# Patient Record
Sex: FEMALE | Race: WHITE | HISPANIC OR LATINO | Employment: PART TIME | URBAN - METROPOLITAN AREA
[De-identification: names, ages, dates, MRNs, and addresses within clinical notes are randomized per-mention and may not be internally consistent; named-entity substitution may affect disease eponyms.]

---

## 2017-02-14 ENCOUNTER — ALLSCRIPTS OFFICE VISIT (OUTPATIENT)
Dept: OTHER | Facility: OTHER | Age: 37
End: 2017-02-14

## 2017-02-14 ENCOUNTER — TRANSCRIBE ORDERS (OUTPATIENT)
Dept: ADMINISTRATIVE | Facility: HOSPITAL | Age: 37
End: 2017-02-14

## 2017-02-14 DIAGNOSIS — E53.8 DEFICIENCY OF OTHER SPECIFIED B GROUP VITAMINS: ICD-10-CM

## 2017-02-14 DIAGNOSIS — G35 MULTIPLE SCLEROSIS (HCC): Primary | ICD-10-CM

## 2017-02-14 DIAGNOSIS — E55.9 VITAMIN D DEFICIENCY: ICD-10-CM

## 2017-02-14 DIAGNOSIS — G35 MULTIPLE SCLEROSIS (HCC): ICD-10-CM

## 2017-02-25 ENCOUNTER — APPOINTMENT (OUTPATIENT)
Dept: LAB | Facility: HOSPITAL | Age: 37
End: 2017-02-25
Payer: COMMERCIAL

## 2017-02-25 DIAGNOSIS — E55.9 VITAMIN D DEFICIENCY: ICD-10-CM

## 2017-02-25 DIAGNOSIS — Z79.899 OTHER LONG TERM (CURRENT) DRUG THERAPY: ICD-10-CM

## 2017-02-25 DIAGNOSIS — E53.8 DEFICIENCY OF OTHER SPECIFIED B GROUP VITAMINS: ICD-10-CM

## 2017-02-25 DIAGNOSIS — G35 MULTIPLE SCLEROSIS (HCC): ICD-10-CM

## 2017-02-25 LAB
25(OH)D3 SERPL-MCNC: 30.9 NG/ML (ref 30–100)
BASOPHILS # BLD AUTO: 0.01 THOUSANDS/ΜL (ref 0–0.1)
BASOPHILS NFR BLD AUTO: 0 % (ref 0–1)
BUN SERPL-MCNC: 11 MG/DL (ref 5–25)
CREAT SERPL-MCNC: 0.58 MG/DL (ref 0.6–1.3)
EOSINOPHIL # BLD AUTO: 0.09 THOUSAND/ΜL (ref 0–0.61)
EOSINOPHIL NFR BLD AUTO: 2 % (ref 0–6)
ERYTHROCYTE [DISTWIDTH] IN BLOOD BY AUTOMATED COUNT: 13.5 % (ref 11.6–15.1)
GFR SERPL CREATININE-BSD FRML MDRD: >60 ML/MIN/1.73SQ M
HCT VFR BLD AUTO: 37.7 % (ref 34.8–46.1)
HGB BLD-MCNC: 12.5 G/DL (ref 11.5–15.4)
LYMPHOCYTES # BLD AUTO: 1.22 THOUSANDS/ΜL (ref 0.6–4.47)
LYMPHOCYTES NFR BLD AUTO: 25 % (ref 14–44)
MCH RBC QN AUTO: 32.1 PG (ref 26.8–34.3)
MCHC RBC AUTO-ENTMCNC: 33.2 G/DL (ref 31.4–37.4)
MCV RBC AUTO: 97 FL (ref 82–98)
MONOCYTES # BLD AUTO: 0.37 THOUSAND/ΜL (ref 0.17–1.22)
MONOCYTES NFR BLD AUTO: 8 % (ref 4–12)
NEUTROPHILS # BLD AUTO: 3.14 THOUSANDS/ΜL (ref 1.85–7.62)
NEUTS SEG NFR BLD AUTO: 65 % (ref 43–75)
NRBC BLD AUTO-RTO: 0 /100 WBCS
PLATELET # BLD AUTO: 242 THOUSANDS/UL (ref 149–390)
PMV BLD AUTO: 10.5 FL (ref 8.9–12.7)
RBC # BLD AUTO: 3.89 MILLION/UL (ref 3.81–5.12)
VIT B12 SERPL-MCNC: 381 PG/ML (ref 100–900)
WBC # BLD AUTO: 4.83 THOUSAND/UL (ref 4.31–10.16)

## 2017-02-25 PROCEDURE — 82607 VITAMIN B-12: CPT

## 2017-02-25 PROCEDURE — 83918 ORGANIC ACIDS TOTAL QUANT: CPT

## 2017-02-25 PROCEDURE — 82565 ASSAY OF CREATININE: CPT

## 2017-02-25 PROCEDURE — 36415 COLL VENOUS BLD VENIPUNCTURE: CPT

## 2017-02-25 PROCEDURE — 85025 COMPLETE CBC W/AUTO DIFF WBC: CPT

## 2017-02-25 PROCEDURE — 82306 VITAMIN D 25 HYDROXY: CPT

## 2017-02-25 PROCEDURE — 84520 ASSAY OF UREA NITROGEN: CPT

## 2017-02-27 ENCOUNTER — GENERIC CONVERSION - ENCOUNTER (OUTPATIENT)
Dept: OTHER | Facility: OTHER | Age: 37
End: 2017-02-27

## 2017-03-01 LAB — METHYLMALONATE SERPL-SCNC: 141 NMOL/L (ref 0–378)

## 2017-03-04 ENCOUNTER — HOSPITAL ENCOUNTER (OUTPATIENT)
Dept: MRI IMAGING | Facility: HOSPITAL | Age: 37
Discharge: HOME | End: 2017-03-04
Payer: COMMERCIAL

## 2017-03-04 DIAGNOSIS — G35 MULTIPLE SCLEROSIS (HCC): ICD-10-CM

## 2017-03-04 PROCEDURE — A9585 GADOBUTROL INJECTION: HCPCS | Performed by: PHYSICIAN ASSISTANT

## 2017-03-04 PROCEDURE — 72157 MRI CHEST SPINE W/O & W/DYE: CPT

## 2017-03-04 RX ADMIN — GADOBUTROL 10 ML: 604.72 INJECTION INTRAVENOUS at 11:40

## 2017-03-18 ENCOUNTER — HOSPITAL ENCOUNTER (OUTPATIENT)
Dept: MRI IMAGING | Facility: HOSPITAL | Age: 37
Discharge: HOME | End: 2017-03-18
Payer: COMMERCIAL

## 2017-03-18 DIAGNOSIS — G35 MULTIPLE SCLEROSIS (HCC): ICD-10-CM

## 2017-03-18 PROCEDURE — A9585 GADOBUTROL INJECTION: HCPCS | Performed by: PHYSICIAN ASSISTANT

## 2017-03-18 PROCEDURE — 70553 MRI BRAIN STEM W/O & W/DYE: CPT

## 2017-03-18 PROCEDURE — 72156 MRI NECK SPINE W/O & W/DYE: CPT

## 2017-03-18 RX ADMIN — GADOBUTROL 10 ML: 604.72 INJECTION INTRAVENOUS at 11:32

## 2017-03-21 ENCOUNTER — HOSPITAL ENCOUNTER (OUTPATIENT)
Dept: MRI IMAGING | Facility: HOSPITAL | Age: 37
Discharge: HOME | End: 2017-03-21
Payer: COMMERCIAL

## 2017-03-21 DIAGNOSIS — G35 MULTIPLE SCLEROSIS (HCC): ICD-10-CM

## 2017-04-22 ENCOUNTER — TRANSCRIBE ORDERS (OUTPATIENT)
Dept: ADMINISTRATIVE | Facility: HOSPITAL | Age: 37
End: 2017-04-22

## 2017-04-22 ENCOUNTER — LAB (OUTPATIENT)
Dept: LAB | Facility: HOSPITAL | Age: 37
End: 2017-04-22
Attending: PSYCHIATRY & NEUROLOGY
Payer: COMMERCIAL

## 2017-04-22 DIAGNOSIS — G35 MS (MULTIPLE SCLEROSIS) (HCC): ICD-10-CM

## 2017-04-22 DIAGNOSIS — G35 MS (MULTIPLE SCLEROSIS) (HCC): Primary | ICD-10-CM

## 2017-04-22 LAB
ALBUMIN SERPL BCP-MCNC: 3.6 G/DL (ref 3.5–5)
ALP SERPL-CCNC: 71 U/L (ref 46–116)
ALT SERPL W P-5'-P-CCNC: 15 U/L (ref 12–78)
AST SERPL W P-5'-P-CCNC: 14 U/L (ref 5–45)
BASOPHILS # BLD AUTO: 0.01 THOUSANDS/ΜL (ref 0–0.1)
BASOPHILS NFR BLD AUTO: 0 % (ref 0–1)
BILIRUB DIRECT SERPL-MCNC: 0.07 MG/DL (ref 0–0.2)
BILIRUB SERPL-MCNC: 0.2 MG/DL (ref 0.2–1)
EOSINOPHIL # BLD AUTO: 0.06 THOUSAND/ΜL (ref 0–0.61)
EOSINOPHIL NFR BLD AUTO: 1 % (ref 0–6)
ERYTHROCYTE [DISTWIDTH] IN BLOOD BY AUTOMATED COUNT: 13.8 % (ref 11.6–15.1)
HCT VFR BLD AUTO: 37.1 % (ref 34.8–46.1)
HGB BLD-MCNC: 12.4 G/DL (ref 11.5–15.4)
LYMPHOCYTES # BLD AUTO: 1.28 THOUSANDS/ΜL (ref 0.6–4.47)
LYMPHOCYTES NFR BLD AUTO: 18 % (ref 14–44)
MCH RBC QN AUTO: 32.2 PG (ref 26.8–34.3)
MCHC RBC AUTO-ENTMCNC: 33.4 G/DL (ref 31.4–37.4)
MCV RBC AUTO: 96 FL (ref 82–98)
MONOCYTES # BLD AUTO: 0.32 THOUSAND/ΜL (ref 0.17–1.22)
MONOCYTES NFR BLD AUTO: 5 % (ref 4–12)
NEUTROPHILS # BLD AUTO: 5.5 THOUSANDS/ΜL (ref 1.85–7.62)
NEUTS SEG NFR BLD AUTO: 76 % (ref 43–75)
NRBC BLD AUTO-RTO: 0 /100 WBCS
PLATELET # BLD AUTO: 265 THOUSANDS/UL (ref 149–390)
PMV BLD AUTO: 10.3 FL (ref 8.9–12.7)
PROT SERPL-MCNC: 7 G/DL (ref 6.4–8.2)
RBC # BLD AUTO: 3.85 MILLION/UL (ref 3.81–5.12)
WBC # BLD AUTO: 7.17 THOUSAND/UL (ref 4.31–10.16)

## 2017-04-22 PROCEDURE — 36415 COLL VENOUS BLD VENIPUNCTURE: CPT

## 2017-04-22 PROCEDURE — 80076 HEPATIC FUNCTION PANEL: CPT

## 2017-04-22 PROCEDURE — 85025 COMPLETE CBC W/AUTO DIFF WBC: CPT

## 2017-05-17 ENCOUNTER — HOSPITAL ENCOUNTER (EMERGENCY)
Facility: HOSPITAL | Age: 37
Discharge: HOME | End: 2017-05-17
Admitting: EMERGENCY MEDICINE
Payer: COMMERCIAL

## 2017-05-17 VITALS
SYSTOLIC BLOOD PRESSURE: 137 MMHG | HEART RATE: 89 BPM | TEMPERATURE: 98.6 F | OXYGEN SATURATION: 98 % | DIASTOLIC BLOOD PRESSURE: 76 MMHG | RESPIRATION RATE: 16 BRPM

## 2017-05-17 DIAGNOSIS — N89.8 VAGINAL DISCHARGE: Primary | ICD-10-CM

## 2017-05-17 DIAGNOSIS — M79.642 BILATERAL HAND PAIN: ICD-10-CM

## 2017-05-17 DIAGNOSIS — M79.641 BILATERAL HAND PAIN: ICD-10-CM

## 2017-05-17 LAB
BACTERIA UR QL AUTO: ABNORMAL /HPF
BILIRUB UR QL STRIP: NEGATIVE
CLARITY UR: CLEAR
COLOR UR: YELLOW
COLOR, POC: YELLOW
GLUCOSE UR STRIP-MCNC: NEGATIVE MG/DL
HCG UR QL: NEGATIVE
HGB UR QL STRIP.AUTO: ABNORMAL
KETONES UR STRIP-MCNC: NEGATIVE MG/DL
LEUKOCYTE ESTERASE UR QL STRIP: NEGATIVE
NITRITE UR QL STRIP: NEGATIVE
NON-SQ EPI CELLS URNS QL MICRO: ABNORMAL /HPF
PH UR STRIP.AUTO: 6 [PH] (ref 4.5–8)
PROT UR STRIP-MCNC: NEGATIVE MG/DL
RBC #/AREA URNS AUTO: ABNORMAL /HPF
SP GR UR STRIP.AUTO: 1.02 (ref 1–1.03)
UROBILINOGEN UR QL STRIP.AUTO: 1 E.U./DL
WBC #/AREA URNS AUTO: ABNORMAL /HPF

## 2017-05-17 PROCEDURE — 81002 URINALYSIS NONAUTO W/O SCOPE: CPT | Performed by: PHYSICIAN ASSISTANT

## 2017-05-17 PROCEDURE — 87591 N.GONORRHOEAE DNA AMP PROB: CPT | Performed by: PHYSICIAN ASSISTANT

## 2017-05-17 PROCEDURE — 96372 THER/PROPH/DIAG INJ SC/IM: CPT

## 2017-05-17 PROCEDURE — 87491 CHLMYD TRACH DNA AMP PROBE: CPT | Performed by: PHYSICIAN ASSISTANT

## 2017-05-17 PROCEDURE — 81001 URINALYSIS AUTO W/SCOPE: CPT

## 2017-05-17 PROCEDURE — 81025 URINE PREGNANCY TEST: CPT | Performed by: PHYSICIAN ASSISTANT

## 2017-05-17 PROCEDURE — 99283 EMERGENCY DEPT VISIT LOW MDM: CPT

## 2017-05-17 RX ORDER — AZITHROMYCIN 250 MG/1
1000 TABLET, FILM COATED ORAL ONCE
Status: COMPLETED | OUTPATIENT
Start: 2017-05-17 | End: 2017-05-17

## 2017-05-17 RX ADMIN — AZITHROMYCIN MONOHYDRATE 1000 MG: 250 TABLET ORAL at 16:57

## 2017-05-17 RX ADMIN — WATER 250 MG: 1 INJECTION INTRAMUSCULAR; INTRAVENOUS; SUBCUTANEOUS at 16:58

## 2017-05-18 LAB
CHLAMYDIA DNA CVX QL NAA+PROBE: ABNORMAL
N GONORRHOEA DNA GENITAL QL NAA+PROBE: ABNORMAL

## 2017-05-31 ENCOUNTER — ALLSCRIPTS OFFICE VISIT (OUTPATIENT)
Dept: OTHER | Facility: OTHER | Age: 37
End: 2017-05-31

## 2017-05-31 DIAGNOSIS — M54.50 LOW BACK PAIN: ICD-10-CM

## 2017-05-31 DIAGNOSIS — R91.1 SOLITARY PULMONARY NODULE: ICD-10-CM

## 2017-06-10 ENCOUNTER — APPOINTMENT (OUTPATIENT)
Dept: LAB | Facility: HOSPITAL | Age: 37
End: 2017-06-10
Attending: PSYCHIATRY & NEUROLOGY
Payer: COMMERCIAL

## 2017-06-10 ENCOUNTER — TRANSCRIBE ORDERS (OUTPATIENT)
Dept: ADMINISTRATIVE | Facility: HOSPITAL | Age: 37
End: 2017-06-10

## 2017-06-10 DIAGNOSIS — G35 MS (MULTIPLE SCLEROSIS) (HCC): Primary | ICD-10-CM

## 2017-06-10 DIAGNOSIS — G35 MS (MULTIPLE SCLEROSIS) (HCC): ICD-10-CM

## 2017-06-10 LAB
ALBUMIN SERPL BCP-MCNC: 3.6 G/DL (ref 3.5–5)
ALP SERPL-CCNC: 62 U/L (ref 46–116)
ALT SERPL W P-5'-P-CCNC: 18 U/L (ref 12–78)
AST SERPL W P-5'-P-CCNC: 11 U/L (ref 5–45)
BASOPHILS # BLD AUTO: 0.01 THOUSANDS/ΜL (ref 0–0.1)
BASOPHILS NFR BLD AUTO: 0 % (ref 0–1)
BILIRUB DIRECT SERPL-MCNC: 0.06 MG/DL (ref 0–0.2)
BILIRUB SERPL-MCNC: 0.23 MG/DL (ref 0.2–1)
EOSINOPHIL # BLD AUTO: 0.07 THOUSAND/ΜL (ref 0–0.61)
EOSINOPHIL NFR BLD AUTO: 1 % (ref 0–6)
ERYTHROCYTE [DISTWIDTH] IN BLOOD BY AUTOMATED COUNT: 14.5 % (ref 11.6–15.1)
HCT VFR BLD AUTO: 37 % (ref 34.8–46.1)
HGB BLD-MCNC: 12 G/DL (ref 11.5–15.4)
LYMPHOCYTES # BLD AUTO: 1.3 THOUSANDS/ΜL (ref 0.6–4.47)
LYMPHOCYTES NFR BLD AUTO: 21 % (ref 14–44)
MCH RBC QN AUTO: 31.7 PG (ref 26.8–34.3)
MCHC RBC AUTO-ENTMCNC: 32.4 G/DL (ref 31.4–37.4)
MCV RBC AUTO: 98 FL (ref 82–98)
MONOCYTES # BLD AUTO: 0.4 THOUSAND/ΜL (ref 0.17–1.22)
MONOCYTES NFR BLD AUTO: 7 % (ref 4–12)
NEUTROPHILS # BLD AUTO: 4.39 THOUSANDS/ΜL (ref 1.85–7.62)
NEUTS SEG NFR BLD AUTO: 71 % (ref 43–75)
PLATELET # BLD AUTO: 255 THOUSANDS/UL (ref 149–390)
PMV BLD AUTO: 10.3 FL (ref 8.9–12.7)
PROT SERPL-MCNC: 6.9 G/DL (ref 6.4–8.2)
RBC # BLD AUTO: 3.78 MILLION/UL (ref 3.81–5.12)
WBC # BLD AUTO: 6.17 THOUSAND/UL (ref 4.31–10.16)

## 2017-06-10 PROCEDURE — 80076 HEPATIC FUNCTION PANEL: CPT

## 2017-06-10 PROCEDURE — 36415 COLL VENOUS BLD VENIPUNCTURE: CPT

## 2017-06-10 PROCEDURE — 85025 COMPLETE CBC W/AUTO DIFF WBC: CPT

## 2017-06-12 ENCOUNTER — GENERIC CONVERSION - ENCOUNTER (OUTPATIENT)
Dept: OTHER | Facility: OTHER | Age: 37
End: 2017-06-12

## 2017-06-12 ENCOUNTER — APPOINTMENT (OUTPATIENT)
Dept: PHYSICAL THERAPY | Facility: CLINIC | Age: 37
End: 2017-06-12
Payer: COMMERCIAL

## 2017-06-12 DIAGNOSIS — M54.50 LOW BACK PAIN: ICD-10-CM

## 2017-06-12 PROCEDURE — 97161 PT EVAL LOW COMPLEX 20 MIN: CPT

## 2017-06-15 ENCOUNTER — ALLSCRIPTS OFFICE VISIT (OUTPATIENT)
Dept: OTHER | Facility: OTHER | Age: 37
End: 2017-06-15

## 2017-06-21 ENCOUNTER — LAB CONVERSION - ENCOUNTER (OUTPATIENT)
Dept: OTHER | Facility: OTHER | Age: 37
End: 2017-06-21

## 2017-06-21 LAB
INDEX VALUE (HISTORICAL): 0.14
JCV ANTIBODY (HISTORICAL): NEGATIVE

## 2017-08-12 ENCOUNTER — APPOINTMENT (OUTPATIENT)
Dept: LAB | Facility: HOSPITAL | Age: 37
End: 2017-08-12
Attending: PSYCHIATRY & NEUROLOGY
Payer: COMMERCIAL

## 2017-08-12 ENCOUNTER — TRANSCRIBE ORDERS (OUTPATIENT)
Dept: ADMINISTRATIVE | Facility: HOSPITAL | Age: 37
End: 2017-08-12

## 2017-08-12 DIAGNOSIS — G35 MS (MULTIPLE SCLEROSIS) (HCC): Primary | ICD-10-CM

## 2017-08-12 DIAGNOSIS — G35 MS (MULTIPLE SCLEROSIS) (HCC): ICD-10-CM

## 2017-08-12 LAB
ALBUMIN SERPL BCP-MCNC: 3.6 G/DL (ref 3.5–5)
ALP SERPL-CCNC: 58 U/L (ref 46–116)
ALT SERPL W P-5'-P-CCNC: 22 U/L (ref 12–78)
AST SERPL W P-5'-P-CCNC: 15 U/L (ref 5–45)
BASOPHILS # BLD AUTO: 0.01 THOUSANDS/ΜL (ref 0–0.1)
BASOPHILS NFR BLD AUTO: 0 % (ref 0–1)
BILIRUB DIRECT SERPL-MCNC: 0.05 MG/DL (ref 0–0.2)
BILIRUB SERPL-MCNC: 0.2 MG/DL (ref 0.2–1)
EOSINOPHIL # BLD AUTO: 0.07 THOUSAND/ΜL (ref 0–0.61)
EOSINOPHIL NFR BLD AUTO: 2 % (ref 0–6)
ERYTHROCYTE [DISTWIDTH] IN BLOOD BY AUTOMATED COUNT: 13.8 % (ref 11.6–15.1)
HCT VFR BLD AUTO: 37.9 % (ref 34.8–46.1)
HGB BLD-MCNC: 12.4 G/DL (ref 11.5–15.4)
LYMPHOCYTES # BLD AUTO: 1.15 THOUSANDS/ΜL (ref 0.6–4.47)
LYMPHOCYTES NFR BLD AUTO: 24 % (ref 14–44)
MCH RBC QN AUTO: 31.7 PG (ref 26.8–34.3)
MCHC RBC AUTO-ENTMCNC: 32.7 G/DL (ref 31.4–37.4)
MCV RBC AUTO: 97 FL (ref 82–98)
MONOCYTES # BLD AUTO: 0.4 THOUSAND/ΜL (ref 0.17–1.22)
MONOCYTES NFR BLD AUTO: 8 % (ref 4–12)
NEUTROPHILS # BLD AUTO: 3.12 THOUSANDS/ΜL (ref 1.85–7.62)
NEUTS SEG NFR BLD AUTO: 66 % (ref 43–75)
PLATELET # BLD AUTO: 239 THOUSANDS/UL (ref 149–390)
PMV BLD AUTO: 10.4 FL (ref 8.9–12.7)
PROT SERPL-MCNC: 7 G/DL (ref 6.4–8.2)
RBC # BLD AUTO: 3.91 MILLION/UL (ref 3.81–5.12)
WBC # BLD AUTO: 4.75 THOUSAND/UL (ref 4.31–10.16)

## 2017-08-12 PROCEDURE — 85025 COMPLETE CBC W/AUTO DIFF WBC: CPT

## 2017-08-12 PROCEDURE — 36415 COLL VENOUS BLD VENIPUNCTURE: CPT

## 2017-08-12 PROCEDURE — 80076 HEPATIC FUNCTION PANEL: CPT

## 2017-09-26 ENCOUNTER — HOSPITAL ENCOUNTER (EMERGENCY)
Facility: HOSPITAL | Age: 37
Discharge: HOME | End: 2017-09-26
Attending: EMERGENCY MEDICINE | Admitting: EMERGENCY MEDICINE
Payer: COMMERCIAL

## 2017-09-26 ENCOUNTER — APPOINTMENT (EMERGENCY)
Dept: ULTRASOUND IMAGING | Facility: HOSPITAL | Age: 37
End: 2017-09-26
Payer: COMMERCIAL

## 2017-09-26 VITALS
HEART RATE: 72 BPM | DIASTOLIC BLOOD PRESSURE: 72 MMHG | TEMPERATURE: 98.1 F | RESPIRATION RATE: 16 BRPM | BODY MASS INDEX: 34.28 KG/M2 | SYSTOLIC BLOOD PRESSURE: 150 MMHG | OXYGEN SATURATION: 98 % | WEIGHT: 206 LBS

## 2017-09-26 DIAGNOSIS — R10.9 ACUTE ABDOMINAL PAIN: Primary | ICD-10-CM

## 2017-09-26 DIAGNOSIS — N94.6 MENSES PAINFUL: ICD-10-CM

## 2017-09-26 LAB
BACTERIA UR QL AUTO: ABNORMAL /HPF
BILIRUB UR QL STRIP: NEGATIVE
CLARITY UR: CLEAR
COLOR UR: YELLOW
EXT PREG TEST URINE: NEGATIVE
GLUCOSE UR STRIP-MCNC: NEGATIVE MG/DL
HGB UR QL STRIP.AUTO: ABNORMAL
KETONES UR STRIP-MCNC: NEGATIVE MG/DL
LEUKOCYTE ESTERASE UR QL STRIP: NEGATIVE
NITRITE UR QL STRIP: NEGATIVE
NON-SQ EPI CELLS URNS QL MICRO: ABNORMAL /HPF
PH UR STRIP.AUTO: 7 [PH] (ref 4.5–8)
PROT UR STRIP-MCNC: ABNORMAL MG/DL
RBC #/AREA URNS AUTO: ABNORMAL /HPF
SP GR UR STRIP.AUTO: 1.02 (ref 1–1.03)
UROBILINOGEN UR QL STRIP.AUTO: 1 E.U./DL
WBC #/AREA URNS AUTO: ABNORMAL /HPF

## 2017-09-26 PROCEDURE — 99284 EMERGENCY DEPT VISIT MOD MDM: CPT

## 2017-09-26 PROCEDURE — 76830 TRANSVAGINAL US NON-OB: CPT

## 2017-09-26 PROCEDURE — 81001 URINALYSIS AUTO W/SCOPE: CPT

## 2017-09-26 PROCEDURE — 81002 URINALYSIS NONAUTO W/O SCOPE: CPT | Performed by: EMERGENCY MEDICINE

## 2017-09-26 PROCEDURE — 76856 US EXAM PELVIC COMPLETE: CPT

## 2017-09-26 PROCEDURE — 96372 THER/PROPH/DIAG INJ SC/IM: CPT

## 2017-09-26 PROCEDURE — 81025 URINE PREGNANCY TEST: CPT | Performed by: EMERGENCY MEDICINE

## 2017-09-26 RX ORDER — NAPROXEN 250 MG/1
250 TABLET ORAL
Qty: 21 TABLET | Refills: 0 | Status: SHIPPED | OUTPATIENT
Start: 2017-09-26 | End: 2018-02-02

## 2017-09-26 RX ORDER — KETOROLAC TROMETHAMINE 30 MG/ML
15 INJECTION, SOLUTION INTRAMUSCULAR; INTRAVENOUS ONCE
Status: COMPLETED | OUTPATIENT
Start: 2017-09-26 | End: 2017-09-26

## 2017-09-26 RX ORDER — KETOROLAC TROMETHAMINE 30 MG/ML
15 INJECTION, SOLUTION INTRAMUSCULAR; INTRAVENOUS ONCE
Status: DISCONTINUED | OUTPATIENT
Start: 2017-09-26 | End: 2017-09-26

## 2017-09-26 RX ADMIN — KETOROLAC TROMETHAMINE 15 MG: 30 INJECTION, SOLUTION INTRAMUSCULAR at 17:12

## 2017-09-26 NOTE — DISCHARGE INSTRUCTIONS
Abdominal Pain   WHAT YOU NEED TO KNOW:   Abdominal pain can be dull, achy, or sharp  You may have pain in one area of your abdomen, or in your entire abdomen  Your pain may be caused by a condition such as constipation, food sensitivity or poisoning, infection, or a blockage  Abdominal pain can also be from a hernia, appendicitis, or an ulcer  Liver, gallbladder, or kidney conditions can also cause abdominal pain  The cause of your abdominal pain may be unknown  DISCHARGE INSTRUCTIONS:   Return to the emergency department if:   · You have new chest pain or shortness of breath  · You have pulsing pain in your upper abdomen or lower back that suddenly becomes constant  · Your pain is in the right lower abdominal area and worsens with movement  · You have a fever over 100 4°F (38°C) or shaking chills  · You are vomiting and cannot keep food or liquids down  · Your pain does not improve or gets worse over the next 8 to 12 hours  · You see blood in your vomit or bowel movements, or they look black and tarry  · Your skin or the whites of your eyes turn yellow  · You are a woman and have a large amount of vaginal bleeding that is not your monthly period  Contact your healthcare provider if:   · You have pain in your lower back  · You are a man and have pain in your testicles  · You have pain when you urinate  · You have questions or concerns about your condition or care  Follow up with your healthcare provider within 24 hours or as directed:  Write down your questions so you remember to ask them during your visits  Medicines:   · Medicines  may be given to calm your stomach and prevent vomiting or to decrease pain  Ask how to take pain medicine safely  · Take your medicine as directed  Contact your healthcare provider if you think your medicine is not helping or if you have side effects  Tell him of her if you are allergic to any medicine   Keep a list of the medicines, vitamins, and herbs you take  Include the amounts, and when and why you take them  Bring the list or the pill bottles to follow-up visits  Carry your medicine list with you in case of an emergency  © 2017 [address] [employer] [employer] Information is for End User's use only and may not be sold, redistributed or otherwise used for commercial purposes  All illustrations and images included in CareNotes® are the copyrighted property of A D A M , Inc  or [address]  The above information is an  only  It is not intended as medical advice for individual conditions or treatments  Talk to your doctor, nurse or pharmacist before following any medical regimen to see if it is safe and effective for you  Dysmenorrhea   WHAT YOU NEED TO KNOW:   Dysmenorrhea is painful menstrual cramps at or around the time of your monthly period  DISCHARGE INSTRUCTIONS:   Medicines: You may need any of the following:  · NSAIDs  help decrease swelling, pain, and fever  This medicine is available with or without a doctor's order  NSAIDs can cause stomach bleeding or kidney problems in certain people  If you take blood thinner medicine, always ask your healthcare provider if NSAIDs are safe for you  Always read the medicine label and follow directions  · Birth control medicine  may help decrease your pain  This medicine may be birth control pills or an IUD that does not contain copper  · Take your medicine as directed  Contact your healthcare provider if you think your medicine is not helping or if you have side effects  Tell him if you are allergic to any medicine  Keep a list of the medicines, vitamins, and herbs you take  Include the amounts, and when and why you take them  Bring the list or the pill bottles to follow-up visits  Carry your medicine list with you in case of an emergency  Eat low-fat foods: Increase the amount of vegetables and raw seeds you eat   Ask your healthcare provider if you should take vitamin B or magnesium supplements  These will help decrease your pain  Do not eat dairy products or eggs  Apply heat:  Apply heat on your lower abdomen for 20 to 30 minutes every 2 hours for as many days as directed  Heat helps decrease pain and muscle spasms  Manage your stress:  Stress can make your symptoms worse  Try relaxation exercises, such as deep breathing  Exercise regularly:  Ask your healthcare provider about the best exercise plan for you  Exercise can help decrease pain  Keep a record of your pain:  Write down when your pain and periods start and stop  Bring the record with you to your follow-up visits  Do not smoke:  Avoid others who smoke  If you smoke, it is never too late to quit  Smoking can increase your risk for dysmenorrhea  Ask your healthcare provider for information if you need help quitting  Follow up with your healthcare provider or OB-GYN as directed:  Write down your questions so you remember to ask them during your visits  Contact your healthcare provider or OB-GYN if:   · You have anxiety or feel depressed  · Your periods are early, late, or more painful than usual     · You have questions or concerns about your condition or care  Return to the emergency department if:   · You have severe pain  · You have heavy vaginal bleeding and you feel faint  · You have sudden chest pain and trouble breathing  © 2017 [address] [employer]  Information is for End User's use only and may not be sold, redistributed or otherwise used for commercial purposes  All illustrations and images included in CareNotes® are the copyrighted property of [employer]  or Jethro Hamilton  The above information is an  only  It is not intended as medical advice for individual conditions or treatments  Talk to your doctor, nurse or pharmacist before following any medical regimen to see if it is safe and effective for you

## 2017-09-26 NOTE — ED NOTES
Patient transported to [address], [address] [place] [address]  09/26/17 1244

## 2017-09-26 NOTE — ED PROVIDER NOTES
History  Chief Complaint   Patient presents with    Abdominal Pain     Pt reports abdominal pain, started, "Right before I got my period on Friday, but it went away " Pt reports pain returned today  Pt reports hx of right ovarian cyst, pt having b/l lower abdominal pain, that radiates down right leg only  61-year-old male presents for evaluation of sharp crampy lower abdominal pain which started Friday before she got her period  She states the pain was moderate intensity and resolved  She states that she has been having vaginal bleeding which is typical of her normal menses for the past 4 days  She states the pain came back today was rated as moderate intensity, constant, nonradiating, without modifying factors  She is concerned because she does have a known right ovarian cyst   Patient has vaginal discharge, urinary complaints, back/flank pain, fevers, chills, chest pain, shortness of breath, anorexia  Prior to Admission Medications   Prescriptions Last Dose Informant Patient Reported? Taking? Dimethyl Fumarate (TECFIDERA) 240 MG CPDR   Yes No   Sig: Take 240 mg by mouth 2 (two) times a day  aspirin 81 MG tablet   Yes No   Sig: Take 81 mg by mouth daily  Facility-Administered Medications: None       Past Medical History:   Diagnosis Date    MS (multiple sclerosis)     Ovarian cyst        Past Surgical History:   Procedure Laterality Date    KNEE ARTHROSCOPY         History reviewed  No pertinent family history  I have reviewed and agree with the history as documented  Social History   Substance Use Topics    Smoking status: Current Every Day Smoker    Smokeless tobacco: Never Used    Alcohol use Yes      Comment: occasionally        Review of Systems   Constitutional: Negative for activity change, appetite change, fatigue and fever  HENT: Negative for congestion, dental problem, ear pain, rhinorrhea and sore throat  Eyes: Negative for pain and redness     Respiratory: Negative for chest tightness, shortness of breath and wheezing  Cardiovascular: Negative for chest pain and palpitations  Gastrointestinal: Positive for abdominal pain  Negative for blood in stool, constipation, diarrhea, nausea and vomiting  Endocrine: Negative for cold intolerance and heat intolerance  Genitourinary: Positive for vaginal bleeding  Negative for difficulty urinating, dysuria, enuresis, flank pain, frequency, hematuria, vaginal discharge and vaginal pain  Musculoskeletal: Negative for arthralgias and myalgias  Skin: Negative for color change, pallor and rash  Neurological: Negative for weakness and numbness  Hematological: Does not bruise/bleed easily  Psychiatric/Behavioral: Negative for agitation, hallucinations and suicidal ideas  Physical Exam  ED Triage Vitals   Temperature Pulse Respirations Blood Pressure SpO2   09/26/17 1519 09/26/17 1520 09/26/17 1519 09/26/17 1520 09/26/17 1519   98 1 °F (36 7 °C) 72 16 150/72 98 %      Temp Source Heart Rate Source Patient Position - Orthostatic VS BP Location FiO2 (%)   09/26/17 1519 09/26/17 1519 09/26/17 1519 09/26/17 1519 --   Temporal Monitor Sitting Right arm       Pain Score       09/26/17 1519       9           Physical Exam   Constitutional: She is oriented to person, place, and time  She appears well-developed and well-nourished  HENT:   Mouth/Throat: No oropharyngeal exudate  TMs normal bilaterally no pharyngeal erythema no rhinorrhea nontender palpation of sinuses, normal looking turbinates   Eyes: Conjunctivae and EOM are normal    Neck: Normal range of motion  Neck supple  No meningeal signs   Cardiovascular: Normal rate, regular rhythm, normal heart sounds and intact distal pulses  Pulmonary/Chest: Effort normal and breath sounds normal  No respiratory distress  She has no wheezes  She has no rales  She exhibits no tenderness  Abdominal: Soft   Bowel sounds are normal  She exhibits no distension and no mass  There is no tenderness  No hernia  No cvat   Musculoskeletal: Normal range of motion  She exhibits no edema  Lymphadenopathy:     She has no cervical adenopathy  Neurological: She is alert and oriented to person, place, and time  No cranial nerve deficit  Skin: No rash noted  No erythema  No edema   Psychiatric: She has a normal mood and affect  Her behavior is normal    Nursing note and vitals reviewed  ED Medications  Medications   ketorolac (TORADOL) 30 mg/mL injection 15 mg (15 mg Intramuscular Given 9/26/17 1930)       Diagnostic Studies  Labs Reviewed   URINE MICROSCOPIC - Abnormal        Result Value Ref Range Status    RBC, UA 4-10 (*) None Seen, 0-5 /hpf Final    WBC, UA None Seen  None Seen, 0-5, 5-55, 5-65 /hpf Final    Epithelial Cells Occasional  None Seen, Occasional /hpf Final    Bacteria, UA Occasional  None Seen, Occasional /hpf Final   POCT URINALYSIS DIPSTICK - Abnormal    ED URINE MACROSCOPIC - Abnormal     Protein, UA Trace (*) Negative mg/dl Final    Blood, UA Moderate (*) Negative Final    Color, UA Yellow   Final    Clarity, UA Clear   Final    pH, UA 7 0  4 5 - 8 0 Final    Leukocytes, UA Negative  Negative Final    Nitrite, UA Negative  Negative Final    Glucose, UA Negative  Negative mg/dl Final    Ketones, UA Negative  Negative mg/dl Final    Urobilinogen, UA 1 0  0 2, 1 0 E U /dl E U /dl Final    Bilirubin, UA Negative  Negative Final    Specific Gravity, UA 1 025  1 003 - 1 030 Final    Narrative:     CLINITEK RESULT   POCT PREGNANCY, URINE - Normal    EXT PREG TEST UR (Ref: Negative) negative   Final       US pelvis complete w transvaginal   ED Interpretation   1   Normal ovaries without evidence for torsion  2   Probable peritoneal inclusion cyst in the pouch of Pérez,   unchanged from priors   No imaging follow-up needed  Final Result       1  Normal ovaries without evidence for torsion        2   Probable peritoneal inclusion cyst in the pouch of Amando David, unchanged from priors  No imaging follow-up needed  Workstation performed: [id number]             Procedures  Procedures      Phone Contacts  ED Phone Contact    ED Course  ED Course as of Sep 26 1715   Tue Sep 26, 2017   1711 Negative ultrasound  Patient's abdominal pain during menses likely secondary to dysmenorrhea  Will discharged home with NSAIDs, PCP follow-up  MDM  Number of Diagnoses or Management Options  Acute abdominal pain:   Menses painful:   Diagnosis management comments: Abdominal pain in menses with acute worsening on R with known cyst-will do urine dip, urpeg, u/s to r/o torsion, nsaids      CritCare Time    Disposition  Final diagnoses:   Acute abdominal pain   Menses painful     ED Disposition     ED Disposition Condition Comment    Discharge  [facility] discharge to home/self care  Condition at discharge: Good        Follow-up Information     Follow up With Specialties Details Why Apple Bhardwaj MD Family Medicine Schedule an appointment as soon as possible for a visit in 2 days  [address] [place]  [facility]          Patient's Medications   Discharge Prescriptions    NAPROXEN (NAPROSYN) 250 MG TABLET    Take 1 tablet by mouth 3 (three) times a day with meals for 7 days       Start Date: 9/26/2017 End Date: 10/3/2017       Order Dose: 250 mg       Quantity: 21 tablet    Refills: 0     No discharge procedures on file      ED Provider  Electronically Signed by       Marisol Chang MD  09/26/17 5923

## 2017-09-27 ENCOUNTER — GENERIC CONVERSION - ENCOUNTER (OUTPATIENT)
Dept: OTHER | Facility: OTHER | Age: 37
End: 2017-09-27

## 2017-11-09 ENCOUNTER — HOSPITAL ENCOUNTER (EMERGENCY)
Facility: HOSPITAL | Age: 37
Discharge: HOME | End: 2017-11-09
Attending: EMERGENCY MEDICINE | Admitting: EMERGENCY MEDICINE
Payer: COMMERCIAL

## 2017-11-09 VITALS
TEMPERATURE: 97.8 F | RESPIRATION RATE: 18 BRPM | HEART RATE: 77 BPM | DIASTOLIC BLOOD PRESSURE: 61 MMHG | BODY MASS INDEX: 34.45 KG/M2 | OXYGEN SATURATION: 100 % | SYSTOLIC BLOOD PRESSURE: 131 MMHG | WEIGHT: 207 LBS

## 2017-11-09 DIAGNOSIS — R10.2 PELVIC PAIN: Primary | ICD-10-CM

## 2017-11-09 LAB
BACTERIA UR QL AUTO: ABNORMAL /HPF
BILIRUB UR QL STRIP: NEGATIVE
CLARITY UR: CLEAR
COLOR UR: YELLOW
COLOR, POC: YELLOW
EXT PREG TEST URINE: NEGATIVE
GLUCOSE UR STRIP-MCNC: NEGATIVE MG/DL
HGB UR QL STRIP.AUTO: ABNORMAL
KETONES UR STRIP-MCNC: NEGATIVE MG/DL
LEUKOCYTE ESTERASE UR QL STRIP: ABNORMAL
NITRITE UR QL STRIP: NEGATIVE
NON-SQ EPI CELLS URNS QL MICRO: ABNORMAL /HPF
PH UR STRIP.AUTO: 6 [PH] (ref 4.5–8)
PROT UR STRIP-MCNC: NEGATIVE MG/DL
RBC #/AREA URNS AUTO: ABNORMAL /HPF
SP GR UR STRIP.AUTO: 1.02 (ref 1–1.03)
UROBILINOGEN UR QL STRIP.AUTO: 0.2 E.U./DL
WBC #/AREA URNS AUTO: ABNORMAL /HPF

## 2017-11-09 PROCEDURE — 81001 URINALYSIS AUTO W/SCOPE: CPT

## 2017-11-09 PROCEDURE — 81025 URINE PREGNANCY TEST: CPT | Performed by: EMERGENCY MEDICINE

## 2017-11-09 PROCEDURE — 87491 CHLMYD TRACH DNA AMP PROBE: CPT | Performed by: EMERGENCY MEDICINE

## 2017-11-09 PROCEDURE — 99283 EMERGENCY DEPT VISIT LOW MDM: CPT

## 2017-11-09 PROCEDURE — 87591 N.GONORRHOEAE DNA AMP PROB: CPT | Performed by: EMERGENCY MEDICINE

## 2017-11-09 PROCEDURE — 81002 URINALYSIS NONAUTO W/O SCOPE: CPT | Performed by: EMERGENCY MEDICINE

## 2017-11-09 RX ORDER — NAPROXEN 500 MG/1
500 TABLET ORAL 2 TIMES DAILY WITH MEALS
Qty: 20 TABLET | Refills: 0 | Status: SHIPPED | OUTPATIENT
Start: 2017-11-09 | End: 2018-02-02

## 2017-11-09 RX ORDER — TRAMADOL HYDROCHLORIDE 50 MG/1
50 TABLET ORAL EVERY 6 HOURS PRN
Qty: 20 TABLET | Refills: 0 | Status: SHIPPED | OUTPATIENT
Start: 2017-11-09 | End: 2017-11-19

## 2017-11-09 NOTE — DISCHARGE INSTRUCTIONS
Pelvic Pain   WHAT YOU NEED TO KNOW:   Pelvic pain may be caused by a number of conditions, such as irritable bowel syndrome, appendicitis, or constipation  A urinary tract infection, prostate inflammation, menstrual cramps, or kidney stones can also cause pelvic pain  You may have pain on one or both sides of your pelvis  Pelvic pain can develop if you have trauma to your pelvis or if you sit or stand for a long time  DISCHARGE INSTRUCTIONS:   Medicines: You may need any of the following:  · NSAIDs , such as ibuprofen, help decrease swelling, pain, and fever  This medicine is available with or without a doctor's order  NSAIDs can cause stomach bleeding or kidney problems in certain people  If you take blood thinner medicine, always ask your healthcare provider if NSAIDs are safe for you  Always read the medicine label and follow directions  · Prescription pain medicine  may be given  Ask how to take this medicine safely  · Birth control medicines  may help decrease pain in women  · Take your medicine as directed  Contact your healthcare provider if you think your medicine is not helping or if you have side effects  Tell him or her if you are allergic to any medicine  Keep a list of the medicines, vitamins, and herbs you take  Include the amounts, and when and why you take them  Bring the list or the pill bottles to follow-up visits  Carry your medicine list with you in case of an emergency  Call 911 for any of the following:   · You have severe chest pain and sudden trouble breathing  Contact your healthcare provider if:   · You have a fever  · You have nausea, vomiting, or diarrhea  · Your pain does not go away, or it gets worse, even after treatment  · You have numbness in your legs or toes  · You have heavy or unusual vaginal bleeding  · You have questions or concerns about your condition or care  Manage your pelvic pain:   · Keep a pain record    Write down when your pain happens and how severe it is  Include any other symptoms you have with your pain  A record will help you keep track of pain cycles  Bring the record with you to your follow-up visits  It may also help your healthcare provider find out what is causing your pain  · Learn ways to relax  Deep breathing, meditation, and relaxation techniques can help decrease your pain  When you are tense, your pain may increase  · Treat or prevent constipation  Drink liquids as directed  You may need to drink more liquid than usual  Ask your healthcare provider how much liquid to drink each day  Eat high-fiber foods  High-fiber foods include fruit, vegetables, whole-grain breads and cereals, and beans  You may need to change the foods you eat if you have irritable bowel syndrome  Follow up with your healthcare provider as directed: You may need physical therapy  You may need to see an orthopedic specialist  Write down your questions so you remember to ask them during your visits  © 2017 [address] [employer]  Information is for End User's use only and may not be sold, redistributed or otherwise used for commercial purposes  All illustrations and images included in CareNotes® are the copyrighted property of A D A M , Inc  or Jethro Hamilton  The above information is an  only  It is not intended as medical advice for individual conditions or treatments  Talk to your doctor, nurse or pharmacist before following any medical regimen to see if it is safe and effective for you

## 2017-11-09 NOTE — ED PROVIDER NOTES
History  Chief Complaint   Patient presents with    Pelvic Pain     Reports to have pelvic pain that occurs monthly the week prior to menstual period  Also reports yellow, bloody vaginal discharge  C/o pelvic pain every month about a week before her period  Ob-gyn told her that she might  Have endometriosis  She has an appt  With ob-gyn next week and had an ultrasound last week  +mild vaginal discharge  Pt  Is sexually active , doesn't always use condoms  No fevers, no n/v    Pt  Is using naprosyn for pain but it isn't helping  Prior to Admission Medications   Prescriptions Last Dose Informant Patient Reported? Taking? Dimethyl Fumarate (TECFIDERA) 240 MG CPDR   Yes No   Sig: Take 240 mg by mouth 2 (two) times a day  aspirin 81 MG tablet   Yes No   Sig: Take 81 mg by mouth daily  naproxen (NAPROSYN) 250 mg tablet   No No   Sig: Take 1 tablet by mouth 3 (three) times a day with meals for 7 days      Facility-Administered Medications: None       Past Medical History:   Diagnosis Date    MS [facility]multiple sclerosis) ([facility] [address] )     Ovarian cyst        Past Surgical History:   Procedure Laterality Date    KNEE ARTHROSCOPY         History reviewed  No pertinent family history  I have reviewed and agree with the history as documented  Social History   Substance Use Topics    Smoking status: Current Every Day Smoker    Smokeless tobacco: Never Used    Alcohol use Yes      Comment: occasionally        Review of Systems   Constitutional: Negative for appetite change, fatigue and fever  HENT: Negative for rhinorrhea and sore throat  Respiratory: Negative for cough, shortness of breath and wheezing  Cardiovascular: Negative for chest pain and leg swelling  Gastrointestinal: Negative for abdominal pain, diarrhea and vomiting  Genitourinary: Positive for pelvic pain and vaginal discharge  Negative for dysuria, flank pain and vaginal bleeding     Musculoskeletal: Negative for back pain and neck pain  Skin: Negative for rash  Neurological: Negative for syncope and headaches  Psychiatric/Behavioral:        Mood normal       Physical Exam  ED Triage Vitals [11/09/17 1211]   Temperature Pulse Respirations Blood Pressure SpO2   97 8 °F (36 6 °C) 77 18 131/61 100 %      Temp Source Heart Rate Source Patient Position - Orthostatic VS BP Location FiO2 (%)   Temporal -- -- -- --      Pain Score       7           Orthostatic Vital Signs  Vitals:    11/09/17 1211   BP: 131/61   Pulse: 77       Physical Exam   Constitutional: She is oriented to person, place, and time  She appears well-developed and well-nourished  HENT:   Head: Normocephalic and atraumatic  Neck: Normal range of motion  Neck supple  Cardiovascular: Normal rate and regular rhythm  Pulmonary/Chest: Effort normal and breath sounds normal    Abdominal: Soft  There is no tenderness  Genitourinary:   Genitourinary Comments: Cervix is not reddened, mild discharge present, no uterine /ovarian tenderness, cultures neg  No CMT   Musculoskeletal: Normal range of motion  Neurological: She is alert and oriented to person, place, and time  Skin: Skin is warm and dry  Nursing note and vitals reviewed  ED Medications  Medications - No data to display    Diagnostic Studies  Results Reviewed     Procedure Component Value Units Date/Time    Chlamydia/GC amplified DNA by PCR [[id number]] Collected:  11/09/17 1334    Lab Status:   In process Specimen:  Genital from Vaginal Updated:  11/09/17 1337    Urine Microscopic [[id number]]  (Abnormal) Collected:  11/09/17 1339    Lab Status:  Final result Specimen:  Urine from Urine, Clean Catch Updated:  11/09/17 1255     RBC, UA None Seen /hpf      WBC, UA 1-2 (A) /hpf      Epithelial Cells Occasional /hpf      Bacteria, UA Occasional /hpf     POCT urinalysis dipstick [[id number]]  (Abnormal) Resulted:  11/09/17 1231    Lab Status:  Final result Updated:  11/09/17 1231     Color, UA yellow POCT pregnancy, urine [[id number]]  (Normal) Resulted:  11/09/17 1231    Lab Status:  Final result Updated:  11/09/17 1231     EXT PREG TEST UR (Ref: Negative) negative    ED Urine Macroscopic [[id number]]  (Abnormal) Collected:  11/09/17 1339    Lab Status:  Final result Specimen:  Urine Updated:  11/09/17 1224     Color, UA Yellow     Clarity, UA Clear     pH, UA 6 0     Leukocytes, UA Trace (A)     Nitrite, UA Negative     Protein, UA Negative mg/dl      Glucose, UA Negative mg/dl      Ketones, UA Negative mg/dl      Urobilinogen, UA 0 2 E U /dl      Bilirubin, UA Negative     Blood, UA Moderate (A)     Specific [place], UA 1 025    Narrative:       CLINITEK RESULT                 No orders to display              Procedures  Procedures       Phone Contacts  ED Phone Contact    ED Course  ED Course                                MDM  Number of Diagnoses or Management Options  Pelvic pain:      Amount and/or Complexity of Data Reviewed  Clinical lab tests: ordered and reviewed    Risk of Complications, Morbidity, and/or Mortality  Presenting problems: moderate      CritCare Time    Disposition  Final diagnoses:   Pelvic pain     Time reflects when diagnosis was documented in both MDM as applicable and the Disposition within this note     Time User Action Codes Description Comment    11/9/2017  1:28 PM Laith Bowling Add [R10 2] Pelvic pain       ED Disposition     ED Disposition Condition Comment    Discharge  Reynold Hernandez discharge to home/self care      Condition at discharge: Stable        Follow-up Information     Follow up With Specialties Details Why Abner Cruz MD [facility]   [address] [address]  [address] [place] [address]   or any ob-gyn dr Xenia Orellana  [phone]        Discharge Medication List as of 11/9/2017  1:29 PM      START taking these medications    Details   traMADol Maykel Ospina) 50 mg tablet Take 1 tablet by mouth every 6 (six) hours as needed for moderate pain for up to 10 days, Starting Thu 11/9/2017, Until Sun 11/19/2017, Print         CONTINUE these medications which have CHANGED    Details   naproxen (NAPROSYN) 500 mg tablet Take 1 tablet by mouth 2 (two) times a day with meals, Starting Thu 11/9/2017, Print         CONTINUE these medications which have NOT CHANGED    Details   aspirin 81 MG tablet Take 81 mg by mouth daily  , Until Discontinued, Historical Med      Dimethyl Fumarate (TECFIDERA) 240 MG CPDR Take 240 mg by mouth 2 (two) times a day , Until Discontinued, Historical Med           No discharge procedures on file      ED Provider  Electronically Signed by           Ivonne Nunez MD  11/09/17 1893

## 2017-11-10 LAB
CHLAMYDIA DNA CVX QL NAA+PROBE: NORMAL
N GONORRHOEA DNA GENITAL QL NAA+PROBE: NORMAL

## 2017-11-27 ENCOUNTER — ALLSCRIPTS OFFICE VISIT (OUTPATIENT)
Dept: OTHER | Facility: OTHER | Age: 37
End: 2017-11-27

## 2017-11-29 NOTE — PROGRESS NOTES
Assessment    1  Multiple sclerosis (340) (G35)  2  Fatigue (780 79) (R53 83)  3  Bilateral hand numbness (782 0) (R20 0)    Discussion/Summary  Discussion Summary:   Pt here for neuro follow upjust found out she is now about 5 weeks pregnantwas having abdominal pain and had endo scan with some bleeding post procedure with spottingthinks she was pregnant at time of scanis now following with new ob in [place] but may be going back to ob in [facility] now off tecfidera, and gabapentinstopped meds about 3 weeks ago at the 2 week kayla during time frame of gyn eval for abdominal paineven went to ER due to the intial painhas 3 children at homeis getting her ultrasound on [place] told there is a risk for miscarriage due to procedure 1/3 riskto call if any clinical changesaware to remain off tecfiderawill have better timing of pregnancy after the ultrasounddue date was done [facility]  Counseling Documentation With Imm: The patient was counseled regarding diagnostic results,-- instructions for management,-- risk factor reductions,-- prognosis,-- patient and family education,-- risks and benefits of treatment options  Goals and Barriers: The patient has the current Goals: Call for any new sxs  Patient's Capacity to Self-Care: Patient is able to Self-Care  Patient Education: Educational resources provided:   Medication SE Review and Pt Understands Tx: Possible side effects of new medications were reviewed with the patient/guardian today  Patient Guardian understands agrees: The treatment plan was reviewed with the patient/guardian  The patient/guardian understands and agrees with the treatment plan      Chief Complaint  Chief Complaint Free Text Note Form: Patient is here for follow up MS  History of Present Illness  HPI: Jeremy Paniagua returns today for follow-up of MS   To review, she is a 41 yo female with PMH of migraines, depression and anxiety  Pt notes sxs began in January 2015, she had a work injury to the left shoulder  She noted in March 2015, she had left t numbness down her torso, down into her feet and legs  She also experienced urinary leakage without Valsalva  Pt then experienced right eye pain and fogginess of vision  She was seen by Dr Shreyas Oviedo and had an OCT done  It revealed normal findings on the right but on the left a sector defect  MRI of the brain from May 13, 2015 revealed a few scattered nonspecific supratentorial WM lesions  No acute infarction, or hemorrhage or enh  MRI of the lumbar spine without contrast revealed small L5/S1 disc herniation, MRI of the T-spine revealed evidence of solitary 8mm lesion to the left of midline at T8/9 in the dorsal Cord  No pathological enh  MRI of the C-spine also done on June 13, 2015 revealed a solitary 5 mm focus of active demyelination in the right lateral cord at C2  Pt was given 3 days of IV steroids and tolerated ok  Pt with significant GI upset with the steroids and also some mild change in mood with increased anxiety  She was NMO negative and Lyme negative  She did not have an LP  MRA of the head was done due to a history of a sister with cerebral aneurysm  This was normal  She was placed on Copaxone  Back in Oct 2015 she was off her Copaxone for about 3 months due to insurance issues  She had increased symptoms during that time with banding and bladder issues, and she did receive steroids with minimal relief  Updated MRI Thoracic Jan 2016, No abnormal enhancement  Persistent small left posterior T8-9 spinal cord lesion suspicious for MS  No pathologic enhancement  Findings are stable  MRI Lumbar Jan 2016 No abnormal enhancement  Persistent tiny thin broad left central disc protrusion L5-S1 with left S1 nerve root contiguity, but without nerve root displacement, compression or edema, Persistent mild degenerative facet hypertrophy L4-5  She went back on her Copaxone TIW   In May 2016, patient stopped Copxone due to increasing site reactions, and she initiated Tecfidera on 5/6/16  She had some GI issues at first, but then tolerated fairly well last seen in June 2017  She is now off the tecfidera due to unexpected pregnancy  pt just found out she is now about 5 weeks pregnant  pt was having abdominal pain and had endo scan with some bleeding post procedure with spotting  pt thinks she was pregnant at time of scan  pt is now following with new ob in [place] but may be going back to ob in [facility]  pt is now off tecfidera, and gabapentin  she stopped meds about 3 weeks ago at the 2 week kayla during time frame of gyn eval for abdominal pain  pt even went to ER due to the intial pain  pt has 3 children at home  pt is getting her ultrasound on dec 8th  pt was told there is a risk for miscarriage due to procedure 1/3 risk  no new MS sxs  pt aware to remain off tecfidera  pt will have better timing of pregnancy after the ultrasound  per pt doing a pregnancy phoebe, her expected due date is July 25th  extended appt to rev pregnancy and MS  no new sxs  no falls or trips  no change in bowel or bladder  no recent infections  pt notes occ int tingling in her hands  She had updated imaging in May 2016, stable c-spine and stable brain MRIs  pt notes some increased fatigue with her first trimester but overall doing ok  re rev last updated MRI brain, c-spine and t-spine in March 2017 and all stable  No new or enhancing lesions in any area  She continues with bladder urgency and has also had some bowel urgency  Denies vision changes  No falls  No trouble with speech or swallowing  Review of Systems  ros rev with pt at appt Neurological ROS:  Constitutional: fatigue  HEENT:  no sinus problems, not feeling congested, no blurred vision, no dryness of the eyes, no eye pain, no hearing loss, no tinnitus, no mouth sores, no sore throat, no hoarseness, no dysphagia, no masses, no bleeding    Cardiovascular:  no chest pain or pressure, no palpitations present, the heart rate was not rapid or irregular, no swelling in the arms or legs, no poor circulation  Respiratory:  no unusual or persistant cough, no shortness of breath with or without exertion  Genitourinary: feelings of urinary urgency  Musculoskeletal: head/neck/back pain  Integumentary  no masses, no rash, no skin lesions, no livedo reticularis  Psychiatric: anxiety,-- depression-- and-- mood swings  Endocrine  no unusual weight loss or gain, no excessive urination, no excessive thirst, no hair loss or gain, no hot or cold intolerance, no menstrual period change or irregularity, no loss of sexual ability or drive, no erection difficulty, no nipple discharge  Hematologic/Lymphatic: a tendency for easy bruising  Neurological General: headache,-- increased sleepiness-- and-- waking up at night  Neurological Mental Status:  no confusion, no mood swings, no alteration or loss of consciousness, no difficulty expressing/understanding speech, no memory problems  Neurological Cranial Nerves:  no blurry or double vision, no loss of vision, no face drooping, no facial numbness or weakness, no taste or smell loss/changes, no hearing loss or ringing, no vertigo or dizziness, no dysphagia, no slurred speech  Neurological Motor findings include: twitching  Neurological Coordination:  no unsteadiness, no vertigo or dizziness, no clumsiness, no problems reaching for objects  Neurological Sensory: numbness-- and-- tingling  Neurological Gait:  no difficulty walking, not falling to one side, no sensation of being pushed, has not had falls  Active Problems  1  Abnormal MRI of head (793 0) (R93 0)  2  Anxiety (300 00) (F41 9)  3  Bilateral hand numbness (782 0) (R20 0)  4  Blurred vision, right eye (368 8) (H53 8)  5  Cervical cancer screening (V76 2) (Z12 4)  6  Chronic bilateral low back pain without sciatica (724 2,338 29) (M54 5,G89 29)  7   Contraception (V25 9) (Z30 9)  8  Eczema (692 9) (L30 9)  9  Fatigue (780 79) (R53 83)  10  Generalized abdominal pain (789 07) (R10 84)  11  Long term use of drug (V58 69) (Z79 899)  12  Lumbar disc disease (722 93) (M51 9)  13  Lung nodule seen on imaging study (793 11) (R91 1)  14  Multiple sclerosis (340) (G35)  15  Numbness in left leg (782 0) (R20 0)  16  Obesity (278 00) (E66 9)  17  Vitamin B12 deficiency (266 2) (E53 8)  18  Vitamin D deficiency (268 9) (E55 9)    Past Medical History    1  History of Acute sinusitis (461 9) (J01 90)  2  Acute upper respiratory infection (465 9) (J06 9)  3  History of Herniated lumbar intervertebral disc (722 10) (M51 26)  4  History of Herpes zoster (053 9) (B02 9)  5  History of acute pharyngitis (V12 69) (Z87 09)  6  History of urinary tract infection (V13 02) (Z87 440)  7  History of Major depressive disorder, recurrent (296 30) (F33 9)  8  History of Obesity complicating pregnancy, childbirth, or puerperium, antepartum (649 13,278 00) (O99 210,E66 9)  9  History of Panic Disorder Without Agoraphobia (300 01)  10  History of Tobacco use disorder complicating pregnancy, childbirth, or the puerperium,  antepartum condition or complication (366 34) (O20 524)  Active Problems And Past Medical History Reviewed: The active problems and past medical history were reviewed and updated today  Surgical History  1  History of Knee Arthroscopy (Therapeutic)  Surgical History Reviewed: The surgical history was reviewed and updated today  Family History  Mother   1  Family history of Hypertension  Father   2  Family history of macular degeneration (V19 19) (Z83 518)  3  Family history of Hypertension  Family History   4  Family history of glaucoma (V19 11) (Z83 511)  5  Family history of macular degeneration (V19 19) (Z83 518)  6  Family history of Hypertension (V17 49)  Family History Reviewed: The family history was reviewed and updated today         Social History     · Alcohol Use (History)   · Current Every Day Smoker (305 1)   · Denied: History of Drug use   · Educational Level - Has High School Diploma   · Marital History - Single   · Occupation:   · Social alcohol use (Z78 9)  Social History Reviewed: The social history was reviewed and updated today  Current Meds  1  Amantadine HCl - 100 MG Oral Capsule; TAKE 1 CAPSULE TWICE DAILY; Therapy: 25Obg3466 to (Evaluate:13Oct2017)  Requested for: 83DMS3224; Last Rx:15Jun2017 Ordered  2  Fish Oil 1000 MG Oral Capsule; Therapy: (Recorded:15Jun2017) to Recorded  3  Gabapentin 300 MG Oral Capsule; 1 BID; Therapy: 74CRE5092 to (Evaluate:25Jan2016)  Requested for: 08LUA2832; Last Rx:27Oct2015; Status: ACTIVE - Renewal Denied Ordered  4  Gabapentin 300 MG Oral Capsule; take 2 caps AM, 1 cap noon, and 1 cap PM; Therapy: 18CQU6478 to (Evaluate:53Fts1677)  Requested for: 93ZMP7359; Last Rx:15Jun2017 Ordered  5  Naproxen 500 MG Oral Tablet; TAKE 1 TABLET EVERY 12 HOURS WITH FOOD AS NEEDED; Therapy: 02HZS3561 to (Evaluate:53Zck7452)  Requested for: 63UFE9646; Last Rx:55Ebh5655 Ordered  6  Tecfidera 240 MG Oral Capsule Delayed Release; Take 1 capsule twice daily  Requested for: 24UUO6856; Last Rx:03Nov2017 Ordered  7  Vitamin B-12 1000 MCG Oral Tablet; Therapy: (Recorded:31Mar2016) to Recorded  8  Vitamin D3 5000 UNIT Oral Capsule; Therapy: (Recorded:27Oct2016) to Recorded  Medication List Reviewed: The medication list was reviewed and updated today  Allergies    1  No Known Drug Allergies    Vitals  Signs   Recorded: 27Nov2017 11:00AM   Heart Rate: 72  Systolic: 820  Diastolic: 60  Height: 5 ft 5 in  Weight: 206 lb 4 oz  BMI Calculated: 34 32  BSA Calculated: 2    Physical Exam   Constitutional  General appearance: No acute distress, well appearing and well nourished  Constitutional  General Appearance: Appears appropriate for age, healthy, well developed, appropriately groomed and appropriately dressed -- distal pulses intact madhu    Eyes Ophthalmoscopic examination: Vision is grossly normal  Gross visual field testing by confrontation shows no abnormalities  EOMI in both eyes  Conjunctivae clear  Eyelids normal palpebral fissures equal  Orbits exhibit normal position  No discharge from the eyes  PERRL  Cardiovascular  Peripheral vascular exam: Normal pulses throughout, no tenderness, erythema or swelling  Musculoskeletal  Gait and station: Abnormal  -- wide based, mild antalgic  Muscle strength: Normal strength throughout  Muscle tone: No atrophy, abnormal movements, flaccidity, cogwheeling or spasticity  Involuntary movements: None observed  Neurologic  Mental Status: Mood is normal  Affect is normal  Memory is intact  (Concentration) No apparent agnosia present  Thought process appears clear and appropriate  Motor System: No pronator drift  Upper Extremities:Normal to inspection  No tenderness over the upper extremities bilaterally  No instability bilaterally  Strength: Motor strength is 5/5 bilaterally  Normal muscle tone bilaterally  Muscle bulk: Muscle bulk is normal bilaterally  Full ROM bilaterally  Lower Extremities: Normal to inspection and palpation  No tenderness of the lower extrimities bilaterally  Exhibits no instability bilaterally  Strength: Motor strength is 5/5 bilaterally  Normal muscle tone bilaterally  Muscle Bulk: Muscle bulk is normal bilaterally  Full ROM bilaterally  Reflexes: DTR's are normal and symmetric bilaterally  Babinski's reflex is negative bilaterally  No pathologic ankle clonus   Coordination: Cerebellum function intact  No involuntary movement or psychomotor activity  Sensation: Abnormal  -- decreased vibration left LE  Cortical function: Normal    Judgment and insight: Normal    Cranial Nerve Exam  II: Normal with no deficit  -- disc flat  III,IV, VI:Normal with no deficit   V: Tamiko with no deficitl  VII: Normal with no deficit  VIII: Normal with no deficit     IV: Normal with no deficit  X: Normal with no deficit  XI: Normal with no deficit  XII: Normal with no deficit  Constitutional  General appearance: No acute distress, well appearing and well nourished  Eyes  Conjunctiva and lids: No erythema, swelling or discharge  Recent and remote memory: Intact  Mood and affect: Normal        Results/Data  Diagnostic Studies Reviewed: I personally reviewed the films/images/results in the office today  My interpretation follows  Diagnostic Review see hpi        Future Appointments    Date/Time Provider Specialty Site   01/25/2018 11:00 AM Avis Starr [facility] Neurology [facility] [address]       Signatures   Electronically signed by : ANDREW Ramirez ; Nov 28 2017  6:24AM EST                       (Author)    Electronically signed by : ANDREW Ramirez ; Nov 28 2017  8:36AM EST                       (Author)

## 2017-12-13 ENCOUNTER — GENERIC CONVERSION - ENCOUNTER (OUTPATIENT)
Dept: OTHER | Facility: OTHER | Age: 37
End: 2017-12-13

## 2017-12-13 ENCOUNTER — HOSPITAL ENCOUNTER (EMERGENCY)
Facility: HOSPITAL | Age: 37
Discharge: HOME | End: 2017-12-13
Attending: EMERGENCY MEDICINE | Admitting: EMERGENCY MEDICINE
Payer: COMMERCIAL

## 2017-12-13 VITALS
OXYGEN SATURATION: 99 % | TEMPERATURE: 97.5 F | HEART RATE: 75 BPM | RESPIRATION RATE: 18 BRPM | DIASTOLIC BLOOD PRESSURE: 57 MMHG | BODY MASS INDEX: 34.45 KG/M2 | WEIGHT: 207 LBS | SYSTOLIC BLOOD PRESSURE: 120 MMHG

## 2017-12-13 DIAGNOSIS — R10.9 ABDOMINAL PAIN DURING PREGNANCY: Primary | ICD-10-CM

## 2017-12-13 DIAGNOSIS — O26.899 ABDOMINAL PAIN DURING PREGNANCY: Primary | ICD-10-CM

## 2017-12-13 LAB
ABO GROUP BLD: NORMAL
ALBUMIN SERPL BCP-MCNC: 3.4 G/DL (ref 3.5–5)
ALP SERPL-CCNC: 59 U/L (ref 46–116)
ALT SERPL W P-5'-P-CCNC: 16 U/L (ref 12–78)
ANION GAP SERPL CALCULATED.3IONS-SCNC: 7 MMOL/L (ref 4–13)
AST SERPL W P-5'-P-CCNC: 14 U/L (ref 5–45)
B-HCG SERPL-ACNC: ABNORMAL MIU/ML
BACTERIA UR QL AUTO: NORMAL /HPF
BASOPHILS # BLD AUTO: 0.01 THOUSANDS/ΜL (ref 0–0.1)
BASOPHILS NFR BLD AUTO: 0 % (ref 0–1)
BILIRUB SERPL-MCNC: 0.17 MG/DL (ref 0.2–1)
BILIRUB UR QL STRIP: NEGATIVE
BLD GP AB SCN SERPL QL: NEGATIVE
BUN SERPL-MCNC: 8 MG/DL (ref 5–25)
CALCIUM SERPL-MCNC: 9.5 MG/DL (ref 8.3–10.1)
CHLAMYDIA DNA CVX QL NAA+PROBE: NORMAL
CHLORIDE SERPL-SCNC: 103 MMOL/L (ref 100–108)
CLARITY UR: CLEAR
CO2 SERPL-SCNC: 27 MMOL/L (ref 21–32)
COLOR UR: YELLOW
CREAT SERPL-MCNC: 0.43 MG/DL (ref 0.6–1.3)
EOSINOPHIL # BLD AUTO: 0.13 THOUSAND/ΜL (ref 0–0.61)
EOSINOPHIL NFR BLD AUTO: 2 % (ref 0–6)
ERYTHROCYTE [DISTWIDTH] IN BLOOD BY AUTOMATED COUNT: 13.7 % (ref 11.6–15.1)
GFR SERPL CREATININE-BSD FRML MDRD: 131 ML/MIN/1.73SQ M
GLUCOSE SERPL-MCNC: 84 MG/DL (ref 65–140)
GLUCOSE UR STRIP-MCNC: NEGATIVE MG/DL
HCT VFR BLD AUTO: 32.3 % (ref 34.8–46.1)
HGB BLD-MCNC: 11.1 G/DL (ref 11.5–15.4)
HGB UR QL STRIP.AUTO: ABNORMAL
HYALINE CASTS #/AREA URNS LPF: NORMAL /LPF
KETONES UR STRIP-MCNC: NEGATIVE MG/DL
LEUKOCYTE ESTERASE UR QL STRIP: NEGATIVE
LYMPHOCYTES # BLD AUTO: 1.73 THOUSANDS/ΜL (ref 0.6–4.47)
LYMPHOCYTES NFR BLD AUTO: 23 % (ref 14–44)
MCH RBC QN AUTO: 32.3 PG (ref 26.8–34.3)
MCHC RBC AUTO-ENTMCNC: 34.4 G/DL (ref 31.4–37.4)
MCV RBC AUTO: 94 FL (ref 82–98)
MONOCYTES # BLD AUTO: 0.54 THOUSAND/ΜL (ref 0.17–1.22)
MONOCYTES NFR BLD AUTO: 7 % (ref 4–12)
N GONORRHOEA DNA GENITAL QL NAA+PROBE: NORMAL
NEUTROPHILS # BLD AUTO: 5.03 THOUSANDS/ΜL (ref 1.85–7.62)
NEUTS SEG NFR BLD AUTO: 68 % (ref 43–75)
NITRITE UR QL STRIP: NEGATIVE
NON-SQ EPI CELLS URNS QL MICRO: NORMAL /HPF
NRBC BLD AUTO-RTO: 0 /100 WBCS
PH UR STRIP.AUTO: 7 [PH] (ref 4.5–8)
PLATELET # BLD AUTO: 272 THOUSANDS/UL (ref 149–390)
PMV BLD AUTO: 9.4 FL (ref 8.9–12.7)
POTASSIUM SERPL-SCNC: 3.9 MMOL/L (ref 3.5–5.3)
PROT SERPL-MCNC: 7 G/DL (ref 6.4–8.2)
PROT UR STRIP-MCNC: NEGATIVE MG/DL
RBC # BLD AUTO: 3.44 MILLION/UL (ref 3.81–5.12)
RBC #/AREA URNS AUTO: NORMAL /HPF
RH BLD: POSITIVE
SODIUM SERPL-SCNC: 137 MMOL/L (ref 136–145)
SP GR UR STRIP.AUTO: 1.01 (ref 1–1.03)
SPECIMEN EXPIRATION DATE: NORMAL
UROBILINOGEN UR QL STRIP.AUTO: 0.2 E.U./DL
WBC # BLD AUTO: 7.45 THOUSAND/UL (ref 4.31–10.16)
WBC #/AREA URNS AUTO: NORMAL /HPF

## 2017-12-13 PROCEDURE — 86901 BLOOD TYPING SEROLOGIC RH(D): CPT | Performed by: EMERGENCY MEDICINE

## 2017-12-13 PROCEDURE — 87491 CHLMYD TRACH DNA AMP PROBE: CPT | Performed by: EMERGENCY MEDICINE

## 2017-12-13 PROCEDURE — 87591 N.GONORRHOEAE DNA AMP PROB: CPT | Performed by: EMERGENCY MEDICINE

## 2017-12-13 PROCEDURE — 99284 EMERGENCY DEPT VISIT MOD MDM: CPT

## 2017-12-13 PROCEDURE — 85025 COMPLETE CBC W/AUTO DIFF WBC: CPT | Performed by: EMERGENCY MEDICINE

## 2017-12-13 PROCEDURE — 84702 CHORIONIC GONADOTROPIN TEST: CPT | Performed by: EMERGENCY MEDICINE

## 2017-12-13 PROCEDURE — 86850 RBC ANTIBODY SCREEN: CPT | Performed by: EMERGENCY MEDICINE

## 2017-12-13 PROCEDURE — 96374 THER/PROPH/DIAG INJ IV PUSH: CPT

## 2017-12-13 PROCEDURE — 36415 COLL VENOUS BLD VENIPUNCTURE: CPT | Performed by: EMERGENCY MEDICINE

## 2017-12-13 PROCEDURE — 81002 URINALYSIS NONAUTO W/O SCOPE: CPT | Performed by: EMERGENCY MEDICINE

## 2017-12-13 PROCEDURE — 80053 COMPREHEN METABOLIC PANEL: CPT | Performed by: EMERGENCY MEDICINE

## 2017-12-13 PROCEDURE — 81001 URINALYSIS AUTO W/SCOPE: CPT

## 2017-12-13 PROCEDURE — 86900 BLOOD TYPING SEROLOGIC ABO: CPT | Performed by: EMERGENCY MEDICINE

## 2017-12-13 PROCEDURE — 96372 THER/PROPH/DIAG INJ SC/IM: CPT

## 2017-12-13 RX ORDER — ONDANSETRON 4 MG/1
4 TABLET, FILM COATED ORAL EVERY 6 HOURS
Qty: 12 TABLET | Refills: 0 | Status: SHIPPED | OUTPATIENT
Start: 2017-12-13 | End: 2018-02-02

## 2017-12-13 RX ORDER — ACETAMINOPHEN 325 MG/1
650 TABLET ORAL ONCE
Status: COMPLETED | OUTPATIENT
Start: 2017-12-13 | End: 2017-12-13

## 2017-12-13 RX ORDER — ONDANSETRON 2 MG/ML
4 INJECTION INTRAMUSCULAR; INTRAVENOUS ONCE
Status: COMPLETED | OUTPATIENT
Start: 2017-12-13 | End: 2017-12-13

## 2017-12-13 RX ORDER — AZITHROMYCIN 250 MG/1
1000 TABLET, FILM COATED ORAL ONCE
Status: COMPLETED | OUTPATIENT
Start: 2017-12-13 | End: 2017-12-13

## 2017-12-13 RX ADMIN — AZITHROMYCIN 1000 MG: 250 TABLET, FILM COATED ORAL at 15:40

## 2017-12-13 RX ADMIN — LIDOCAINE HYDROCHLORIDE 250 MG: 10 INJECTION, SOLUTION EPIDURAL; INFILTRATION; INTRACAUDAL; PERINEURAL at 15:47

## 2017-12-13 RX ADMIN — ONDANSETRON 4 MG: 2 INJECTION INTRAMUSCULAR; INTRAVENOUS at 13:44

## 2017-12-13 RX ADMIN — ACETAMINOPHEN 650 MG: 325 TABLET, FILM COATED ORAL at 13:43

## 2017-12-13 NOTE — ED PROVIDER NOTES
History  Chief Complaint   Patient presents with    Abdominal Pain Pregnant     Pt is 8 weeks preg and is having increased lower abdominal pain and cramping  Pt states she has b eenhaving brown discharge that has progressed to pink  called obmaikeln was told to come to Ed for evaluation      40  8 week pregnant female comes to the ED for evaluation of pelvic pain with 1 episode pink discharge from the vagina  Patient states that she noticed when she wiped that she had pink discharge from vaginal  Patient denies fever, hematochezia, and dysuria  Patient states her pelvic pain is sharp, intermittent, and made worse with movement  Patient has taken tylenol with minimal relief  Patient states that she also has yellow mucoid discharge intermittently  Patient does have hx of gonorrhea and still is sexually active with the man who transmitted it to her  MDM: I assess for ectopic pregnancy by performing bedside ultrasound  I will assess for UTI by ordering urinalysis  I will also get a cbc and cmp to assess for leukocytosis and transamnitis  I will treat symptomatically with tylenol and zofran  Prior to Admission Medications   Prescriptions Last Dose Informant Patient Reported? Taking? Dimethyl Fumarate (TECFIDERA) 240 MG CPDR   Yes No   Sig: Take 240 mg by mouth 2 (two) times a day  aspirin 81 MG tablet   Yes No   Sig: Take 81 mg by mouth daily  naproxen (NAPROSYN) 250 mg tablet   No No   Sig: Take 1 tablet by mouth 3 (three) times a day with meals for 7 days   naproxen (NAPROSYN) 500 mg tablet   No No   Sig: Take 1 tablet by mouth 2 (two) times a day with meals      Facility-Administered Medications: None       Past Medical History:   Diagnosis Date    MS (multiple sclerosis) ([facility] [address] )     Ovarian cyst        Past Surgical History:   Procedure Laterality Date    KNEE ARTHROSCOPY         History reviewed  No pertinent family history  I have reviewed and agree with the history as documented      Social History   Substance Use Topics    Smoking status: Current Every Day Smoker    Smokeless tobacco: Never Used    Alcohol use Yes      Comment: occasionally        Review of Systems   Constitutional: Negative for appetite change, chills, diaphoresis, fatigue and fever  HENT: Negative for congestion, ear discharge, ear pain, hearing loss, postnasal drip, rhinorrhea, sneezing and sore throat  Eyes: Negative for pain, discharge and redness  Respiratory: Negative for choking, chest tightness, shortness of breath, wheezing and stridor  Cardiovascular: Negative for chest pain and palpitations  Gastrointestinal: Positive for abdominal pain and nausea  Negative for abdominal distention, blood in stool, constipation, diarrhea and vomiting  Genitourinary: Negative for decreased urine volume, difficulty urinating, dysuria, flank pain, frequency and hematuria  Musculoskeletal: Negative for arthralgias, gait problem, joint swelling and neck pain  Skin: Negative for color change, pallor and rash  Allergic/Immunologic: Negative for environmental allergies, food allergies and immunocompromised state  Neurological: Negative for dizziness, seizures, weakness, light-headedness, numbness and headaches  Hematological: Negative for adenopathy  Does not bruise/bleed easily  Psychiatric/Behavioral: Negative for agitation and behavioral problems  Physical Exam  ED Triage Vitals [12/13/17 1140]   Temperature Pulse Respirations Blood Pressure SpO2   97 5 °F (36 4 °C) 77 18 129/60 100 %      Temp Source Heart Rate Source Patient Position - Orthostatic VS BP Location FiO2 (%)   Tympanic Monitor Lying Left arm --      Pain Score       6           Orthostatic Vital Signs  Vitals:    12/13/17 1140 12/13/17 1503   BP: 129/60 120/57   Pulse: 77 75   Patient Position - Orthostatic VS: Lying Lying       Physical Exam   Constitutional: She is oriented to person, place, and time   She appears well-developed and well-nourished  HENT:   Head: Normocephalic and atraumatic  Nose: Nose normal    Mouth/Throat: Oropharynx is clear and moist    Eyes: Conjunctivae and EOM are normal  Pupils are equal, round, and reactive to light  Neck: Normal range of motion  Neck supple  Cardiovascular: Normal rate, regular rhythm and normal heart sounds  Exam reveals no gallop and no friction rub  No murmur heard  Pulmonary/Chest: Effort normal and breath sounds normal    Abdominal: Soft  Bowel sounds are normal  She exhibits no distension  There is tenderness  There is no rebound and no guarding  Genitourinary: Vaginal discharge found  Musculoskeletal: Normal range of motion  Neurological: She is alert and oriented to person, place, and time  She has normal reflexes  Skin: Skin is warm and dry  No erythema  No pallor  Psychiatric: She has a normal mood and affect  Her behavior is normal    Nursing note and vitals reviewed        ED Medications  Medications   acetaminophen (TYLENOL) tablet 650 mg (650 mg Oral Given 12/13/17 1343)   ondansetron (ZOFRAN) injection 4 mg (4 mg Intravenous Given 12/13/17 1344)   cefTRIAXone (ROCEPHIN) 250 mg in lidocaine (PF) (XYLOCAINE-MPF) 1 % IM only syringe (250 mg Intramuscular Given 12/13/17 1547)   azithromycin (ZITHROMAX) tablet 1,000 mg (1,000 mg Oral Given 12/13/17 1540)       Diagnostic Studies  Results Reviewed     Procedure Component Value Units Date/Time    hCG, quantitative [[id number]]  (Abnormal) Collected:  12/13/17 1341    Lab Status:  Final result Specimen:  Blood from Arm, Left Updated:  12/13/17 1446     HCG, Quant 86,449 (H) mIU/mL     Narrative:          Expected Ranges:     Approximate               Approximate HCG  Gestation age          Concentration ( mIU/mL)  _____________          ______________________   Suhail Sers                      HCG values  0 2-1                       5-50  1-2                           2-3                         100-5000  3-4 500-39446  4-5                         1000-36367  5-6                         [id number]  6-8                         48225-844375  8-12                        43948-567636    Comprehensive metabolic panel [[id number]]  (Abnormal) Collected:  12/13/17 1341    Lab Status:  Final result Specimen:  Blood from Arm, Left Updated:  12/13/17 1446     Sodium 137 mmol/L      Potassium 3 9 mmol/L      Chloride 103 mmol/L      CO2 27 mmol/L      Anion Gap 7 mmol/L      BUN 8 mg/dL      Creatinine 0 43 (L) mg/dL      Glucose 84 mg/dL      Calcium 9 5 mg/dL      AST 14 U/L      ALT 16 U/L      Alkaline Phosphatase 59 U/L      Total Protein 7 0 g/dL      Albumin 3 4 (L) g/dL      Total Bilirubin 0 17 (L) mg/dL      eGFR 131 ml/min/1 73sq m     Narrative:         National Kidney Disease Education Program recommendations are as follows:  GFR calculation is accurate only with a steady state creatinine  Chronic Kidney disease less than 60 ml/min/1 73 sq  meters  Kidney failure less than 15 ml/min/1 73 sq  meters  [facility] amplified DNA by PCR [[id number]] Collected:  12/13/17 1343    Lab Status:   In process Specimen:  Genital from Cervix Updated:  12/13/17 1425    Urine Microscopic [[id number]]  (Normal) Collected:  12/13/17 1403    Lab Status:  Final result Specimen:  Urine from Urine, Clean Catch Updated:  12/13/17 1419     RBC, UA None Seen /hpf      WBC, UA None Seen /hpf      Epithelial Cells None Seen /hpf      Bacteria, UA None Seen /hpf      Hyaline Casts, UA None Seen /lpf     POCT urinalysis dipstick [[id number]]  (Normal) Resulted:  12/13/17 1404    Lab Status:  Final result Specimen:  Urine Updated:  12/13/17 1404     Color, UA --    ED Urine Macroscopic [[id number]]  (Abnormal) Collected:  12/13/17 1403    Lab Status:  Final result Specimen:  Urine Updated:  12/13/17 1403     Color, UA Yellow     Clarity, UA Clear     pH, UA 7 0     Leukocytes, UA Negative     Nitrite, UA Negative     Protein, UA Negative mg/dl Glucose, UA Negative mg/dl      Ketones, UA Negative mg/dl      Urobilinogen, UA 0 2 E U /dl      Bilirubin, UA Negative     Blood, UA Trace (A)     Specific [place], UA 1 015    Narrative:       CLINITEK RESULT    CBC and differential [[id number]]  (Abnormal) Collected:  12/13/17 1341    Lab Status:  Final result Specimen:  Blood from Arm, Left Updated:  12/13/17 1357     WBC 7 45 Thousand/uL      RBC 3 44 (L) Million/uL      Hemoglobin 11 1 (L) g/dL      Hematocrit 32 3 (L) %      MCV 94 fL      MCH 32 3 pg      MCHC 34 4 g/dL      RDW 13 7 %      MPV 9 4 fL      Platelets 963 Thousands/uL      nRBC 0 /100 WBCs      Neutrophils Relative 68 %      Lymphocytes Relative 23 %      Monocytes Relative 7 %      Eosinophils Relative 2 %      Basophils Relative 0 %      Neutrophils Absolute 5 03 Thousands/µL      Lymphocytes Absolute 1 73 Thousands/µL      Monocytes Absolute 0 54 Thousand/µL      Eosinophils Absolute 0 13 Thousand/µL      Basophils Absolute 0 01 Thousands/µL                  No orders to display         Procedures  Procedures      Phone Consults  ED Phone Contact    ED Course  ED Course as of Dec 13 2224   Wed Dec 13, 2017   1358 Bedside transvaginal ultrasound shows IUP with fetal heart rate at 175                                [facility]  CritCare Time    Disposition  Final diagnoses:   Abdominal pain during pregnancy     Time reflects when diagnosis was documented in both MDM as applicable and the Disposition within this note     Time User Action Codes Description Comment    12/13/2017  3:50 PM Megan Saez Add [O26 899,  R10 9] Abdominal pain during pregnancy       ED Disposition     ED Disposition Condition Comment    Discharge  Cleophus Sukhdev discharge to home/self care      Condition at discharge: Stable        Follow-up Information     Follow up With Specialties Details Why [address], [place] Family Medicine In 3 days  [address]  [address] [place] [address] U  49  [phone] Discharge Medication List as of 12/13/2017  3:51 PM      CONTINUE these medications which have NOT CHANGED    Details   aspirin 81 MG tablet Take 81 mg by mouth daily  , Until Discontinued, Historical Med      Dimethyl Fumarate (TECFIDERA) 240 MG CPDR Take 240 mg by mouth 2 (two) times a day , Until Discontinued, Historical Med      naproxen (NAPROSYN) 500 mg tablet Take 1 tablet by mouth 2 (two) times a day with meals, Starting Thu 11/9/2017, Print           No discharge procedures on file  ED Provider  Attending physically available and evaluated Ángel Urena I managed the patient along with the ED Attending      Electronically Signed by         Alicia Muhammad MD  Resident  12/13/17 0961

## 2017-12-13 NOTE — ED ATTENDING ATTESTATION
Marjorie Kayser, MD, saw and evaluated the patient  I have discussed the patient with the resident/non-physician practitioner and agree with the resident's/non-physician practitioner's findings, Plan of Care, and MDM as documented in the resident's/non-physician practitioner's note, except where noted  All available labs and Radiology studies were reviewed  At this point I agree with the current assessment done in the Emergency Department  I have conducted an independent evaluation of this patient a history and physical is as follows:      Critical Care Time  CritCare Time    39 yo female , currently 8 weeks pregnant c/o pelvic pain with pink discharge  Pt with hx of std and having intermittent yellow discharge  Pain is intermittent  No urinary complaints  pmh ms, no meds currently  No fever, no n/v/d  Vss, afebrile, lungs cta, rrr, abdomen soft tender pelvis, vaginal exam with white discharge  Urine, gc and chlamydia  U/s at bedside, labs, type and screen

## 2017-12-13 NOTE — DISCHARGE INSTRUCTIONS
Follow up with primary care doctor in 3-5 days  If your symptoms continue, or if there are ANY concerns, return to the ED immediately  Abdominal Pain, Ambulatory Care   GENERAL INFORMATION:   Abdominal pain  can be dull, achy, or sharp  You may have pain in one area of your abdomen, or in your entire abdomen  Your pain may be caused by constipation, food sensitivity or poisoning, infection, or a blockage  Abdominal pain can also be caused by a hernia, appendicitis, or an ulcer  The cause of your abdominal pain may be unknown  Seek immediate care for the following symptoms:   · New chest pain or shortness of breath    · Pulsing pain in your upper abdomen or lower back that suddenly becomes constant    · Pain in the right lower abdominal area that worsens with movement    · Fever over 100 4°F (38°C) or shaking chills    · Vomiting and you cannot keep food or fluids down    · Pain does not improve or gets worse over the next 8 to 12 hours    · Blood in your vomit or bowel movements, or they look black and tarry    · Skin or the whites of your eyes turn yellow    · Large amount of vaginal bleeding that is not your monthly period  Treatment for abdominal pain  may include medicine to calm your stomach, prevent vomiting, or decrease pain  Follow up with your healthcare provider as directed:  Write down your questions so you remember to ask them during your visits  CARE AGREEMENT:   You have the right to help plan your care  Learn about your health condition and how it may be treated  Discuss treatment options with your caregivers to decide what care you want to receive  You always have the right to refuse treatment  The above information is an  only  It is not intended as medical advice for individual conditions or treatments  Talk to your doctor, nurse or pharmacist before following any medical regimen to see if it is safe and effective for you    © 2014 [address] is for End User's use only and may not be sold, redistributed or otherwise used for commercial purposes  All illustrations and images included in CareNotes® are the copyrighted property of A D A M [employer] Inc  or [employer]

## 2018-01-03 ENCOUNTER — ALLSCRIPTS OFFICE VISIT (OUTPATIENT)
Dept: OTHER | Facility: OTHER | Age: 38
End: 2018-01-03

## 2018-01-03 DIAGNOSIS — F41.9 ANXIETY DISORDER: ICD-10-CM

## 2018-01-03 DIAGNOSIS — Z34.81 ENCOUNTER FOR SUPERVISION OF OTHER NORMAL PREGNANCY, FIRST TRIMESTER: ICD-10-CM

## 2018-01-05 LAB
HPV 18 (HISTORICAL): NOT DETECTED
HPV HIGH RISK 16/18 (HISTORICAL): NOT DETECTED
HPV16 (HISTORICAL): NOT DETECTED
PAP (HISTORICAL): NORMAL

## 2018-01-06 LAB — CULT RSLT GENITAL (HISTORICAL): ABNORMAL

## 2018-01-09 NOTE — RESULT NOTES
Verified Results  (1) CBC/PLT/DIFF [id number] 09:37AM Kassandra Lui Order Number: [id number]     Test Name Result Flag Reference   WBC COUNT 4 83 Thousand/uL  4 31-10 16   RBC COUNT 3 89 Million/uL  3 81-5 12   HEMOGLOBIN 12 5 g/dL  11 5-15 4   HEMATOCRIT 37 7 %  34 8-46  1   MCV 97 fL  82-98   MCH 32 1 pg  26 8-34 3   MCHC 33 2 g/dL  31 4-37 4   RDW 13 5 %  11 6-15 1   MPV 10 5 fL  8 9-12 7   PLATELET COUNT 263 Thousands/uL  149-390   nRBC AUTOMATED 0 /100 WBCs     NEUTROPHILS RELATIVE PERCENT 65 %  43-75   LYMPHOCYTES RELATIVE PERCENT 25 %  14-44   MONOCYTES RELATIVE PERCENT 8 %  4-12   EOSINOPHILS RELATIVE PERCENT 2 %  0-6   BASOPHILS RELATIVE PERCENT 0 %  0-1   NEUTROPHILS ABSOLUTE COUNT 3 14 Thousands/? ??L  1 85-7 62   LYMPHOCYTES ABSOLUTE COUNT 1 22 Thousands/? ??L  0 60-4 47   MONOCYTES ABSOLUTE COUNT 0 37 Thousand/? ??L  0 17-1 22   EOSINOPHILS ABSOLUTE COUNT 0 09 Thousand/? ??L  0 00-0 61   BASOPHILS ABSOLUTE COUNT 0 01 Thousands/? ??L  0 00-0 10

## 2018-01-12 VITALS
RESPIRATION RATE: 18 BRPM | OXYGEN SATURATION: 100 % | TEMPERATURE: 96.6 F | HEART RATE: 88 BPM | BODY MASS INDEX: 34.66 KG/M2 | SYSTOLIC BLOOD PRESSURE: 124 MMHG | WEIGHT: 208 LBS | DIASTOLIC BLOOD PRESSURE: 78 MMHG | HEIGHT: 65 IN

## 2018-01-13 VITALS
DIASTOLIC BLOOD PRESSURE: 60 MMHG | HEART RATE: 72 BPM | WEIGHT: 206.25 LBS | BODY MASS INDEX: 34.36 KG/M2 | SYSTOLIC BLOOD PRESSURE: 110 MMHG | HEIGHT: 65 IN

## 2018-01-13 VITALS
RESPIRATION RATE: 18 BRPM | DIASTOLIC BLOOD PRESSURE: 58 MMHG | BODY MASS INDEX: 33.66 KG/M2 | WEIGHT: 202 LBS | HEIGHT: 65 IN | HEART RATE: 77 BPM | SYSTOLIC BLOOD PRESSURE: 100 MMHG | OXYGEN SATURATION: 98 %

## 2018-01-13 NOTE — RESULT NOTES
Verified Results  (1) CBC/PLT/DIFF 08Oct2016 08:19AM [facility] Order Number: [id number]     Test Name Result Flag Reference   WBC COUNT 4 31 Thousand/uL  4 31-10 16   RBC COUNT 3 93 Million/uL  3 81-5 12   HEMOGLOBIN 12 4 g/dL  11 5-15 4   HEMATOCRIT 37 6 %  34 8-46  1   MCV 96 fL  82-98   MCH 31 6 pg  26 8-34 3   MCHC 33 0 g/dL  31 4-37 4   RDW 13 4 %  11 6-15 1   MPV 10 1 fL  8 9-12 7   PLATELET COUNT 695 Thousands/uL  149-390   nRBC AUTOMATED 0 /100 WBCs     NEUTROPHILS RELATIVE PERCENT 65 %  43-75   LYMPHOCYTES RELATIVE PERCENT 25 %  14-44   MONOCYTES RELATIVE PERCENT 7 %  4-12   EOSINOPHILS RELATIVE PERCENT 3 %  0-6   BASOPHILS RELATIVE PERCENT 0 %  0-1   NEUTROPHILS ABSOLUTE COUNT 2 76 Thousands/?L  1 85-7 62   LYMPHOCYTES ABSOLUTE COUNT 1 09 Thousands/?L  0 60-4 47   MONOCYTES ABSOLUTE COUNT 0 32 Thousand/?L  0 17-1 22   EOSINOPHILS ABSOLUTE COUNT 0 13 Thousand/?L  0 00-0 61   BASOPHILS ABSOLUTE COUNT 0 01 Thousands/?L  0 00-0 10   - Patient Instructions: This bloodwork is non-fasting  Please drink two glasses of water morning of bloodwork  - Patient Instructions: This bloodwork is non-fasting  Please drink two glasses of water morning of bloodwork  (1) TSH 08Oct2016 08:19[facility] Order Number: [id number]     Test Name Result Flag Reference   TSH 0 856 uIU/mL  0 358-3 740   - Patient Instructions: This bloodwork is non-fasting  Please drink two glasses of water morning of bloodwork  - Patient Instructions: This bloodwork is non-fasting  Please drink two glasses of water morning of bloodwork  Patients undergoing fluorescein dye angiography may retain small amounts of fluorescein in the body for 48-72 hours post procedure  Samples containing fluorescein can produce falsely depressed TSH values  If the patient had this procedure,a specimen should be resubmitted post fluorescein clearance            The recommended reference ranges for TSH during pregnancy are as follows:  First trimester 0 1 to 2 5 uIU/mL  Second trimester  0 2 to 3 0 uIU/mL  Third trimester 0 3 to 3 0 uIU/m

## 2018-01-13 NOTE — RESULT NOTES
Verified Results  (1) VITAMIN D 25-HYDROXY [id number] 08:19AM Andree Castillo    Order Number: [id number]     Test Name Result Flag Reference   VIT D 25-HYDROX 31 9 ng/mL  30 0-100 0   This assay is a certified procedure of the CDC Vitamin D Standardization Certification Program (VDSCP)     Deficiency <20ng/ml   Insufficiency 20-30ng/ml   Sufficient  ng/ml     *Patients undergoing fluorescein dye angiography may retain small amounts of fluorescein in the body for 48-72 hours post procedure  Samples containing fluorescein can produce falsely elevated Vitamin D values  If the patient had this procedure, a specimen should be resubmitted post fluorescein clearance

## 2018-01-13 NOTE — MISCELLANEOUS
Message   Recorded as Task   Date: 09/20/2016 11:22 AM, Created By: Bakari Obregon   Task Name: Medical Complaint Callback   Assigned To: Brittanie Kern   Regarding Patient: Louise Worley, Status: In Progress   Comment:    Trixie Mora - 20 Sep 2016 11:22 AM     TASK CREATED  pt called the office c/o bilateral hand numbness, pain in her feet, and lower back pain  pt states she is taking gabapentin 300mg 1 po bid  pt states she does feel her legs are weaker x 2 weeks and states she has the intermitent feeling of vibrations down her legs  pt denies any bowel or bladder changes  pt does report chills, fever, and sore throat  I did advise pt she needs to be seen by her PCP to r/o infection as this can cause psuedoflare of sx  pt states her PCP retired and she moved from [facility] to [place]  I advised pt she needs to establish care with a PCP and in the meantime can be seen by urgent care  I advised pt if she has underlying infection we cannot treat with steroids so that needs to be addressed first  pt aware and aggreable to see urgent care  any other reccomendations? Nuria George - 20 Sep 2016 2:10 PM     TASK REASSIGNED: Previously Assigned To Nuria George Francine - 20 Sep 2016 3:10 PM     TASK EDITED                 noted, pt as advised, needs to be seen by medical to r/o concurrent illness with fever and chills first  pt to let us know outcome  Brittanie Kern - 20 Sep 2016 3:13 PM     TASK IN PROGRESS   Brittanie Kern - 26 Sep 2016 11:14 AM     TASK REPLIED TO: Previously Assigned To Neurology Clinical,Team        can you call pt and see how she is doing  thanks   [facility] - 26 Sep 2016 12:48 PM     TASK EDITED                 [facility] for pt to return call     Trixie Mora - 27 Sep 2016 12:06 PM     TASK EDITED                 called and lmom for pt to call the office back   [facility] - 27 Sep 2016 12:53 PM     TASK EDITED                 Pt states that she is calling and establishing care with a new PCP in 2 weeks  Pt states that she is feeling better, no fever or chills  She thinks it was a virus going around, because nw her son is sick  Brittanie Kern - 27 Sep 2016 3:33 PM     TASK EDITED                 noted, thanks for update   pt to call with any other issues        Signatures   Electronically signed by : Hong Guzmán; Sep 27 2016  3:34PM EST                       (Author)

## 2018-01-14 VITALS
RESPIRATION RATE: 18 BRPM | WEIGHT: 209 LBS | HEIGHT: 65 IN | HEART RATE: 80 BPM | SYSTOLIC BLOOD PRESSURE: 128 MMHG | DIASTOLIC BLOOD PRESSURE: 70 MMHG | BODY MASS INDEX: 34.82 KG/M2

## 2018-01-15 NOTE — RESULT NOTES
Message   Pls let patient know vit B12 is 381, lower end of normal   Would suggest OTC B12 supplement     Verified Results  (1) [facility]UN [id number] 09:37AM [facility]: [id number]     Test Name Result Flag Reference   BLOOD UREA NITROGEN 11 mg/dL  5-25     (1) CREATININE [id number] 09:37AM Tomeka Cocker Order Number: [id number]     Test Name Result Flag Reference   CREATININE 0 58 mg/dL L 0 60-1 30   Standardized to IDMS reference method   eGFR Non-African American      >60 0 ml/min/1 73sq [facility] Disease Education Program recommendations are as follows:  GFR calculation is accurate only with a steady state creatinine  Chronic Kidney disease less than 60 ml/min/1 73 sq  meters  Kidney failure less than 15 ml/min/1 73 sq  meters  (1) VITAMIN D 25-HYDROXY [id number] 09:37AM Tomeka Cocker Order Number: [id number]     Test Name Result Flag Reference   VIT D 25-HYDROX 30 9 ng/mL  30 0-100 0   This assay is a certified procedure of the CDC Vitamin D Standardization Certification Program (VDSCP)     Deficiency <20ng/ml   Insufficiency 20-30ng/ml   Sufficient  ng/ml     *Patients undergoing fluorescein dye angiography may retain small amounts of fluorescein in the body for 48-72 hours post procedure  Samples containing fluorescein can produce falsely elevated Vitamin D values  If the patient had this procedure, a specimen should be resubmitted post fluorescein clearance       (1) VITAMIN B12 [id number] 09:37AM Alexsandra [facility] Order Number: [id number]     Test Name Result Flag Reference   VITAMIN B12 381 pg/mL  100-900

## 2018-01-16 NOTE — RESULT NOTES
Verified Results  * MRI CERVICAL [address]ene [email or web address] [id number] 01:21PM Nikki Rutherford Order Number: [id number]     Test Name Result Flag Reference   MRI CERVICAL SPINE [address] (Report)     MRI CERVICAL SPINE WITH AND WITHOUT CONTRAST     INDICATION:        COMPARISON: None  TECHNIQUE: Sagittal T1, sagittal T2, sagittal inversion recovery, axial 2D merge and axial T2  Sagittal T1 and axial T1 postcontrast       mL of Gadavist was administered without immediate consequence  IMAGE QUALITY: Diagnostic  FINDINGS:     ALIGNMENT: Normal alignment of the cervical spine  No compression fracture  No subluxation  No scoliosis  MARROW SIGNAL: Normal marrow signal is identified within the visualized bony structures  No discrete marrow lesion  CERVICAL AND VISUALIZED UPPER THORACIC CORD: Prior described focus of cord abnormality on the right at C2 is barely visible on today's study no longer demonstrating postcontrast enhancement  PREVERTEBRAL AND PARASPINAL SOFT TISSUES: Prevertebral and paraspinal soft tissues are unremarkable  VISUALIZED POSTERIOR FOSSA: The visualized posterior fossa demonstrates no abnormal signal      CERVICAL DISC SPACES:        C2-C3: Normal      C3-C4: Normal      C4-C5: Normal      C5-C6: Tiny left paramedian protrusion type disc herniation  No central or foraminal narrowing  C6-C7: Annular bulging and tiny right paramedian protrusion type disc herniation  No central or foraminal narrowing  C7-T1: Normal      UPPER THORACIC DISC SPACES: Normal      POSTCONTRAST IMAGING: Normal        IMPRESSION:     Minimal cord abnormality eccentric to the right at the C2 level barely visible and has decreased in size from the prior MRI no longer demonstrating postcontrast enhancement  Minimal degenerative changes as described         Workstation performed: [id number]     Signed by:   Gil Gonzalez DO   5/9/16     * MRI BRAIN MS WO AND W CONTRAST [id number] 01:14PM Lary Mobley Order Number: [id number]     Test Name Result Flag Reference   MRI BRAIN MS WO AND W CONTRAST (Report)     MRI BRAIN WITH AND WITHOUT CONTRAST     INDICATION: Multiple sclerosis  Checking for new lesions  COMPARISON: Prior MRI May 11, 2015     TECHNIQUE: Axial T2, axial and sagittal FLAIR, axial T1  Axial and sagittal [id number]  Axial postcontrast bravo  9 mL of Gadavist was injected intravenously without immediate consequence  IMAGE QUALITY: Diagnostic  FINDINGS:     BRAIN PARENCHYMA: There are a few white matter foci identified which are new since the prior study located in the left splenium the corpus callosum, right temporal periventricular white matter, and right frontoparietal deep white matter none of which    demonstrate postcontrast enhancement  The progression in location of these lesions are compatible with demyelinating disease i e  multiple sclerosis  There is no discrete mass, mass effect or midline shift  Brainstem and cerebellum demonstrate normal signal  There is no intracranial hemorrhage  There is no evidence of acute infarction  Postcontrast imaging of the brain demonstrates no abnormal enhancement  VENTRICLES: Normal      SELLA AND PITUITARY GLAND: Normal      ORBITS: Normal      PARANASAL SINUSES: Normal      VASCULATURE: Evaluation of the major intracranial vasculature demonstrates appropriate flow voids  CALVARIUM AND SKULL BASE: Normal      EXTRACRANIAL SOFT TISSUES: Normal        IMPRESSION:     There are new white matter foci identified located left splenium the corpus callosum, right frontoparietal deep white matter, and right temporal periventricular white matter compatible with history of multiple sclerosis  There are no abnormal enhancing    lesions identified         Workstation performed: [id number]     Signed by:   Bonny Bone DO   5/9/16       Discussion/Summary   new lesions noted on [facility] MRI, No enh note, Just started Tecfidera, will recheck mri james in few months on new med

## 2018-01-16 NOTE — RESULT NOTES
Verified Results  (1) CBC/PLT/DIFF [id number] 08:23AM [employer]     Test Name Result Flag Reference   WBC COUNT 5 85 Thousand/uL  4 31-10 16   RBC COUNT 3 62 Million/uL L 3 81-5 12   HEMOGLOBIN 11 9 g/dL  11 5-15 4   HEMATOCRIT 35 0 %  34 8-46  1   MCV 97 fL  82-98   MCH 32 9 pg  26 8-34 3   MCHC 34 0 g/dL  31 4-37 4   RDW 13 8 %  11 6-15 1   MPV 10 3 fL  8 9-12 7   PLATELET COUNT 762 Thousands/uL  149-390   nRBC AUTOMATED 0 /100 WBCs     NEUTROPHILS RELATIVE PERCENT 71 %  43-75   LYMPHOCYTES RELATIVE PERCENT 22 %  14-44   MONOCYTES RELATIVE PERCENT 6 %  4-12   EOSINOPHILS RELATIVE PERCENT 1 %  0-6   BASOPHILS RELATIVE PERCENT 0 %  0-1   NEUTROPHILS ABSOLUTE COUNT 4 19 Thousands/?L  1 85-7 62   LYMPHOCYTES ABSOLUTE COUNT 1 26 Thousands/?L  0 60-4 47   MONOCYTES ABSOLUTE COUNT 0 34 Thousand/?L  0 17-1 22   EOSINOPHILS ABSOLUTE COUNT 0 05 Thousand/?L  0 00-0 61   BASOPHILS ABSOLUTE COUNT 0 01 Thousands/?L  0 00-0 10     (1) HEPATIC FUNCTION PANEL [id number] 08:23AM [employer]     Test Name Result Flag Reference   ALBUMIN 3 4 g/dL L 3 5-5 0   ALK PHOSPHATAS 64 U/L     ALT (SGPT) 16 U/L  12-78   AST(SGOT) 16 U/L  5-45   BILI, DIRECT 0 10 mg/dL  0 00-0 20   BILI, TOTAL 0 32 mg/dL  0 20-1 00   TOTAL PROTEIN 6 6 g/dL  6 4-8 2 21.9

## 2018-01-17 ENCOUNTER — GENERIC CONVERSION - ENCOUNTER (OUTPATIENT)
Dept: PERINATAL CARE | Facility: MEDICAL CENTER | Age: 38
End: 2018-01-17

## 2018-01-17 NOTE — MISCELLANEOUS
To Whom It May Concern:    Giovanna Kirk missed work on August 19, 2016 due to swollen feet  Electronically signed by:Octavio CAMILO    Aug 23 2016  1:18PM EST

## 2018-01-18 NOTE — RESULT NOTES
Message   please obtain final assay result for jcv ab   present value indeterminate     Verified Results  STRATIFY JCV(TM) AB (WITH INDEX) W/RFL INHIBITION [id number] 09:55AM Miguel Odor   REPORT COMMENT:  FASTING:NO     Test Name Result Flag Reference   INDEX VALUE 0 21 H    JCV ANTIBODY INDETERMINATE A    Index interpretive criteria:       <0 20 negative       0 20-0 40 indeterminate       >0 40 positive     INTERPRETATION       Negative: Antibodies to JCV not detected  Indeterminate: Low level reactivity detected, see                      Inhibition Assay result to follow                      for the final antibody result  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

## 2018-01-19 ENCOUNTER — GENERIC CONVERSION - ENCOUNTER (OUTPATIENT)
Dept: OTHER | Facility: OTHER | Age: 38
End: 2018-01-19

## 2018-01-19 ENCOUNTER — APPOINTMENT (OUTPATIENT)
Dept: PERINATAL CARE | Facility: CLINIC | Age: 38
End: 2018-01-19
Payer: COMMERCIAL

## 2018-01-19 ENCOUNTER — ALLSCRIPTS OFFICE VISIT (OUTPATIENT)
Dept: PERINATAL CARE | Facility: CLINIC | Age: 38
End: 2018-01-19
Payer: COMMERCIAL

## 2018-01-19 PROCEDURE — 99203 OFFICE O/P NEW LOW 30 MIN: CPT | Performed by: GENETIC COUNSELOR, MS

## 2018-01-19 PROCEDURE — 76801 OB US < 14 WKS SINGLE FETUS: CPT | Performed by: OBSTETRICS & GYNECOLOGY

## 2018-01-19 PROCEDURE — 76813 OB US NUCHAL MEAS 1 GEST: CPT | Performed by: OBSTETRICS & GYNECOLOGY

## 2018-01-22 VITALS
DIASTOLIC BLOOD PRESSURE: 58 MMHG | HEIGHT: 65 IN | HEART RATE: 77 BPM | SYSTOLIC BLOOD PRESSURE: 113 MMHG | BODY MASS INDEX: 34.49 KG/M2 | WEIGHT: 207.03 LBS

## 2018-01-23 VITALS
BODY MASS INDEX: 34.32 KG/M2 | DIASTOLIC BLOOD PRESSURE: 62 MMHG | WEIGHT: 206 LBS | SYSTOLIC BLOOD PRESSURE: 128 MMHG | HEIGHT: 65 IN

## 2018-01-23 NOTE — PROGRESS NOTES
To Whom it May Concern,    The above named patient is under our care for pregnancy  She has and expected due date of July 25, 2018  Any questions or concerns please contact Arnav Casarez at [phone]      Sincerely,           MICHAEL Jauregui      Electronically signed by:Cat RODRIGUEZ  Simón  3 2018  4:39PM EST Author         Electronically signed by:Cat RODRIGUEZ   Simón  3 2018  4:41PM EST Acknowledgement       Electronically signed by:Cat RODRIGUEZ  Simón  3 2018  4:42PM EST Author

## 2018-01-23 NOTE — PROGRESS NOTES
Chief Complaint  Patient seen for genetic counseling to discuss maternal age related risk for aneuploidy  At the time of our genetic counseling session Patricia Ayala was approximately 13 weeks 2 days pregnant with her 4th pregnancy  Her prior pregnancy history is significant for 3 full-term vaginal deliveries of healthy children now ages 13, 6 and 4  Her younger two children were with her current partner  At the age of 41 there is an estimated 1/122 risk for a fetal chromosome abnormality at term  Approximately half of these abnormalities are due to Down syndrome with the remainder due to other chromosome abnormalities  I reviewed with the patient the options regarding prenatal diagnosis for chromosome abnormalities including CVS and amniocentesis  Chorionic villus sampling(CVS) is generally performed between 10 and 12 weeks of pregnancy and amniocentesis is generally performed at 16 weeks or later  Recent literature supports that CVS and amniocentesis both have an associated procedure related risk of miscarriage of less than 1/300  Chromosome results from CVS or amniocentesis are greater than 99 9% accurate  In addition microarray testing can detect submicroscopic deletions and duplications of genetic material throughout th genome  Occasionally a repeat procedure may be necessary due to cell culture failure  Approximately 1% of the time, a mosaic chromosome result from CVS will necessitate a followup amniocentesis  Measurement of AFP from amniotic fluid is able to detect approximately 95% of open neural tube defects  Maternal serum AFP is recommended for patients who elect CVS     We also reviewed the availability and limitations of sequential screening, NIPS, and level II ultrasound evaluation   Sequential screening consisting of first trimester measurement of nuchal translucency combined with first trimester biochemical analysis as well as second trimester biochemical analysis is able to detect approximately 90% of pregnancies in which the fetus has Down syndrome, 90% of pregnancies in which the fetus has trisomy 25 and 80% of pregnancies which appears has an open neural tube defect  NIPS involves assessment of fragments of fetal DNA in maternal blood  This test has varying detection rates depending on the laboratory used though the quoted detection rate for Down syndrome is generally greater than 99% and detection rate for trisomy 18 is 98% or better  NIPS has a low false positive rate however consideration of prenatal diagnostic testing is always recommended following a positive NIPS  MSAFP screening is recommended for patients electing NIPS  Level II ultrasound evaluation is able to detect many major physical birth defects and variations in fetal development that may be associated with chromosome abnormalities  Level II ultrasound evaluation is not able to detect all birth defects or health problems  After discussing the available prenatal diagnostic and screening procedures and elected to decline prenatal diagnostic testing at this time  She did opt to move forward with NIPS  Her insurance requires prior authorization  Our office will initiate the process and contact the patient once authorization has been obtained  In the meantime she was provided with a TRF for [employer]  In addition she has nuchal translucency ultrasound scheduled follow genetic counseling session and is planning on pursuing MSAFP screening and level 2 ultrasound evaluation at the appropriate times  During our counseling session history is were taken on the patient's family and the family of her partner, Keith Das, both of which were negative for any prior known cases of intellectual disability, birth defects or single gene disorders  Shanell Nesotr does report that her 14-year-old son with a previous partner has a diagnosis of ADHD  We reviewed the multifactorial inheritance of this condition increased risk to her pregnancy   Currently there are no genetic test clinically available to detect susceptibility to ADHD  Her pediatrician is already aware of this history  The patient reports being of [place] and [place] descent at her partner is of  descent  She denies either of them having any known Gnosticism ancestry  Carrier screening for CF, SMA, Fragile X and hemoglobinopathies was discussed  Jeremy Coates is planning on further considering her genetic carrier screening options  Hemoglobinopathy screening is recommended if not previously performed  Lastly, we discussed the fact that it is important to keep in mind that everyone in the general population regardless of age, family history, or medical background has approximately a 3% risk of having a child with some type of her defect, genetic disease or intellectual disability  Currently there are no tests available to rule out all birth defects or health problems  Active Problems    1  Abnormal MRI of head (793 0) (R93 0)   2  Anxiety (300 00) (F41 9)   3  Bilateral hand numbness (782 0) (R20 0)   4  Blurred vision, right eye (368 8) (H53 8)   5  Cervical cancer screening (V76 2) (Z12 4)   6  Chronic bilateral low back pain without sciatica (724 2,338 29) (M54 5,G89 29)   7  Contraception (V25 9) (Z30 9)   8  Eczema (692 9) (L30 9)   9  Encounter for supervision of other normal pregnancy in first trimester (V22 1) (Z34 81)   10  Fatigue (780 79) (R53 83)   11  Generalized abdominal pain (789 07) (R10 84)   12  Long term use of drug (V58 69) (Z79 899)   13  Lumbar disc disease (722 93) (M51 9)   14  Lung nodule seen on imaging study (793 11) (R91 1)   15  Multiple sclerosis (340) (G35)   16  Numbness in left leg (782 0) (R20 0)   17  Obesity (278 00) (E66 9)   18  Vitamin B12 deficiency (266 2) (E53 8)   19  Vitamin D deficiency (268 9) (E55 9)    Past Medical History    1  History of Acute sinusitis (461 9) (J01 90)   2  Acute upper respiratory infection (465 9) (J06 9)   3   History of Herniated lumbar intervertebral disc (722 10) (M51 26)   4  History of Herpes zoster (053 9) (B02 9)   5  History of acute pharyngitis (V12 69) (Z87 09)   6  History of urinary tract infection (V13 02) (Z87 440)   7  History of Major depressive disorder, recurrent (296 30) (F33 9)   8  History of Obesity complicating pregnancy, childbirth, or puerperium, antepartum   (649 13,278 00) (O99 210,E66 9)   9  History of Panic Disorder Without Agoraphobia (300 01)   10  History of Tobacco use disorder complicating pregnancy, childbirth, or the puerperium,    antepartum condition or complication (155 01) (V89 852)    Surgical History    1  History of Knee Arthroscopy (Therapeutic)    Family History    1  Family history of Hypertension    2  Family history of macular degeneration (V19 19) (Z83 518)   3  Family history of Hypertension    4  Family history of glaucoma (V19 11) (Z83 511)   5  Family history of macular degeneration (V19 19) (Z83 518)   6  Family history of Hypertension (V17 49)    Social History    · Alcohol Use (History)   · Current Every Day Smoker (305 1)   · Denied: History of Drug use   · Educational Level - Has High School Diploma   · Marital History - Single   · Occupation:   · Social alcohol use (Z78 9)    Current Meds   1  PNV Prenatal Plus Multivitamin 27-1 MG Oral Tablet; Therapy: 29HGU4868 to Recorded    Allergies    1  No Known Drug Allergies    2  No Known Environmental Allergies   3  No Known Food Allergies    Vitals  Signs   Recorded: 71ZNP3707 12:53PM   Heart Rate: 77  Systolic: 569, LUE, Sitting  Diastolic: 58, LUE, Sitting  BP Cuff Size: Large  Height: 5 ft 5 in  Weight: 207 lb 0 4 oz  BMI Calculated: 34 45  BSA Calculated: 2 01  Pain Scale: 0    Future Appointments    Date/Time Provider Specialty Site   02/02/2018 03:15 PM ANDREW Manzano   Obstetrics/Gynecology [facility]   01/25/2018 11:00 AM Jus Eid [address] Neurology [facility] 0464 2018 02:30 PM  [place], [address]   Electronically signed by : Marizol Berry, ; 2018  1:57PM EST                       (Author)

## 2018-01-23 NOTE — PROGRESS NOTES
2018         RE: Damien Turner                                To: [facility]   MR#: [id number]                                    [address]   : MAY [birth year] Cady Mack [address] [facility]   ENC: [id number]                             Fax: [phone]   (Exam #: [id number])      The LMP of this 40year old,  G4, P3-0-0-3 patient was OCT 25 2017, giving   her an ANGEL of 2018 and a current gestational age of 17 weeks 2 days   by dates  A sonographic examination was performed on 2018 using   real time equipment  The ultrasound examination was performed using   abdominal technique  The patient has a BMI of 34 4  Her blood pressure   today was 113/58  Earliest US on record:   1/3/18  11w0d  ANGEL 18 Multiple longitudinal   and transverse sections revealed a steinberg intrauterine pregnancy with   the fetus in transverse presentation  The placenta is anterior in   implantation, grade 0 in appearance  Cardiac motion was observed at 155 bpm       INDICATIONS      first trimester genetic screening   advanced maternal age   maternal multiple sclerosis   obesity      Exam Types      Level I      RESULTS      Fetus # 1 of 1   Transverse presentation   Fetal growth appeared normal      MEASUREMENTS (* Included In Average GA)      CRL              7 8 cm        13 weeks 4 days*   Nuchal Trans    2 01 mm      THE AVERAGE GESTATIONAL AGE is 13 weeks 4 days +/- 7 days  ANATOMY COMMENTS      Anatomic detail is limited at this gestational age  The fetal cranium   appeared normal in shape and the nuchal translucency was normal in size   (2 0mm)  The nasal bone was not assessed due to fetal position  The   intracranial anatomy was unremarkable  Evaluation of the spine revealed   no obvious evidence for a neural tube defect    Anatomy of the fetal   thorax, normal cardiac axis and first trimester three vessel views were   not assessed due to unfavorable fetal position  The cardiac rhythm was   regular and documented with M-mode  Within the abdomen, the stomach as   seen  The bladder was not visualized and the abdominal wall appeared   intact  A three vessel cord appears to be present  Active movement of the   fetal body & extremities was seen  There is no suspicion of a   subchorionic bleed  The placental cord insertion appeared normal    There   is no suspicion of a uterine myoma  Free fluid is not seen in the   posterior cul-de-sac  ADNEXA      The left ovary was not visualized  The right ovary was not visualized  AMNIOTIC FLUID         Largest Vertical Pocket = 3 7 cm   Amniotic Fluid: Normal      IMPRESSION      [place] IUP   13 weeks and 4 days by this ultrasound  (ANGEL=JUL 23 2018)   Transverse presentation   Fetal growth appeared normal   Regular fetal heart rate of 155 bpm   Anterior placenta      CONSULT COMMENT        Thank you very much for requesting a consultation on this very nice   patient for the indication of advanced maternal age with history of   multiple sclerosis  This is the patient's 4th pregnancy  She has a   history of 3 full-term vaginal deliveries  Her last pregnancy was   complicated by bleeding throughout much of her pregnancy however she   delivered at term  She has a significant history of back pain and   depression not currently taking medications  She has a surgical history   of a cyst removal from her right knee  She was diagnosed with multiple   sclerosis over the past couple of years previously on multiple   medications, most recently on Tecfidera and gabapentin  She stopped these   medications in conjunction with her neurologist upon learning of the   pregnancy  She currently smokes a half pack of cigarettes a day  She   denies alcohol or drug use  She takes prenatal vitamins and has no known   drug allergies  Her family medical history is otherwise unremarkable    A   review of systems is otherwise negative  On exam, the patient appears   well, in no acute distress, and her abdomen is nontender  The patient had formal genetic counseling and at the conclusion of that   counseling, she elected noninvasive prenatal testing utilizing the [facility]   Advanced cell-free fetal DNA test through [employer]  The patient   was given a requisition to have this blood work drawn through [facility] and the results should be available in approximately 7-10 days  We discussed multiple sclerosis during pregnancy  We discussed that the   majority of patients demonstrate improvement in disease activity as a   result of pregnancy and most patients can come off of medications  The   medication that she was previously taking is generally felt to be safe to   utilized during pregnancy if she does need this medication  In general,   steroids can be used to treat flares during pregnancy  We discussed that   there is often times at increased risk for a disease flare after pregnancy   and I recommend she continue to maintain close contact with her   neurologist to discuss utilizing medication immediately after birth  The   patient is unsure if she wants to breast feed however she is leaning   against it given her history  We discussed the increased risk of adverse pregnancy outcomes in women who   smoke tobacco, including but not limited to risk of growth restriction,   abruption, and  delivery  We discussed smoking cessation and   trying to cut down as much as possible if she is unable to quit  If she   continues to have trouble quitting smoking, recommend referral to a   smoking cessation program   Recommend a third trimester growth ultrasound   to screen for fetal growth restriction given her tobacco use  We discussed follow-up in detail and I recommend an anatomy ultrasound be   scheduled for 20 weeks gestation        Thank you very much for allowing us to participate in the care of this   very nice patient  Should you have any questions, please do not hesitate   to contact our office  Please note, in addition to the time spent discussing the results of the   ultrasound, I spent approximately 15 minutes of face-to-face time with the   patient, greater than 50% of which was spent in counseling and the   coordination of care for this patient  Portions of the record may have been created with voice recognition   software  Occasional wrong word or "sound a like" substitutions may have   occurred due to the inherent limitations of voice recognition software  Read the chart carefully and recognize, using context, where substitutions   have occurred  GRACIELA Mcginnis M D     Electronically signed 01/19/18 15:31

## 2018-01-23 NOTE — MISCELLANEOUS
Attached is your new obstetrical paperwork package for your upcoming appointment  Please fill out all highlighted sections of Parts 2, 3 and 4 and complete  the questionnaire and consent forms in their entirety  Please contact your insurance company to find out which lab you must use for bloodwork  Also, please bring all completed paperwork with you to your first appointment  Prior to your first  appointment, please fax or email a copy of your current insurance card (both sides) in order for our office to pre-certify your insurance  Our fax number is [phone], steven Medina or email to  [email or web address]  Arrive at least 20 minutes prior to your appointment time

## 2018-01-25 ENCOUNTER — TELEPHONE (OUTPATIENT)
Dept: PERINATAL CARE | Facility: CLINIC | Age: 38
End: 2018-01-25

## 2018-01-30 LAB
ABO GROUP BLD: NORMAL
APPEARANCE UR: CLEAR
BASOPHILS # BLD AUTO: 23 CELLS/UL (ref 0–200)
BASOPHILS NFR BLD AUTO: 0.3 %
BILIRUB UR QL STRIP: NEGATIVE
BLD GP AB SCN SERPL QL: NORMAL
COLOR UR: YELLOW
EOSINOPHIL # BLD AUTO: 113 CELLS/UL (ref 15–500)
EOSINOPHIL NFR BLD AUTO: 1.5 %
ERYTHROCYTE [DISTWIDTH] IN BLOOD BY AUTOMATED COUNT: 13.1 % (ref 11–15)
GLUCOSE 1H P 50 G GLC PO SERPL-MCNC: 88 MG/DL
GLUCOSE UR QL STRIP: NEGATIVE
HBV SURFACE AG SERPL QL IA: NORMAL
HCT VFR BLD AUTO: 32.5 % (ref 35–45)
HCV AB S/CO SERPL IA: 0.01
HCV AB SERPL QL IA: NORMAL
HGB BLD-MCNC: 10.9 G/DL (ref 11.7–15.5)
HGB UR QL STRIP: NEGATIVE
HIV 1+2 AB+HIV1 P24 AG SERPL QL IA: NORMAL
KETONES UR QL STRIP: NEGATIVE
LEUKOCYTE ESTERASE UR QL STRIP: NEGATIVE
LYMPHOCYTES # BLD AUTO: 1440 CELLS/UL (ref 850–3900)
LYMPHOCYTES NFR BLD AUTO: 19.2 %
MCH RBC QN AUTO: 32.3 PG (ref 27–33)
MCHC RBC AUTO-ENTMCNC: 33.5 G/DL (ref 32–36)
MCV RBC AUTO: 96.4 FL (ref 80–100)
MONOCYTES # BLD AUTO: 435 CELLS/UL (ref 200–950)
MONOCYTES NFR BLD AUTO: 5.8 %
NEUTROPHILS # BLD AUTO: 5490 CELLS/UL (ref 1500–7800)
NEUTROPHILS NFR BLD AUTO: 73.2 %
NITRITE UR QL STRIP: NEGATIVE
PH UR STRIP: 7.5 [PH] (ref 5–8)
PLATELET # BLD AUTO: 256 THOUSAND/UL (ref 140–400)
PMV BLD REES-ECKER: 10.1 FL (ref 7.5–12.5)
PROT UR QL STRIP: NEGATIVE
RBC # BLD AUTO: 3.37 MILLION/UL (ref 3.8–5.1)
RH BLD: NORMAL
RPR SER QL: NORMAL
RUBV IGG SERPL IA-ACNC: 5.81 INDEX
SP GR UR STRIP: 1.02 (ref 1–1.03)
T4 FREE SERPL-MCNC: 1 NG/DL (ref 0.8–1.8)
TSH SERPL-ACNC: 0.66 MIU/L
WBC # BLD AUTO: 7.5 THOUSAND/UL (ref 3.8–10.8)

## 2018-01-31 LAB
# FETUSES US: 1
CFDNA.FET/TOTAL PLAS.CFDNA: NORMAL
FET CHR 13 TS PLAS.CFDNA QL: NEGATIVE
FET CHR 18 TS PLAS.CFDNA QL: NEGATIVE
FET CHR 21 TS PLAS.CFDNA QL: NEGATIVE
FET CHR X + Y ANEUP PLAS.CFDNA QL: NORMAL
FET CHROM X + Y ANEUP CFDNA IMP: NORMAL
FET Y CHROM PLAS.CFDNA QL: NOT DETECTED
FET Y CHROM PLAS.CFDNA: NORMAL
GA (DAYS): 3 D
GA (WEEKS): 14 WK
MICRODELETION SYND BLD/T FISH: NORMAL
REF LAB TEST METHOD: NORMAL
SERVICE CMNT-IMP: NORMAL
SERVICE CMNT-IMP: NORMAL
SL AMB ABNORMAL MSS?: NORMAL
SL AMB ABNORMAL US?: NORMAL
SL AMB ADVANCED MATERNAL AGE?: YES
SL AMB MICRODELETION INTERP: NORMAL
SL AMB MICRODELETION: NORMAL
SL AMB PERSONAL/FAM HISTORY?: NORMAL
SL AMB SPECIFICATIONS: NORMAL

## 2018-02-01 ENCOUNTER — OB ABSTRACT (OUTPATIENT)
Dept: OBGYN CLINIC | Facility: CLINIC | Age: 38
End: 2018-02-01

## 2018-02-01 NOTE — PROGRESS NOTES
I have reviewed the results which are normal   Please call patient and notify her of normal results  Thank you

## 2018-02-02 ENCOUNTER — ROUTINE PRENATAL (OUTPATIENT)
Dept: OBGYN CLINIC | Facility: CLINIC | Age: 38
End: 2018-02-02

## 2018-02-02 VITALS — SYSTOLIC BLOOD PRESSURE: 110 MMHG | WEIGHT: 209 LBS | BODY MASS INDEX: 34.78 KG/M2 | DIASTOLIC BLOOD PRESSURE: 62 MMHG

## 2018-02-02 DIAGNOSIS — Z3A.16 16 WEEKS GESTATION OF PREGNANCY: Primary | ICD-10-CM

## 2018-02-02 PROCEDURE — PNV: Performed by: OBSTETRICS & GYNECOLOGY

## 2018-02-02 RX ORDER — VITAMIN A ACETATE, .BETA.-CAROTENE, ASCORBIC ACID, CHOLECALCIFEROL, .ALPHA.-TOCOPHEROL ACETATE, DL-, THIAMINE MONONITRATE, RIBOFLAVIN, NIACINAMIDE, PYRIDOXINE HYDROCHLORIDE, FOLIC ACID, CYANOCOBALAMIN, CALCIUM CARBONATE, FERROUS FUMARATE, ZINC OXIDE, AND CUPRIC OXIDE 2000; 2000; 120; 400; 22; 1.84; 3; 20; 10; 1; 12; 200; 27; 25; 2 [IU]/1; [IU]/1; MG/1; [IU]/1; MG/1; MG/1; MG/1; MG/1; MG/1; MG/1; UG/1; MG/1; MG/1; MG/1; MG/1
TABLET ORAL
COMMUNITY
Start: 2018-01-29 | End: 2018-10-02

## 2018-02-02 NOTE — PROGRESS NOTES
Visit:  Tolerating PNV  - slightly more energy - has f/u with [facility] - would like to have toxoplasmosis blood work - genetic screening pending -

## 2018-02-12 ENCOUNTER — TELEPHONE (OUTPATIENT)
Dept: PERINATAL CARE | Facility: CLINIC | Age: 38
End: 2018-02-12

## 2018-02-22 ENCOUNTER — OFFICE VISIT (OUTPATIENT)
Dept: NEUROLOGY | Facility: CLINIC | Age: 38
End: 2018-02-22
Payer: COMMERCIAL

## 2018-02-22 VITALS
WEIGHT: 208 LBS | RESPIRATION RATE: 18 BRPM | DIASTOLIC BLOOD PRESSURE: 67 MMHG | SYSTOLIC BLOOD PRESSURE: 133 MMHG | HEART RATE: 98 BPM | BODY MASS INDEX: 34.61 KG/M2

## 2018-02-22 DIAGNOSIS — G35 MULTIPLE SCLEROSIS (HCC): Primary | ICD-10-CM

## 2018-02-22 DIAGNOSIS — Z34.90 PREGNANCY, UNSPECIFIED GESTATIONAL AGE: ICD-10-CM

## 2018-02-22 PROCEDURE — 99214 OFFICE O/P EST MOD 30 MIN: CPT | Performed by: PHYSICIAN ASSISTANT

## 2018-02-22 NOTE — PROGRESS NOTES
Patient ID: Luca Kumar is a 40 y o  female  Assessment/Plan:    Multiple sclerosis ([address] )  Currently off Tecfidera due to pregnancy  She is doing well, no new symptoms to report  She will remain off IMD therapy for the duration of her pregnancy and resume after delivery/after she is finished breastfeeding  She is not sure if she is going to breastfeed yet  Her exam is stable today  Will see her back again in June before she is due in July  Pregnancy  Currently 18 weeks, following with OB       Diagnoses and all orders for this visit:    Multiple sclerosis ([address] )    Pregnancy, unspecified gestational age           Subjective:    HPI    Shruthi Bain returns today for follow-up of MS  To review, she is a 41 yo female with PMH of migraines, depression and anxiety  Pt notes sxs began in January 2015, she had a work injury to the left shoulder  She noted in March 2015, she had left sided numbness down her torso, down into her feet and legs  She also experienced urinary leakage without Valsalva  Pt then experienced right eye pain and fogginess of vision  She was seen by Dr Eyad Samuel and had an OCT done  It revealed normal findings on the right but on the left a sector defect  MRI of the brain from May 13, 2015 revealed a few scattered nonspecific supratentorial WM lesions  No acute infarction, or hemorrhage or enh  MRI of the lumbar spine without contrast revealed small L5/S1 disc herniation, MRI of the T-spine revealed evidence of solitary 8mm lesion to the left of midline at T8/9 in the dorsal Cord  No pathological enh  MRI of the C-spine also done on June 13, 2015 revealed a solitary 5 mm focus of active demyelination in the right lateral cord at C2  Pt was given 3 days of IV steroids and tolerated ok  Pt with significant GI upset with the steroids and also some mild change in mood with increased anxiety  She was NMO negative and Lyme negative  She did not have an LP   MRA of the head was done due to a history of a sister with cerebral aneurysm  This was normal  She was placed on Copaxone  Back in Oct 2015 she was off her Copaxone for about 3 months due to insurance issues  She went back on her Copaxone TIW  In May 2016, patient stopped Copxone due to increasing site reactions, and she initiated Tecfidera on 5/6/16  She had some GI issues at first, but then tolerated fairly well  She had updated MRI brain, c-spine and t-spine in March 2017 and all stable  No new or enhancing lesions in any area  Patient called our office 11/20/17 to report she just found out she was unexpectedly pregnant  Her Tecfidera was stopped immediately  She also stopped gabapentin  She was seen by GYN immediately  Today, patient reports she is doing well  She is currently 18 weeks pregnant and found out she is having a girl  She denies any new neurologic symptoms  She has some ongoing long-standing numbness in the hands and pain in the hands when she is mopping  She had a normal EMG done last year  Denies changes in bowel or bladder, speech or swallowing  No vision changes, vertigo  No new weakness, trips or falls  The following portions of the patient's history were reviewed and updated as appropriate: current medications, past family history, past medical history, past social history, past surgical history and problem list          Objective:    Blood pressure 133/67, pulse 98, resp  rate 18, weight 94 3 kg (208 lb), last menstrual period 10/18/2017    Physical Exam   Constitutional: She appears well-developed and well-nourished  HENT:   Head: Normocephalic and atraumatic  Eyes: EOM are normal  Pupils are equal, round, and reactive to light  Cardiovascular: Intact distal pulses  Neurological: She has normal strength and normal reflexes  Coordination normal    Skin: Skin is warm and dry  Psychiatric: She has a normal mood and affect   Her speech is normal        Neurological Exam    Mental Status  The patient is alert and oriented to person, place, time, and situation  Her recent and remote memory are normal  Her speech is normal  Her language is fluent with no aphasia  She has normal attention span and concentration  She has a normal fund of knowledge  Cranial Nerves    CN II: The patient's visual acuity and visual fields are normal   CN III, IV, VI: The patient's pupils are equally round and reactive to light and ocular movements are normal   CN V: The patient has normal facial sensation  CN VII:  The patient has symmetric facial movement  CN VIII:  The patient's hearing is normal   CN IX, X: The patient has symmetric palate movement and normal gag reflex  CN XI: The patient's shoulder shrug strength is normal   CN XII: The patient's tongue is midline without atrophy or fasciculations  Motor  The patient has normal muscle bulk throughout  Her overall muscle tone is normal throughout  Her strength is 5/5 throughout all four extremities  Sensory    Decreased vibratory sensation in lower extremities bilaterally       Reflexes  Deep tendon reflexes are 2+ and symmetric in all four extremities with downgoing toes bilaterally  Gait and Coordination   She has a wide stance  She has normal coordination bilaterally  ROS:    Review of Systems   Constitutional: Positive for fatigue  Negative for appetite change and fever  HENT: Negative  Negative for hearing loss, tinnitus, trouble swallowing and voice change  Eyes: Negative  Negative for photophobia and pain  Respiratory: Negative  Negative for shortness of breath  Cardiovascular: Negative  Negative for palpitations  Gastrointestinal: Negative  Negative for nausea and vomiting  Endocrine: Negative  Negative for cold intolerance and heat intolerance  Genitourinary: Positive for frequency and urgency  Negative for dysuria  Loss of bladder control   Musculoskeletal: Positive for back pain  Negative for myalgias and neck pain  Skin: Negative  Negative for rash  Neurological: Positive for numbness and headaches  Negative for dizziness, tremors, seizures, syncope, facial asymmetry, speech difficulty, weakness and light-headedness  Clumsiness  Tingling     Hematological: Bruises/bleeds easily  Psychiatric/Behavioral: Positive for sleep disturbance  Negative for confusion and hallucinations  Increased sleepiness   Waking at night  Balance problems         Total time spent today 25 minutes   Greater than 50% of total time was spent with the patient and / or family counseling and / or coordination of care

## 2018-02-22 NOTE — ASSESSMENT & PLAN NOTE
Currently off Tecfidera due to pregnancy  She is doing well, no new symptoms to report  She will remain off IMD therapy for the duration of her pregnancy and resume after delivery/after she is finished breastfeeding  She is not sure if she is going to breastfeed yet  Her exam is stable today  Will see her back again in June before she is due in July

## 2018-02-22 NOTE — PATIENT INSTRUCTIONS
Your exam is stable today    Continue to follow with OB for prenatal care  We will see you back in June before you deliver  Call for any new or worsening symptoms

## 2018-03-05 ENCOUNTER — ROUTINE PRENATAL (OUTPATIENT)
Dept: OBGYN CLINIC | Facility: CLINIC | Age: 38
End: 2018-03-05

## 2018-03-05 VITALS
HEIGHT: 65 IN | WEIGHT: 207.2 LBS | DIASTOLIC BLOOD PRESSURE: 74 MMHG | SYSTOLIC BLOOD PRESSURE: 124 MMHG | BODY MASS INDEX: 34.52 KG/M2

## 2018-03-05 DIAGNOSIS — Z3A.19 19 WEEKS GESTATION OF PREGNANCY: ICD-10-CM

## 2018-03-05 DIAGNOSIS — O46.90 VAGINAL BLEEDING IN PREGNANCY: Primary | ICD-10-CM

## 2018-03-05 PROCEDURE — PNV: Performed by: PHYSICIAN ASSISTANT

## 2018-03-05 NOTE — PROGRESS NOTES
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; No recent IC - last episode likely when she became became pregnant  Did have a week long of spotting in the first trimester - everything checked out ok at that time per pt; Did spot throughout last pregnancy for unknown reason; No vulvo-vaginal itch/burn/abn d/c or odor; Does report a thick yellowish discharge in moderate amount throughout the whole pregnancy -was treated for a yeast infection at beginning of pregnancy; Denies any urinary sx -burning/pain/frequency/hematuria; Pt last ultrasound done at [facility] for sequential screen - all WNL per patient; occasional n/v/HA;some swelling of fingers - can't wear rings no lof/dv; (+) smoking - 1/2 pack/day  PNVs + DHA - tolerating daily  No FM yet  Level 2 ultrasound scheduled for Thursday    Abdomen soft without any significant pain illicited on exam with deep palpation   On speculum exam - no active bleeding from cervical os or any blood noted in vaginal canal; small amount of thick, homogenous white discharge noted; Genital culture collected as well - will treat based on results    Note for work given - pt to not lift more than 15 lbs in her pregnancy; Pt to rest, hydrate and watch symptoms at this time;  Has level 2 ultrasound scheduled for Thursday; Pt to report any worsening symptoms - increase in bleeding/spotting or abdominal/pelvic pain  RTO in 4 weeks for routine ob check or sooner if needed

## 2018-03-08 ENCOUNTER — ROUTINE PRENATAL (OUTPATIENT)
Dept: PERINATAL CARE | Facility: CLINIC | Age: 38
End: 2018-03-08
Payer: COMMERCIAL

## 2018-03-08 VITALS
HEIGHT: 65 IN | WEIGHT: 210.6 LBS | DIASTOLIC BLOOD PRESSURE: 61 MMHG | HEART RATE: 90 BPM | BODY MASS INDEX: 35.09 KG/M2 | SYSTOLIC BLOOD PRESSURE: 120 MMHG

## 2018-03-08 DIAGNOSIS — O44.02 PLACENTA PREVIA ANTEPARTUM IN SECOND TRIMESTER: ICD-10-CM

## 2018-03-08 DIAGNOSIS — O99.212 OBESITY AFFECTING PREGNANCY IN SECOND TRIMESTER: ICD-10-CM

## 2018-03-08 DIAGNOSIS — Z36.3 ENCOUNTER FOR ANTENATAL SCREENING FOR MALFORMATIONS: Primary | ICD-10-CM

## 2018-03-08 DIAGNOSIS — Z3A.20 20 WEEKS GESTATION OF PREGNANCY: ICD-10-CM

## 2018-03-08 DIAGNOSIS — O09.522 ELDERLY MULTIGRAVIDA IN SECOND TRIMESTER: ICD-10-CM

## 2018-03-08 DIAGNOSIS — Z36.86 ENCOUNTER FOR ANTENATAL SCREENING FOR CERVICAL LENGTH: ICD-10-CM

## 2018-03-08 LAB — SL AMB GENITAL CULTURE: ABNORMAL

## 2018-03-08 PROCEDURE — 99212 OFFICE O/P EST SF 10 MIN: CPT | Performed by: OBSTETRICS & GYNECOLOGY

## 2018-03-08 PROCEDURE — 76811 OB US DETAILED SNGL FETUS: CPT | Performed by: OBSTETRICS & GYNECOLOGY

## 2018-03-08 PROCEDURE — 76817 TRANSVAGINAL US OBSTETRIC: CPT | Performed by: OBSTETRICS & GYNECOLOGY

## 2018-03-08 NOTE — PROGRESS NOTES
[address]: Ms Cece Adhikari was seen today at 20w1d for anatomic survey and cervical length screening ultrasound  See ultrasound report under "OB Procedures" tab  Please don't hesitate to contact our office with any concerns or questions    Taylor Lozano MD

## 2018-03-08 NOTE — PATIENT INSTRUCTIONS
Please notify your doctor if you have any bleeding complications this pregnancy  Would like to see her back in 4 weeks for follow-up of missed anatomy  We would also like to see back in 8 weeks for follow-up of placental location as well as fetal growth assessment

## 2018-03-09 ENCOUNTER — DOCUMENTATION (OUTPATIENT)
Dept: NEUROLOGY | Facility: CLINIC | Age: 38
End: 2018-03-09

## 2018-03-09 ENCOUNTER — TELEPHONE (OUTPATIENT)
Dept: NEUROLOGY | Facility: CLINIC | Age: 38
End: 2018-03-09

## 2018-03-09 ENCOUNTER — TELEPHONE (OUTPATIENT)
Dept: OBGYN CLINIC | Facility: CLINIC | Age: 38
End: 2018-03-09

## 2018-03-09 NOTE — PROGRESS NOTES
Signed, faxed to [phone] and Faxed back to central scanning to be put in patients chart on 3/9/2018  I called patient and she want them mailed to her and I am going to mail them today 3/9/2018

## 2018-03-09 NOTE — TELEPHONE ENCOUNTER
Pt's FLMA forms were filled out and emailed to you, please have Cely Chavez sign, and then contact pt to see what she would like done with these forms  They were also faxed to central faxing to sort into chart  Thank you

## 2018-03-20 ENCOUNTER — TELEPHONE (OUTPATIENT)
Dept: PERINATAL CARE | Facility: CLINIC | Age: 38
End: 2018-03-20

## 2018-04-05 ENCOUNTER — ULTRASOUND (OUTPATIENT)
Dept: PERINATAL CARE | Facility: CLINIC | Age: 38
End: 2018-04-05
Payer: COMMERCIAL

## 2018-04-05 ENCOUNTER — ROUTINE PRENATAL (OUTPATIENT)
Dept: OBGYN CLINIC | Facility: CLINIC | Age: 38
End: 2018-04-05

## 2018-04-05 VITALS
SYSTOLIC BLOOD PRESSURE: 133 MMHG | BODY MASS INDEX: 35.32 KG/M2 | DIASTOLIC BLOOD PRESSURE: 79 MMHG | WEIGHT: 212 LBS | HEIGHT: 65 IN | HEART RATE: 102 BPM

## 2018-04-05 VITALS — BODY MASS INDEX: 35.66 KG/M2 | SYSTOLIC BLOOD PRESSURE: 102 MMHG | DIASTOLIC BLOOD PRESSURE: 62 MMHG | WEIGHT: 213 LBS

## 2018-04-05 DIAGNOSIS — Z36.89 ENCOUNTER FOR ULTRASOUND TO CHECK FETAL GROWTH: ICD-10-CM

## 2018-04-05 DIAGNOSIS — Z3A.24 24 WEEKS GESTATION OF PREGNANCY: Primary | ICD-10-CM

## 2018-04-05 DIAGNOSIS — IMO0002 EVALUATE ANATOMY NOT SEEN ON PRIOR SONOGRAM: ICD-10-CM

## 2018-04-05 DIAGNOSIS — O99.212 OBESITY AFFECTING PREGNANCY IN SECOND TRIMESTER: ICD-10-CM

## 2018-04-05 DIAGNOSIS — O44.02 PLACENTA PREVIA ANTEPARTUM IN SECOND TRIMESTER: Primary | ICD-10-CM

## 2018-04-05 PROCEDURE — 76817 TRANSVAGINAL US OBSTETRIC: CPT | Performed by: OBSTETRICS & GYNECOLOGY

## 2018-04-05 PROCEDURE — 76816 OB US FOLLOW-UP PER FETUS: CPT | Performed by: OBSTETRICS & GYNECOLOGY

## 2018-04-05 PROCEDURE — 99212 OFFICE O/P EST SF 10 MIN: CPT | Performed by: OBSTETRICS & GYNECOLOGY

## 2018-04-05 PROCEDURE — PNV: Performed by: OBSTETRICS & GYNECOLOGY

## 2018-04-05 NOTE — PATIENT INSTRUCTIONS
Thank you for choosing [facility] for your  care today  If you have any questions about your ultrasound or care, please do not hesitate to contact us or your primary obstetrician  Please return in 4-6 weeks for your next ultrasound to check on the fetal growth  Your placenta previa has resolved  Please discuss with your doctors the discharge that you are having  Please continue to try to cut down on tobacco use as this will help you and baby  and Please try to keep your weight gain to 11-20 pounds this pregnancy; this is best accomplished by regular aerobic exercise and healthy eating

## 2018-04-05 NOTE — PROGRESS NOTES
[address]: Ms Matt Luke was seen today at 24w1d for growth and followup of missed anatomy and of placenta previa  Previa has resolved  See ultrasound report under "OB Procedures" tab  Please don't hesitate to contact our office with any concerns or questions    Leonard So MD

## 2018-04-05 NOTE — PROGRESS NOTES
Visit:  Good FM - previa resolved on [facility] scan today - tolerating PNV - has f/u US scheduled

## 2018-04-09 ENCOUNTER — TELEPHONE (OUTPATIENT)
Dept: OBGYN CLINIC | Facility: CLINIC | Age: 38
End: 2018-04-09

## 2018-04-09 ENCOUNTER — HOSPITAL ENCOUNTER (OUTPATIENT)
Facility: HOSPITAL | Age: 38
Discharge: HOME | End: 2018-04-09
Attending: OBSTETRICS & GYNECOLOGY | Admitting: OBSTETRICS & GYNECOLOGY
Payer: COMMERCIAL

## 2018-04-09 VITALS
RESPIRATION RATE: 16 BRPM | OXYGEN SATURATION: 100 % | TEMPERATURE: 98.1 F | DIASTOLIC BLOOD PRESSURE: 82 MMHG | SYSTOLIC BLOOD PRESSURE: 116 MMHG | HEART RATE: 102 BPM

## 2018-04-09 PROCEDURE — 87591 N.GONORRHOEAE DNA AMP PROB: CPT | Performed by: OBSTETRICS & GYNECOLOGY

## 2018-04-09 PROCEDURE — 87491 CHLMYD TRACH DNA AMP PROBE: CPT | Performed by: OBSTETRICS & GYNECOLOGY

## 2018-04-09 PROCEDURE — 99202 OFFICE O/P NEW SF 15 MIN: CPT

## 2018-04-09 NOTE — PROGRESS NOTES
Dr Terry Lino in to give d/c instructions  Patient sent home with labor precautions  All questions answered

## 2018-04-10 ENCOUNTER — HOSPITAL ENCOUNTER (EMERGENCY)
Facility: HOSPITAL | Age: 38
Discharge: HOME | End: 2018-04-10
Attending: EMERGENCY MEDICINE | Admitting: EMERGENCY MEDICINE
Payer: COMMERCIAL

## 2018-04-10 VITALS
DIASTOLIC BLOOD PRESSURE: 74 MMHG | SYSTOLIC BLOOD PRESSURE: 155 MMHG | WEIGHT: 211 LBS | TEMPERATURE: 97.9 F | RESPIRATION RATE: 18 BRPM | HEART RATE: 105 BPM | BODY MASS INDEX: 35.33 KG/M2 | OXYGEN SATURATION: 99 %

## 2018-04-10 DIAGNOSIS — N94.89 LABIAL PAIN: ICD-10-CM

## 2018-04-10 DIAGNOSIS — Z3A.24 24 WEEKS GESTATION OF PREGNANCY: ICD-10-CM

## 2018-04-10 DIAGNOSIS — N89.8 VAGINAL DISCHARGE: Primary | ICD-10-CM

## 2018-04-10 DIAGNOSIS — N94.89 LABIAL SWELLING: ICD-10-CM

## 2018-04-10 DIAGNOSIS — Z20.2 POSSIBLE EXPOSURE TO STD: ICD-10-CM

## 2018-04-10 PROCEDURE — 99283 EMERGENCY DEPT VISIT LOW MDM: CPT

## 2018-04-10 PROCEDURE — 96372 THER/PROPH/DIAG INJ SC/IM: CPT

## 2018-04-10 RX ORDER — AZITHROMYCIN 250 MG/1
1000 TABLET, FILM COATED ORAL ONCE
Status: COMPLETED | OUTPATIENT
Start: 2018-04-10 | End: 2018-04-10

## 2018-04-10 RX ADMIN — LIDOCAINE HYDROCHLORIDE: 20 JELLY TOPICAL at 04:36

## 2018-04-10 RX ADMIN — AZITHROMYCIN 1000 MG: 250 TABLET, FILM COATED ORAL at 04:47

## 2018-04-10 RX ADMIN — LIDOCAINE HYDROCHLORIDE 250 MG: 10 INJECTION, SOLUTION EPIDURAL; INFILTRATION; INTRACAUDAL; PERINEURAL at 04:48

## 2018-04-10 NOTE — DISCHARGE INSTRUCTIONS
You have been treated for a possible STD  You received Azithromycin and Rocephin IM  You have a STD culture pending - you will be notified by your OB with results  You are NOT to use vagisil cream or ointment  Use cool compresses  You are to use the lidocaine jelly as prescribed      Gonorrhea   WHAT YOU NEED TO KNOW:   Gonorrhea, or gonococcal urethritis, is a sexually transmitted infection (STI) caused by bacteria  It is spread by unprotected oral, vaginal, or anal sex  Gonorrhea causes inflammation of the urethra  The urethra is the tube where urine passes from the bladder to the outside of the body  Anyone with multiple sexual partners is at higher risk for gonorrhea  DISCHARGE INSTRUCTIONS:   Return to the emergency department if:   · You have chest pain or trouble breathing  · You have pain and swelling in your scrotum  · You have pain in your abdomen or joints  Contact your healthcare provider if:   · You have a fever  · You have chills, a cough, or feel weak and achy  · You have questions or concerns about your condition or care  Medicines:   · Antibiotics  help treat the infection caused by bacteria  Both you and your sexual partner have to be treated to prevent gonorrhea from spreading  · Take your medicine as directed  Contact your healthcare provider if you think your medicine is not helping or if you have side effects  Tell him of her if you are allergic to any medicine  Keep a list of the medicines, vitamins, and herbs you take  Include the amounts, and when and why you take them  Bring the list or the pill bottles to follow-up visits  Carry your medicine list with you in case of an emergency  Prevent the spread of gonorrhea:   · Use a condom  during oral, vaginal, and anal sex  Ask for more information about the correct way to use condoms  · Do not have sex with someone who has gonorrhea  This includes oral, vaginal, and anal sex      · Do not have sex while you or your partner are being treated for gonorrhea  Ask when it is safe to have sex  · Tell your healthcare provider if you are pregnant  Gonorrhea can be passed to an infant during birth  Follow up with your healthcare provider as directed:  Write down your questions so you remember to ask them during your visits  © 2017 [address]  Information is for End User's use only and may not be sold, redistributed or otherwise used for commercial purposes  All illustrations and images included in CareNotes® are the copyrighted property of A D A M , Inc  or [employer]eth[employer]  The above information is an  only  It is not intended as medical advice for individual conditions or treatments  Talk to your doctor, nurse or pharmacist before following any medical regimen to see if it is safe and effective for you  Chlamydia   WHAT YOU NEED TO KNOW:   Chlamydia is a sexually transmitted infection (STI)  It is caused by a bacteria most often spread through vaginal, oral, or anal sex  You have an increased risk of chlamydia if you have another STI, such as gonorrhea  Your risk is also higher if you have more than 1 sex partner  DISCHARGE INSTRUCTIONS:   Return to the emergency department if:   · You have a fever  · You have nausea or you cannot stop vomiting  · You have severe abdominal pain  Contact your healthcare provider if:   · Your signs or symptoms last longer than 1 week or get worse during treatment  · Your signs or symptoms return after treatment  · You have pain during sex  · You have questions or concerns about your condition or care  Medicines:   · Antibiotics  kill the bacteria that causes chlamydia  Take them as directed  · Take your medicine as directed  Contact your healthcare provider if you think your medicine is not helping or if you have side effects  Tell him or her if you are allergic to any medicine   Keep a list of the medicines, vitamins, and herbs you take  Include the amounts, and when and why you take them  Bring the list or the pill bottles to follow-up visits  Carry your medicine list with you in case of an emergency  Follow up with your healthcare provider as directed: You may need to return regularly for tests  Write down your questions so you remember to ask them during your visits  Prevent the spread of chlamydia:   · Wash your hands often  Use soap and water  Wash your hands after you use the bathroom  This helps prevent the infection from spreading to other parts of your body, such as your eyes  · Use a latex condom during sex to prevent chlamydia and other STIs  Use a new condom each time you have sex  · Talk to your sex partners  Tell anyone you have had sex with in the last 3 months that you have chlamydia  They may also be infected and need treatment  Ask your sex partners to get tested before you have sex  · Do not have sex until you and your partner have taken all of your antibiotics  Ask your healthcare provider when it is safe to have sex  · Get regular screenings for STIs  Ask your healthcare provider how often to get tested for STIs  He may tell you to get tested after you have sex with a new partner  Manage your symptoms:   · Keep your genital area clean and dry  Take showers instead of baths, and use unscented soap  · Do not douche unless your healthcare provider says it is okay  Do not use feminine hygiene sprays or powders  Tell your healthcare provider if you are pregnant:  You can spread chlamydia to your baby while you are pregnant  Your baby could get an eye infection or pneumonia  Chlamydia may also cause your baby to be born too early  Early treatment may prevent your baby from getting chlamydia  © 2017 [address] [employer]  Information is for End User's use only and may not be sold, redistributed or otherwise used for commercial purposes   All illustrations and images included in [facility] are the copyrighted property of [employer] [facility] [employer]  or [address]  The above information is an  only  It is not intended as medical advice for individual conditions or treatments  Talk to your doctor, nurse or pharmacist before following any medical regimen to see if it is safe and effective for you

## 2018-04-11 ENCOUNTER — OFFICE VISIT (OUTPATIENT)
Dept: OBGYN CLINIC | Facility: CLINIC | Age: 38
End: 2018-04-11
Payer: COMMERCIAL

## 2018-04-11 VITALS
DIASTOLIC BLOOD PRESSURE: 80 MMHG | SYSTOLIC BLOOD PRESSURE: 126 MMHG | BODY MASS INDEX: 34.66 KG/M2 | HEIGHT: 65 IN | WEIGHT: 208 LBS

## 2018-04-11 DIAGNOSIS — N76.0 VULVOVAGINITIS: Primary | ICD-10-CM

## 2018-04-11 LAB
CHLAMYDIA DNA CVX QL NAA+PROBE: NORMAL
N GONORRHOEA DNA GENITAL QL NAA+PROBE: NORMAL

## 2018-04-11 PROCEDURE — 99214 OFFICE O/P EST MOD 30 MIN: CPT | Performed by: PHYSICIAN ASSISTANT

## 2018-04-11 NOTE — PROGRESS NOTES
Assessment/Plan   Diagnoses and all orders for this visit:    Vulvovaginitis  -     GP Culture, Genital    Discussion  Genital culture collected; At this time since still with vulvo-vaginal itching and recent antibiotics - advise OTC Monistat 7  Encourage probiotic tablet like acidophilus BID with sx and then qd for maintenance; can also increase yogurt in diet; Apply OTC hydrocortisone BID x 7-10 days for vulvar itching  RTO for routine ob check or sooner if needed    Subjective     HPI   Hung Arguelles is a 40 y o  female who presents for possible vaginal infection at 25w0d  Patient recently got back together with "baby daddy" and had unprotected IC last Thursday  Patient found out he had 2 other partners this past month and had unprotected IC  Sunday patient started with vaginal pain, swelling, itching and pelvic pain/cramping  Pt seen at L&D 4/9 at which time they monitored baby and sent home stating no UTI, yeast, bacteria, or trich  Cultures for C/G done and results still pending  Then, symptoms persisted/worsened so went to ER yesterday - given Azithromycin and rocephin prophylactically - cultures still pending; At this time, pelvic pain/cramping has subsided; Still with vulvo-vaginal itching - no discharge or unusual odor  Denies any rashes, lesions, or ulcerations - denies any past history of genital herpes  Patient had treated prior yeast infection with Monistat 7 with positive results  Still smoking about 7 cigs/day  Denies fever/chills Experiencing mild headaches and nausea; Denies v/HA/cramping/vb/lof/edema/dv  PNVs + DHA - tolerating daily  Good FM   No urinary sx - burning/pain/frequency/hematuria    Review of Systems   Constitutional: Negative for fatigue and unexpected weight change  Eyes: Negative for photophobia and visual disturbance  Respiratory: Negative for shortness of breath  Cardiovascular: Negative for chest pain and leg swelling     Gastrointestinal: Negative for abdominal distention, abdominal pain, constipation, diarrhea, nausea and vomiting  Genitourinary: Positive for vaginal pain  Negative for difficulty urinating, dysuria, flank pain, frequency, genital sores, hematuria, pelvic pain, urgency, vaginal bleeding and vaginal discharge  Neurological: Negative for headaches  Psychiatric/Behavioral: Negative for dysphoric mood  The patient is not nervous/anxious          The following portions of the patient's history were reviewed and updated as appropriate: allergies, current medications, past family history, past medical history, past social history, past surgical history and problem list          Past Medical History:   Diagnosis Date    Abnormal Pap smear of cervix     Depression     Last assessed: 8/1/12    History of chlamydia infection     History of gonorrhea     MS ([address] sclerosis) [address] [address]     Ovarian cyst     Varicella     HX DISEASE       Past Surgical History:   Procedure Laterality Date    KNEE ARTHROSCOPY         Family History   Problem Relation Age of Onset    Hypertension Mother     Hypertension Father     Macular degeneration Father     Glaucoma Family     Macular degeneration Family     Hypertension Family     Anuerysm Sister     Migraines Sister     No Known Problems Daughter     Asthma Son     ADD / ADHD Son     Allergies Son        Social History     Social History    Marital status: Single     Spouse name: N/A    Number of children: N/A    Years of education: 12     Occupational History    [employer]      Social History Main Topics    Smoking status: Current Every Day Smoker     Packs/day: 1 00     Types: Cigarettes    Smokeless tobacco: Never Used    Alcohol use No      Comment: Per [employer]: Alcohol use/occasional/social    Drug use: No    Sexual activity: Not on file     Other Topics Concern    Not on file     Social History Narrative    No narrative on file         Current Outpatient Prescriptions:    lidocaine (XYLOCAINE) 2 % topical gel, Apply 1 application topically as needed for mild pain or moderate pain, Disp: 60 mL, Rfl: 0    Prenatal Vit-Fe Fumarate-FA (PNV PRENATAL PLUS MULTIVITAMIN) 27-1 MG TABS, , Disp: , Rfl:     No Known Allergies    Objective   Vitals:    04/11/18 0800   BP: 126/80   BP Location: Left arm   Cuff Size: Standard   Weight: 94 3 kg (208 lb)   Height: 5' 4 75" (1 645 m)     Physical Exam   Constitutional: She appears well-developed and well-nourished  No distress  Abdominal: Soft  She exhibits no distension and no mass  There is no tenderness  25 week gravid uterus measuring 25 cm fundal height   Genitourinary: Vagina normal and uterus normal  Pelvic exam was performed with patient supine  There is no rash, tenderness or lesion on the right labia  There is no rash, tenderness or lesion on the left labia  Uterus is not deviated, not enlarged, not fixed and not tender  Cervix exhibits no motion tenderness, no discharge and no friability  Right adnexum displays no mass, no tenderness and no fullness  Left adnexum displays no mass, no tenderness and no fullness  No erythema, tenderness or bleeding in the vagina  No foreign body in the vagina  No vaginal discharge found  Genitourinary Comments: Mild erythema of vulva without any lesions, ulcerations or rashes   Lymphadenopathy:        Right: No inguinal adenopathy present  Left: No inguinal adenopathy present  Neurological: She is alert  Skin: Skin is warm  She is not diaphoretic  Psychiatric: She has a normal mood and affect  Her behavior is normal    Vitals reviewed  There are no Patient Instructions on file for this visit

## 2018-04-14 LAB — SL AMB GENITAL CULTURE: NORMAL

## 2018-04-18 ENCOUNTER — TELEPHONE (OUTPATIENT)
Dept: OBGYN CLINIC | Facility: CLINIC | Age: 38
End: 2018-04-18

## 2018-04-18 NOTE — TELEPHONE ENCOUNTER
Pt aware reviewed with dr rizzo, normal pregnancy restrictions and can return to work  Pt asked to have return date on 5/14 due to upcoming ultrasound on 5/10 and doesn't have time to take from work for the day off

## 2018-05-04 ENCOUNTER — ROUTINE PRENATAL (OUTPATIENT)
Dept: OBGYN CLINIC | Facility: CLINIC | Age: 38
End: 2018-05-04

## 2018-05-04 VITALS — SYSTOLIC BLOOD PRESSURE: 118 MMHG | DIASTOLIC BLOOD PRESSURE: 60 MMHG | WEIGHT: 217 LBS | BODY MASS INDEX: 36.39 KG/M2

## 2018-05-04 DIAGNOSIS — Z3A.28 28 WEEKS GESTATION OF PREGNANCY: Primary | ICD-10-CM

## 2018-05-04 DIAGNOSIS — Z72.0 TOBACCO ABUSE: ICD-10-CM

## 2018-05-04 PROCEDURE — PNV: Performed by: OBSTETRICS & GYNECOLOGY

## 2018-05-04 RX ORDER — NICOTINE 21 MG/24HR
1 PATCH, TRANSDERMAL 24 HOURS TRANSDERMAL EVERY 24 HOURS
Qty: 28 PATCH | Refills: 1 | Status: SHIPPED | OUTPATIENT
Start: 2018-05-04 | End: 2018-05-30

## 2018-05-04 NOTE — PROGRESS NOTES
Patient reports good fm,some n/v, headache, cramping  No bleeding, loss of fluid, edema, dom violence     marlin pnv some smoking about 7/day fully counseled on quitting desires patch will send to pharmacy  Advised must not smoke on patch even if removed for at least 12 hours  Given slip glucola cbc return in 2 weeks or sooner as needed, decline blood std testing

## 2018-05-10 ENCOUNTER — ULTRASOUND (OUTPATIENT)
Dept: PERINATAL CARE | Facility: CLINIC | Age: 38
End: 2018-05-10
Payer: COMMERCIAL

## 2018-05-10 VITALS
HEART RATE: 90 BPM | BODY MASS INDEX: 35.96 KG/M2 | HEIGHT: 65 IN | SYSTOLIC BLOOD PRESSURE: 109 MMHG | DIASTOLIC BLOOD PRESSURE: 71 MMHG | WEIGHT: 215.8 LBS

## 2018-05-10 DIAGNOSIS — O99.350 MULTIPLE SCLEROSIS AFFECTING PREGNANCY, ANTEPARTUM (HCC): Primary | ICD-10-CM

## 2018-05-10 DIAGNOSIS — Z3A.29 29 WEEKS GESTATION OF PREGNANCY: ICD-10-CM

## 2018-05-10 DIAGNOSIS — O99.210 OBESITY AFFECTING PREGNANCY, ANTEPARTUM: ICD-10-CM

## 2018-05-10 DIAGNOSIS — O09.523 ELDERLY MULTIGRAVIDA IN THIRD TRIMESTER: ICD-10-CM

## 2018-05-10 DIAGNOSIS — G35 MULTIPLE SCLEROSIS AFFECTING PREGNANCY, ANTEPARTUM (HCC): Primary | ICD-10-CM

## 2018-05-10 PROBLEM — R91.1 LUNG NODULE SEEN ON IMAGING STUDY: Status: ACTIVE | Noted: 2017-05-31

## 2018-05-10 PROCEDURE — 76816 OB US FOLLOW-UP PER FETUS: CPT | Performed by: OBSTETRICS & GYNECOLOGY

## 2018-05-18 ENCOUNTER — ROUTINE PRENATAL (OUTPATIENT)
Dept: OBGYN CLINIC | Facility: CLINIC | Age: 38
End: 2018-05-18

## 2018-05-18 VITALS — BODY MASS INDEX: 36.28 KG/M2 | WEIGHT: 218 LBS | DIASTOLIC BLOOD PRESSURE: 68 MMHG | SYSTOLIC BLOOD PRESSURE: 110 MMHG

## 2018-05-18 DIAGNOSIS — Z34.83 PRENATAL CARE, SUBSEQUENT PREGNANCY, THIRD TRIMESTER: Primary | ICD-10-CM

## 2018-05-18 PROCEDURE — PNV: Performed by: PHYSICIAN ASSISTANT

## 2018-05-18 NOTE — PROGRESS NOTES
Visit: Good FM, vomiting only with brushing teeth, occ headaches improved with tylenol, Occ cramping, comes and goes, nothing regular, mild  Smoking 7 cig/day, planning on starting patch this weekend, aware not to smoke while has patch on  No FM, VB, LOF, edema, domestic violence  Tolerating PNV  Reviewed Tdap, patient reports 28 week booklet and 30 week packet already given last week  Continue routine prenatal care  Return to office in 2 weeks for ob check

## 2018-05-19 ENCOUNTER — APPOINTMENT (OUTPATIENT)
Dept: LAB | Facility: HOSPITAL | Age: 38
End: 2018-05-19
Attending: OBSTETRICS & GYNECOLOGY
Payer: COMMERCIAL

## 2018-05-19 DIAGNOSIS — Z3A.28 28 WEEKS GESTATION OF PREGNANCY: ICD-10-CM

## 2018-05-19 LAB
BASOPHILS # BLD AUTO: 0.01 THOUSANDS/ΜL (ref 0–0.1)
BASOPHILS NFR BLD AUTO: 0 % (ref 0–1)
EOSINOPHIL # BLD AUTO: 0.2 THOUSAND/ΜL (ref 0–0.61)
EOSINOPHIL NFR BLD AUTO: 3 % (ref 0–6)
ERYTHROCYTE [DISTWIDTH] IN BLOOD BY AUTOMATED COUNT: 14.7 % (ref 11.6–15.1)
GLUCOSE 1H P 50 G GLC PO SERPL-MCNC: 112 MG/DL
HCT VFR BLD AUTO: 29.1 % (ref 34.8–46.1)
HGB BLD-MCNC: 9.7 G/DL (ref 11.5–15.4)
LYMPHOCYTES # BLD AUTO: 1.15 THOUSANDS/ΜL (ref 0.6–4.47)
LYMPHOCYTES NFR BLD AUTO: 15 % (ref 14–44)
MCH RBC QN AUTO: 33.3 PG (ref 26.8–34.3)
MCHC RBC AUTO-ENTMCNC: 33.3 G/DL (ref 31.4–37.4)
MCV RBC AUTO: 100 FL (ref 82–98)
MONOCYTES # BLD AUTO: 0.38 THOUSAND/ΜL (ref 0.17–1.22)
MONOCYTES NFR BLD AUTO: 5 % (ref 4–12)
NEUTROPHILS # BLD AUTO: 5.79 THOUSANDS/ΜL (ref 1.85–7.62)
NEUTS SEG NFR BLD AUTO: 77 % (ref 43–75)
NRBC BLD AUTO-RTO: 0 /100 WBCS
PLATELET # BLD AUTO: 222 THOUSANDS/UL (ref 149–390)
PMV BLD AUTO: 9.7 FL (ref 8.9–12.7)
RBC # BLD AUTO: 2.91 MILLION/UL (ref 3.81–5.12)
WBC # BLD AUTO: 7.53 THOUSAND/UL (ref 4.31–10.16)

## 2018-05-19 PROCEDURE — 36415 COLL VENOUS BLD VENIPUNCTURE: CPT

## 2018-05-19 PROCEDURE — 82950 GLUCOSE TEST: CPT

## 2018-05-19 PROCEDURE — 85025 COMPLETE CBC W/AUTO DIFF WBC: CPT

## 2018-05-30 ENCOUNTER — ROUTINE PRENATAL (OUTPATIENT)
Dept: OBGYN CLINIC | Facility: CLINIC | Age: 38
End: 2018-05-30
Payer: COMMERCIAL

## 2018-05-30 ENCOUNTER — HOSPITAL ENCOUNTER (EMERGENCY)
Facility: HOSPITAL | Age: 38
Discharge: HOME | End: 2018-05-30
Attending: EMERGENCY MEDICINE | Admitting: EMERGENCY MEDICINE
Payer: COMMERCIAL

## 2018-05-30 ENCOUNTER — APPOINTMENT (EMERGENCY)
Dept: ULTRASOUND IMAGING | Facility: HOSPITAL | Age: 38
End: 2018-05-30
Payer: COMMERCIAL

## 2018-05-30 VITALS
OXYGEN SATURATION: 98 % | TEMPERATURE: 97.8 F | DIASTOLIC BLOOD PRESSURE: 64 MMHG | RESPIRATION RATE: 20 BRPM | HEART RATE: 94 BPM | BODY MASS INDEX: 36.05 KG/M2 | WEIGHT: 214.95 LBS | SYSTOLIC BLOOD PRESSURE: 131 MMHG

## 2018-05-30 VITALS
HEART RATE: 70 BPM | DIASTOLIC BLOOD PRESSURE: 68 MMHG | SYSTOLIC BLOOD PRESSURE: 110 MMHG | HEIGHT: 65 IN | WEIGHT: 215 LBS | BODY MASS INDEX: 35.82 KG/M2

## 2018-05-30 DIAGNOSIS — O23.43 UTI (URINARY TRACT INFECTION) DURING PREGNANCY, THIRD TRIMESTER: ICD-10-CM

## 2018-05-30 DIAGNOSIS — R10.9 RIGHT FLANK PAIN: Primary | ICD-10-CM

## 2018-05-30 DIAGNOSIS — Z34.83 PRENATAL CARE, SUBSEQUENT PREGNANCY, THIRD TRIMESTER: ICD-10-CM

## 2018-05-30 DIAGNOSIS — O23.43 UTI (URINARY TRACT INFECTION) DURING PREGNANCY, THIRD TRIMESTER: Primary | ICD-10-CM

## 2018-05-30 LAB
ANION GAP SERPL CALCULATED.3IONS-SCNC: 10 MMOL/L (ref 4–13)
BACTERIA UR QL AUTO: ABNORMAL /HPF
BACTERIA UR QL AUTO: ABNORMAL /HPF
BASOPHILS # BLD AUTO: 0.01 THOUSANDS/ΜL (ref 0–0.1)
BASOPHILS NFR BLD AUTO: 0 % (ref 0–1)
BILIRUB UR QL STRIP: NEGATIVE
BILIRUB UR QL STRIP: NEGATIVE
BUN SERPL-MCNC: 7 MG/DL (ref 5–25)
CALCIUM SERPL-MCNC: 8.8 MG/DL (ref 8.3–10.1)
CHLORIDE SERPL-SCNC: 103 MMOL/L (ref 100–108)
CLARITY UR: CLEAR
CLARITY UR: CLEAR
CO2 SERPL-SCNC: 24 MMOL/L (ref 21–32)
COLOR UR: YELLOW
COLOR UR: YELLOW
CREAT SERPL-MCNC: 0.54 MG/DL (ref 0.6–1.3)
EOSINOPHIL # BLD AUTO: 0.09 THOUSAND/ΜL (ref 0–0.61)
EOSINOPHIL NFR BLD AUTO: 1 % (ref 0–6)
ERYTHROCYTE [DISTWIDTH] IN BLOOD BY AUTOMATED COUNT: 14.7 % (ref 11.6–15.1)
GFR SERPL CREATININE-BSD FRML MDRD: 121 ML/MIN/1.73SQ M
GLUCOSE SERPL-MCNC: 93 MG/DL (ref 65–140)
GLUCOSE UR STRIP-MCNC: NEGATIVE MG/DL
GLUCOSE UR STRIP-MCNC: NEGATIVE MG/DL
HCT VFR BLD AUTO: 28 % (ref 34.8–46.1)
HGB BLD-MCNC: 9.4 G/DL (ref 11.5–15.4)
HGB UR QL STRIP.AUTO: ABNORMAL
HGB UR QL STRIP.AUTO: NEGATIVE
KETONES UR STRIP-MCNC: NEGATIVE MG/DL
KETONES UR STRIP-MCNC: NEGATIVE MG/DL
LEUKOCYTE ESTERASE UR QL STRIP: ABNORMAL
LEUKOCYTE ESTERASE UR QL STRIP: NEGATIVE
LYMPHOCYTES # BLD AUTO: 1.52 THOUSANDS/ΜL (ref 0.6–4.47)
LYMPHOCYTES NFR BLD AUTO: 21 % (ref 14–44)
MCH RBC QN AUTO: 33.3 PG (ref 26.8–34.3)
MCHC RBC AUTO-ENTMCNC: 33.6 G/DL (ref 31.4–37.4)
MCV RBC AUTO: 99 FL (ref 82–98)
MONOCYTES # BLD AUTO: 0.47 THOUSAND/ΜL (ref 0.17–1.22)
MONOCYTES NFR BLD AUTO: 7 % (ref 4–12)
NEUTROPHILS # BLD AUTO: 5.18 THOUSANDS/ΜL (ref 1.85–7.62)
NEUTS SEG NFR BLD AUTO: 71 % (ref 43–75)
NITRITE UR QL STRIP: NEGATIVE
NITRITE UR QL STRIP: NEGATIVE
NON-SQ EPI CELLS URNS QL MICRO: ABNORMAL /HPF
NON-SQ EPI CELLS URNS QL MICRO: ABNORMAL /HPF
NRBC BLD AUTO-RTO: 0 /100 WBCS
PH UR STRIP.AUTO: 7 [PH] (ref 4.5–8)
PH UR STRIP.AUTO: 8.5 [PH] (ref 4.5–8)
PLATELET # BLD AUTO: 208 THOUSANDS/UL (ref 149–390)
PMV BLD AUTO: 9.6 FL (ref 8.9–12.7)
POTASSIUM SERPL-SCNC: 3.6 MMOL/L (ref 3.5–5.3)
PROT UR STRIP-MCNC: ABNORMAL MG/DL
PROT UR STRIP-MCNC: NEGATIVE MG/DL
RBC # BLD AUTO: 2.82 MILLION/UL (ref 3.81–5.12)
RBC #/AREA URNS AUTO: ABNORMAL /HPF
RBC #/AREA URNS AUTO: ABNORMAL /HPF
SL AMB  POCT GLUCOSE, UA: NEGATIVE
SL AMB LEUKOCYTE ESTERASE,UA: ABNORMAL
SL AMB POCT BLOOD,UA: ABNORMAL
SL AMB POCT KETONES,UA: NEGATIVE
SL AMB POCT NITRITE,UA: NEGATIVE
SL AMB POCT PH,UA: 7.5
SL AMB POCT SPECIFIC GRAVITY,UA: 1.01
SL AMB POCT URINE PROTEIN: NEGATIVE
SODIUM SERPL-SCNC: 137 MMOL/L (ref 136–145)
SP GR UR STRIP.AUTO: 1.01 (ref 1–1.03)
SP GR UR STRIP.AUTO: 1.01 (ref 1–1.03)
UROBILINOGEN UR QL STRIP.AUTO: 0.2 E.U./DL
UROBILINOGEN UR QL STRIP.AUTO: 0.2 E.U./DL
WBC # BLD AUTO: 7.27 THOUSAND/UL (ref 4.31–10.16)
WBC #/AREA URNS AUTO: ABNORMAL /HPF
WBC #/AREA URNS AUTO: ABNORMAL /HPF

## 2018-05-30 PROCEDURE — 81001 URINALYSIS AUTO W/SCOPE: CPT

## 2018-05-30 PROCEDURE — 87086 URINE CULTURE/COLONY COUNT: CPT | Performed by: EMERGENCY MEDICINE

## 2018-05-30 PROCEDURE — 81002 URINALYSIS NONAUTO W/O SCOPE: CPT | Performed by: NURSE PRACTITIONER

## 2018-05-30 PROCEDURE — 81001 URINALYSIS AUTO W/SCOPE: CPT | Performed by: EMERGENCY MEDICINE

## 2018-05-30 PROCEDURE — 76770 US EXAM ABDO BACK WALL COMP: CPT

## 2018-05-30 PROCEDURE — PNV: Performed by: NURSE PRACTITIONER

## 2018-05-30 PROCEDURE — 99284 EMERGENCY DEPT VISIT MOD MDM: CPT

## 2018-05-30 PROCEDURE — 80048 BASIC METABOLIC PNL TOTAL CA: CPT | Performed by: EMERGENCY MEDICINE

## 2018-05-30 PROCEDURE — 36415 COLL VENOUS BLD VENIPUNCTURE: CPT | Performed by: EMERGENCY MEDICINE

## 2018-05-30 PROCEDURE — 85025 COMPLETE CBC W/AUTO DIFF WBC: CPT | Performed by: EMERGENCY MEDICINE

## 2018-05-30 PROCEDURE — 81002 URINALYSIS NONAUTO W/O SCOPE: CPT | Performed by: EMERGENCY MEDICINE

## 2018-05-30 RX ORDER — ACETAMINOPHEN 325 MG/1
650 TABLET ORAL ONCE
Status: COMPLETED | OUTPATIENT
Start: 2018-05-30 | End: 2018-05-30

## 2018-05-30 RX ORDER — NITROFURANTOIN 25; 75 MG/1; MG/1
100 CAPSULE ORAL 2 TIMES DAILY
Qty: 14 CAPSULE | Refills: 0 | Status: SHIPPED | OUTPATIENT
Start: 2018-05-30 | End: 2018-06-06

## 2018-05-30 RX ADMIN — ACETAMINOPHEN 650 MG: 325 TABLET ORAL at 20:08

## 2018-05-30 NOTE — PROGRESS NOTES
OFFICE VISIT: Pt presents today with complaints of back pain, abdominal cramping and pelvic pain which began last night  Pt states she took extra strength Tylenol and had no relief  Also started with Diarrhea this am  No nausea or vomiting  Staying well hydrated  Denies any fever, chills, malaise  Denies any contractions, just lower abdominal cramping  Denies any LOF, VB, or any abnormal vaginal discharge, itching, or odor  Pt denies dysuria, states + frequency, urgency, hesitancy  Urine dip in office + leuks + blood  Will treat and send out urine for culture  Reviewed with patient will treat for UTI  Advised to call back may need to be evaluated inpatient if pain persists, unable to keep down food or fluids, or any signs of fever or chills  NST in office reassuring FHR reactive with baseline 125bpm + accelerations, - decelerations, - contractions noted on the monitor  Pt states + FM, + HA which she has had on occasion throughout pregnancy  Denies any edema, domestic violence  Still smoking  Tolerating PNV, food and fluids at this time  RTO for routine ob check

## 2018-05-30 NOTE — ED PROVIDER NOTES
History  Chief Complaint   Patient presents with    Flank Pain     right flank pain that started yesterday  pt was seen by ob/gyn was given macrobid for uti  pain is worse  was told to come in  pt is 32weeks pregnant  denies discharge      42 yo female at 27 weeks gestation, c/o right flank pain, right low back, onset yesterday, and for which she was seen by OB today, started on macrobid for presumed UTI by urine dip  She was advised to return to the ED for increasing discomfort   History provided by:  Patient  Flank Pain   Pain location:  R flank  Pain quality: aching    Pain radiates to:  Groin  Pain severity:  Moderate  Onset quality:  Gradual  Duration:  1 day  Timing:  Constant  Progression:  Waxing and waning  Chronicity:  New  Context: not previous surgeries    Associated symptoms: nausea and vomiting (once today)    Associated symptoms: no chest pain, no chills, no cough, no diarrhea, no dysuria, no fever, no hematuria, no shortness of breath and no sore throat    Risk factors: pregnancy        Prior to Admission Medications   Prescriptions Last Dose Informant Patient Reported? Taking?    Prenatal Vit-Fe Fumarate-FA (PNV PRENATAL PLUS MULTIVITAMIN) 27-1 MG TABS  Self Yes No   lidocaine (XYLOCAINE) 2 % topical gel  Self No No   Sig: Apply 1 application topically as needed for mild pain or moderate pain   nitrofurantoin (MACROBID) 100 mg capsule   No No   Sig: Take 1 capsule (100 mg total) by mouth 2 (two) times a day for 7 days      Facility-Administered Medications: None       Past Medical History:   Diagnosis Date    Abnormal Pap smear of cervix     Depression     Last assessed: 8/1/12    History of chlamydia infection     History of gonorrhea     MS (multiple sclerosis) ([facility] [address] )     Ovarian cyst     Varicella     HX DISEASE       Past Surgical History:   Procedure Laterality Date    KNEE ARTHROSCOPY         Family History   Problem Relation Age of Onset    Hypertension Mother    [facility] Hypertension Father     Macular degeneration Father     Glaucoma Family     Macular degeneration Family     Hypertension Family     Anuerysm Sister     Migraines Sister     No Known Problems Daughter     Asthma Son     ADD / ADHD Son     Allergies Son      I have reviewed and agree with the history as documented  Social History   Substance Use Topics    Smoking status: Current Every Day Smoker     Packs/day: 1 00     Types: Cigarettes    Smokeless tobacco: Never Used    Alcohol use No      Comment: Per [employer]: Alcohol use/occasional/social        Review of Systems   Constitutional: Negative for appetite change, chills and fever  HENT: Negative for sore throat  Respiratory: Negative for cough, shortness of breath and wheezing  Cardiovascular: Negative for chest pain and palpitations  Gastrointestinal: Positive for nausea and vomiting (once today)  Negative for abdominal pain and diarrhea  Genitourinary: Positive for flank pain  Negative for dysuria and hematuria  Musculoskeletal: Negative for neck pain  Skin: Negative for rash  Neurological: Negative for dizziness, weakness and headaches  Psychiatric/Behavioral: Negative for suicidal ideas  All other systems reviewed and are negative  Physical Exam  Physical Exam   Constitutional: She is oriented to person, place, and time  She appears well-developed and well-nourished  No distress  HENT:   Head: Normocephalic and atraumatic  Right Ear: External ear normal    Left Ear: External ear normal    Nose: Nose normal    Eyes: Conjunctivae and EOM are normal  Pupils are equal, round, and reactive to light  Neck: Normal range of motion  Neck supple  Cardiovascular: Normal rate and regular rhythm  Pulmonary/Chest: Effort normal    Abdominal: Soft  She exhibits distension (gravid to xiphoid)  Musculoskeletal: Normal range of motion          Lumbar back: She exhibits tenderness (mild localizing right low back, below true CVA area)  Neurological: She is alert and oriented to person, place, and time  She has normal strength  Gait normal    Skin: Skin is warm and dry  No rash noted  She is not diaphoretic  Psychiatric: She has a normal mood and affect  Her behavior is normal  Judgment and thought content normal    Nursing note and vitals reviewed  Vital Signs  ED Triage Vitals [05/30/18 1739]   Temperature Pulse Respirations Blood Pressure SpO2   97 8 °F (36 6 °C) 91 20 135/68 98 %      Temp Source Heart Rate Source Patient Position - Orthostatic VS BP Location FiO2 (%)   Oral Monitor Sitting Right arm --      Pain Score       9           Vitals:    05/30/18 1739 05/30/18 1934   BP: 135/68 131/64   Pulse: 91 94   Patient Position - Orthostatic VS: Sitting        Visual Acuity      ED Medications  Medications   acetaminophen (TYLENOL) tablet 650 mg (not administered)       Diagnostic Studies  Results Reviewed     Procedure Component Value Units Date/Time    UA w Reflex to Microscopic [[id number]]     Lab Status:  No result Specimen:  Urine     Urine Microscopic [[id number]]  (Abnormal) Collected:  05/30/18 1854    Lab Status:  Final result Specimen:  Urine from Urine, Clean Catch Updated:  05/30/18 1931     RBC, UA None Seen /hpf      WBC, UA 4-10 (A) /hpf      Epithelial Cells Occasional /hpf      Bacteria, UA Occasional /hpf     Basic metabolic panel [[id number]]  (Abnormal) Collected:  05/30/18 1847    Lab Status:  Final result Specimen:  Blood from Arm, Right Updated:  05/30/18 1906     Sodium 137 mmol/L      Potassium 3 6 mmol/L      Chloride 103 mmol/L      CO2 24 mmol/L      Anion Gap 10 mmol/L      BUN 7 mg/dL      Creatinine 0 54 (L) mg/dL      Glucose 93 mg/dL      Calcium 8 8 mg/dL      eGFR 121 ml/min/1 73sq m     Narrative:         National Kidney Disease Education Program recommendations are as follows:  GFR calculation is accurate only with a steady state creatinine  Chronic Kidney disease less than 60 ml/min/1 73 sq  meters  Kidney failure less than 15 ml/min/1 73 sq  meters  CBC and differential [[id number]]  (Abnormal) Collected:  05/30/18 1847    Lab Status:  Final result Specimen:  Blood from Arm, Right Updated:  05/30/18 1858     WBC 7 27 Thousand/uL      RBC 2 82 (L) Million/uL      Hemoglobin 9 4 (L) g/dL      Hematocrit 28 0 (L) %      MCV 99 (H) fL      MCH 33 3 pg      MCHC 33 6 g/dL      RDW 14 7 %      MPV 9 6 fL      Platelets 626 Thousands/uL      nRBC 0 /100 WBCs      Neutrophils Relative 71 %      Lymphocytes Relative 21 %      Monocytes Relative 7 %      Eosinophils Relative 1 %      Basophils Relative 0 %      Neutrophils Absolute 5 18 Thousands/µL      Lymphocytes Absolute 1 52 Thousands/µL      Monocytes Absolute 0 47 Thousand/µL      Eosinophils Absolute 0 09 Thousand/µL      Basophils Absolute 0 01 Thousands/µL     POCT urinalysis dipstick [[id number]]  (Abnormal) Resulted:  05/30/18 1852    Lab Status:  Final result Specimen:  Urine Updated:  05/30/18 1852    ED Urine Macroscopic [[id number]]  (Abnormal) Collected:  05/30/18 1854    Lab Status:  Final result Specimen:  Urine Updated:  05/30/18 1851     Color, UA Yellow     Clarity, UA Clear     pH, UA 8 5 (H)     Leukocytes, UA Trace (A)     Nitrite, UA Negative     Protein, UA Trace (A) mg/dl      Glucose, UA Negative mg/dl      Ketones, UA Negative mg/dl      Urobilinogen, UA 0 2 E U /dl      Bilirubin, UA Negative     Blood, UA Negative     Specific Gravity, UA 1 015    Narrative:       CLINITEK RESULT                 US kidney and bladder   Final Result by Fred Corrigan MD (05/30 1926)         1  No hydronephrosis  2   Nondistended bladder  Workstation performed: [id number]                    Procedures  Procedures       Phone Contacts  ED Phone Contact    ED Course  ED Course as of May 30 2001   Wed May 30, 2018   1957 D/W Dr Rock Harry who agrees she can be discharged home, and to make sure a culture gets done  MDM  CritCare Time    Disposition  Final diagnoses:   Right flank pain   UTI (urinary tract infection) during pregnancy, third trimester     Time reflects when diagnosis was documented in both MDM as applicable and the Disposition within this note     Time User Action Codes Description Comment    5/30/2018  7:59 PM Max Fanshawe [R10 9] Right flank pain     5/30/2018  7:59 PM Hortencia Logan [O23 43] UTI (urinary tract infection) during pregnancy, third trimester       ED Disposition     ED Disposition Condition Comment    Discharge  Ashkan James discharge to home/self care  Condition at discharge: Good        Follow-up Information     Follow up With Specialties Details Why Cristina Bland MD Obstetrics and Gynecology, Obstetrics, Gynecology Go to For followup [address] [place] [address]  [phone]            Patient's Medications   Discharge Prescriptions    No medications on file     No discharge procedures on file      ED Provider  Electronically Signed by           Mike Perdomo MD  05/30/18 2001

## 2018-05-31 ENCOUNTER — ROUTINE PRENATAL (OUTPATIENT)
Dept: OBGYN CLINIC | Facility: CLINIC | Age: 38
End: 2018-05-31

## 2018-05-31 VITALS
HEIGHT: 65 IN | BODY MASS INDEX: 35.99 KG/M2 | SYSTOLIC BLOOD PRESSURE: 118 MMHG | DIASTOLIC BLOOD PRESSURE: 68 MMHG | WEIGHT: 216 LBS

## 2018-05-31 DIAGNOSIS — Z34.83 PRENATAL CARE, SUBSEQUENT PREGNANCY, THIRD TRIMESTER: Primary | ICD-10-CM

## 2018-05-31 PROCEDURE — PNV: Performed by: NURSE PRACTITIONER

## 2018-05-31 NOTE — PROGRESS NOTES
OFFICE VISIT: Pt states she presented to the hospital last evening because her back pain got worse  They did an ultrasound of her bladder, kidneys and ureters and saw no signs of kidney stones and stated did not look like a kidney infection at this time  Pt is continuing to take antibiotic I prescribed yesterday for UTI  States back pain has resolved but continues to have pelvic pain which she is describing now as more pressure than pain  Reviewed pregnancy support belt to help with pelvic pressure  Denies any fever/chills  Denies any N/V, HA, Cramping, VB, LOF, Edema, Domestic Violence  + FM, still smoking declines stopping at this time  Tolerating PNV  RTO 2 weeks for GBS or sooner as needed

## 2018-05-31 NOTE — DISCHARGE INSTRUCTIONS
Dr Dayami Wilson recommended plenty of fluids, rest, tylenol for pain, and close follow up with the office  Return if you are experiencing severe pain, vomiting, fever, bleeding or other new concerns

## 2018-06-01 LAB
BACTERIA UR CULT: NO GROWTH
BACTERIA UR CULT: NORMAL

## 2018-06-14 ENCOUNTER — ROUTINE PRENATAL (OUTPATIENT)
Dept: OBGYN CLINIC | Facility: CLINIC | Age: 38
End: 2018-06-14

## 2018-06-14 VITALS — DIASTOLIC BLOOD PRESSURE: 82 MMHG | WEIGHT: 219 LBS | BODY MASS INDEX: 36.73 KG/M2 | SYSTOLIC BLOOD PRESSURE: 130 MMHG

## 2018-06-14 DIAGNOSIS — Z34.83 PRENATAL CARE, SUBSEQUENT PREGNANCY, THIRD TRIMESTER: Primary | ICD-10-CM

## 2018-06-14 LAB — EXTERNAL GROUP B STREP ANTIGEN: POSITIVE

## 2018-06-14 PROCEDURE — PNV: Performed by: NURSE PRACTITIONER

## 2018-06-14 NOTE — PROGRESS NOTES
OFFICE VISIT: Occasional headache due to caffeine withdraw, goes away once she has her morning coffee  Mild lower leg edema, resolves with rest  Denies any N/V,  Cramping, VB, LOF, Domestic Violence, Smoking  + FM  Tolerating PNV  Has follow up growth scan at [facility] in 2 weeks  GBS done today  RTO 2 weeks for labor talk

## 2018-06-20 ENCOUNTER — TELEPHONE (OUTPATIENT)
Dept: OBGYN CLINIC | Facility: CLINIC | Age: 38
End: 2018-06-20

## 2018-06-20 ENCOUNTER — ROUTINE PRENATAL (OUTPATIENT)
Dept: OBGYN CLINIC | Facility: CLINIC | Age: 38
End: 2018-06-20

## 2018-06-20 VITALS — SYSTOLIC BLOOD PRESSURE: 120 MMHG | WEIGHT: 218 LBS | BODY MASS INDEX: 36.56 KG/M2 | DIASTOLIC BLOOD PRESSURE: 64 MMHG

## 2018-06-20 DIAGNOSIS — Z34.83 PRENATAL CARE, SUBSEQUENT PREGNANCY, THIRD TRIMESTER: Primary | ICD-10-CM

## 2018-06-20 LAB — GP B STREP GENITAL QL CULT: POSITIVE

## 2018-06-20 PROCEDURE — PNV: Performed by: NURSE PRACTITIONER

## 2018-06-20 RX ORDER — FERROUS SULFATE 325(65) MG
325 TABLET ORAL
COMMUNITY
End: 2018-09-27 | Stop reason: ALTCHOICE

## 2018-06-20 NOTE — PROGRESS NOTES
Problem visit: Pt presents today with complaints of vaginal spotting  States this morning after using restroom she noted spotting twice on toilet tissue  Has resolved since  Denies any pelvic or abdominal pain  Denies any bladder problems  Occasional constipation  Unsure if has hemorrhoids  States hx of resolved placenta previa  Denies any recent intercourse or vaginal exams  Speculum exam revealed no bleeding in vaginal vault  Pt has scheduled [facility] visit tomorrow, pt to keep appointment  Occasional nausea/vomiting  Denies any HA, Cramping, VB, LOF, Edema, Domestic Violence, Smoking  + FM  Tolerating PNV  Precautions given to call office with  RTO for regular ob check or sooner as needed

## 2018-06-21 ENCOUNTER — ULTRASOUND (OUTPATIENT)
Dept: PERINATAL CARE | Facility: CLINIC | Age: 38
End: 2018-06-21
Payer: COMMERCIAL

## 2018-06-21 VITALS
WEIGHT: 216.4 LBS | HEIGHT: 65 IN | SYSTOLIC BLOOD PRESSURE: 121 MMHG | HEART RATE: 103 BPM | DIASTOLIC BLOOD PRESSURE: 76 MMHG | BODY MASS INDEX: 36.06 KG/M2

## 2018-06-21 DIAGNOSIS — G35 MULTIPLE SCLEROSIS AFFECTING PREGNANCY, ANTEPARTUM (HCC): Primary | ICD-10-CM

## 2018-06-21 DIAGNOSIS — Z3A.35 35 WEEKS GESTATION OF PREGNANCY: ICD-10-CM

## 2018-06-21 DIAGNOSIS — O99.350 MULTIPLE SCLEROSIS AFFECTING PREGNANCY, ANTEPARTUM (HCC): Primary | ICD-10-CM

## 2018-06-21 DIAGNOSIS — O09.523 ELDERLY MULTIGRAVIDA IN THIRD TRIMESTER: ICD-10-CM

## 2018-06-21 PROCEDURE — 76816 OB US FOLLOW-UP PER FETUS: CPT | Performed by: OBSTETRICS & GYNECOLOGY

## 2018-06-21 PROCEDURE — 99212 OFFICE O/P EST SF 10 MIN: CPT | Performed by: OBSTETRICS & GYNECOLOGY

## 2018-06-21 NOTE — PATIENT INSTRUCTIONS

## 2018-06-27 ENCOUNTER — OFFICE VISIT (OUTPATIENT)
Dept: NEUROLOGY | Facility: CLINIC | Age: 38
End: 2018-06-27
Payer: COMMERCIAL

## 2018-06-27 VITALS
WEIGHT: 217.4 LBS | BODY MASS INDEX: 37.11 KG/M2 | HEIGHT: 64 IN | SYSTOLIC BLOOD PRESSURE: 102 MMHG | DIASTOLIC BLOOD PRESSURE: 58 MMHG | HEART RATE: 90 BPM

## 2018-06-27 DIAGNOSIS — G35 MULTIPLE SCLEROSIS (HCC): Primary | ICD-10-CM

## 2018-06-27 PROCEDURE — 99214 OFFICE O/P EST MOD 30 MIN: CPT | Performed by: PSYCHIATRY & NEUROLOGY

## 2018-06-27 NOTE — ASSESSMENT & PLAN NOTE
Pt here for neuro follow up  Pt last seen in feb  Pt is 8 months pregnant  Pt is due at end of July, this her 4th pregnancy  Pt had bout of vertigo while in [employer] about 2 weeks ago  No other associated sxs ie diff with speech or swallwong, no facial paresthesias, no diplopia  sxs sound peripheral in etiology  If any other assoicated neuro sxs, pt to go to er  Pt remains off imd therapy due to pregnancy  Pt is planning on breastfeeding  We will make an appt one month after delivery to discuss options  Pt ideally will go back on tecfidera once she is not breastfeeding  Pt to call for any new sxs  Pt may be going for the pertussis vaccination per gyn  Not sure if this is a live virus vaccination, if so, not generally advised with pts with ms

## 2018-06-27 NOTE — PROGRESS NOTES
Patient ID: Sharyn Henao is a 41 y o  female  Assessment/Plan:    Multiple sclerosis (HCC)  Pt here for neuro follow up  Pt last seen in feb  Pt is 8 months pregnant  Pt is due at end of July, this her 4th pregnancy  Pt had bout of vertigo while in [place] about 2 weeks ago  No other associated sxs ie diff with speech or swallwong, no facial paresthesias, no diplopia  sxs sound peripheral in etiology  If any other assoicated neuro sxs, pt to go to er  Pt remains off imd therapy due to pregnancy  Pt is planning on breastfeeding  We will make an appt one month after delivery to discuss options  Pt ideally will go back on tecfidera once she is not breastfeeding  Pt to call for any new sxs  Pt may be going for the pertussis vaccination per gyn  Not sure if this is a live virus vaccination, if so, not generally advised with pts with ms       Diagnoses and all orders for this visit:    Multiple sclerosis ([facility] [address] )           Subjective:    HPI  Demetrius Backers returns today for follow-up of MS  Pt is a  39 yo female with PMH of migraines, depression and anxiety  Pt last seen by diana in feb 2018 and per diana notes "Pt notes sxs began in January 2015, she had a work injury to the left shoulder  She noted in March 2015, she had left sided numbness down her torso, down into her feet and legs  She also experienced urinary leakage without Valsalva  Pt then experienced right eye pain and fogginess of vision  She was seen by Dr Sukhjinder Crabtree and had an OCT done  It revealed normal findings on the right but on the left a sector defect  MRI of the brain from May 13, 2015 revealed a few scattered nonspecific supratentorial WM lesions  No acute infarction, or hemorrhage or enh  MRI of the lumbar spine without contrast revealed small L5/S1 disc herniation, MRI of the T-spine revealed evidence of solitary 8mm lesion to the left of midline at T8/9 in the dorsal Cord  No pathological enh   MRI of the C-spine also done on June 13, 2015 revealed a solitary 5 mm focus of active demyelination in the right lateral cord at C2  Pt was given 3 days of IV steroids and tolerated ok  Pt with significant GI upset with the steroids and also some mild change in mood with increased anxiety  She was NMO negative and Lyme negative  She did not have an LP "     Pt was started on Copaxone as her initial imd med in 2015  Pt had some brief interval of being off med due to insurance  However due to site rxns pt stopped med and started tecfidera as oral med din may 2016  Pt was subsequently told to stop her tecifidera due to unplanned pregnancy in nov 2017  Pt has been off med since realization of her pregnancy  Pt following closely with gyn as well  Pt is 8 months pregnant currently here with her other 2 children  Family is very excited about newest addition  Pt is due at end of July, this her 4th pregnancy  Pt had bout of vertigo while in [facility] about 2 weeks ago  No other associated sxs ie diff with speech or swallwong, no facial paresthesias, no diplopia  Rev sxs in detail with pt and sxs sound peripheral in etiology  If any other assoicated neuro sxs, pt to go to er  Pt remains off imd therapy due to pregnancy  Overall she is feeling well, no other new sxs  No falls or trips  No changes in bowel or bladder  No recent infections  No ha   Pt is planning on breastfeeding  We will make an appt one month after delivery to discuss options  Pt ideally will go back on tecfidera once she is not breastfeeding any more  Pt may be going for the pertussis vaccination per gyn  Not sure if this is a live virus vaccination, if so, not generally advised with pts with ms  Pt will follow up with her gyn tomorrow to get more details  Pt remains off her gabapentin  No new paresthesias  No diplopia  No change in speech or swallowing  Total time spent today 25 minutes   Greater than 50% of total time was spent with the patient and / or family counseling and / or coordination of care                       The following portions of the patient's history were reviewed and updated as appropriate: allergies, current medications, past family history, past medical history, past social history, past surgical history and problem list          Objective:    Blood pressure 102/58, pulse 90, height 5' 4" (1 626 m), weight 98 6 kg (217 lb 6 4 oz), last menstrual period 10/18/2017, unknown if currently breastfeeding  Physical Exam   Constitutional: She appears well-developed and well-nourished  Eyes: EOM are normal  Pupils are equal, round, and reactive to light  Cardiovascular: Intact distal pulses  Neurological: She has normal strength and normal reflexes  Psychiatric: Her speech is normal        Neurological Exam    Mental Status  The patient is alert and oriented to person, place, time, and situation  Her recent and remote memory are normal  Her speech is normal  Her language is fluent with no aphasia  She has normal attention span and concentration  She follows multi-step commands  She has a normal fund of knowledge  Cranial Nerves    CN II: The patient's visual acuity and visual fields are normal   CN III, IV, VI: The patient's pupils are equally round and reactive to light and ocular movements are normal   CN V: The patient has normal facial sensation  CN VII:  The patient has symmetric facial movement  CN VIII:  The patient's hearing is normal   CN IX, X: The patient has symmetric palate movement and normal gag reflex  CN XI: The patient's shoulder shrug strength is normal   CN XII: The patient's tongue is midline without atrophy or fasciculations  Motor  The patient has normal muscle bulk throughout  Her overall muscle tone is normal throughout  Her strength is 5/5 throughout all four extremities  Sensory  The patient's sensation is normal in all four extremities      Reflexes  Deep tendon reflexes are 2+ and symmetric in all four extremities with downgoing toes bilaterally  Gait and Coordination   She has a wide stance  She has abnormal tandem gait  She has normal right finger to nose and normal left finger to nose coordination  Neg babinski         ROS:    Review of Systems   Constitutional: Negative  Negative for appetite change and fever  HENT: Negative  Negative for hearing loss, tinnitus, trouble swallowing and voice change  Eyes: Negative  Negative for photophobia and pain  Respiratory: Negative  Negative for shortness of breath  Cardiovascular: Negative  Negative for palpitations  Gastrointestinal: Negative  Negative for nausea and vomiting  Endocrine: Negative  Negative for cold intolerance and heat intolerance  Genitourinary: Positive for frequency and urgency  Negative for dysuria  Musculoskeletal: Negative  Negative for myalgias and neck pain  Skin: Negative  Negative for rash  Neurological: Positive for dizziness, numbness and headaches  Negative for tremors, seizures, syncope, facial asymmetry, speech difficulty, weakness and light-headedness  Cramping   Hematological: Bruises/bleeds easily  Psychiatric/Behavioral: Positive for sleep disturbance  Negative for confusion and hallucinations

## 2018-06-27 NOTE — PATIENT INSTRUCTIONS
Multiple sclerosis ([facility] [address] )  Pt here for neuro follow up  Pt last seen in feb  Pt is 8 months pregnant  Pt is due at end of July, this her 4th pregnancy  Pt had bout of vertigo while in [place] about 2 weeks ago  No other associated sxs ie diff with speech or swallwong, no facial paresthesias, no diplopia  sxs sound peripheral in etiology  If any other assoicated neuro sxs, pt to go to er  Pt remains off imd therapy due to pregnancy  Pt is planning on breastfeeding  We will make an appt one month after delivery to discuss options  Pt ideally will go back on tecfidera once she is not breastfeeding  Pt to call for any new sxs  Pt may be going for the pertussis vaccination per gyn  Not sure if this is a live virus vaccination, if so, not generally advised with pts with ms

## 2018-06-28 ENCOUNTER — ROUTINE PRENATAL (OUTPATIENT)
Dept: OBGYN CLINIC | Facility: CLINIC | Age: 38
End: 2018-06-28

## 2018-06-28 VITALS
DIASTOLIC BLOOD PRESSURE: 64 MMHG | SYSTOLIC BLOOD PRESSURE: 118 MMHG | HEIGHT: 65 IN | WEIGHT: 219 LBS | BODY MASS INDEX: 36.49 KG/M2

## 2018-06-28 DIAGNOSIS — Z34.83 PRENATAL CARE, SUBSEQUENT PREGNANCY, THIRD TRIMESTER: Primary | ICD-10-CM

## 2018-06-28 PROCEDURE — PNV: Performed by: NURSE PRACTITIONER

## 2018-06-28 NOTE — PROGRESS NOTES
OFFICE VISIT: Denies any N/V, HA, Cramping, VB, LOF, Edema, Domestic Violence, Smoking  + FM  Tolerating PNV  Labor talk done  Wants epidural, plans to breastfeed  Has car seat  Ws induced with other pregnancies, labors ranged from 12-32 hours  Plans on getting TDap vaccine  GBS positive  RTO 1 week or sooner as needed

## 2018-07-05 ENCOUNTER — ROUTINE PRENATAL (OUTPATIENT)
Dept: OBGYN CLINIC | Facility: CLINIC | Age: 38
End: 2018-07-05

## 2018-07-05 VITALS
HEIGHT: 65 IN | BODY MASS INDEX: 36.65 KG/M2 | DIASTOLIC BLOOD PRESSURE: 68 MMHG | SYSTOLIC BLOOD PRESSURE: 114 MMHG | WEIGHT: 220 LBS

## 2018-07-05 DIAGNOSIS — Z34.83 PRENATAL CARE, SUBSEQUENT PREGNANCY, THIRD TRIMESTER: Primary | ICD-10-CM

## 2018-07-05 PROCEDURE — PNV: Performed by: NURSE PRACTITIONER

## 2018-07-05 NOTE — PROGRESS NOTES
OFFICE VISIT: Denies any N/V, HA, Cramping, VB, LOF, Edema, Domestic Violence, Smoking  + FM  Tolerating PNV  Would like to look into induction of labor after 39 weeks will discuss at next appointment  RTO 1 week or sooner as needed

## 2018-07-17 ENCOUNTER — ROUTINE PRENATAL (OUTPATIENT)
Dept: OBGYN CLINIC | Facility: CLINIC | Age: 38
End: 2018-07-17

## 2018-07-17 VITALS
SYSTOLIC BLOOD PRESSURE: 118 MMHG | DIASTOLIC BLOOD PRESSURE: 78 MMHG | WEIGHT: 224 LBS | HEIGHT: 65 IN | BODY MASS INDEX: 37.32 KG/M2

## 2018-07-17 DIAGNOSIS — O09.523 ELDERLY MULTIGRAVIDA IN THIRD TRIMESTER: Primary | ICD-10-CM

## 2018-07-17 PROCEDURE — PNV: Performed by: OBSTETRICS & GYNECOLOGY

## 2018-07-17 NOTE — PROGRESS NOTES
Patient reports good fm, no n/v, headache, cramping,, dom violence    marlin pnv desire iol, some edema and smoking, will evaluate for iol

## 2018-07-18 ENCOUNTER — HOSPITAL ENCOUNTER (INPATIENT)
Facility: HOSPITAL | Age: 38
LOS: 2 days | Discharge: HOME | End: 2018-07-20
Attending: OBSTETRICS & GYNECOLOGY | Admitting: OBSTETRICS & GYNECOLOGY
Payer: COMMERCIAL

## 2018-07-18 ENCOUNTER — ANESTHESIA (INPATIENT)
Dept: LABOR AND DELIVERY | Facility: HOSPITAL | Age: 38
End: 2018-07-18
Payer: COMMERCIAL

## 2018-07-18 ENCOUNTER — HOSPITAL ENCOUNTER (OUTPATIENT)
Dept: LABOR AND DELIVERY | Facility: HOSPITAL | Age: 38
Discharge: HOME | End: 2018-07-18
Payer: COMMERCIAL

## 2018-07-18 ENCOUNTER — ANESTHESIA EVENT (INPATIENT)
Dept: LABOR AND DELIVERY | Facility: HOSPITAL | Age: 38
End: 2018-07-18
Payer: COMMERCIAL

## 2018-07-18 DIAGNOSIS — Z3A.39 39 WEEKS GESTATION OF PREGNANCY: Primary | ICD-10-CM

## 2018-07-18 LAB
ABO GROUP BLD: NORMAL
BASE EXCESS BLDCOV CALC-SCNC: -0.3 MMOL/L (ref 1–9)
BASOPHILS # BLD AUTO: 0.02 THOUSANDS/ΜL (ref 0–0.1)
BASOPHILS NFR BLD AUTO: 0 % (ref 0–1)
BLD GP AB SCN SERPL QL: NEGATIVE
EOSINOPHIL # BLD AUTO: 0.09 THOUSAND/ΜL (ref 0–0.61)
EOSINOPHIL NFR BLD AUTO: 1 % (ref 0–6)
ERYTHROCYTE [DISTWIDTH] IN BLOOD BY AUTOMATED COUNT: 14.4 % (ref 11.6–15.1)
HCO3 BLDCOV-SCNC: 24.8 MMOL/L (ref 12.2–28.6)
HCT VFR BLD AUTO: 30.8 % (ref 34.8–46.1)
HGB BLD-MCNC: 10.2 G/DL (ref 11.5–15.4)
IMM GRANULOCYTES # BLD AUTO: 0.04 THOUSAND/UL (ref 0–0.2)
IMM GRANULOCYTES NFR BLD AUTO: 1 % (ref 0–2)
LYMPHOCYTES # BLD AUTO: 1.24 THOUSANDS/ΜL (ref 0.6–4.47)
LYMPHOCYTES NFR BLD AUTO: 18 % (ref 14–44)
MCH RBC QN AUTO: 34 PG (ref 26.8–34.3)
MCHC RBC AUTO-ENTMCNC: 33.1 G/DL (ref 31.4–37.4)
MCV RBC AUTO: 103 FL (ref 82–98)
MONOCYTES # BLD AUTO: 0.43 THOUSAND/ΜL (ref 0.17–1.22)
MONOCYTES NFR BLD AUTO: 6 % (ref 4–12)
NEUTROPHILS # BLD AUTO: 5.03 THOUSANDS/ΜL (ref 1.85–7.62)
NEUTS SEG NFR BLD AUTO: 74 % (ref 43–75)
NRBC BLD AUTO-RTO: 0 /100 WBCS
OXYHGB MFR BLDCOV: 76.1 %
PCO2 BLDCOV: 42.4 MM HG (ref 27–43)
PH BLDCOV: 7.38 [PH] (ref 7.19–7.49)
PLATELET # BLD AUTO: 211 THOUSANDS/UL (ref 149–390)
PMV BLD AUTO: 9.5 FL (ref 8.9–12.7)
PO2 BLDCOV: 30.7 MM HG (ref 15–45)
RBC # BLD AUTO: 3 MILLION/UL (ref 3.81–5.12)
RH BLD: POSITIVE
SAO2 % BLDCOV: 16.6 ML/DL
SPECIMEN EXPIRATION DATE: NORMAL
WBC # BLD AUTO: 6.85 THOUSAND/UL (ref 4.31–10.16)

## 2018-07-18 PROCEDURE — 82805 BLOOD GASES W/O2 SATURATION: CPT | Performed by: OBSTETRICS & GYNECOLOGY

## 2018-07-18 PROCEDURE — 10H07YZ INSERTION OF OTHER DEVICE INTO PRODUCTS OF CONCEPTION, VIA NATURAL OR ARTIFICIAL OPENING: ICD-10-PCS | Performed by: OBSTETRICS & GYNECOLOGY

## 2018-07-18 PROCEDURE — 85025 COMPLETE CBC W/AUTO DIFF WBC: CPT | Performed by: OBSTETRICS & GYNECOLOGY

## 2018-07-18 PROCEDURE — 86901 BLOOD TYPING SEROLOGIC RH(D): CPT | Performed by: OBSTETRICS & GYNECOLOGY

## 2018-07-18 PROCEDURE — 86900 BLOOD TYPING SEROLOGIC ABO: CPT | Performed by: OBSTETRICS & GYNECOLOGY

## 2018-07-18 PROCEDURE — 59400 OBSTETRICAL CARE: CPT | Performed by: OBSTETRICS & GYNECOLOGY

## 2018-07-18 PROCEDURE — 3E033VJ INTRODUCTION OF OTHER HORMONE INTO PERIPHERAL VEIN, PERCUTANEOUS APPROACH: ICD-10-PCS | Performed by: OBSTETRICS & GYNECOLOGY

## 2018-07-18 PROCEDURE — 86592 SYPHILIS TEST NON-TREP QUAL: CPT | Performed by: OBSTETRICS & GYNECOLOGY

## 2018-07-18 PROCEDURE — 10907ZC DRAINAGE OF AMNIOTIC FLUID, THERAPEUTIC FROM PRODUCTS OF CONCEPTION, VIA NATURAL OR ARTIFICIAL OPENING: ICD-10-PCS | Performed by: OBSTETRICS & GYNECOLOGY

## 2018-07-18 PROCEDURE — 86850 RBC ANTIBODY SCREEN: CPT | Performed by: OBSTETRICS & GYNECOLOGY

## 2018-07-18 PROCEDURE — 4A1HXCZ MONITORING OF PRODUCTS OF CONCEPTION, CARDIAC RATE, EXTERNAL APPROACH: ICD-10-PCS | Performed by: OBSTETRICS & GYNECOLOGY

## 2018-07-18 RX ORDER — OXYCODONE HYDROCHLORIDE AND ACETAMINOPHEN 5; 325 MG/1; MG/1
2 TABLET ORAL EVERY 4 HOURS PRN
Status: DISCONTINUED | OUTPATIENT
Start: 2018-07-18 | End: 2018-07-20 | Stop reason: HOSPADM

## 2018-07-18 RX ORDER — ROPIVACAINE HYDROCHLORIDE 2 MG/ML
INJECTION, SOLUTION EPIDURAL; INFILTRATION; PERINEURAL AS NEEDED
Status: DISCONTINUED | OUTPATIENT
Start: 2018-07-18 | End: 2018-07-18 | Stop reason: SURG

## 2018-07-18 RX ORDER — MAGNESIUM HYDROXIDE/ALUMINUM HYDROXICE/SIMETHICONE 120; 1200; 1200 MG/30ML; MG/30ML; MG/30ML
15 SUSPENSION ORAL EVERY 6 HOURS PRN
Status: DISCONTINUED | OUTPATIENT
Start: 2018-07-18 | End: 2018-07-20 | Stop reason: HOSPADM

## 2018-07-18 RX ORDER — SODIUM CHLORIDE, SODIUM LACTATE, POTASSIUM CHLORIDE, CALCIUM CHLORIDE 600; 310; 30; 20 MG/100ML; MG/100ML; MG/100ML; MG/100ML
125 INJECTION, SOLUTION INTRAVENOUS CONTINUOUS
Status: DISCONTINUED | OUTPATIENT
Start: 2018-07-18 | End: 2018-07-20 | Stop reason: HOSPADM

## 2018-07-18 RX ORDER — IBUPROFEN 600 MG/1
600 TABLET ORAL EVERY 6 HOURS PRN
Status: DISCONTINUED | OUTPATIENT
Start: 2018-07-18 | End: 2018-07-20 | Stop reason: HOSPADM

## 2018-07-18 RX ORDER — OXYCODONE HYDROCHLORIDE AND ACETAMINOPHEN 5; 325 MG/1; MG/1
1 TABLET ORAL EVERY 4 HOURS PRN
Status: DISCONTINUED | OUTPATIENT
Start: 2018-07-18 | End: 2018-07-20 | Stop reason: HOSPADM

## 2018-07-18 RX ORDER — DIAPER,BRIEF,INFANT-TODD,DISP
1 EACH MISCELLANEOUS AS NEEDED
Status: DISCONTINUED | OUTPATIENT
Start: 2018-07-18 | End: 2018-07-20 | Stop reason: HOSPADM

## 2018-07-18 RX ORDER — OXYTOCIN/RINGER'S LACTATE 30/500 ML
1-30 PLASTIC BAG, INJECTION (ML) INTRAVENOUS
Status: DISCONTINUED | OUTPATIENT
Start: 2018-07-18 | End: 2018-07-18

## 2018-07-18 RX ORDER — ACETAMINOPHEN 325 MG/1
650 TABLET ORAL EVERY 6 HOURS PRN
Status: DISCONTINUED | OUTPATIENT
Start: 2018-07-18 | End: 2018-07-20 | Stop reason: HOSPADM

## 2018-07-18 RX ORDER — ONDANSETRON 2 MG/ML
4 INJECTION INTRAMUSCULAR; INTRAVENOUS EVERY 8 HOURS PRN
Status: DISCONTINUED | OUTPATIENT
Start: 2018-07-18 | End: 2018-07-20 | Stop reason: HOSPADM

## 2018-07-18 RX ORDER — DIPHENHYDRAMINE HCL 25 MG
25 TABLET ORAL EVERY 6 HOURS PRN
Status: DISCONTINUED | OUTPATIENT
Start: 2018-07-18 | End: 2018-07-20 | Stop reason: HOSPADM

## 2018-07-18 RX ADMIN — IBUPROFEN 600 MG: 600 TABLET ORAL at 23:25

## 2018-07-18 RX ADMIN — HYDROCORTISONE 1 APPLICATION: 1 CREAM TOPICAL at 23:26

## 2018-07-18 RX ADMIN — WITCH HAZEL 1 PAD: 500 SOLUTION RECTAL; TOPICAL at 23:26

## 2018-07-18 RX ADMIN — SODIUM CHLORIDE 2.5 MILLION UNITS: 9 INJECTION, SOLUTION INTRAVENOUS at 20:55

## 2018-07-18 RX ADMIN — SODIUM CHLORIDE 2.5 MILLION UNITS: 9 INJECTION, SOLUTION INTRAVENOUS at 17:02

## 2018-07-18 RX ADMIN — SODIUM CHLORIDE 2.5 MILLION UNITS: 9 INJECTION, SOLUTION INTRAVENOUS at 12:44

## 2018-07-18 RX ADMIN — SODIUM CHLORIDE, SODIUM LACTATE, POTASSIUM CHLORIDE, AND CALCIUM CHLORIDE 125 ML/HR: .6; .31; .03; .02 INJECTION, SOLUTION INTRAVENOUS at 17:06

## 2018-07-18 RX ADMIN — ROPIVACAINE HYDROCHLORIDE: 2 INJECTION, SOLUTION EPIDURAL; INFILTRATION at 12:15

## 2018-07-18 RX ADMIN — SODIUM CHLORIDE 5 MILLION UNITS: 0.9 INJECTION, SOLUTION INTRAVENOUS at 08:49

## 2018-07-18 RX ADMIN — BENZOCAINE AND LEVOMENTHOL: 200; 5 SPRAY TOPICAL at 23:26

## 2018-07-18 RX ADMIN — ROPIVACAINE HYDROCHLORIDE 4 ML: 2 INJECTION, SOLUTION EPIDURAL; INFILTRATION at 12:04

## 2018-07-18 RX ADMIN — SODIUM CHLORIDE, SODIUM LACTATE, POTASSIUM CHLORIDE, AND CALCIUM CHLORIDE 125 ML/HR: .6; .31; .03; .02 INJECTION, SOLUTION INTRAVENOUS at 11:48

## 2018-07-18 RX ADMIN — SODIUM CHLORIDE, SODIUM LACTATE, POTASSIUM CHLORIDE, AND CALCIUM CHLORIDE 125 ML/HR: .6; .31; .03; .02 INJECTION, SOLUTION INTRAVENOUS at 08:48

## 2018-07-18 RX ADMIN — Medication 2 MILLI-UNITS/MIN: at 08:49

## 2018-07-18 RX ADMIN — ROPIVACAINE HYDROCHLORIDE: 2 INJECTION, SOLUTION EPIDURAL; INFILTRATION at 21:32

## 2018-07-18 NOTE — OB LABOR/OXYTOCIN SAFETY PROGRESS
Oxytocin Safety Progress Check Note - Arnaldo Reach 41 y o  female MRN: [id number]    Unit/Bed#: -01 Encounter: [id number]    Obstetric History       T3      L3     SAB0   TAB0   Ectopic0   Multiple0   Live Births3      Gestational Age: 39w0d  Dose (shirin-units/min) Oxytocin: 8 shirin-units/min  Contraction Frequency (minutes): 3-6  Contraction Quality: Mild  Tachysystole: No   Dilation: 2        Effacement (%): 70  Station: -2  Baseline Rate: 140 bpm  Fetal Heart Rate: 135 BPM  FHR Category: Category I          Notes/comments:         Patient feeling contractions, generally tolerating well, may consider epidural before arom in a couple of hours    First pcn [phone]    Yolanda Huang MD 2018 10:38 AM

## 2018-07-18 NOTE — H&P
H&P Exam - Obstetrics   Pineda Santizo 41 y o  female MRN: [id number]  Unit/Bed#: -01 Encounter: [id number]    Assessment/Plan     History of Present Illness   Chief Complaint: elective iol, maternal discomfort    HPI:  Pineda Santizo is a 41 y o   female with an ANGEL of 2018, by Last Menstrual Period at 39w0d weeks gestation who is being admitted for induction of labor  Her current obstetrical history is significant for advanced maternal age, maternal MS  Contractions: None  Leakage of fluid: None  Bleeding: None  Fetal movement: present  Pregnancy complications: tobacco use and ama, maternal ms  Review of Systems    Historical Information   OB History    Para Term  AB Living   4 3 3     3   SAB TAB Ectopic Multiple Live Births           3      # Outcome Date GA Lbr [place]/2nd Weight Sex Delivery Anes PTL Lv   4 Current            3 Term 10/02/13 40w0d  3175 g (7 lb) F Vag-Spont EPI  JACQUELINE   2 Term 05/10/08 40w0d  3657 g (8 lb 1 oz) M Vag-Spont EPI  JACQUELINE   1 Term 02 39w0d  2778 g (6 lb 2 oz) M Vag-Spont EPI  JACQUELINE        Baby complications/comments:   Past Medical History:   Diagnosis Date    Abnormal Pap smear of cervix     Depression     Last assessed: 12    History of chlamydia infection     History of gonorrhea     [facility]     Ovarian cyst     Varicella     HX DISEASE     Past Surgical History:   Procedure Laterality Date    KNEE ARTHROSCOPY       Social History   History   Alcohol Use No     Comment: Per [employer]: Alcohol use/occasional/social     History   Drug Use No     History   Smoking Status    Current Every Day Smoker    Packs/day: 1 00    Types: Cigarettes   Smokeless Tobacco    Never Used       Meds/Allergies   PTA meds:   Prior to Admission Medications   Prescriptions Last Dose Informant Patient Reported? Taking?    Lactobacillus (ACIDOPHILUS PROBIOTIC PO) 2018 at 2100 Self Yes Yes   Sig: Take by mouth   Prenatal Vit-Fe Fumarate-FA (PNV PRENATAL PLUS MULTIVITAMIN) 27-1 MG TABS 7/17/2018 at 2100 Self Yes Yes   ferrous sulfate 325 (65 Fe) mg tablet 7/17/2018 at 2100 Self Yes Yes   Sig: Take 325 mg by mouth daily with breakfast   lidocaine (XYLOCAINE) 2 % topical gel Unknown at Unknown time Self No No   Sig: Apply 1 application topically as needed for mild pain or moderate pain      Facility-Administered Medications: None     No Known Allergies    Objective   Vitals: Blood pressure 122/67, pulse 96, temperature 98 2 °F (36 8 °C), temperature source Oral, resp  rate 16, height 5' 5" (1 651 m), weight 99 8 kg (220 lb), last menstrual period 10/18/2017, currently breastfeeding  Body mass index is 36 61 kg/m²      Invasive Devices     Peripheral Intravenous Line            Peripheral IV 07/18/18 Left Arm less than 1 day                Physical Exam   Lungs:  CTA B  Heart:  RRR S1-S2  Abdomen:  Soft gravid nontender positive bowel sounds no organomegaly  EFW:  7 5  Extremities no edema or calf pain  Reflexes trace  Fetal heart tones 130s reactive and reassuring  [place] rare  Cervix 1-2/60/ -3 soft posterior      Prenatal Labs:   Blood Type:   Lab Results   Component Value Date/Time    ABO Grouping A 01/27/2018 08:48 AM    ABO Grouping A 12/13/2017 01:41 PM     , D (Rh type):   Lab Results   Component Value Date/Time    Rh Type RH(D) POSITIVE 01/27/2018 08:48 AM     , Antibody Screen:   Lab Results   Component Value Date/Time    Antibody Screen Negative 12/13/2017 01:41 PM    , HCT/HGB:   Lab Results   Component Value Date/Time    Hematocrit 28 0 (L) 05/30/2018 06:47 PM    Hematocrit 35 2 05/03/2016 01:47 PM    Hemoglobin 9 4 (L) 05/30/2018 06:47 PM    Hemoglobin 11 6 (L) 05/03/2016 01:47 PM      , Platelets:   Lab Results   Component Value Date/Time    Platelets 399 01/18/6751 06:47 PM    Platelets 153 65/08/2353 01:47 PM      , 1 hour Glucola:   Lab Results   Component Value Date/Time    Glucose 112 05/19/2018 10:03 AM   , Rubella: Immune  Lab Results   Component Value Date/Time    RPR NON-REACTIVE 2018 08:48 AM   HIV: neg, HBSAG: non reactive, HepC: neg  Lab Results   Component Value Date/Time    External Strep Group B Ag Positive (A) 2018              Assessment:  27-year-old  4 para 3 here for induction of labor at 39 weeks for maternal discomfort    Plan:  Pitocin, penicillin G, expected management

## 2018-07-18 NOTE — OB LABOR/OXYTOCIN SAFETY PROGRESS
Oxytocin Safety Progress Check Note - Jaclyn Hyde 41 y o  female MRN: [id number]    Unit/Bed#: -01 Encounter: [id number]    Obstetric History       T3      L3     SAB0   TAB0   Ectopic0   Multiple0   Live Births3      Gestational Age: 39w0d  Dose (shirin-units/min) Oxytocin: 16 shirin-units/min  Contraction Frequency (minutes): 7-9  Contraction Quality: Mild  Tachysystole: No   Dilation: 3        Effacement (%): 70  Station: -2  Baseline Rate: 135 bpm  Fetal Heart Rate: 138 BPM  FHR Category: Category I          Notes/comments:   Patient is comfortable on epidural  Cervical exam is 370/-2  AROM @ 1350    D/w Dr Laz Hardy MD 2018 1:38 PM

## 2018-07-18 NOTE — ANESTHESIA PREPROCEDURE EVALUATION
Review of Systems/Medical History  Patient summary reviewed  Chart reviewed  No history of anesthetic complications     Cardiovascular  Exercise tolerance (METS): >4,     Pulmonary  Smoker cigarette smoker  , Tobacco cessation counseling given ,        GI/Hepatic            Endo/Other     GYN  Currently pregnant ,          Hematology   Musculoskeletal       Neurology    Neuromuscular disease , multiple sclerosis,    Psychology   Depression , being treated for depression,              Physical Exam    Airway    Mallampati score: II  TM Distance: >3 FB  Neck ROM: full     Dental   No notable dental hx     Cardiovascular      Pulmonary      Other Findings        Anesthesia Plan  ASA Score- 2     Anesthesia Type- epidural with ASA Monitors  Additional Monitors:   Airway Plan:         Plan Factors-    Induction-     Postoperative Plan-     Informed Consent- Anesthetic plan and risks discussed with patient

## 2018-07-18 NOTE — OB LABOR/OXYTOCIN SAFETY PROGRESS
Oxytocin Safety Progress Check Note - Sheila Rueda 41 y o  female MRN: [id number]    Unit/Bed#: -01 Encounter: [id number]    Obstetric History       T3      L3     SAB0   TAB0   Ectopic0   Multiple0   Live Births3      Gestational Age: 39w0d  Dose (shirin-units/min) Oxytocin: 10 shirin-units/min (per Dr Gaylene Litten)  Contraction Frequency (minutes): 2-4  Contraction Quality: Mild  Tachysystole: No   Dilation: 4        Effacement (%): 70  Station: -1  Baseline Rate: 135 bpm  Fetal Heart Rate: 160 BPM  FHR Category: Category I          Notes/comments:      IUPC placed, pit was at 28, pattern not appropriate but not tachysystole, will half pitocin and evaluate mvu         Mike Fleming MD 2018 5:33 PM

## 2018-07-18 NOTE — OB LABOR/OXYTOCIN SAFETY PROGRESS
Oxytocin Safety Progress Check Note - Vernon Duncan 41 y o  female MRN: [id number]    Unit/Bed#: -01 Encounter: [id number]    Obstetric History       T3      L3     SAB0   TAB0   Ectopic0   Multiple0   Live Births3      Gestational Age: 39w0d  Dose (shirin-units/min) Oxytocin: 22 shirin-units/min  Contraction Frequency (minutes): 2-6  Contraction Quality: Mild  Tachysystole: No   Dilation: 4        Effacement (%): 80  Station: -1  Baseline Rate: 140 bpm  Fetal Heart Rate: 135 BPM  FHR Category: Category I          Notes/comments:      Patient comfortable, expectant management         Saqib Faith MD 2018 3:20 PM

## 2018-07-18 NOTE — ANESTHESIA PROCEDURE NOTES
Epidural Block    Patient location during procedure: OB  Start time: 7/18/2018 11:52 AM  Reason for block: at surgeon's request and primary anesthetic  Staffing  Anesthesiologist: Peng Lovett  Performed: anesthesiologist   Preanesthetic Checklist  Completed: patient identified, site marked, surgical consent, pre-op evaluation, timeout performed, IV checked, risks and benefits discussed and monitors and equipment checked  Epidural  Patient position: sitting  Prep: Betadine and site prepped and draped  Patient monitoring: frequent blood pressure checks, continuous pulse ox and heart rate  Approach: midline  Location: lumbar (1-5)  Injection technique: LASHAY saline  Needle  Needle type: Tuohy   Needle gauge: 18 G  Catheter type: side hole  Catheter size: 20 G  Catheter at skin depth: 11 cm  Test dose: negative and lidocaine 1 5% with epinephrine 1-to-200,000  Assessment  Sensory level: [id number] aspiration for CSF, negative aspiration for heme and no paresthesia on injection  patient tolerated the procedure well with no immediate complications

## 2018-07-18 NOTE — OB LABOR/OXYTOCIN SAFETY PROGRESS
Oxytocin Safety Progress Check Note - Sheila Rueda 41 y o  female MRN: [id number]    Unit/Bed#: -01 Encounter: [id number]    Obstetric History       T3      L3     SAB0   TAB0   Ectopic0   Multiple0   Live Births3      Gestational Age: 39w0d  Dose (shirin-units/min) Oxytocin: 16 shirin-units/min  Contraction Frequency (minutes): 2-5  Contraction Quality: Mild  Tachysystole: No   Dilation: 6        Effacement (%): 70  Station: -1  Baseline Rate: 140 bpm  Fetal Heart Rate: 144 BPM  FHR Category: Category I          Notes/comments:      Patient comfortable, MVU about 200, expectant management         Mike Fleming MD 2018 7:36 PM

## 2018-07-18 NOTE — PLAN OF CARE
ANTEPARTUM     Maintain pregnancy as long as maternal and/or fetal condition is stable Progressing        BIRTH - VAGINAL/ SECTION     Fetal and maternal status remain reassuring during the birth process [de-identified]     Emotionally satisfying birthing experience for mother/fetus [address]ide Discharge to home or other facility with appropriate resources Progressing        INFECTION - ADULT     Absence or prevention of progression during hospitalization Progressing     Absence of fever/infection during neutropenic period Progressing        Knowledge Deficit     Patient/family/caregiver demonstrates understanding of disease process, treatment plan, medications, and discharge instructions Progressing     Verbalizes understanding of labor plan Progressing        Labor & Delivery     Manages discomfort Progressing     Patient vital signs are stable Progressing        PAIN - ADULT     Verbalizes/displays adequate comfort level or baseline comfort level Progressing        POSTPARTUM     Experiences normal postpartum course Progressing     Appropriate maternal -  bonding Progressing     Establishment of infant feeding pattern Progressing     Incision(s), wounds(s) or drain site(s) healing without S/S of infection Progressing        SAFETY ADULT     Patient will remain free of falls Progressing     Maintain or return to baseline ADL function Progressing     Maintain or return mobility status to optimal level Progressing

## 2018-07-19 LAB — RPR SER QL: NORMAL

## 2018-07-19 PROCEDURE — 99024 POSTOP FOLLOW-UP VISIT: CPT | Performed by: OBSTETRICS & GYNECOLOGY

## 2018-07-19 RX ADMIN — IBUPROFEN 600 MG: 600 TABLET ORAL at 07:46

## 2018-07-19 NOTE — ANESTHESIA POSTPROCEDURE EVALUATION
Post-Op Assessment Note      CV Status:  Stable    Mental Status:  Alert and awake    Hydration Status:  Euvolemic    PONV Controlled:  Controlled    Airway Patency:  Patent    Post Op Vitals Reviewed: Yes          Staff: Anesthesiologist     Post-op block assessment: no complications and catheter intact        /60 (07/18/18 2345)    Temp      Pulse 86 (07/18/18 2345)   Resp      SpO2

## 2018-07-19 NOTE — PLAN OF CARE
ANTEPARTUM     Maintain pregnancy as long as maternal and/or fetal condition is stable Completed        BIRTH - VAGINAL/ SECTION     Fetal and maternal status remain reassuring during the birth process Completed     Emotionally satisfying birthing experience for mother/fetus Completed        Labor & Delivery     Manages discomfort Completed     Patient vital signs are stable Completed          DISCHARGE PLANNING     Discharge to home or other facility with appropriate resources Progressing        INFECTION - ADULT     Absence or prevention of progression during hospitalization Progressing     Absence of fever/infection during neutropenic period Progressing        Knowledge Deficit     Patient/family/caregiver demonstrates understanding of disease process, treatment plan, medications, and discharge instructions Progressing     Verbalizes understanding of labor plan Progressing        PAIN - ADULT     Verbalizes/displays adequate comfort level or baseline comfort level Progressing        POSTPARTUM     Experiences normal postpartum course Progressing     Appropriate maternal -  bonding Progressing     Establishment of infant feeding pattern Progressing     Incision(s), wounds(s) or drain site(s) healing without S/S of infection Progressing        SAFETY ADULT     Patient will remain free of falls Progressing     Maintain or return to baseline ADL function Progressing     Maintain or return mobility status to optimal level Progressing

## 2018-07-19 NOTE — OB LABOR/OXYTOCIN SAFETY PROGRESS
Oxytocin Safety Progress Check Note - Jose Ceja 41 y o  female MRN: [id number]    Unit/Bed#: -01 Encounter: [id number]    Obstetric History       T3      L3     SAB0   TAB0   Ectopic0   Multiple0   Live Births3      Gestational Age: 39w0d  Dose (shirin-units/min) Oxytocin: 22 shirin-units/min  Contraction Frequency (minutes): 2-5  Contraction Quality: Mild  Tachysystole: No   Dilation: 9        Effacement (%): 100  Station: 1  Baseline Rate: 145 bpm  Fetal Heart Rate: 140 BPM  FHR Category: Category I          Notes/comments:   Patient comfortable, was feeling ctx earlier, expectant management            Hakan Green MD 2018 9:44 PM

## 2018-07-19 NOTE — DISCHARGE INSTRUCTIONS
Vaginal Delivery   WHAT YOU SHOULD KNOW:   A vaginal delivery is the birth of your baby through your vagina (birth canal)  AFTER YOU LEAVE:   Medicines:  · NSAIDs  help decrease swelling and pain or fever  This medicine is available with or without a doctor's order  NSAIDs can cause stomach bleeding or kidney problems in certain people  If you take blood thinner medicine, always ask your healthcare provider if NSAIDs are safe for you  Always read the medicine label and follow directions  · Take your medicine as directed  Call your healthcare provider if you think your medicine is not helping or if you have side effects  Tell him if you are allergic to any medicine  Keep a list of the medicines, vitamins, and herbs you take  Include the amounts, and when and why you take them  Bring the list or the pill bottles to follow-up visits  Carry your medicine list with you in case of an emergency  Follow up with your primary healthcare provider:  Most women need to return 6 weeks after a vaginal delivery  Ask about how to care for your wounds or stitches  Write down your questions so you remember to ask them during your visits  Activity:  Rest as much as possible  Try to keep all activities short  You may be able to do some exercise soon after you have your baby  Talk with your primary healthcare provider before you start exercising  If you work outside the home, ask when you can return to your job  Kegel exercises:  Kegel exercises may help your vaginal and rectal muscles heal faster  You can do Kegel exercises by tightening and relaxing the muscles around your vagina  Kegel exercises help make the muscles stronger  Breast care:  When your milk comes in, your breasts may feel full and hard  Ask how to care for your breasts, even if you are not breastfeeding  Constipation:  Do not try to push the bowel movement out if it is too hard   High-fiber foods, extra liquids, and regular exercise can help you prevent constipation  Examples of high-fiber foods are fruit and bran  Prune juice and water are good liquids to drink  Regular exercise helps your digestive system work  You may also be told to take over-the-counter fiber and stool softener medicines  Take these items as directed  Hemorrhoids:  Pregnancy can cause severe hemorrhoids  You may have rectal pain because of the hemorrhoids  Ask how to prevent or treat hemorrhoids  Perineum care: Your perineum is the area between your vagina and anus  Keep the area clean and dry to help it heal and to prevent infection  Wash the area gently with soap and water when you bathe or shower  Rinse your perineum with warm water when you use the toilet  Your primary healthcare provider may suggest you use a warm sitz bath to help decrease pain  A sitz bath is a bathtub or basin filled to hip level  Stay in the sitz bath for 20 to 30 minutes, or as directed  Vaginal discharge: You will have vaginal discharge, called lochia, after your delivery  The lochia is bright red the first day or two after the birth  By the fourth day, the amount decreases, and it turns red-brown  Use a sanitary pad rather than a tampon to prevent a vaginal infection  It is normal to have lochia up to 8 weeks after your baby is born  Monthly periods: Your period may start again within 7 to 12 weeks after your baby is born  If you are breastfeeding, it may take longer for your period to start again  You can still get pregnant again even though you do not have your monthly period  Talk with your primary healthcare provider about a birth control method that will be good for you if you do not want to get pregnant  Mood changes: Many new mothers have some kind of mood changes after delivery  Some of these changes occur because of lack of sleep, hormone changes, and caring for a new baby  Some mood changes can be more serious, such as postpartum depression   Talk with your primary healthcare provider if you feel unable to care for yourself or your baby  Sexual activity:  You may need to avoid sex for 6 to 7 weeks after you have your baby  You may notice you have a decreased desire for sex, or sex may be painful  You may need to use a vaginal lubricant (gel) to help make sex more comfortable  Contact your primary healthcare provider if:   · You have heavy vaginal bleeding that fills 1 or more sanitary pads in 1 hour  · You have a fever  · Your pain does not go away, or gets worse  · The skin between your vagina and rectum is swollen, warm, or red  · You have swollen, hard, or painful breasts  · You feel very sad or depressed  · You feel more tired than usual      · You have questions or concerns about your condition or care  Seek care immediately or call 911 if:   · You have pus or yellow drainage coming from your vagina or wound  · You are urinating very little, or not at all  · Your arm or leg feels warm, tender, and painful  It may look swollen and red  · You feel lightheaded, have sudden and worsening chest pain, or trouble breathing  You may have more pain when you take deep breaths or cough, or you may cough up blood  © 2014 [address] is for End User's use only and may not be sold, redistributed or otherwise used for commercial purposes  All illustrations and images included in CareNotes® are the copyrighted property of [employer]  or [employer]  The above information is an  only  It is not intended as medical advice for individual conditions or treatments  Talk to your doctor, nurse or pharmacist before following any medical regimen to see if it is safe and effective for you

## 2018-07-19 NOTE — PROGRESS NOTES
Progress Note - OB/GYN   Jose Ceja 41 y o  female MRN: [id number]  Unit/Bed#: CW3 335-01 Encounter: [id number]    Assessment:  Post partum Day #1 s/p , stable, baby in room    Plan:  1) MS, not requiring medication in postpartum period    2) Continue routine post partum care   Encourage ambulation   Encourage breastfeeding   Anticipate discharge tomorrow     Subjective/Objective   Chief Complaint:     Post delivery  Patient is doing well  Lochia WNL  Pain well controlled  Subjective:     Pain: yes, cramping, improved with meds  Tolerating PO: yes  Voiding: yes  Flatus: yes  BM: no  Ambulating: yes  Breastfeeding:  yes  Chest pain: no  Shortness of breath: no  Leg pain: no  Lochia: minimal    Objective:     Vitals: /73 (BP Location: Left arm)   Pulse 74   Temp 98 5 °F (36 9 °C) (Oral)   Resp 18   Ht 5' 5" (1 651 m)   Wt 99 8 kg (220 lb)   LMP 10/18/2017 (Exact Date)   SpO2 99%   Breastfeeding? Yes   BMI 36 61 kg/m²       Intake/Output Summary (Last 24 hours) at 18 0622  Last data filed at 18 0100   Gross per 24 hour   Intake              500 ml   Output             1800 ml   Net            -1300 ml       Lab Results   Component Value Date    WBC 6 85 2018    HGB 10 2 (L) 2018    HCT 30 8 (L) 2018     (H) 2018     2018       Physical Exam:     Gen: AAOx3, NAD  CV: RRR  Lungs: CTA b/l  Abd: Soft, non-tender, non-distended, no rebound or guarding  Uterine fundus firm and non-tender, 1 cm below the umbilicus     Ext: Non tender    Michael Thao MD  2018  6:22 AM

## 2018-07-19 NOTE — PLAN OF CARE
ALTERATION IN THE BREAST     Optimize infant feeding at the breast Progressing        DISCHARGE PLANNING     Discharge to home or other facility with appropriate resources Progressing        INADEQUATE LATCH, SUCK OR SWALLOW     Demonstrate ability to latch and sustain latch, audible swallowing and satiety Progressing        INFECTION - ADULT     Absence or prevention of progression during hospitalization Progressing     Absence of fever/infection during neutropenic period Progressing        Knowledge Deficit     Patient/family/caregiver demonstrates understanding of disease process, treatment plan, medications, and discharge instructions Progressing     Verbalizes understanding of labor plan Progressing        PAIN - ADULT     Verbalizes/displays adequate comfort level or baseline comfort level Progressing        POSTPARTUM     Experiences normal postpartum course Progressing     Appropriate maternal -  bonding Progressing     Establishment of infant feeding pattern Progressing     Incision(s), wounds(s) or drain site(s) healing without S/S of infection Progressing        SAFETY ADULT     Patient will remain free of falls Progressing     Maintain or return to baseline ADL function Progressing     Maintain or return mobility status to optimal level Progressing

## 2018-07-19 NOTE — L&D DELIVERY NOTE
Delivery Note    Obstetrician:   Del Dickens     Assistant: Sascha    Pre-Delivery Diagnosis: Term pregnancy, Induced labor, Single fetus and GBS positive    Post-Delivery Diagnosis: Same as above - Delivered    Procedure: SAVD     Episiotomy: none    Laceration: none    Estimated Blood Loss:  774           Complications:  None    Venous Gas: Ph: 7 38 BE -0 3    Details: Patient delivered a viable Female  over an intact perineum  After delivery of the  and appropriate delay, the umbilical cord was doubly clamped and cut and the  was passed to staff for routine care  Umbilical cord blood and umbilical artery and venous gases were collected  Active management of the third stage of labor was undertaken with IV pitocin  Bleeding was noted to be under control  Placenta delivered intact with 3-v cord and trailing membranes  Inspection of the perineum revealed intact perineum and  good hemostasis  Mother and baby are currently recovering nicely in stable condition             Attending Attestation: I was present for the entire procedure

## 2018-07-19 NOTE — LACTATION NOTE
This note was copied from a baby's chart  CONSULT - LACTATION  Baby Girl  Dacia Wilson) [address] 1 days female MRN: [id number]    [facility] Room / Bed: (N)/(N) Encounter: [id number]    Maternal Information     MOTHER:  Leesa Fontenot  Maternal Age: 41 y o    OB History: #: 1, Date: 02, Sex: Male, Weight: 2778 g (6 lb 2 oz), GA: 39w0d, Delivery: Vaginal, Spontaneous Delivery, Apgar1: None, Apgar5: None, Living: Living, Birth Comments: None    #: 2, Date: 05/10/08, Sex: Male, Weight: 3657 g (8 lb 1 oz), GA: 40w0d, Delivery: Vaginal, Spontaneous Delivery, Apgar1: None, Apgar5: None, Living: Living, Birth Comments: None    #: 3, Date: 10/02/13, Sex: Female, Weight: 3175 g (7 lb), GA: 40w0d, Delivery: Vaginal, Spontaneous Delivery, Apgar1: None, Apgar5: None, Living: Living, Birth Comments: None    #: 4, Date: 18, Sex: Female, Weight: 3090 g (6 lb 13 oz), GA: 39w0d, Delivery: Vaginal, Spontaneous Delivery, Apgar1: 9, Apgar5: 9, Living: Living, Birth Comments: None   Previouse breast reduction surgery? No    Lactation history:   Has patient previously breast fed: Yes   How long had patient previously breast fed: 3 months for second child, did not breast feed first or third children     Previous breast feeding complications: Low milk supply     Past Surgical History:   Procedure Laterality Date    KNEE ARTHROSCOPY         Birth information:  YOB: 2017   Time of birth: 10:17 PM   Sex: female   Delivery type: Vaginal, Spontaneous Delivery   Birth Weight: 3090 g (6 lb 13 oz)   Percent of Weight Change: 0%     Gestational Age: 39w0d   [unfilled]    Assessment     Breast and nipple assessment: normal assessment     Assessment: normal assessment    Feeding assessment: feeding well  LATCH:  Latch: Grasps breast, tongue down, lips flanged, rhythmic sucking   Audible Swallowing: Spontaneous and intermittent (24 hours old)   Type of Nipple: Everted (After stimulation)   Comfort (Breast/Nipple): Soft/non-tender   Hold (Positioning): Full assist, teach one side, mother does other, staff holds   [facility] Score: 9          Feeding recommendations:  breast feed on demand     Discussed 2nd night syndrome and ways to calm infant  Hand out given  Information on hand expression given  Discussed benefits of knowing how to manually express breast including stimulating milk supply, softening nipple for latch and evacuating breast in the event of engorgement  Met with mother  Provided mother with Ready, Set, Baby booklet  Discussed Skin to Skin contact an benefits to mom and baby  Talked about the delay of the first bath until baby has adjusted  Spoke about the benefits of rooming in  Feeding on cue and what that means for recognizing infant's hunger  Avoidance of pacifiers for the first month discussed  Talked about exclusive breastfeeding for the first 6 months  Positioning and latch reviewed as well as showing images of other feeding positions  Discussed the properties of a good latch in any position  Reviewed hand/manual expression  Discussed s/s that baby is getting enough milk and some s/s that breastfeeding dyad may need further help  Gave information on common concerns, what to expect the first few weeks after delivery, preparing for other caregivers, and how partners can help  Resources for support also provided    Philipp Oviedo RN 7/19/2018 2:49 PM

## 2018-07-19 NOTE — DISCHARGE SUMMARY
Discharge Summary -   José Antonio Arreola 41 y o  female MRN: [id number]  Unit/Bed#: CW3 335-01 Encounter: [id number]    Admission Date: 2018     Discharge Date: 2018    Discharging Attending: Chayo Dent MD    Principal Diagnosis: Term pregnancy  Induced labor  Single fetus  GBS positive    Secondary Diagnosis: Term pregnancy  Induced labor  Single fetus  GBS positive    Procedures: Spontaneous vaginal delivery    Hospital Course: Patient delivered and underwent normal post delivery care  She is ambulating well, voiding on her own, passing flatus, and tolerating oral intake  Complications: None    Condition at discharge: good     Discharge instructions/Information to patient and family:   - Do not place anything (no partner, tampons or douche) in your vagina for 6 weeks  -You may walk for exercise for the first 6 weeks then gradually return to your usual activities    -Please do not drive for 1 week if you have no stitches and for 2 weeks if you have stitches or underwent a  delivery     -You may take baths or shower per your preference    -Please look at your bust (breasts) in the mirror daily and call for redness or tenderness or increased warmth    -Please call us for temperature > 100 4*F or 38* C, worsening pain or a foul discharge       Discharge Medications:   Prenatal vitamin daily for 6 months or the duration of nursing whichever is longer  Motrin 600 mg orally every 6 hours as needed for pain  Tylenol (over the counter) per bottle directions as needed for pain: do NOT use with percocet  Hydrocortisone cream 1% (over the counter) applied 1-2x daily to hemorrhoids as needed    Provisions for Follow-Up Care: Follow up with your doctor in 6 weeks for postpartum care       Planned Readmission: No        Lulu Handley MD  2018  6:41 AM

## 2018-07-19 NOTE — SOCIAL WORK
Breast pump consult  Per pt request, Spectra pump ordered from [facility] via [address] for room delivery tomorrow  No other CM needs noted

## 2018-07-20 VITALS
BODY MASS INDEX: 36.65 KG/M2 | HEART RATE: 59 BPM | RESPIRATION RATE: 19 BRPM | OXYGEN SATURATION: 95 % | DIASTOLIC BLOOD PRESSURE: 67 MMHG | SYSTOLIC BLOOD PRESSURE: 139 MMHG | TEMPERATURE: 97.6 F | HEIGHT: 65 IN | WEIGHT: 220 LBS

## 2018-07-20 PROBLEM — Z3A.39 39 WEEKS GESTATION OF PREGNANCY: Status: RESOLVED | Noted: 2018-07-18 | Resolved: 2018-07-20

## 2018-07-20 PROCEDURE — 90715 TDAP VACCINE 7 YRS/> IM: CPT | Performed by: OBSTETRICS & GYNECOLOGY

## 2018-07-20 RX ORDER — ACETAMINOPHEN 325 MG/1
TABLET ORAL
Qty: 30 TABLET | Refills: 0
Start: 2018-07-20 | End: 2018-09-27 | Stop reason: ALTCHOICE

## 2018-07-20 RX ORDER — IBUPROFEN 600 MG/1
600 TABLET ORAL EVERY 6 HOURS PRN
Qty: 30 TABLET | Refills: 0 | Status: SHIPPED | OUTPATIENT
Start: 2018-07-20 | End: 2018-09-27 | Stop reason: ALTCHOICE

## 2018-07-20 RX ADMIN — TETANUS TOXOID, REDUCED DIPHTHERIA TOXOID AND ACELLULAR PERTUSSIS VACCINE, ADSORBED 0.5 ML: 5; 2.5; 8; 8; 2.5 SUSPENSION INTRAMUSCULAR at 12:32

## 2018-07-20 RX ADMIN — IBUPROFEN 600 MG: 600 TABLET ORAL at 04:50

## 2018-07-20 NOTE — LACTATION NOTE
This note was copied from a baby's chart  Dewarui Sabrina commented that the baby was cluster feeding overnight  Reinforced that this is normal pattern at this point in time for a two day old baby  Encouraged continued frequent feeds  Met with mother to go over feeding log since birth for the first week  Emphasized 8 or more (12) feedings in a 24 hour period, what to expect for the number of diapers per day of life and the progression of properties of the  stooling pattern  Discussed s/s that breastfeeding is going well after day 4 and when to get help from a pediatrician or lactation support person after day 4  Booklet included Breast Pumping Instructions, When You Go Back to Work or School, and Breastfeeding Resources for after discharge including access to the number for the [employer]  Powerpoint given breastfeeding class at patient request     Discussed s/s engorgement and how to manage with medications and cool compresses as well as s/s mastitis and when to contact physician  Encouraged MOB to call for assistance, questions, and concerns about breastfeeding  Extension provided

## 2018-07-20 NOTE — PROGRESS NOTES
Progress Note - OB/GYN   Dinora Faith 41 y o  female MRN: [id number]  Unit/Bed#: [facility] 335-01 Encounter: [id number]    Assessment:  Post partum Day #2 s/p , stable, baby in room    Plan:  1) MS   Would like to breastfeed, hoping not to take meds   Plans to follow up with neurology    2) Continue routine post partum care   Encourage ambulation   Encourage breastfeeding   Anticipate discharge today     Subjective/Objective   Chief Complaint:     Post delivery  Patient is doing well  Lochia WNL  Pain well controlled  Subjective:     Pain: yes, cramping, improved with meds  Tolerating PO: yes  Voiding: yes  Flatus: yes  BM: yes  Ambulating: yes  Breastfeeding:  yes  Chest pain: no  Shortness of breath: no  Leg pain: no  Lochia: minimal    Objective:     Vitals: /61 (BP Location: Right arm)   Pulse 65   Temp 98 2 °F (36 8 °C) (Oral)   Resp 18   Ht 5' 5" (1 651 m)   Wt 99 8 kg (220 lb)   LMP 10/18/2017 (Exact Date)   SpO2 95%   Breastfeeding? Yes   BMI 36 61 kg/m²     No intake or output data in the 24 hours ending 18 0639    Lab Results   Component Value Date    WBC 6 85 2018    HGB 10 2 (L) 2018    HCT 30 8 (L) 2018     (H) 2018     2018       Physical Exam:     Gen: AAOx3, NAD  CV: RRR  Lungs: CTA b/l  Abd: Soft, non-tender, non-distended, no rebound or guarding  Uterine fundus firm and non-tender, 1 cm below the umbilicus     Ext: Non tender    Sejal Farias MD  2018  6:39 AM

## 2018-07-21 NOTE — CASE MANAGEMENT
Notification of Maternity Inpatient Admission/Maternity Inpatient Authorization Request  This is a Notification of Maternity Inpatient Admission/Maternity Inpatient Authorization Request to our facility [facility]  Please be advised that this patient is currently in our facility under Inpatient Status  Below you will find the Birth/ Summary, Attending Physician and Facilitys information including NPI# and contact for the Utilization  assigned to the [facility] where the patient is receiving services  Please feel free to contact the Utilization Review Department with any questions  Mothers Information:  Andrew Schulz  MRN: [id number]  YOB: 1980  Admission Date: 2018  7:00 AM  Discharge Date: 2018  1:15 PM  Disposition: Home/Self Care  Admitting Diagnosis: Encounter for full-term uncomplicated delivery [R59]  [place] Information:  Estimated Date of Delivery: 18  Information for the patient's :  Ollen Half Girl  Ricarda December) [[id number]]     [place] Delivery Information:  Sex: female  Delivered 2018 10:17 PM by Vaginal, Spontaneous Delivery; Gestational Age: 36w0d    [place] Measurements:  Weight: 6 lb 13 oz (3090 g); Height: 19 5"    APGAR 1 minute 5 minutes 10 minutes   Totals: 9 9      OB History      Para Term  AB Living    4 4 4     4    SAB TAB Ectopic Multiple Live Births          0 4        Attending Physician:  ANDREW Benites    Specialty- Obstetrics and Gynecology  [facility] ID- [id number]  [address] [place] [address], [address] [place] [address]  Phone 1: [phone]  Fax: [phone] [address]  [phone]  Tax ID: [id number]  NPI: [id number]    [address] [place] [address] in the [facility] by Jethro Hamilton for 2017  Network Utilization Review Department  Phone: [phone]; Fax [phone]  ATTENTION: The Network Utilization Review Department is now centralized for our 7 Facilities  Make a note that we have a new phone and fax numbers for our Department  Please call with any questions or concerns to [phone] and carefully follow the prompts so that you are directed to the right person  All voicemails are confidential  Fax any determinations, approvals, denials, and requests for initial or continue stay review clinical to [phone]  Due to HIGH CALL volume, it would be easier if you could please send faxed requests to expedite your requests and in part, help us provide discharge notifications faster

## 2018-07-23 ENCOUNTER — TRANSITIONAL CARE MANAGEMENT (OUTPATIENT)
Dept: FAMILY MEDICINE CLINIC | Facility: CLINIC | Age: 38
End: 2018-07-23

## 2018-07-23 ENCOUNTER — TELEPHONE (OUTPATIENT)
Dept: OBGYN CLINIC | Facility: CLINIC | Age: 38
End: 2018-07-23

## 2018-07-27 LAB — PLACENTA IN STORAGE: NORMAL

## 2018-08-01 DIAGNOSIS — B37.3 YEAST VAGINITIS: Primary | ICD-10-CM

## 2018-08-01 RX ORDER — FLUCONAZOLE 150 MG/1
150 TABLET ORAL EVERY OTHER DAY
Qty: 2 TABLET | Refills: 0 | Status: SHIPPED | OUTPATIENT
Start: 2018-08-01 | End: 2018-08-04

## 2018-08-13 ENCOUNTER — TELEPHONE (OUTPATIENT)
Dept: OBGYN CLINIC | Facility: CLINIC | Age: 38
End: 2018-08-13

## 2018-08-15 ENCOUNTER — POSTPARTUM VISIT (OUTPATIENT)
Dept: OBGYN CLINIC | Facility: CLINIC | Age: 38
End: 2018-08-15

## 2018-08-15 VITALS
HEIGHT: 65 IN | WEIGHT: 201 LBS | DIASTOLIC BLOOD PRESSURE: 82 MMHG | BODY MASS INDEX: 33.49 KG/M2 | SYSTOLIC BLOOD PRESSURE: 124 MMHG

## 2018-08-15 DIAGNOSIS — N89.8 VAGINAL ODOR: ICD-10-CM

## 2018-08-15 PROCEDURE — 99024 POSTOP FOLLOW-UP VISIT: CPT | Performed by: NURSE PRACTITIONER

## 2018-08-15 NOTE — PROGRESS NOTES
Subjective     Boyd Falk is a 41 y o  female who presents for a postpartum visit  She is 3 weeks postpartum following a spontaneous vaginal delivery  I have fully reviewed the prenatal and intrapartum course  The delivery was at 44 gestational weeks  Outcome: spontaneous vaginal delivery  Anesthesia: epidural  Postpartum course has been uncomplicated  Baby's course has been uncomplicated  Baby is feeding by both breast and bottle -Is breastfeeding and supplementing with formula  Bleeding staining only  Denies any pain  Pt complaining of abnormal discharge and odor  States noticed yellow/brown discharge x 1 week  Also noticed an ammonia type odor vaginally  Denies any itching, burning  Denies any new products vaginally  Has been on pelvic rest since delivery  Bowel function is normal  Bladder function is normal  Patient is not sexually active  Contraception method is none  Postpartum depression screening: negative  EPDS: 2 Pt is feeing well  The following portions of the patient's history were reviewed and updated as appropriate: allergies, current medications, past family history, past medical history, past social history, past surgical history and problem list     Review of Systems  Pertinent items are noted in HPI       Objective     /82 (BP Location: Left arm, Patient Position: Sitting, Cuff Size: Standard)   Ht 5' 5" (1 651 m)   Wt 91 2 kg (201 lb)   LMP 10/18/2017 (Exact Date)   BMI 33 45 kg/m²    General:  alert and oriented, in no acute distress    Breasts:  not performed   Lungs: clear to auscultation bilaterally   Heart:  regular rate and rhythm, S1, S2 normal, no murmur, click, rub or gallop   Abdomen: soft, non-tender; bowel sounds normal; no masses,  no organomegaly    Vulva:  normal   Vagina: normal vagina, no discharge, exudate, lesion, or erythema   Cervix:  no cervical motion tenderness and no lesions   Corpus: normal size, contour, position, consistency, mobility, non-tender Adnexa:  normal adnexa   Rectal Exam: Not performed  Assessment/Plan     Normal postpartum exam      1  Contraception: Pt deciding between Nexplanon and Mirena  Both reviewed in detail  Mirena: Reviewed risk of uterine perforation, migration, expulsion  Risk of ectopic pregnancy  Risk of requiring surgery and loss of fertility  Reviewed how placed and removed  Common side effects  Pamphlet given  Reviewed how Nexplanon inserted and removed  Reviewed common bleeding patterns and side effects  Pamphlet given  Pt to review both pamphlets and call with contraceptive option  Reviewed will have to come sign form and await insurance approval prior to insertion of device chosen  Denies wanting anything now  2  May increase activity as tolerated  Cleared for intercourse  Advised waiting until genital culture results and treatment as can make things worse  3  Genital culture sent to evaluate for abnormal discharge and odor  Reviewed discharge may be due to old blood  Did not appreciate odor on exam today  Will treat based on culture results  Advised Acidophilous daily  No scented products vaginally  4  Follow up in: 5 months for annual exam or as needed

## 2018-08-18 LAB — SL AMB GENITAL CULTURE: NORMAL

## 2018-08-29 ENCOUNTER — OFFICE VISIT (OUTPATIENT)
Dept: NEUROLOGY | Facility: CLINIC | Age: 38
End: 2018-08-29
Payer: COMMERCIAL

## 2018-08-29 VITALS
BODY MASS INDEX: 32.86 KG/M2 | DIASTOLIC BLOOD PRESSURE: 61 MMHG | SYSTOLIC BLOOD PRESSURE: 112 MMHG | HEART RATE: 77 BPM | HEIGHT: 65 IN | WEIGHT: 197.2 LBS

## 2018-08-29 DIAGNOSIS — G35 MULTIPLE SCLEROSIS (HCC): Primary | ICD-10-CM

## 2018-08-29 PROBLEM — O99.350 MULTIPLE SCLEROSIS AFFECTING PREGNANCY, ANTEPARTUM (HCC): Status: RESOLVED | Noted: 2018-01-19 | Resolved: 2018-08-29

## 2018-08-29 PROCEDURE — 99214 OFFICE O/P EST MOD 30 MIN: CPT | Performed by: PHYSICIAN ASSISTANT

## 2018-08-29 NOTE — PATIENT INSTRUCTIONS
Will continue off meds until you are completely done breastfeeding  Call about 2 weeks prior to being completely done so that we can send in a Rx for the lower dose titration  Will get baseline labs done at this time    If any new symptoms, give us a call  Follow up in 4 months

## 2018-08-29 NOTE — PROGRESS NOTES
Patient ID: Yayo Quigley is a 41 y o  female  Assessment/Plan:    Multiple sclerosis ([facility] )  Patient just delivered her 4th child, a daughter, on July 18, 2018  No complications with delivery  She is doing well, denies any new symptoms  She is currently breast-feeding and also supplementing with formula  She plans on breast-feeding for a few more months, maybe weaning after she goes back to work in October  Patient would like to go back on Tecfidera after she is completely done breast feeding  She does have some leftover medication that was still sent to her last year, however she will need titration with a 120 mg capsules again  I had her sign a start form today and will hold onto it  She will call me few weeks before she will be totally done breast feeding and we will get a new prescription submitted for her  Will get baseline CBC and LFTs at this time  After she resumes the Tecfidera, she will need to continue every other month blood work  Her exam is stable today  She will follow up in 4 months or sooner if needed  She will call the office for any new or worsening symptoms  Diagnoses and all orders for this visit:    Multiple sclerosis ([facility] )  -     CBC and differential; Future  -     Comprehensive metabolic panel; Future           Subjective:    NIKKI    Rukhsana Reid returns today for follow-up of MS  Pt is a 42 yo female with PMH of migraines, depression and anxiety  Patient last seen in June 2018  To review, patient notes symptoms began in January 2015 when she had a work injury to the left shoulder  She noted in March 2015, she had left sided numbness down her torso, down into her feet and legs  She also experienced urinary leakage without Valsalva  Patient then experienced right eye pain and fogginess of vision  She was seen by Dr Yoselyn Webster and had an OCT done  It revealed normal findings on the right but a sector defect on the left   MRI of the brain from May 13, 2015 revealed a few scattered nonspecific supratentorial WM lesions  No acute infarction, or hemorrhage or enh  MRI of the lumbar spine without contrast revealed small L5/S1 disc herniation, MRI of the T-spine revealed evidence of solitary 8mm lesion to the left of midline at T8/9 in the dorsal Cord  No pathological enh  MRI of the C-spine also done on June 13, 2015 revealed a solitary 5 mm focus of active demyelination in the right lateral cord at C2  Patient was given 3 days of IV  Patient with significant GI upset with the steroids and also some mild change in mood with increased anxiety  She was NMO negative and Lyme negative  She did not have an LP  Patient was started on Copaxone as her initial IMD med in 2015  She had a brief interval of being off med due to insurance  However, due to site  reactions, patient stopped med and started Tecfidera in May 2016  Patient was subsequently told to stop her Tecifidera due to unplanned pregnancy in Nov 2017  One patient was seen in June 2018, she was 8 months pregnant  Patient reported she was planning on breastfeeding  It was discussed that patient would ideally go back on Tecfidera once she is not breastfeeding any more  Today, patient is doing well  She delivered her daughter via vaginal delivery on 8/76/10 without complication  She reports she is feeling well  She denies any new neurologic symptoms or increase in any of her regular MS symptoms  She is breast feeding her baby as well as supplementing with formula  She plans on breast-feeding for a few more months  She goes back to work in October and may wean the baby at that time  Patient reports she would like to go back on Tecfidera after she has finished breast feeding  She denies any vision changes, vertigo  She denies any difficulty with speech or swallowing  She denies any trouble with bowel or bladder  No falls      The following portions of the patient's history were reviewed and updated as appropriate: current medications, past family history, past medical history, past social history, past surgical history and problem list          Objective:    Blood pressure 112/61, pulse 77, height 5' 5" (1 651 m), weight 89 4 kg (197 lb 3 2 oz), last menstrual period 10/18/2017, currently breastfeeding  Physical Exam   Constitutional: She appears well-developed and well-nourished  HENT:   Head: Normocephalic and atraumatic  Eyes: EOM are normal  Pupils are equal, round, and reactive to light  Cardiovascular: Intact distal pulses  Neurological: She has normal strength and normal reflexes  Gait and coordination normal    Skin: Skin is warm and dry  Psychiatric: She has a normal mood and affect  Her speech is normal        Neurological Exam    Mental Status  The patient is alert and oriented to person, place, time, and situation  Her recent and remote memory are normal  Her speech is normal  Her language is fluent with no aphasia  She has normal attention span and concentration  She has a normal fund of knowledge  Cranial Nerves    CN II: The patient's visual acuity and visual fields are normal   CN III, IV, VI: The patient's pupils are equally round and reactive to light and ocular movements are normal   CN V: The patient has normal facial sensation  CN VII:  The patient has symmetric facial movement  CN VIII:  The patient's hearing is normal   CN IX, X: The patient has symmetric palate movement and normal gag reflex  CN XI: The patient's shoulder shrug strength is normal   CN XII: The patient's tongue is midline without atrophy or fasciculations  Motor  The patient has normal muscle bulk throughout  Her overall muscle tone is normal throughout  Her strength is 5/5 throughout all four extremities        Sensory    Decreased vibratory sensation in lower extremities bilaterally       Reflexes  Deep tendon reflexes are 2+ and symmetric in all four extremities with downgoing toes bilaterally  Gait and Coordination  The patient has normal gait and station  She has normal coordination bilaterally  ROS:    Review of Systems   Constitutional: Negative  Negative for appetite change and fever  HENT: Negative  Negative for hearing loss, tinnitus, trouble swallowing and voice change  Eyes: Negative  Negative for photophobia and pain  Respiratory: Negative  Negative for shortness of breath  Cardiovascular: Negative  Negative for palpitations  Gastrointestinal: Negative  Negative for nausea and vomiting  Endocrine: Negative  Negative for cold intolerance and heat intolerance  Genitourinary: Positive for urgency  Negative for dysuria and frequency  Musculoskeletal: Negative  Negative for myalgias and neck pain  Skin: Negative  Negative for rash  Neurological: Negative  Negative for dizziness, tremors, seizures, syncope, facial asymmetry, speech difficulty, weakness, light-headedness, numbness and headaches  Hematological: Negative  Does not bruise/bleed easily  Psychiatric/Behavioral: Negative  Negative for confusion, hallucinations and sleep disturbance       I personally reviewed the ROS

## 2018-08-29 NOTE — ASSESSMENT & PLAN NOTE
Patient just delivered her 4th child, a daughter, on July 18, 2018  No complications with delivery  She is doing well, denies any new symptoms  She is currently breast-feeding and also supplementing with formula  She plans on breast-feeding for a few more months, maybe weaning after she goes back to work in October  Patient would like to go back on Tecfidera after she is completely done breast feeding  She does have some leftover medication that was still sent to her last year, however she will need titration with a 120 mg capsules again  I had her sign a start form today and will hold onto it  She will call me few weeks before she will be totally done breast feeding and we will get a new prescription submitted for her  Will get baseline CBC and LFTs at this time  After she resumes the Tecfidera, she will need to continue every other month blood work  Her exam is stable today  She will follow up in 4 months or sooner if needed  She will call the office for any new or worsening symptoms

## 2018-09-26 ENCOUNTER — OFFICE VISIT (OUTPATIENT)
Dept: OBGYN CLINIC | Facility: CLINIC | Age: 38
End: 2018-09-26
Payer: COMMERCIAL

## 2018-09-26 VITALS
DIASTOLIC BLOOD PRESSURE: 78 MMHG | BODY MASS INDEX: 32.99 KG/M2 | WEIGHT: 198 LBS | HEIGHT: 65 IN | SYSTOLIC BLOOD PRESSURE: 122 MMHG

## 2018-09-26 DIAGNOSIS — Z30.09 CONSULTATION FOR FEMALE STERILIZATION: Primary | ICD-10-CM

## 2018-09-26 PROCEDURE — 99213 OFFICE O/P EST LOW 20 MIN: CPT | Performed by: OBSTETRICS & GYNECOLOGY

## 2018-09-26 NOTE — PROGRESS NOTES
Assessment/Plan:    Multiparity with request for permanent sterilization  Reviewed options and patient would like to proceed with bilateral laparoscopic tubal ligation using an occlusion method-Falope rings       Problem List Items Addressed This Visit     Consultation for female sterilization - Primary            Subjective:      Patient ID: Ying Rene is a 41 y o  female  Judy العلي is a 41-year-old  3 para 3 female presenting for discussion regarding permanent sterilization  She is completed childbearing and would like a permanent method  We reviewed the risks and benefits associated with the laparoscopic tubal ligation  We also reviewed the options of a salpingectomy versus is a occlusion with Falope rings  She would like to proceed with the occlusion with Falope rings  We discussed the risks and benefits of sterilization in general including the risk of failure and subsequent ectopic  She understands and consents to all of this and we signed the consent forms  The following portions of the patient's history were reviewed and updated as appropriate:   She  has a past medical history of Abnormal Pap smear of cervix; Depression; History of chlamydia infection; History of gonorrhea; [facility] ); Ovarian cyst; and Varicella  She   Patient Active Problem List    Diagnosis Date Noted    Consultation for female sterilization 2018    [facility] sclerosis ([facility] ) 2018    Lung nodule seen on imaging study 2017     She  has a past surgical history that includes Knee arthroscopy  Her family history includes ADD / ADHD in her son; Allergies in her son; Anuerysm in her sister; Asthma in her son; Glaucoma in her family; Hypertension in her family, father, and mother; Macular degeneration in her family and father; Migraines in her sister; No Known Problems in her daughter  She  reports that she has been smoking Cigarettes    She has been smoking about 1 00 pack per day  She has never used smokeless tobacco  She reports that she does not drink alcohol or use drugs  Current Outpatient Prescriptions   Medication Sig Dispense Refill    Lactobacillus (ACIDOPHILUS PROBIOTIC PO) Take by mouth      Prenatal Vit-Fe Fumarate-FA (PNV PRENATAL PLUS MULTIVITAMIN) 27-1 MG TABS        No current facility-administered medications for this visit  Current Outpatient Prescriptions on File Prior to Visit   Medication Sig    Lactobacillus (ACIDOPHILUS PROBIOTIC PO) Take by mouth    Prenatal Vit-Fe Fumarate-FA (PNV PRENATAL PLUS MULTIVITAMIN) 27-1 MG TABS     [DISCONTINUED] acetaminophen (TYLENOL) 325 mg tablet Take 1-2 tablets every 4-6 hours as needed   [DISCONTINUED] ferrous sulfate 325 (65 Fe) mg tablet Take 325 mg by mouth daily with breakfast    [DISCONTINUED] ibuprofen (MOTRIN) 600 mg tablet Take 1 tablet (600 mg total) by mouth every 6 (six) hours as needed (Cramping)    [DISCONTINUED] lidocaine (XYLOCAINE) 2 % topical gel Apply 1 application topically as needed for mild pain or moderate pain     No current facility-administered medications on file prior to visit  She has No Known Allergies       Review of Systems   Constitutional: Positive for fatigue  Negative for fever and unexpected weight change  HENT: Negative for dental problem, mouth sores, nosebleeds, rhinorrhea, sinus pain, sinus pressure and sore throat  Eyes: Negative for pain, discharge and visual disturbance  Respiratory: Negative for cough, chest tightness, shortness of breath and wheezing  Cardiovascular: Negative for chest pain, palpitations and leg swelling  Gastrointestinal: Negative for blood in stool, constipation, diarrhea, nausea and vomiting  Endocrine: Negative for polydipsia  Genitourinary: Negative for difficulty urinating, dyspareunia, dysuria, menstrual problem, pelvic pain, urgency, vaginal discharge and vaginal pain     Musculoskeletal: Negative for arthralgias, back pain and joint swelling  Allergic/Immunologic: Negative for environmental allergies  Neurological: Negative for seizures, light-headedness and headaches  Hematological: Does not bruise/bleed easily  Psychiatric/Behavioral: Negative for sleep disturbance  The patient is not nervous/anxious  Objective: There were no vitals taken for this visit  Physical Exam   Constitutional: She appears well-developed  No distress  Overweight white female   HENT:   Head: Normocephalic and atraumatic  Neck: Normal range of motion  No thyromegaly present  Cardiovascular: Normal rate, regular rhythm and normal heart sounds  No murmur heard  Pulmonary/Chest: Effort normal and breath sounds normal  No respiratory distress  She has no wheezes  She has no rales  Neurological: She is alert  Psychiatric: She has a normal mood and affect  Vitals reviewed

## 2018-09-27 PROBLEM — O09.523 ELDERLY MULTIGRAVIDA IN THIRD TRIMESTER: Status: RESOLVED | Noted: 2018-05-10 | Resolved: 2018-09-27

## 2018-09-27 PROBLEM — Z34.83 PRENATAL CARE, SUBSEQUENT PREGNANCY, THIRD TRIMESTER: Status: RESOLVED | Noted: 2018-05-18 | Resolved: 2018-09-27

## 2018-09-27 PROBLEM — Z34.90 PREGNANCY: Status: RESOLVED | Noted: 2018-02-22 | Resolved: 2018-09-27

## 2018-09-27 PROBLEM — O99.210 OBESITY AFFECTING PREGNANCY, ANTEPARTUM: Status: RESOLVED | Noted: 2018-05-10 | Resolved: 2018-09-27

## 2018-09-27 PROBLEM — Z30.09 CONSULTATION FOR FEMALE STERILIZATION: Status: ACTIVE | Noted: 2018-09-27

## 2018-10-01 PROBLEM — Z30.2 REQUEST FOR STERILIZATION: Status: ACTIVE | Noted: 2018-10-01

## 2018-10-02 NOTE — PRE-PROCEDURE INSTRUCTIONS
No outpatient prescriptions have been marked as taking for the 10/5/18 encounter J[facility] Encounter)  Pt does not take any meds or supplements at this time    Reviewed shower instructions with patient

## 2018-10-05 ENCOUNTER — ANESTHESIA EVENT (OUTPATIENT)
Dept: PERIOP | Facility: HOSPITAL | Age: 38
End: 2018-10-05
Payer: COMMERCIAL

## 2018-10-05 ENCOUNTER — HOSPITAL ENCOUNTER (OUTPATIENT)
Facility: HOSPITAL | Age: 38
Setting detail: OUTPATIENT SURGERY
Discharge: HOME | End: 2018-10-05
Attending: OBSTETRICS & GYNECOLOGY | Admitting: OBSTETRICS & GYNECOLOGY
Payer: COMMERCIAL

## 2018-10-05 ENCOUNTER — ANESTHESIA (OUTPATIENT)
Dept: PERIOP | Facility: HOSPITAL | Age: 38
End: 2018-10-05
Payer: COMMERCIAL

## 2018-10-05 VITALS
TEMPERATURE: 98.7 F | BODY MASS INDEX: 32.82 KG/M2 | DIASTOLIC BLOOD PRESSURE: 81 MMHG | HEART RATE: 59 BPM | SYSTOLIC BLOOD PRESSURE: 141 MMHG | RESPIRATION RATE: 18 BRPM | OXYGEN SATURATION: 100 % | WEIGHT: 197 LBS | HEIGHT: 65 IN

## 2018-10-05 DIAGNOSIS — Z98.890 S/P LAPAROSCOPIC SURGERY: Primary | ICD-10-CM

## 2018-10-05 DIAGNOSIS — Z30.2 REQUEST FOR STERILIZATION: ICD-10-CM

## 2018-10-05 LAB — EXT PREGNANCY TEST URINE: NEGATIVE

## 2018-10-05 PROCEDURE — 81025 URINE PREGNANCY TEST: CPT | Performed by: OBSTETRICS & GYNECOLOGY

## 2018-10-05 PROCEDURE — 58671 LAPAROSCOPY TUBAL BLOCK: CPT | Performed by: OBSTETRICS & GYNECOLOGY

## 2018-10-05 DEVICE — DISPOSABLE FALOPE-RING BAND APPLICATOR KIT WITH 8 MM DISPOSABLE TROCAR
Type: IMPLANTABLE DEVICE | Site: FALLOPIAN TUBE | Status: FUNCTIONAL
Brand: FALOPE RING

## 2018-10-05 RX ORDER — FENTANYL CITRATE/PF 50 MCG/ML
50 SYRINGE (ML) INJECTION
Status: DISCONTINUED | OUTPATIENT
Start: 2018-10-05 | End: 2018-10-05 | Stop reason: HOSPADM

## 2018-10-05 RX ORDER — SODIUM CHLORIDE, SODIUM LACTATE, POTASSIUM CHLORIDE, CALCIUM CHLORIDE 600; 310; 30; 20 MG/100ML; MG/100ML; MG/100ML; MG/100ML
125 INJECTION, SOLUTION INTRAVENOUS CONTINUOUS
Status: DISCONTINUED | OUTPATIENT
Start: 2018-10-05 | End: 2018-10-05 | Stop reason: HOSPADM

## 2018-10-05 RX ORDER — OXYCODONE HYDROCHLORIDE AND ACETAMINOPHEN 5; 325 MG/1; MG/1
1 TABLET ORAL EVERY 4 HOURS PRN
Qty: 10 TABLET | Refills: 0 | Status: SHIPPED | OUTPATIENT
Start: 2018-10-05 | End: 2018-12-31 | Stop reason: ALTCHOICE

## 2018-10-05 RX ORDER — PROPOFOL 10 MG/ML
INJECTION, EMULSION INTRAVENOUS AS NEEDED
Status: DISCONTINUED | OUTPATIENT
Start: 2018-10-05 | End: 2018-10-05 | Stop reason: SURG

## 2018-10-05 RX ORDER — HYDROMORPHONE HCL/PF 1 MG/ML
0.4 SYRINGE (ML) INJECTION
Status: DISCONTINUED | OUTPATIENT
Start: 2018-10-05 | End: 2018-10-05 | Stop reason: HOSPADM

## 2018-10-05 RX ORDER — LIDOCAINE HYDROCHLORIDE 10 MG/ML
INJECTION, SOLUTION INFILTRATION; PERINEURAL AS NEEDED
Status: DISCONTINUED | OUTPATIENT
Start: 2018-10-05 | End: 2018-10-05 | Stop reason: SURG

## 2018-10-05 RX ORDER — GLYCOPYRROLATE 0.2 MG/ML
INJECTION INTRAMUSCULAR; INTRAVENOUS AS NEEDED
Status: DISCONTINUED | OUTPATIENT
Start: 2018-10-05 | End: 2018-10-05 | Stop reason: SURG

## 2018-10-05 RX ORDER — FENTANYL CITRATE 50 UG/ML
INJECTION, SOLUTION INTRAMUSCULAR; INTRAVENOUS AS NEEDED
Status: DISCONTINUED | OUTPATIENT
Start: 2018-10-05 | End: 2018-10-05 | Stop reason: SURG

## 2018-10-05 RX ORDER — BUPIVACAINE HYDROCHLORIDE 2.5 MG/ML
INJECTION, SOLUTION INFILTRATION; PERINEURAL AS NEEDED
Status: DISCONTINUED | OUTPATIENT
Start: 2018-10-05 | End: 2018-10-05 | Stop reason: HOSPADM

## 2018-10-05 RX ORDER — PROMETHAZINE HYDROCHLORIDE 25 MG/ML
12.5 INJECTION, SOLUTION INTRAMUSCULAR; INTRAVENOUS ONCE AS NEEDED
Status: DISCONTINUED | OUTPATIENT
Start: 2018-10-05 | End: 2018-10-05 | Stop reason: HOSPADM

## 2018-10-05 RX ORDER — MIDAZOLAM HYDROCHLORIDE 1 MG/ML
INJECTION INTRAMUSCULAR; INTRAVENOUS AS NEEDED
Status: DISCONTINUED | OUTPATIENT
Start: 2018-10-05 | End: 2018-10-05 | Stop reason: SURG

## 2018-10-05 RX ORDER — ROCURONIUM BROMIDE 10 MG/ML
INJECTION, SOLUTION INTRAVENOUS AS NEEDED
Status: DISCONTINUED | OUTPATIENT
Start: 2018-10-05 | End: 2018-10-05 | Stop reason: SURG

## 2018-10-05 RX ORDER — KETOROLAC TROMETHAMINE 30 MG/ML
INJECTION, SOLUTION INTRAMUSCULAR; INTRAVENOUS AS NEEDED
Status: DISCONTINUED | OUTPATIENT
Start: 2018-10-05 | End: 2018-10-05 | Stop reason: SURG

## 2018-10-05 RX ORDER — IBUPROFEN 600 MG/1
600 TABLET ORAL EVERY 6 HOURS PRN
Status: DISCONTINUED | OUTPATIENT
Start: 2018-10-05 | End: 2018-10-05 | Stop reason: HOSPADM

## 2018-10-05 RX ORDER — LABETALOL HYDROCHLORIDE 5 MG/ML
10 INJECTION, SOLUTION INTRAVENOUS AS NEEDED
Status: DISCONTINUED | OUTPATIENT
Start: 2018-10-05 | End: 2018-10-05 | Stop reason: HOSPADM

## 2018-10-05 RX ORDER — ONDANSETRON 2 MG/ML
4 INJECTION INTRAMUSCULAR; INTRAVENOUS ONCE AS NEEDED
Status: CANCELLED | OUTPATIENT
Start: 2018-10-05

## 2018-10-05 RX ORDER — ONDANSETRON 2 MG/ML
4 INJECTION INTRAMUSCULAR; INTRAVENOUS ONCE AS NEEDED
Status: DISCONTINUED | OUTPATIENT
Start: 2018-10-05 | End: 2018-10-05 | Stop reason: HOSPADM

## 2018-10-05 RX ORDER — ONDANSETRON 2 MG/ML
INJECTION INTRAMUSCULAR; INTRAVENOUS AS NEEDED
Status: DISCONTINUED | OUTPATIENT
Start: 2018-10-05 | End: 2018-10-05 | Stop reason: SURG

## 2018-10-05 RX ORDER — MEPERIDINE HYDROCHLORIDE 25 MG/ML
12.5 INJECTION INTRAMUSCULAR; INTRAVENOUS; SUBCUTANEOUS
Status: DISCONTINUED | OUTPATIENT
Start: 2018-10-05 | End: 2018-10-05 | Stop reason: HOSPADM

## 2018-10-05 RX ADMIN — KETOROLAC TROMETHAMINE 30 MG: 30 INJECTION, SOLUTION INTRAMUSCULAR at 10:48

## 2018-10-05 RX ADMIN — NEOSTIGMINE METHYLSULFATE 5 MG: 1 INJECTION, SOLUTION INTRAMUSCULAR; INTRAVENOUS; SUBCUTANEOUS at 10:50

## 2018-10-05 RX ADMIN — LIDOCAINE HYDROCHLORIDE 50 MG: 10 INJECTION, SOLUTION INFILTRATION; PERINEURAL at 10:15

## 2018-10-05 RX ADMIN — FENTANYL CITRATE 100 MCG: 50 INJECTION, SOLUTION INTRAMUSCULAR; INTRAVENOUS at 10:15

## 2018-10-05 RX ADMIN — MIDAZOLAM 2 MG: 1 INJECTION INTRAMUSCULAR; INTRAVENOUS at 10:06

## 2018-10-05 RX ADMIN — FENTANYL CITRATE 50 MCG: 50 INJECTION INTRAMUSCULAR; INTRAVENOUS at 11:10

## 2018-10-05 RX ADMIN — PROPOFOL 150 MG: 10 INJECTION, EMULSION INTRAVENOUS at 10:15

## 2018-10-05 RX ADMIN — GLYCOPYRROLATE 0.8 MG: 0.2 INJECTION, SOLUTION INTRAMUSCULAR; INTRAVENOUS at 10:50

## 2018-10-05 RX ADMIN — ROCURONIUM BROMIDE 50 MG: 10 INJECTION INTRAVENOUS at 10:16

## 2018-10-05 RX ADMIN — ONDANSETRON 4 MG: 2 INJECTION INTRAMUSCULAR; INTRAVENOUS at 10:50

## 2018-10-05 RX ADMIN — CEFAZOLIN SODIUM 2000 MG: 2 SOLUTION INTRAVENOUS at 10:19

## 2018-10-05 RX ADMIN — SODIUM CHLORIDE, SODIUM LACTATE, POTASSIUM CHLORIDE, AND CALCIUM CHLORIDE: .6; .31; .03; .02 INJECTION, SOLUTION INTRAVENOUS at 09:45

## 2018-10-05 NOTE — ANESTHESIA POSTPROCEDURE EVALUATION
Post-Op Assessment Note      CV Status:  Stable    Mental Status:  Alert and awake    Hydration Status:  Euvolemic    PONV Controlled:  Controlled    Airway Patency:  Patent  Airway: intubated    Post Op Vitals Reviewed: Yes          Staff: CRNA           /62 (10/05/18 1104)    Temp 98 2 °F (36 8 °C) (10/05/18 1104)    Pulse 73 (10/05/18 1104)   Resp 12 (10/05/18 1104)    SpO2 100 % (10/05/18 1104)

## 2018-10-05 NOTE — H&P (VIEW-ONLY)
Assessment/Plan:    Multiparity with request for permanent sterilization  Reviewed options and patient would like to proceed with bilateral laparoscopic tubal ligation using an occlusion method-Falope rings       Problem List Items Addressed This Visit     Consultation for female sterilization - Primary            Subjective:      Patient ID: Bindu العراقي is a 41 y o  female  Ernestina Armendariz is a 15-year-old  3 para 3 female presenting for discussion regarding permanent sterilization  She is completed childbearing and would like a permanent method  We reviewed the risks and benefits associated with the laparoscopic tubal ligation  We also reviewed the options of a salpingectomy versus is a occlusion with Falope rings  She would like to proceed with the occlusion with Falope rings  We discussed the risks and benefits of sterilization in general including the risk of failure and subsequent ectopic  She understands and consents to all of this and we signed the consent forms  The following portions of the patient's history were reviewed and updated as appropriate:   She  has a past medical history of Abnormal Pap smear of cervix; Depression; History of chlamydia infection; History of gonorrhea; MS (multiple sclerosis) ([facility] ); Ovarian cyst; and Varicella  She   Patient Active Problem List    Diagnosis Date Noted    Consultation for female sterilization 2018    [facility] 2018    Lung nodule seen on imaging study 2017     She  has a past surgical history that includes Knee arthroscopy  Her family history includes ADD / ADHD in her son; Allergies in her son; Anuerysm in her sister; Asthma in her son; Glaucoma in her family; Hypertension in her family, father, and mother; Macular degeneration in her family and father; Migraines in her sister; No Known Problems in her daughter  She  reports that she has been smoking Cigarettes    She has been smoking about 1 00 pack per day  She has never used smokeless tobacco  She reports that she does not drink alcohol or use drugs  Current Outpatient Prescriptions   Medication Sig Dispense Refill    Lactobacillus (ACIDOPHILUS PROBIOTIC PO) Take by mouth      Prenatal Vit-Fe Fumarate-FA (PNV PRENATAL PLUS MULTIVITAMIN) 27-1 MG TABS        No current facility-administered medications for this visit  Current Outpatient Prescriptions on File Prior to Visit   Medication Sig    Lactobacillus (ACIDOPHILUS PROBIOTIC PO) Take by mouth    Prenatal Vit-Fe Fumarate-FA (PNV PRENATAL PLUS MULTIVITAMIN) 27-1 MG TABS     [DISCONTINUED] acetaminophen (TYLENOL) 325 mg tablet Take 1-2 tablets every 4-6 hours as needed   [DISCONTINUED] ferrous sulfate 325 (65 Fe) mg tablet Take 325 mg by mouth daily with breakfast    [DISCONTINUED] ibuprofen (MOTRIN) 600 mg tablet Take 1 tablet (600 mg total) by mouth every 6 (six) hours as needed (Cramping)    [DISCONTINUED] lidocaine (XYLOCAINE) 2 % topical gel Apply 1 application topically as needed for mild pain or moderate pain     No current facility-administered medications on file prior to visit  She has No Known Allergies       Review of Systems   Constitutional: Positive for fatigue  Negative for fever and unexpected weight change  HENT: Negative for dental problem, mouth sores, nosebleeds, rhinorrhea, sinus pain, sinus pressure and sore throat  Eyes: Negative for pain, discharge and visual disturbance  Respiratory: Negative for cough, chest tightness, shortness of breath and wheezing  Cardiovascular: Negative for chest pain, palpitations and leg swelling  Gastrointestinal: Negative for blood in stool, constipation, diarrhea, nausea and vomiting  Endocrine: Negative for polydipsia  Genitourinary: Negative for difficulty urinating, dyspareunia, dysuria, menstrual problem, pelvic pain, urgency, vaginal discharge and vaginal pain     Musculoskeletal: Negative for arthralgias, back pain and joint swelling  Allergic/Immunologic: Negative for environmental allergies  Neurological: Negative for seizures, light-headedness and headaches  Hematological: Does not bruise/bleed easily  Psychiatric/Behavioral: Negative for sleep disturbance  The patient is not nervous/anxious  Objective: There were no vitals taken for this visit  Physical Exam   Constitutional: She appears well-developed  No distress  Overweight white female   HENT:   Head: Normocephalic and atraumatic  Neck: Normal range of motion  No thyromegaly present  Cardiovascular: Normal rate, regular rhythm and normal heart sounds  No murmur heard  Pulmonary/Chest: Effort normal and breath sounds normal  No respiratory distress  She has no wheezes  She has no rales  Neurological: She is alert  Psychiatric: She has a normal mood and affect  Vitals reviewed

## 2018-10-05 NOTE — ANESTHESIA PREPROCEDURE EVALUATION
Review of Systems/Medical History  Patient summary reviewed  Chart reviewed      Cardiovascular   Pulmonary       GI/Hepatic            Endo/Other     GYN       Hematology   Musculoskeletal       Neurology      Comment: MS well controlled Psychology           Physical Exam    Airway    Mallampati score: I  TM Distance: >3 FB  Neck ROM: full     Dental       Cardiovascular  Rhythm: regular, Rate: normal,     Pulmonary      Other Findings        Anesthesia Plan  ASA Score- 1     Anesthesia Type- general with ASA Monitors  Additional Monitors:   Airway Plan: ETT  Plan Factors-    Induction- intravenous  Postoperative Plan- Plan for postoperative opioid use  Informed Consent- Anesthetic plan and risks discussed with patient  I personally reviewed this patient with the CRNA  Discussed and agreed on the Anesthesia Plan with the CRNA  Kayla Kelly

## 2018-10-05 NOTE — PROGRESS NOTES
Assumed care of patient at 1205 hours  Report received from [facility] in [address]  Patient in supine position with HOB elevated to 60 degrees  In NAD  Noted tolerated light snack PO

## 2018-10-05 NOTE — OP NOTE
OPERATIVE REPORT  PATIENT NAME: Ayde Loya    :  1980  MRN: [id number]  Pt Location:  OR ROOM 03    SURGERY DATE: 10/5/2018    Surgeon(s) and Role:     * Anna Ledbetter MD - Primary     * Mague English MD - Assisting    Preop Diagnosis:  Request for sterilization [Z30 2]    Post-Op Diagnosis Codes:     * Request for sterilization [Z30 2]    Procedure(s) (LRB):  LIGATION TUBAL LAPAROSCOPIC - FALOPE RINGS (Bilateral)    Specimen(s):  * No specimens in log *    Estimated Blood Loss:   Minimal    Drains:   None    Anesthesia Type:   General    Operative Indications:  Request for sterilization [Z30 2]      Operative Findings:  Uterus normal size and anterverted, no mass and lesions observed  Vulva, vaginal WNL no mass and lesions appreciated  Cervix multipara no lesions and mass oberved  Bilateral fallopian tubes and ovaries WNL and no lesion mass or cyst observed  Overall abdominal organ was WNL not pathology observed    Complications:   None    Procedure and Technique:  Brief History    All risks, benefits, and alternatives to the procedure were discussed with the patient and she had the opportunity to ask questions  The risk of regret of procedure was also addressed with the patient and options for LARC was discussed  Patient expressed desire to continue with tubal sterilization  Informed consent was obtained  Description of Procedure    Patient was taken to the operating room were a time out was performed to confirm correct patient and correct procedure  General endotracheal anesthesia (GET) was administered and the patient was positioned on the OR table in the dorsal lithotomy position  All pressure points were padded and a jina hugger was placed to maintain control of core body temperature  A bimanual exam was performed and the uterus was noted to be anteverted, normal in size and consistency with no palpable adnexal masses or fullness   The patient was prepped and draped in the usual sterile fashion with chloroprep on the abdomen and betadine prep on the vagina and perineum  Operative Technique   A weighted retractor was inserted into the vagina and used to visualize the anterior lip of the cervix, which was then grasped with a single toothed tenaculum  A 24 sized Tracy dilator inserted uterine cavity and secured to the  tenaculum  The weighted retractor was removed from the vagina  Sterile gloves were then exchanged and attention was turned to the abdomen  A 10mm incision was made at the inferior edge of the umbilicus for introduction of a 10mm trocar  Trocar was introduced under direct visualization  Pneumoperitoneum was then established to a maximum of 15mmHg  The entire abdomen and pelvis was inspected and there was no evidence of injury to bowel, bladder, vasculature, or other structures  Attention was then turned to the pelvis  Patient was placed in Trendelenburg and the uterus was elevated to visualize the fallopian tubes  There was noted to be grossly normal tubes and ovaries bilaterally  A second port site was selected 2cm cephalad to the pubic symphysis  A 8mm incision was made for introduction of a 8mm trocar under direct visualization  A sharp probe was inserted through this port and used to visualize the fimbriated ends of the tubes  The right fallopian tube and mesosalpinx were grasped with fallopian ring applicator and ring inserted by good visualization between isthmus and ampulla  Adequate fulguration was visualized following this procedure  The contralateral tube was identified and in similar fashion ring inserted  Circumferentially both ring seen and no bleeding and extra tissue was present  Following procedure, pneumoperitoneum was allowed to escape  Adequate hemostasis was visualized  The inferior trocar was removed under direct visualization  The laparoscope was withdrawn from the abdomen, followed by its trocar sleeve at the umbilicus   Skin incisions were closed with interrupted  0 Vicryl suture  Attention was turned to the vagina  Weighted retractor was reinserted into the vagina and the uterine manipulator was withdrawn  Single toothed tenaculum was removed from the anterior lip of the cervix  Good hemostasis was confirmed at the tenaculum puncture sites  Speculum was then removed from the vagina  At the conclusion of the procedure, all needle, sponge, and instrument counts were noted to be correct x2  Patient tolerated the procedure well and was transferred to PACU in stable condition prior to discharge with follow up in 1-2 weeks  Dr Diogo Marinelli was present and participated in all key portions of the case      Patient Disposition:  PACU  and extubated and stable    SIGNATURE: Sathya Frederick MD  DATE: October 5, 2018  TIME: 11:04 AM

## 2018-10-09 ENCOUNTER — TELEPHONE (OUTPATIENT)
Dept: OBGYN CLINIC | Facility: CLINIC | Age: 38
End: 2018-10-09

## 2018-10-16 ENCOUNTER — POSTPARTUM VISIT (OUTPATIENT)
Dept: OBGYN CLINIC | Facility: CLINIC | Age: 38
End: 2018-10-16

## 2018-10-16 VITALS
SYSTOLIC BLOOD PRESSURE: 124 MMHG | WEIGHT: 194 LBS | BODY MASS INDEX: 32.32 KG/M2 | HEIGHT: 65 IN | DIASTOLIC BLOOD PRESSURE: 80 MMHG

## 2018-10-16 DIAGNOSIS — B37.9 YEAST INFECTION: Primary | ICD-10-CM

## 2018-10-16 DIAGNOSIS — Z98.51 S/P TUBAL LIGATION: ICD-10-CM

## 2018-10-16 PROCEDURE — 99024 POSTOP FOLLOW-UP VISIT: CPT | Performed by: PHYSICIAN ASSISTANT

## 2018-10-16 RX ORDER — FLUCONAZOLE 150 MG/1
150 TABLET ORAL EVERY OTHER DAY
Qty: 2 TABLET | Refills: 0 | Status: SHIPPED | OUTPATIENT
Start: 2018-10-16 | End: 2018-10-19

## 2018-10-16 NOTE — ASSESSMENT & PLAN NOTE
Reviewed with patient, no signs of acute infection today  Patient has post op appt with Dr Terrie Rodgers tomorrow, will reevaluate at that appt

## 2018-10-16 NOTE — PROGRESS NOTES
Assessment/Plan:    S/P tubal ligation  Reviewed with patient, no signs of acute infection today  Patient has post op appt with Dr Chaur Winston tomorrow, will reevaluate at that appt  Yeast infection  Reviewed suspicion of yeast infection on exam  Will plan to treat with Diflucan x 2 doses  Problem List Items Addressed This Visit     S/P tubal ligation     Reviewed with patient, no signs of acute infection today  Patient has post op appt with Dr Charu Winston tomorrow, will reevaluate at that appt  Yeast infection - Primary     Reviewed suspicion of yeast infection on exam  Will plan to treat with Diflucan x 2 doses  Relevant Medications    fluconazole (DIFLUCAN) 150 mg tablet            Subjective:      Patient ID: Rita Gongora is a 41 y o  female  HPI  42 yo seen for post op issues  Patient is 11 days post op tubal ligation  Patient reports on Sunday scab came off of umbilicus incision  Since Sunday noticed some redness at incision, mildly tender to touch, reports had bandaid on it and had some discharge onto the bandaid  No fever or chills  Denies fatigue or malaise  Also reports in the last day noticed vaginal itching with some discharge, just getting over menses  Reports feels like yeast infection  No odors or pelvic pain  The following portions of the patient's history were reviewed and updated as appropriate:   She  has a past medical history of Abnormal Pap smear of cervix; Depression; History of chlamydia infection; History of gonorrhea; [facility]) ([facility] ); Ovarian cyst; and Varicella    She   Patient Active Problem List    Diagnosis Date Noted    S/P tubal ligation 10/16/2018    Yeast infection 10/16/2018    Request for sterilization 10/01/2018    Consultation for female sterilization 09/27/2018    [facility] ) 02/22/2018    Lung nodule seen on imaging study 05/31/2017     She  has a past surgical history that includes Knee arthroscopy and pr lap,tubal cautery (Bilateral, 10/5/2018)  Her family history includes ADD / ADHD in her son; Allergies in her son; Anuerysm in her sister; Asthma in her son; Glaucoma in her family; Hypertension in her family, father, and mother; Macular degeneration in her family and father; Migraines in her sister; No Known Problems in her daughter  She  reports that she has been smoking Cigarettes  She has been smoking about 0 50 packs per day  She has never used smokeless tobacco  She reports that she does not drink alcohol or use drugs  Current Outpatient Prescriptions   Medication Sig Dispense Refill    fluconazole (DIFLUCAN) 150 mg tablet Take 1 tablet (150 mg total) by mouth every other day for 2 doses 2 tablet 0    oxyCODONE-acetaminophen (PERCOCET) 5-325 mg per tablet Take 1 tablet by mouth every 4 (four) hours as needed for moderate pain for up to 10 doses Max Daily Amount: 6 tablets 10 tablet 0     No current facility-administered medications for this visit  She has No Known Allergies       Review of Systems   Constitutional: Negative for fatigue, fever and unexpected weight change  HENT: Negative for dental problem and sinus pressure  Eyes: Negative for visual disturbance  Respiratory: Negative for cough, shortness of breath and wheezing  Cardiovascular: Negative for chest pain  Gastrointestinal: Negative for abdominal pain, blood in stool, constipation, diarrhea, nausea and vomiting  Endocrine: Negative for polydipsia  Genitourinary: Positive for vaginal discharge  Negative for difficulty urinating, dyspareunia, dysuria, frequency, hematuria, pelvic pain and urgency  Musculoskeletal: Negative for arthralgias and back pain  Neurological: Negative for dizziness, seizures, light-headedness and headaches  Psychiatric/Behavioral: Negative for suicidal ideas  The patient is not nervous/anxious            Objective:      /80 (BP Location: Left arm, Patient Position: Sitting, Cuff Size: Standard)   Ht 5' 5" (1 651 m)   Wt 88 kg (194 lb)   LMP 10/10/2018 (Approximate)   BMI 32 28 kg/m²          Physical Exam   Constitutional: She is oriented to person, place, and time  She appears well-developed and well-nourished  Cardiovascular: Normal rate, regular rhythm and normal heart sounds  Exam reveals no gallop and no friction rub  No murmur heard  Pulmonary/Chest: Effort normal and breath sounds normal  No respiratory distress  She has no wheezes  She has no rales  She exhibits no tenderness  Abdominal:       Genitourinary: There is no rash, tenderness, lesion or injury on the right labia  There is no rash, tenderness, lesion or injury on the left labia  Uterus is not deviated, not enlarged, not fixed and not tender  Cervix exhibits no motion tenderness, no discharge and no friability  Right adnexum displays no mass, no tenderness and no fullness  Left adnexum displays no mass, no tenderness and no fullness  No erythema, tenderness or bleeding in the vagina  No foreign body in the vagina  No signs of injury around the vagina  Vaginal discharge (scant amount of thick white discharge in vaginal vault  ) found  Neurological: She is alert and oriented to person, place, and time  Skin: Skin is warm and dry  Psychiatric: She has a normal mood and affect   Her behavior is normal

## 2018-10-30 ENCOUNTER — TELEPHONE (OUTPATIENT)
Dept: OBGYN CLINIC | Facility: CLINIC | Age: 38
End: 2018-10-30

## 2018-10-31 ENCOUNTER — OFFICE VISIT (OUTPATIENT)
Dept: OBGYN CLINIC | Facility: CLINIC | Age: 38
End: 2018-10-31
Payer: COMMERCIAL

## 2018-10-31 VITALS — SYSTOLIC BLOOD PRESSURE: 124 MMHG | DIASTOLIC BLOOD PRESSURE: 78 MMHG | BODY MASS INDEX: 32.12 KG/M2 | WEIGHT: 193 LBS

## 2018-10-31 DIAGNOSIS — T81.49XA INCISIONAL INFECTION: ICD-10-CM

## 2018-10-31 DIAGNOSIS — N89.8 VAGINAL DISCHARGE: Primary | ICD-10-CM

## 2018-10-31 PROCEDURE — 99213 OFFICE O/P EST LOW 20 MIN: CPT | Performed by: PHYSICIAN ASSISTANT

## 2018-10-31 RX ORDER — METRONIDAZOLE 500 MG/1
500 TABLET ORAL EVERY 12 HOURS SCHEDULED
Qty: 14 TABLET | Refills: 0 | Status: SHIPPED | OUTPATIENT
Start: 2018-10-31 | End: 2018-11-07

## 2018-10-31 RX ORDER — CEPHALEXIN 500 MG/1
500 CAPSULE ORAL EVERY 12 HOURS SCHEDULED
Qty: 14 CAPSULE | Refills: 0 | Status: SHIPPED | OUTPATIENT
Start: 2018-10-31 | End: 2018-11-07

## 2018-10-31 RX ORDER — FLUCONAZOLE 150 MG/1
150 TABLET ORAL EVERY OTHER DAY
Qty: 2 TABLET | Refills: 0 | Status: SHIPPED | OUTPATIENT
Start: 2018-10-31 | End: 2018-11-03

## 2018-10-31 NOTE — ASSESSMENT & PLAN NOTE
Suspected bacterial vaginosis script for Metronidazole 500mg BID x 7 days given  Aware no alcohol with treatment  For prevention: Recommend acidophilus or yogurt daily, avoid irritating soaps or lotions, no tight fitted pants or underwear, avoid bubble baths, do not stay in wet swimsuit

## 2018-10-31 NOTE — ASSESSMENT & PLAN NOTE
Reviewed with patient with continued drainage and tenderness at incision will start on Keflex  Script given  Patient to call if incision not improving or develops fever, chills, redness around site  Given script for Diflucan secondary to giving Keflex just in case patient gets yeast symptoms

## 2018-10-31 NOTE — PROGRESS NOTES
Assessment/Plan:    Vaginal discharge  Suspected bacterial vaginosis script for Metronidazole 500mg BID x 7 days given  Aware no alcohol with treatment  For prevention: Recommend acidophilus or yogurt daily, avoid irritating soaps or lotions, no tight fitted pants or underwear, avoid bubble baths, do not stay in wet swimsuit  Incisional infection  Reviewed with patient with continued drainage and tenderness at incision will start on Keflex  Script given  Patient to call if incision not improving or develops fever, chills, redness around site  Given script for Diflucan secondary to giving Keflex just in case patient gets yeast symptoms  Problem List Items Addressed This Visit     Vaginal discharge - Primary     Suspected bacterial vaginosis script for Metronidazole 500mg BID x 7 days given  Aware no alcohol with treatment  For prevention: Recommend acidophilus or yogurt daily, avoid irritating soaps or lotions, no tight fitted pants or underwear, avoid bubble baths, do not stay in wet swimsuit  Relevant Medications    metroNIDAZOLE (FLAGYL) 500 mg tablet    fluconazole (DIFLUCAN) 150 mg tablet    Incisional infection     Reviewed with patient with continued drainage and tenderness at incision will start on Keflex  Script given  Patient to call if incision not improving or develops fever, chills, redness around site  Given script for Diflucan secondary to giving Keflex just in case patient gets yeast symptoms  Relevant Medications    cephalexin (KEFLEX) 500 mg capsule            Subjective:      Patient ID: Terry Atwood is a 41 y o  female  HPI  42 yo seen for persistent vaginal discharge  Patient is post tubal ligation on 10/5/2018  Was seen on 10/16/2018 for vaginal discharge and incision issues  At that time incision appeared intact with only mild erythema and no purulent discharge or sign of infection   Pt was treated for vaginal yeast infection, reports itching went away but discharge remained  Tried Monistat and didn't help  Reports fishy odors, thin yellow-clear discharge  No itching, Denies bowel or bladder issues although did have suspected kidney stone over the weekend, pain started in right flank and wrapped around to front resolved by Sunday, no pain since  Patient reports umbilical incision still having some discharge from area and mildly tender to touch  The following portions of the patient's history were reviewed and updated as appropriate:   She  has a past medical history of Abnormal Pap smear of cervix; Depression; History of chlamydia infection; History of gonorrhea; MS (multiple sclerosis) ([facility] [place] [address] ); Ovarian cyst; and Varicella  She   Patient Active Problem List    Diagnosis Date Noted    Vaginal discharge 10/31/2018    Incisional infection 10/31/2018    S/P tubal ligation 10/16/2018    Yeast infection 10/16/2018    Request for sterilization 10/01/2018    Consultation for female sterilization 09/27/2018    Multiple sclerosis ([facility] [place] [address] ) 02/22/2018    Lung nodule seen on imaging study 05/31/2017     She  has a past surgical history that includes Knee arthroscopy and pr lap,tubal cautery (Bilateral, 10/5/2018)  Her family history includes ADD / ADHD in her son; Allergies in her son; Anuerysm in her sister; Asthma in her son; Glaucoma in her family; Hypertension in her family, father, and mother; Macular degeneration in her family and father; Migraines in her sister; No Known Problems in her daughter  She  reports that she has been smoking Cigarettes  She has been smoking about 0 50 packs per day  She has never used smokeless tobacco  She reports that she does not drink alcohol or use drugs    Current Outpatient Prescriptions   Medication Sig Dispense Refill    cephalexin (KEFLEX) 500 mg capsule Take 1 capsule (500 mg total) by mouth every 12 (twelve) hours for 7 days 14 capsule 0    fluconazole (DIFLUCAN) 150 mg tablet Take 1 tablet (150 mg total) by mouth every other day for 2 doses 2 tablet 0    metroNIDAZOLE (FLAGYL) 500 mg tablet Take 1 tablet (500 mg total) by mouth every 12 (twelve) hours for 7 days 14 tablet 0    oxyCODONE-acetaminophen (PERCOCET) 5-325 mg per tablet Take 1 tablet by mouth every 4 (four) hours as needed for moderate pain for up to 10 doses Max Daily Amount: 6 tablets 10 tablet 0     No current facility-administered medications for this visit  She has No Known Allergies       Review of Systems   Constitutional: Negative for fatigue, fever and unexpected weight change  HENT: Negative for dental problem and sinus pressure  Eyes: Negative for visual disturbance  Respiratory: Negative for cough, shortness of breath and wheezing  Cardiovascular: Negative for chest pain  Gastrointestinal: Negative for abdominal pain, blood in stool, constipation, diarrhea, nausea and vomiting  Endocrine: Negative for polydipsia  Genitourinary: Positive for vaginal discharge  Negative for difficulty urinating, dyspareunia, dysuria, frequency, hematuria, pelvic pain and urgency  Musculoskeletal: Negative for arthralgias and back pain  Neurological: Negative for dizziness, seizures, light-headedness and headaches  Psychiatric/Behavioral: Negative for suicidal ideas  The patient is not nervous/anxious  Objective:      /78 (BP Location: Left arm, Patient Position: Sitting, Cuff Size: Standard)   Wt 87 5 kg (193 lb)   LMP 10/10/2018 (Approximate)   BMI 32 12 kg/m²          Physical Exam   Constitutional: She is oriented to person, place, and time  She appears well-developed and well-nourished  Neck: Neck supple  No thyroid mass and no thyromegaly present  Cardiovascular: Normal rate, regular rhythm and normal heart sounds  Exam reveals no gallop and no friction rub  No murmur heard  Pulmonary/Chest: Effort normal and breath sounds normal  No respiratory distress  She has no wheezes  She has no rales  Abdominal: Soft   Normal appearance and bowel sounds are normal  She exhibits no distension and no mass  There is no tenderness  There is no rebound and no guarding  Genitourinary: There is no rash, tenderness, lesion or injury on the right labia  There is no rash, tenderness, lesion or injury on the left labia  Uterus is not deviated, not enlarged and not tender  Cervix exhibits no motion tenderness, no discharge and no friability  Right adnexum displays no mass, no tenderness and no fullness  Left adnexum displays no mass, no tenderness and no fullness  No erythema, tenderness or bleeding in the vagina  No signs of injury around the vagina  Vaginal discharge (thin clear watery discharge in vaginal vault) found  Lymphadenopathy:     She has no cervical adenopathy  Right: No inguinal and no supraclavicular adenopathy present  Left: No inguinal and no supraclavicular adenopathy present  Neurological: She is alert and oriented to person, place, and time  Skin: Skin is warm and dry  No rash noted  No erythema  No pallor  Psychiatric: She has a normal mood and affect  Her behavior is normal  Judgment and thought content normal        Wet mount: Scant clue cells throughout  No yeast or trichomonads

## 2018-11-05 ENCOUNTER — TELEPHONE (OUTPATIENT)
Dept: OBGYN CLINIC | Facility: CLINIC | Age: 38
End: 2018-11-05

## 2018-11-29 ENCOUNTER — TELEPHONE (OUTPATIENT)
Dept: NEUROLOGY | Facility: CLINIC | Age: 38
End: 2018-11-29

## 2018-11-29 DIAGNOSIS — G35 MULTIPLE SCLEROSIS (HCC): Primary | ICD-10-CM

## 2018-11-29 NOTE — TELEPHONE ENCOUNTER
Patient is ready to start back up on tecfidera, no longer breastfeeding, and states she already signed a start form at last office visit  States she has noticed some sxs  Numbness in her legs and hands and intermittent memory loss  She is questioning if/when she needs to get labs done, she uses a [facility] facility, does not need orders mailed or faxed

## 2018-11-29 NOTE — TELEPHONE ENCOUNTER
Yes, I have her start form in my folder  We can submit that now  She will need to get some baseline labs done now, and then every other month while on the drug  I will enter the standing orders into the system  It would be a good idea to update her MRIs  They were last done in March 2017  This will act as a good baseline for re-starting the drug and to ensure nothing acute that requires steroids  Are the symptoms she is having new, or just increase in her usual symptoms?

## 2018-12-05 NOTE — TELEPHONE ENCOUNTER
Pt made aware  MRI b/c/t scheduled 12/19/18  Prior auth team:  Pt no longer has [employer], [facility]issy is primary        thanks

## 2018-12-05 NOTE — TELEPHONE ENCOUNTER
pt made aware  she states that she had these symptoms before so these are an increase of her usual symptoms  she is agreeable to updating mri's  please enter orders  pt states that she no longer has [employer] insurance    [facility] is primary

## 2018-12-11 ENCOUNTER — APPOINTMENT (OUTPATIENT)
Dept: LAB | Age: 38
End: 2018-12-11
Payer: COMMERCIAL

## 2018-12-11 LAB
ALBUMIN SERPL BCP-MCNC: 3.7 G/DL (ref 3.5–5)
ALP SERPL-CCNC: 61 U/L (ref 46–116)
ALT SERPL W P-5'-P-CCNC: 17 U/L (ref 12–78)
ANION GAP SERPL CALCULATED.3IONS-SCNC: 5 MMOL/L (ref 4–13)
AST SERPL W P-5'-P-CCNC: 11 U/L (ref 5–45)
BASOPHILS # BLD AUTO: 0.03 THOUSANDS/ΜL (ref 0–0.1)
BASOPHILS NFR BLD AUTO: 1 % (ref 0–1)
BILIRUB SERPL-MCNC: 0.28 MG/DL (ref 0.2–1)
BUN SERPL-MCNC: 14 MG/DL (ref 5–25)
CALCIUM SERPL-MCNC: 9.8 MG/DL (ref 8.3–10.1)
CHLORIDE SERPL-SCNC: 104 MMOL/L (ref 100–108)
CO2 SERPL-SCNC: 28 MMOL/L (ref 21–32)
CREAT SERPL-MCNC: 0.6 MG/DL (ref 0.6–1.3)
EOSINOPHIL # BLD AUTO: 0.39 THOUSAND/ΜL (ref 0–0.61)
EOSINOPHIL NFR BLD AUTO: 6 % (ref 0–6)
ERYTHROCYTE [DISTWIDTH] IN BLOOD BY AUTOMATED COUNT: 13.2 % (ref 11.6–15.1)
GFR SERPL CREATININE-BSD FRML MDRD: 117 ML/MIN/1.73SQ M
GLUCOSE SERPL-MCNC: 66 MG/DL (ref 65–140)
HCT VFR BLD AUTO: 37.3 % (ref 34.8–46.1)
HGB BLD-MCNC: 11.8 G/DL (ref 11.5–15.4)
IMM GRANULOCYTES # BLD AUTO: 0.02 THOUSAND/UL (ref 0–0.2)
IMM GRANULOCYTES NFR BLD AUTO: 0 % (ref 0–2)
LYMPHOCYTES # BLD AUTO: 1.63 THOUSANDS/ΜL (ref 0.6–4.47)
LYMPHOCYTES NFR BLD AUTO: 26 % (ref 14–44)
MCH RBC QN AUTO: 31.6 PG (ref 26.8–34.3)
MCHC RBC AUTO-ENTMCNC: 31.6 G/DL (ref 31.4–37.4)
MCV RBC AUTO: 100 FL (ref 82–98)
MONOCYTES # BLD AUTO: 0.49 THOUSAND/ΜL (ref 0.17–1.22)
MONOCYTES NFR BLD AUTO: 8 % (ref 4–12)
NEUTROPHILS # BLD AUTO: 3.66 THOUSANDS/ΜL (ref 1.85–7.62)
NEUTS SEG NFR BLD AUTO: 59 % (ref 43–75)
NRBC BLD AUTO-RTO: 0 /100 WBCS
PLATELET # BLD AUTO: 224 THOUSANDS/UL (ref 149–390)
PMV BLD AUTO: 10.5 FL (ref 8.9–12.7)
POTASSIUM SERPL-SCNC: 3.9 MMOL/L (ref 3.5–5.3)
PROT SERPL-MCNC: 6.9 G/DL (ref 6.4–8.2)
RBC # BLD AUTO: 3.73 MILLION/UL (ref 3.81–5.12)
SODIUM SERPL-SCNC: 137 MMOL/L (ref 136–145)
WBC # BLD AUTO: 6.22 THOUSAND/UL (ref 4.31–10.16)

## 2018-12-11 PROCEDURE — 80053 COMPREHEN METABOLIC PANEL: CPT

## 2018-12-11 PROCEDURE — 36415 COLL VENOUS BLD VENIPUNCTURE: CPT

## 2018-12-11 PROCEDURE — 85025 COMPLETE CBC W/AUTO DIFF WBC: CPT

## 2018-12-19 ENCOUNTER — HOSPITAL ENCOUNTER (OUTPATIENT)
Dept: MRI IMAGING | Facility: HOSPITAL | Age: 38
End: 2018-12-19
Payer: COMMERCIAL

## 2018-12-19 ENCOUNTER — APPOINTMENT (OUTPATIENT)
Dept: MRI IMAGING | Facility: HOSPITAL | Age: 38
End: 2018-12-19
Payer: COMMERCIAL

## 2018-12-19 ENCOUNTER — HOSPITAL ENCOUNTER (OUTPATIENT)
Dept: MRI IMAGING | Facility: HOSPITAL | Age: 38
Discharge: HOME | End: 2018-12-19
Payer: COMMERCIAL

## 2018-12-19 DIAGNOSIS — G35 MULTIPLE SCLEROSIS (HCC): ICD-10-CM

## 2018-12-19 PROCEDURE — A9585 GADOBUTROL INJECTION: HCPCS | Performed by: PHYSICIAN ASSISTANT

## 2018-12-19 PROCEDURE — 70553 MRI BRAIN STEM W/O & W/DYE: CPT

## 2018-12-19 RX ADMIN — GADOBUTROL 8 ML: 604.72 INJECTION INTRAVENOUS at 19:13

## 2018-12-20 ENCOUNTER — TELEPHONE (OUTPATIENT)
Dept: NEUROLOGY | Facility: CLINIC | Age: 38
End: 2018-12-20

## 2018-12-20 NOTE — TELEPHONE ENCOUNTER
Received call from Naveen at  stating that Darryl Mckenna called stating that this patient's MRI had significant findings  Call placed to Jody Carodza working with Christina Pink, and I requested that she verbally inform Morteza Brown of this message

## 2018-12-20 NOTE — TELEPHONE ENCOUNTER
Noted, she has 2 new, but non-enhancing lesions in the brain compared to 3/2017  Nothing urgent    Will review with patient when other MRIs are also back

## 2018-12-21 ENCOUNTER — TELEPHONE (OUTPATIENT)
Dept: FAMILY MEDICINE CLINIC | Facility: CLINIC | Age: 38
End: 2018-12-21

## 2018-12-21 DIAGNOSIS — G35 MULTIPLE SCLEROSIS (HCC): Primary | ICD-10-CM

## 2018-12-21 NOTE — TELEPHONE ENCOUNTER
Pt has an apt with Neuro on 12/31/2018 for an MRI with findings and they need an order aaded with dx: G35 Multiple Sclerosis

## 2018-12-31 ENCOUNTER — OFFICE VISIT (OUTPATIENT)
Dept: NEUROLOGY | Facility: CLINIC | Age: 38
End: 2018-12-31
Payer: COMMERCIAL

## 2018-12-31 VITALS
HEART RATE: 79 BPM | DIASTOLIC BLOOD PRESSURE: 60 MMHG | BODY MASS INDEX: 31.95 KG/M2 | RESPIRATION RATE: 16 BRPM | WEIGHT: 192 LBS | SYSTOLIC BLOOD PRESSURE: 126 MMHG

## 2018-12-31 DIAGNOSIS — G35 MULTIPLE SCLEROSIS (HCC): ICD-10-CM

## 2018-12-31 PROCEDURE — 99214 OFFICE O/P EST MOD 30 MIN: CPT | Performed by: PHYSICIAN ASSISTANT

## 2018-12-31 NOTE — PATIENT INSTRUCTIONS
Will re-submit start form for Tecfidera  Call if you do not hear anything about the med in 1-2 weeks  Get labs done every other month after starting Tecfidera  Standing order was entered in the system since you will use a [facility] lab  Follow up in 4 months  Call for any new or worsening symptoms

## 2018-12-31 NOTE — PROGRESS NOTES
Patient ID: Cecilia Ellison is a 41 y o  female  Assessment/Plan:    Multiple sclerosis ([facility] [address] )  Patient overall doing well  She is now 5 months post-partum, was off IMD therapy since finding out she was pregnant, as well as in the few months post-partum while breastfeeding  She called our office at the end of November reporting she was finishing breastfeeding and ready to re-start Tecfidera  Start form was submitted  Patient still has not heard from the drug company, so has not resumed yet  I am not sure why she has not heard from the yet, so I had her sign another start form and will submit again today and have our nurse follow up on it  She had updated MRI brain 12/19/18 to use as a baseline in the post-partum period and prior to re-starting her IMD therapy  This MRI was compared to March 2017  There were 2 new, but non-enhancing lesions since last MRI  Her cord imaging is scheduled for later this week  Advised patient to contact our office if she does not hear from the drug company or pharmacy about her Tecfidera in 1-2 weeks  She will need bloodwork every other month once she re-starts Tecfidera  Standing order for labs entered in the system since she will use [facility] lab  Exam is stable today  Will see her back in 4 months or sooner if needed  She is to call for any new or worsening symptoms  Diagnoses and all orders for this visit:    Multiple sclerosis ([facility] )  -     Ambulatory referral to Neurology  -     CBC and differential; Standing  -     Hepatic function panel; Standing  -     CBC and differential  -     Hepatic function panel         Subjective:    NIKKI Crowe returns today for follow-up of MS, last seen in August 2018  Patient is a 41 y o  female with PMH of migraines, depression and anxiety  To review, patient notes symptoms began in January 2015 when she had a work injury to the left shoulder   In March 2015, she had left sided numbness going down her torso, down into her feet and legs  She also experienced urinary leakage without Valsalva  Patient then experienced right eye pain and fogginess of vision  She was seen by Dr Rajiv Harris and had an OCT done  It revealed normal findings on the right but a sector defect on the left  MRI of the brain from May 13, 2015 revealed a few scattered nonspecific supratentorial WM lesions  No acute infarction, or hemorrhage or enh  MRI of the lumbar spine without contrast revealed small L5/S1 disc herniation, MRI of the T-spine revealed evidence of solitary 8mm lesion to the left of midline at T8/9 in the dorsal Cord  No pathological enh  MRI of the C-spine also done on June 13, 2015 revealed a solitary 5 mm focus of active demyelination in the right lateral cord at C2  Patient was given 3 days of IV  Patient with significant GI upset with the steroids and also some mild change in mood with increased anxiety  She was NMO negative and Lyme negative  She did not have an LP  Patient was started on Copaxone as her initial IMD med in 2015  She had a brief interval of being off med due to insurance  However, due to site reactions, patient stopped med and started Tecfidera in May 2016  Patient was subsequently told to stop her Tecifidera due to unplanned pregnancy in Nov 2017  Patient delivered her daughter via vaginal delivery on 6/22/83 without complication  At time of follow up after her deliver, she denied any new neurologic symptoms or increase in any of her regular MS symptoms  She was breast feeding her baby as well as supplementing with formula  She reported she was planning on breast-feeding for a few more months  Patient reported she would like to go back on Tecfidera after she has finished breast feeding        Today, patient reports she is doing well   She had called our office in late November 2018 reporting she was done breastfeeding and wanted to re-start Tecfidera   Start form was submitted, as she had signed it at her last visit   She notes she has not re-started therapy yet, is still waiting for them to ship the titration   MRI brain was updated as a baseline after pregnancy and before re-starting therapy   MRI brain completed 12/19/18 and compared to 3/18/17 demonstrated 2 new, but non-enhancing lesions in the right parietal and left frontal subcortical region   Her cord imaging is scheduled for later this week  She denies any vision changes, vertigo  She denies any difficulty with speech or swallowing  She denies any trouble with bowel or bladder  No falls   She notes the Holidays were a little rough, increased stress   She reports she had a tubal ligation since her last visit here  The following portions of the patient's history were reviewed and updated as appropriate: current medications, past family history, past medical history, past social history, past surgical history and problem list          Objective:    Blood pressure 126/60, pulse 79, resp  rate 16, weight 87 1 kg (192 lb)    Physical Exam   Constitutional: She appears well-developed and well-nourished  HENT:   Head: Normocephalic and atraumatic  Eyes: Pupils are equal, round, and reactive to light  EOM are normal    Cardiovascular: Intact distal pulses  Neurological: She has normal strength and normal reflexes  Coordination normal    Skin: Skin is warm and dry  Psychiatric: She has a normal mood and affect  Her speech is normal        Neurological Exam  Mental Status   Oriented to person, place, time and situation  Recent and remote memory are intact  Speech is normal  Language is fluent with no aphasia  Attention and concentration are normal     Cranial Nerves  CN II: Visual fields full to confrontation  CN III, IV, VI: Extraocular movements intact bilaterally  Pupils equal round and reactive to light bilaterally  CN V: Facial sensation is normal   CN VII: Full and symmetric facial movement    CN VIII: Hearing is normal   CN IX, X: Palate elevates symmetrically  Normal gag reflex  CN XI: Shoulder shrug strength is normal   CN XII: Tongue midline without atrophy or fasciculations  Motor   Normal muscle tone  Strength is 5/5 throughout all four extremities  Sensory  Decreased vibratory sensation in lower extremities bilaterally   Reflexes  Deep tendon reflexes are 2+ and symmetric in all four extremities with downgoing toes bilaterally  Coordination  Finger-to-nose, rapid alternating movements and heel-to-shin normal bilaterally without dysmetria  Gait  Casual gait is normal including stance, stride, and arm swing  ROS:    Review of Systems   Constitutional: Negative  Negative for appetite change and fever  HENT: Negative  Negative for hearing loss, tinnitus, trouble swallowing and voice change  Eyes: Negative  Negative for photophobia and pain  Respiratory: Negative  Negative for shortness of breath  Cardiovascular: Negative  Negative for palpitations  Gastrointestinal: Negative  Negative for nausea and vomiting  Endocrine: Negative  Negative for cold intolerance and heat intolerance  Genitourinary: Negative  Negative for dysuria, frequency and urgency  Musculoskeletal: Positive for back pain and gait problem  Negative for myalgias and neck pain  Skin: Negative  Negative for rash  Neurological: Positive for dizziness and numbness  Negative for tremors, seizures, syncope, facial asymmetry, speech difficulty, weakness, light-headedness and headaches  Hematological: Bruises/bleeds easily  Psychiatric/Behavioral: Positive for confusion  Negative for hallucinations and sleep disturbance  The patient is nervous/anxious      I personally reviewed and updated the ROS as appropriate

## 2018-12-31 NOTE — ASSESSMENT & PLAN NOTE
Patient overall doing well  She is now 5 months post-partum, was off IMD therapy since finding out she was pregnant, as well as in the few months post-partum while breastfeeding  She called our office at the end of November reporting she was finishing breastfeeding and ready to re-start Tecfidera  Start form was submitted  Patient still has not heard from the drug company, so has not resumed yet  I am not sure why she has not heard from the yet, so I had her sign another start form and will submit again today and have our nurse follow up on it  She had updated MRI brain 12/19/18 to use as a baseline in the post-partum period and prior to re-starting her IMD therapy  This MRI was compared to March 2017  There were 2 new, but non-enhancing lesions since last MRI  Her cord imaging is scheduled for later this week  Advised patient to contact our office if she does not hear from the drug company or pharmacy about her Tecfidera in 1-2 weeks  She will need bloodwork every other month once she re-starts Tecfidera  Standing order for labs entered in the system since she will use [facility] lab  Exam is stable today  Will see her back in 4 months or sooner if needed  She is to call for any new or worsening symptoms

## 2019-01-04 ENCOUNTER — HOSPITAL ENCOUNTER (OUTPATIENT)
Dept: MRI IMAGING | Facility: HOSPITAL | Age: 39
Discharge: HOME | End: 2019-01-04
Payer: COMMERCIAL

## 2019-01-04 DIAGNOSIS — G35 MULTIPLE SCLEROSIS (HCC): ICD-10-CM

## 2019-01-04 PROCEDURE — 72156 MRI NECK SPINE W/O & W/DYE: CPT

## 2019-01-04 PROCEDURE — A9585 GADOBUTROL INJECTION: HCPCS | Performed by: PHYSICIAN ASSISTANT

## 2019-01-04 PROCEDURE — 72157 MRI CHEST SPINE W/O & W/DYE: CPT

## 2019-01-04 RX ADMIN — GADOBUTROL 9 ML: 604.72 INJECTION INTRAVENOUS at 08:02

## 2019-01-09 ENCOUNTER — ANNUAL EXAM (OUTPATIENT)
Dept: OBGYN CLINIC | Facility: CLINIC | Age: 39
End: 2019-01-09
Payer: COMMERCIAL

## 2019-01-09 VITALS
SYSTOLIC BLOOD PRESSURE: 128 MMHG | DIASTOLIC BLOOD PRESSURE: 80 MMHG | BODY MASS INDEX: 31.32 KG/M2 | HEIGHT: 65 IN | WEIGHT: 188 LBS

## 2019-01-09 DIAGNOSIS — Z01.419 WELL WOMAN EXAM: Primary | ICD-10-CM

## 2019-01-09 PROCEDURE — 99395 PREV VISIT EST AGE 18-39: CPT | Performed by: PHYSICIAN ASSISTANT

## 2019-01-09 NOTE — PROGRESS NOTES
Assessment/Plan   Diagnoses and all orders for this visit:    Well woman exam        Discussion    All questions have been answered to her satisfaction  RTO for APE or sooner if needed      Subjective     Pt for annual GYN exam  Has had some increase in cramping w her menses since the tubal  Some clotting but nothing too significant  Changes pad q 3 hours  Denies any vaginal complaints  No concerns for STD exposure  Pt does have h/o MS  She follows regularly w neurology and feels like sx have been stable for the most part  Corina Roberson is a 41 y o  female who presents for annual well woman exam    Menarche -7 ; LMP -12/31/18 ; Periods are reg q  28 days and last 4-5 days  No vulvar itch/burn; No vaginal itch/burn; No abn discharge or odor; No urinary sx - burning/pain/frequency/hematuria  (+) SBEs - no breast masses, asymmetry, nipple discharge or bleeding, changes in skin of breast, or breast tenderness bilaterally  No abd/pelvic pain or HAs;   Pt is sexually active in a mutually monog/ sexual relationship; No issues with intercourse; She declines std/hiv/hep testing; Feels safe at home  Current contraception: TL     (+) PCP for routine Bw/care; Last Pap - 1/3/18 WNL neg HPV  History of abnormal Pap smear:     Review of Systems   Constitutional: Negative for fatigue, fever and unexpected weight change  HENT: Negative for dental problem, mouth sores, nosebleeds, rhinorrhea, sinus pain, sinus pressure and sore throat  Eyes: Negative for pain, discharge and visual disturbance  Respiratory: Negative for cough, chest tightness, shortness of breath and wheezing  Cardiovascular: Negative for chest pain, palpitations and leg swelling  Gastrointestinal: Negative for blood in stool, constipation, diarrhea, nausea and vomiting  Endocrine: Negative for polydipsia     Genitourinary: Negative for difficulty urinating, dyspareunia, dysuria, menstrual problem, pelvic pain, urgency, vaginal discharge and vaginal pain  Musculoskeletal: Negative for arthralgias, back pain and joint swelling  Allergic/Immunologic: Negative for environmental allergies  Neurological: Negative for seizures, light-headedness and headaches  Hematological: Does not bruise/bleed easily  Psychiatric/Behavioral: Negative for sleep disturbance  The patient is not nervous/anxious          The following portions of the patient's history were reviewed and updated as appropriate: allergies, current medications, past family history, past medical history, past social history, past surgical history and problem list          OB History      Para Term  AB Living    5 4 4     4    SAB TAB Ectopic Multiple Live Births          0 4          Past Medical History:   Diagnosis Date    Abnormal Pap smear of cervix     Depression     Last assessed: 12    History of chlamydia infection     History of gonorrhea     MS [facility] sclerosis) ([facility] [address] )     Ovarian cyst     Varicella     HX DISEASE       Past Surgical History:   Procedure Laterality Date    KNEE ARTHROSCOPY      CA LAP,TUBAL CAUTERY Bilateral 10/5/2018    Procedure: LIGATION TUBAL LAPAROSCOPIC - FALOPE RINGS;  Surgeon: Anette Tam MD;  Location: [facility];  Service: Gynecology       Family History   Problem Relation Age of Onset    Hypertension Mother     Hypertension Father     Macular degeneration Father     Glaucoma Family     Macular degeneration Family     Hypertension Family     Anuerysm Sister     Migraines Sister     No Known Problems Daughter     Asthma Son     ADD / ADHD Son     Allergies Son        Social History     Social History    Marital status: Single     Spouse name: N/A    Number of children: N/A    Years of education: 12     Occupational History    [employer]      Social History Main Topics    Smoking status: Current Every Day Smoker     Packs/day: 0 50     Types: Cigarettes    Smokeless tobacco: Never Used    Alcohol use No    Drug use: No    Sexual activity: Yes     Partners: Male     Birth control/ protection: Female Sterilization     Other Topics Concern    Not on file     Social History Narrative    No narrative on file       No current outpatient prescriptions on file  No Known Allergies    Objective   Vitals:    01/09/19 1459   BP: 128/80   BP Location: Left arm   Patient Position: Sitting   Cuff Size: Standard   Weight: 85 3 kg (188 lb)   Height: 5' 5" (1 651 m)     Physical Exam   Constitutional: She is oriented to person, place, and time  She appears well-developed and well-nourished  HENT:   Head: Normocephalic and atraumatic  Cardiovascular: Normal rate and regular rhythm  Pulmonary/Chest: Effort normal and breath sounds normal  Right breast exhibits no inverted nipple, no mass, no nipple discharge, no skin change and no tenderness  Left breast exhibits no inverted nipple, no mass, no nipple discharge, no skin change and no tenderness  Breasts are symmetrical    Abdominal: Soft  She exhibits no distension and no mass  There is no rebound and no guarding  Genitourinary: Vagina normal and uterus normal    Neurological: She is alert and oriented to person, place, and time  Skin: Skin is warm and dry  Psychiatric: She has a normal mood and affect  Patient Instructions   Pt will cont to follow with neuro   Now that she is done nursing will plan to restart tecfedara for MS

## 2019-01-11 ENCOUNTER — DOCUMENTATION (OUTPATIENT)
Dept: NEUROLOGY | Facility: CLINIC | Age: 39
End: 2019-01-11

## 2019-01-11 NOTE — PROGRESS NOTES
Received PA form for Tecfidera from Vivian Rodriguez  Completed, signed by Mabel Luna and faxed  Scanned to CF

## 2019-01-15 ENCOUNTER — TELEPHONE (OUTPATIENT)
Dept: NEUROLOGY | Facility: CLINIC | Age: 39
End: 2019-01-15

## 2019-02-27 ENCOUNTER — APPOINTMENT (OUTPATIENT)
Dept: LAB | Age: 39
End: 2019-02-27
Payer: COMMERCIAL

## 2019-02-27 LAB
BASOPHILS # BLD AUTO: 0.03 THOUSANDS/ΜL (ref 0–0.1)
BASOPHILS NFR BLD AUTO: 1 % (ref 0–1)
BILIRUB DIRECT SERPL-MCNC: 0.11 MG/DL (ref 0–0.2)
EOSINOPHIL # BLD AUTO: 0.14 THOUSAND/ΜL (ref 0–0.61)
EOSINOPHIL NFR BLD AUTO: 2 % (ref 0–6)
ERYTHROCYTE [DISTWIDTH] IN BLOOD BY AUTOMATED COUNT: 13.8 % (ref 11.6–15.1)
HCT VFR BLD AUTO: 37.7 % (ref 34.8–46.1)
HGB BLD-MCNC: 11.9 G/DL (ref 11.5–15.4)
IMM GRANULOCYTES # BLD AUTO: 0.01 THOUSAND/UL (ref 0–0.2)
IMM GRANULOCYTES NFR BLD AUTO: 0 % (ref 0–2)
LYMPHOCYTES # BLD AUTO: 1.5 THOUSANDS/ΜL (ref 0.6–4.47)
LYMPHOCYTES NFR BLD AUTO: 23 % (ref 14–44)
MCH RBC QN AUTO: 32 PG (ref 26.8–34.3)
MCHC RBC AUTO-ENTMCNC: 31.6 G/DL (ref 31.4–37.4)
MCV RBC AUTO: 101 FL (ref 82–98)
MONOCYTES # BLD AUTO: 0.48 THOUSAND/ΜL (ref 0.17–1.22)
MONOCYTES NFR BLD AUTO: 7 % (ref 4–12)
NEUTROPHILS # BLD AUTO: 4.45 THOUSANDS/ΜL (ref 1.85–7.62)
NEUTS SEG NFR BLD AUTO: 67 % (ref 43–75)
NRBC BLD AUTO-RTO: 0 /100 WBCS
PLATELET # BLD AUTO: 259 THOUSANDS/UL (ref 149–390)
PMV BLD AUTO: 10.2 FL (ref 8.9–12.7)
RBC # BLD AUTO: 3.72 MILLION/UL (ref 3.81–5.12)
WBC # BLD AUTO: 6.61 THOUSAND/UL (ref 4.31–10.16)

## 2019-02-27 PROCEDURE — 82248 BILIRUBIN DIRECT: CPT | Performed by: PHYSICIAN ASSISTANT

## 2019-02-27 PROCEDURE — 85025 COMPLETE CBC W/AUTO DIFF WBC: CPT | Performed by: PHYSICIAN ASSISTANT

## 2019-02-27 PROCEDURE — 36415 COLL VENOUS BLD VENIPUNCTURE: CPT | Performed by: PHYSICIAN ASSISTANT

## 2019-04-22 ENCOUNTER — APPOINTMENT (OUTPATIENT)
Dept: LAB | Age: 39
End: 2019-04-22
Payer: COMMERCIAL

## 2019-04-30 ENCOUNTER — OFFICE VISIT (OUTPATIENT)
Dept: NEUROLOGY | Facility: CLINIC | Age: 39
End: 2019-04-30
Payer: COMMERCIAL

## 2019-04-30 VITALS
BODY MASS INDEX: 31.62 KG/M2 | DIASTOLIC BLOOD PRESSURE: 56 MMHG | SYSTOLIC BLOOD PRESSURE: 112 MMHG | HEART RATE: 78 BPM | RESPIRATION RATE: 16 BRPM | WEIGHT: 190 LBS

## 2019-04-30 DIAGNOSIS — G35 MULTIPLE SCLEROSIS (HCC): Primary | ICD-10-CM

## 2019-04-30 PROBLEM — Z01.419 WELL WOMAN EXAM: Status: RESOLVED | Noted: 2019-01-09 | Resolved: 2019-04-30

## 2019-04-30 PROBLEM — Z30.09 CONSULTATION FOR FEMALE STERILIZATION: Status: RESOLVED | Noted: 2018-09-27 | Resolved: 2019-04-30

## 2019-04-30 PROCEDURE — 99214 OFFICE O/P EST MOD 30 MIN: CPT | Performed by: PHYSICIAN ASSISTANT

## 2019-04-30 RX ORDER — DIMETHYL FUMARATE 240 MG/1
CAPSULE ORAL
COMMUNITY
Start: 2019-02-18 | End: 2019-12-04 | Stop reason: SDUPTHER

## 2019-08-01 ENCOUNTER — HOSPITAL ENCOUNTER (OUTPATIENT)
Dept: MRI IMAGING | Facility: HOSPITAL | Age: 39
Discharge: HOME | End: 2019-08-01
Payer: COMMERCIAL

## 2019-08-01 DIAGNOSIS — G35 MULTIPLE SCLEROSIS (HCC): ICD-10-CM

## 2019-08-01 PROCEDURE — 70553 MRI BRAIN STEM W/O & W/DYE: CPT

## 2019-08-01 PROCEDURE — A9585 GADOBUTROL INJECTION: HCPCS | Performed by: PHYSICIAN ASSISTANT

## 2019-08-01 RX ADMIN — GADOBUTROL 8 ML: 604.72 INJECTION INTRAVENOUS at 10:04

## 2019-08-03 ENCOUNTER — OFFICE VISIT (OUTPATIENT)
Dept: URGENT CARE | Age: 39
End: 2019-08-03
Payer: COMMERCIAL

## 2019-08-03 VITALS
HEIGHT: 65 IN | SYSTOLIC BLOOD PRESSURE: 111 MMHG | HEART RATE: 69 BPM | RESPIRATION RATE: 20 BRPM | TEMPERATURE: 97.8 F | BODY MASS INDEX: 31.69 KG/M2 | WEIGHT: 190.2 LBS | OXYGEN SATURATION: 97 % | DIASTOLIC BLOOD PRESSURE: 66 MMHG

## 2019-08-03 DIAGNOSIS — B35.4 TINEA CORPORIS: ICD-10-CM

## 2019-08-03 DIAGNOSIS — S39.012A STRAIN OF LUMBAR REGION, INITIAL ENCOUNTER: Primary | ICD-10-CM

## 2019-08-03 PROCEDURE — 99283 EMERGENCY DEPT VISIT LOW MDM: CPT | Performed by: FAMILY MEDICINE

## 2019-08-03 PROCEDURE — G0382 LEV 3 HOSP TYPE B ED VISIT: HCPCS | Performed by: FAMILY MEDICINE

## 2019-08-03 PROCEDURE — 99203 OFFICE O/P NEW LOW 30 MIN: CPT | Performed by: FAMILY MEDICINE

## 2019-08-03 RX ORDER — CYCLOBENZAPRINE HCL 10 MG
10 TABLET ORAL 3 TIMES DAILY PRN
Qty: 30 TABLET | Refills: 0 | Status: SHIPPED | OUTPATIENT
Start: 2019-08-03 | End: 2020-04-13 | Stop reason: ALTCHOICE

## 2019-08-03 RX ORDER — KETOCONAZOLE 20 MG/G
CREAM TOPICAL 2 TIMES DAILY
Qty: 15 G | Refills: 0 | Status: SHIPPED | OUTPATIENT
Start: 2019-08-03 | End: 2020-04-13 | Stop reason: ALTCHOICE

## 2019-08-03 NOTE — PATIENT INSTRUCTIONS
I prescribed cyclobenzaprine 10 mg q 8 hours  Patient warned about side effects  Advised to apply heating pad to affected area  I prescribed ketoconazole 2% cream to be applied twice a day to affected rash for up to 2 weeks  Follow-up per primary care provider if back or rash worsens    Acute Low Back Pain, Ambulatory Care   GENERAL INFORMATION:   Acute low back pain  is discomfort in your lower back area that lasts for less than 12 weeks  The word acute is used to describe pain that starts suddenly, worsens quickly, and lasts for a short time  Common symptoms include the following:   · Back stiffness or spasms    · Pain down the back or side of one leg    · Holding yourself in an unusual position or posture to decrease your back pain    · Not being able to find a sitting position that is comfortable    · Slow increase in your pain for 24 to 48 hours after you stress your back    · Tenderness on your lower back or severe pain when you move your back  Seek immediate care for the following symptoms:   · Severe pain    · Sudden stiffness and heaviness in both buttocks down to both legs    · Numbness or weakness in one leg, or pain in both legs    · Numbness in your genital area or across your lower back    · Unable to control your urine or bowel movements  Treatment for acute low back pain  may include any of the following:  · Medicines:      ¨ NSAIDs  help decrease swelling and pain or fever  This medicine is available with or without a doctor's order  NSAIDs can cause stomach bleeding or kidney problems in certain people  If you take blood thinner medicine, always ask your healthcare provider if NSAIDs are safe for you  Always read the medicine label and follow directions  ¨ Muscle relaxers  help decrease muscle spasms pain  ¨ Prescription pain medicine  may be given  Ask how to take this medicine safely  · Surgery  may be needed if your pain is severe and other treatments do not work   Surgery may be needed for conditions of the lumbar spine, such as herniated disc or spinal stenosis  Manage your symptoms:   · Sleep on a firm mattress  If you do not have a firm mattress, have someone move your mattress to the floor for a few days  A piece of plywood under your mattress can also help make it firmer  · Apply ice  on your lower back for 15 to 20 minutes every hour or as directed  Use an ice pack, or put crushed ice in a plastic bag  Cover it with a towel  Ice helps prevent tissue damage and decreases swelling and pain  You can alternate ice and heat  · Apply heat  on your lower back for 20 to 30 minutes every 2 hours for as many days as directed  Heat helps decrease pain and muscle spasms  · Go to physical therapy  A physical therapist teaches you exercises to help improve movement and strength, and to decrease pain  Prevent acute low back pain:   · Use proper body mechanics  ¨ Bend at the hips and knees when you  objects  Do not bend from the waist  Use your leg muscles as you lift the load  Do not use your back  Keep the object close to your chest as you lift it  Try not to twist or lift anything above your waist     ¨ Change your position often when you stand for long periods of time  Rest one foot on a small box or footrest, and then switch to the other foot often  ¨ Try not to sit for long periods of time  When you do, sit in a straight-backed chair with your feet flat on the floor  Never reach, pull, or push while you are sitting  · Exercise regularly  Warm up before you exercise  Do exercises that strengthen your back muscles  Ask about the best exercise plan for you  · Maintain a healthy weight  Ask your healthcare provider how much you should weigh  Ask him to help you create a weight loss plan if you are overweight  Follow up with your healthcare provider as directed:  Return for a follow-up visit if you still have pain after 1 to 3 weeks of treatment   You may need to visit an orthopedist if your back pain lasts more than 6 to 12 weeks  Write down your questions so you remember to ask them during your visits  CARE AGREEMENT:   You have the right to help plan your care  Learn about your health condition and how it may be treated  Discuss treatment options with your caregivers to decide what care you want to receive  You always have the right to refuse treatment  The above information is an  only  It is not intended as medical advice for individual conditions or treatments  Talk to your doctor, nurse or pharmacist before following any medical regimen to see if it is safe and effective for you  © 2014 [address] is for End User's use only and may not be sold, redistributed or otherwise used for commercial purposes  All illustrations and images included in CareNotes® are the copyrighted property of [employer]  or Jethro Hamilton  Tinea Corporis   WHAT YOU NEED TO KNOW:   Tinea corporis, or ringworm, is a skin infection caused by a fungus  It usually affects the skin on your face, chest, or limbs  Tinea corporis is most common in children and athletes  DISCHARGE INSTRUCTIONS:   Contact your healthcare provider if:   · You have a fever  · Your rash continues to spread after 7 days of treatment  · Your rash is not gone in 2 weeks  · The area around your sore becomes red, warm, tender, swollen, or smells bad  · You have questions or concerns about your condition or care  Medicines:   · Antifungal medicine  may be given as a cream or pill  Take the medicine until it is gone, even if it looks like your infection is gone sooner  · Take your medicine as directed  Contact your healthcare provider if you think your medicine is not helping or if you have side effects  Tell him of her if you are allergic to any medicine  Keep a list of the medicines, vitamins, and herbs you take   Include the amounts, and when and why you take them  Bring the list or the pill bottles to follow-up visits  Carry your medicine list with you in case of an emergency  Follow up with your healthcare provider as directed:  Write down your questions so you remember to ask them during your visits  Prevent the spread of tinea corporis:   · Wash all items that come into contact with infected skin  Wash all towels, clothes, and bedding in hot water  Use laundry soap  Clean shower stalls, mats, and floors with a germ-killing or fungus-killing   · Do not share personal items  Do not share towels, brushes, akers, or hair accessories  · Keep your skin, hair, and nails clean and dry  Bathe every day, and dry your skin before you put medicine on the infected area  Wash your hands often  Do not scratch your sores  This may cause the infection to spread  · Do not participate in contact sports , such as wrestling, for 72 hours after starting treatment  Talk to your healthcare provider before you participate in contact sports  · Have infected pets treated by a   A patch of missing fur is a sign of infection in a pet  Wear gloves and long sleeves if you handle an infected animal  Always wash your hands after handling the animal  Vacuum your home to remove infected fur or skin flakes  Disinfect surfaces and bedding that your animal comes into contact with  © 2017 [address] Information is for End User's use only and may not be sold, redistributed or otherwise used for commercial purposes  All illustrations and images included in CareNotes® are the copyrighted property of [employer]  or Jethro Hamilton  The above information is an  only  It is not intended as medical advice for individual conditions or treatments  Talk to your doctor, nurse or pharmacist before following any medical regimen to see if it is safe and effective for you

## 2019-08-03 NOTE — PROGRESS NOTES
[facility]        NAME: Fritz Thomas is a 46 y o  female  : 1980    MRN: [id number]  DATE: August 3, 2019  TIME: 11:13 AM    Assessment and Plan   Strain of lumbar region, initial encounter [S39 012A]  1  Strain of lumbar region, initial encounter  cyclobenzaprine (FLEXERIL) 10 mg tablet   2  Tinea corporis  ketoconazole (NIZORAL) 2 % cream         Patient Instructions       Follow up with PCP in 3-5 days  Proceed to  ER if symptoms worsen  Chief Complaint     Chief Complaint   Patient presents with    Back Pain     Pt states she was balancing daughter on her right hip this morning when she bent down to  bottle and felt pain in her left lower back radiating into buttock  Took Tylenol at 7am       Recurrent Skin Infections     Pt reports four week hx of ringworm which improved and in past two to three day returned  Has been applying antifungal cream to right lower leg  History of Present Illness       56-year-old female complaining of acute low back pain that began today  Patient went to lift something off the ground while holding her baby on her left hip and felt severe pain left lower lumbar area  Describes stiffness  She took Tylenol her last dose was 7:00 a m  This morning  She has a known history of herniated disc L5-S1  Denies numbness weakness of the lower extremity  Symptoms seem to be aggravated with sitting or bending feels better when standing  She is also here today because of rash of her right lower leg  She was describes being treated for ringworm using over-the-counter Lotrisone cream twice a day  However rash S in last 2-3 days  It is itchy  Review of Systems   Review of Systems   Constitutional: Negative  Musculoskeletal: Positive for arthralgias and back pain  Skin: Positive for rash  Negative for color change           Current Medications       Current Outpatient Medications:     cyclobenzaprine (FLEXERIL) 10 mg tablet, Take 1 tablet (10 mg total) by mouth 3 (three) times a day as needed for muscle spasms, Disp: 30 tablet, Rfl: 0    ketoconazole (NIZORAL) 2 % cream, Apply topically 2 (two) times a day for 14 days, Disp: 15 g, Rfl: 0    TECFIDERA 240 MG CPDR, , Disp: , Rfl:     Current Allergies     Allergies as of 08/03/2019    (No Known Allergies)            The following portions of the patient's history were reviewed and updated as appropriate: allergies, current medications, past family history, past medical history, past social history, past surgical history and problem list      Past Medical History:   Diagnosis Date    Abnormal Pap smear of cervix     Depression     Last assessed: 8/1/12    History of chlamydia infection     History of gonorrhea     MS [facility]multiple sclerosis) ([facility] [address] )     Ovarian cyst     Varicella     HX DISEASE       Past Surgical History:   Procedure Laterality Date    KNEE ARTHROSCOPY      [place] LAP,TUBAL CAUTERY Bilateral 10/5/2018    Procedure: LIGATION TUBAL LAPAROSCOPIC - FALOPE RINGS;  Surgeon: Dre Lemos MD;  [facility]: [facility];  Service: Gynecology       Family History   Problem Relation Age of Onset    Hypertension Mother     Hypertension Father     Macular degeneration Father     Glaucoma Family     Macular degeneration Family     Hypertension Family     Anuerysm Sister     Migraines Sister     No Known Problems Daughter     Asthma Son     ADD / ADHD Son     Allergies Son          Medications have been verified  Objective   /66   Pulse 69   Temp 97 8 °F (36 6 °C)   Resp 20   Ht 5' 5" (1 651 m)   Wt 86 3 kg (190 lb 3 2 oz)   LMP 12/31/2018 (Exact Date)   SpO2 97%   BMI 31 65 kg/m²        Physical Exam     Physical Exam   Constitutional: She appears well-developed  Musculoskeletal: Normal range of motion  She exhibits tenderness  LS spine:  Flexion to 30° and, extension to 20°, lateral rotation side bending 30°    There is tenderness of the right and left paraspinal area and right and left sacroiliac joint  Extremities:  Lower-good strength and tone, 5/5  Skin:   Right lower leg reveals erythematous rash which is slightly elevated borders  Measuring 3 cm  Findings consistent with tinea corporis  Vitals reviewed

## 2019-08-24 ENCOUNTER — APPOINTMENT (OUTPATIENT)
Dept: LAB | Age: 39
End: 2019-08-24
Payer: COMMERCIAL

## 2019-08-24 ENCOUNTER — OFFICE VISIT (OUTPATIENT)
Dept: URGENT CARE | Age: 39
End: 2019-08-24
Payer: COMMERCIAL

## 2019-08-24 VITALS
WEIGHT: 188 LBS | RESPIRATION RATE: 18 BRPM | HEIGHT: 65 IN | SYSTOLIC BLOOD PRESSURE: 117 MMHG | TEMPERATURE: 96.9 F | DIASTOLIC BLOOD PRESSURE: 62 MMHG | HEART RATE: 86 BPM | OXYGEN SATURATION: 98 % | BODY MASS INDEX: 31.32 KG/M2

## 2019-08-24 DIAGNOSIS — S39.012D STRAIN OF LUMBAR REGION, SUBSEQUENT ENCOUNTER: Primary | ICD-10-CM

## 2019-08-24 DIAGNOSIS — G35 MULTIPLE SCLEROSIS (HCC): ICD-10-CM

## 2019-08-24 PROCEDURE — G0382 LEV 3 HOSP TYPE B ED VISIT: HCPCS | Performed by: FAMILY MEDICINE

## 2019-08-24 PROCEDURE — 99283 EMERGENCY DEPT VISIT LOW MDM: CPT | Performed by: FAMILY MEDICINE

## 2019-08-24 PROCEDURE — 99213 OFFICE O/P EST LOW 20 MIN: CPT | Performed by: FAMILY MEDICINE

## 2019-08-24 RX ORDER — BACLOFEN 10 MG/1
10 TABLET ORAL 3 TIMES DAILY
Qty: 30 TABLET | Refills: 0 | Status: SHIPPED | OUTPATIENT
Start: 2019-08-24 | End: 2020-04-13 | Stop reason: ALTCHOICE

## 2019-08-24 RX ORDER — IBUPROFEN 600 MG/1
600 TABLET ORAL EVERY 6 HOURS PRN
Refills: 0 | COMMUNITY
Start: 2019-08-07 | End: 2021-03-18 | Stop reason: ALTCHOICE

## 2019-08-24 RX ORDER — NAPROXEN 500 MG/1
500 TABLET ORAL 2 TIMES DAILY WITH MEALS
Qty: 30 TABLET | Refills: 0 | Status: SHIPPED | OUTPATIENT
Start: 2019-08-24 | End: 2020-04-13 | Stop reason: ALTCHOICE

## 2019-08-24 NOTE — PATIENT INSTRUCTIONS
Heat to the low back for 20-30 minutes, 3 to 4 times a day, then stretch out the low back  Follow up with PCP in 3-5 days  Proceed to  ER if symptoms worsen  Lower Back Exercises   WHAT YOU NEED TO KNOW:   Lower back exercises help heal and strengthen your back muscles to prevent another injury  Ask your healthcare provider if you need to see a physical therapist for more advanced exercises  DISCHARGE INSTRUCTIONS:   Return to the emergency department if:   · You have severe pain that prevents you from moving  Contact your healthcare provider if:   · Your pain becomes worse  · You have new pain  · You have questions or concerns about your condition or care  Do lower back exercises safely:   · Do the exercises on a mat or firm surface  (not on a bed) to support your spine and prevent low back pain  · Move slowly and smoothly  Avoid fast or jerky motions  · Breathe normally  Do not hold your breath  · Stop if you feel pain  It is normal to feel some discomfort at first  Regular exercise will help decrease your discomfort over time  Lower back exercises: Your healthcare provider may recommend that you do back exercises 10 to 30 minutes each day  He may also recommend that you do exercises 1 to 3 times each day  Ask your healthcare provider which exercises are best for you and how often to do them  · Ankle pumps:  Lie on your back  Move your foot up (with your toes pointing toward your head)  Then, move your foot down (with your toes pointing away from you)  Repeat this exercise 10 times on each side  · Heel slides:  Lie on your back  Slowly bend one leg and then straighten it  Next, bend the other leg and then straighten it  Repeat 10 times on each side  · Pelvic tilt:  Lie on your back with your knees bent and feet flat on the floor  Place your arms in a relaxed position beside your body   Tighten the muscles of your abdomen and flatten your back against the floor  Hold for 5 seconds  Repeat 5 times  · Back stretch:  Lie on your back with your hands behind your head  Bend your knees and turn the lower half of your body to one side  Hold this position for 10 seconds  Repeat 3 times on each side  · Straight leg raises:  Lie on your back with one leg straight  Bend the other knee  Tighten your abdomen and then slowly lift the straight leg up about 6 to 12 inches off the floor  Hold for 1 to 5 seconds  Lower your leg slowly  Repeat 10 times on each leg  · Knee-to-chest:  Lie on your back with your knees bent and feet flat on the floor  Pull one of your knees toward your chest and hold it there for 5 seconds  Return your leg to the starting position  Lift the other knee toward your chest and hold for 5 seconds  Do this 5 times on each side  · Cat and camel:  Place your hands and knees on the floor  Arch your back upward toward the ceiling and lower your head  Round out your spine as much as you can  Hold for 5 seconds  Lift your head upward and push your chest downward toward the floor  Hold for 5 seconds  Do 3 sets or as directed  · Wall squats:  Stand with your back against a wall  Tighten the muscles of your abdomen  Slowly lower your body until your knees are bent at a 45 degree angle  Hold this position for 5 seconds  Slowly move back up to a standing position  Repeat 10 times  · Curl up:  Lie on your back with your knees bent and feet flat on the floor  Place your hands, palms down, underneath the curve in your lower back  Next, with your elbows on the floor, lift your shoulders and chest 2 to 3 inches  Keep your head in line with your shoulders  Hold this position for 5 seconds  When you can do this exercise without pain for 10 to 15 seconds, you may add a rotation  While your shoulders and chest are lifted off the ground, turn slightly to the left and hold  Repeat on the other side             · Bird dog: Place your hands and knees on the floor  Keep your wrists directly below your shoulders and your knees directly below your hips  Pull your belly button in toward your spine  Do not flatten or arch your back  Tighten your abdominal muscles  Raise one arm straight out so that it is aligned with your head  Next, raise the leg opposite your arm  Hold this position for 15 seconds  Lower your arm and leg slowly and change sides  Do 5 sets  © 2017 [address]  Information is for End User's use only and may not be sold, redistributed or otherwise used for commercial purposes  All illustrations and images included in CareNotes® are the copyrighted property of [employer]  or Jethro Hamilton  The above information is an  only  It is not intended as medical advice for individual conditions or treatments  Talk to your doctor, nurse or pharmacist before following any medical regimen to see if it is safe and effective for you

## 2019-08-24 NOTE — PROGRESS NOTES
[facility]        NAME: Rickey Olivo is a 46 y o  female  : 1980    MRN: [id number]  DATE: 2019  TIME: 9:00 AM    Assessment and Plan   Strain of lumbar region, subsequent encounter [S39 012D]  1  Strain of lumbar region, subsequent encounter  naproxen (NAPROSYN) 500 mg tablet    baclofen 10 mg tablet         Patient Instructions     Heat to the low back for 20-30 minutes, 3 to 4 times a day, then stretch out the low back  Follow up with PCP in 3-5 days  Proceed to  ER if symptoms worsen  Chief Complaint     Chief Complaint   Patient presents with    Hip Pain     for about 3 week; seen here previously for back pain         History of Present Illness       Hip Pain (for about 3 week; seen here previously for back pain)  Not much improvement with over-the-counter ibuprofen and Flexeril  Patient has not followed up with her PCP  Hip Pain          Review of Systems   Review of Systems   Constitutional: Negative  Respiratory: Negative  Cardiovascular: Negative  Musculoskeletal: Positive for back pain           Current Medications       Current Outpatient Medications:     cyclobenzaprine (FLEXERIL) 10 mg tablet, Take 1 tablet (10 mg total) by mouth 3 (three) times a day as needed for muscle spasms, Disp: 30 tablet, Rfl: 0    ibuprofen (MOTRIN) 600 mg tablet, Take 600 mg by mouth every 6 (six) hours as needed, Disp: , Rfl: 0    TECFIDERA 240 MG CPDR, , Disp: , Rfl:     baclofen 10 mg tablet, Take 1 tablet (10 mg total) by mouth 3 (three) times a day for 10 days, Disp: 30 tablet, Rfl: 0    ketoconazole (NIZORAL) 2 % cream, Apply topically 2 (two) times a day for 14 days, Disp: 15 g, Rfl: 0    naproxen (NAPROSYN) 500 mg tablet, Take 1 tablet (500 mg total) by mouth 2 (two) times a day with meals, Disp: 30 tablet, Rfl: 0    Current Allergies     Allergies as of 2019    (No Known Allergies)            The following portions of the patient's history were reviewed and updated as appropriate: allergies, current medications, past family history, past medical history, past social history, past surgical history and problem list      Past Medical History:   Diagnosis Date    Abnormal Pap smear of cervix     Depression     Last assessed: 8/1/12    History of chlamydia infection     History of gonorrhea     MS [facility] sclerosis) ([facility] [address] [facility]     Ovarian cyst     Varicella     HX DISEASE       Past Surgical History:   Procedure Laterality Date    KNEE ARTHROSCOPY      [place] LAP,TUBAL CAUTERY Bilateral 10/5/2018    Procedure: LIGATION TUBAL LAPAROSCOPIC - Lisa Puente;  Surgeon: Veda Stout MD;  [facility];  Service: Gynecology       Family History   Problem Relation Age of Onset    Hypertension Mother     Hypertension Father     Macular degeneration Father     Glaucoma Family     Macular degeneration Family     Hypertension Family     Anuerysm Sister     Migraines Sister     No Known Problems Daughter     Asthma Son     ADD / ADHD Son     Allergies Son          Medications have been verified  Objective   /62   Pulse 86   Temp (!) 96 9 °F (36 1 °C)   Resp 18   Ht 5' 5" (1 651 m)   Wt 85 3 kg (188 lb)   LMP 08/01/2019   SpO2 98%   BMI 31 28 kg/m²        Physical Exam     Physical Exam   Constitutional: She appears well-developed and well-nourished  Cardiovascular: Normal rate and regular rhythm  Pulmonary/Chest: Effort normal and breath sounds normal    Musculoskeletal:        Lumbar back: She exhibits decreased range of motion, tenderness and pain          Back:

## 2019-08-26 ENCOUNTER — OFFICE VISIT (OUTPATIENT)
Dept: NEUROLOGY | Facility: CLINIC | Age: 39
End: 2019-08-26
Payer: COMMERCIAL

## 2019-08-26 VITALS
BODY MASS INDEX: 31.19 KG/M2 | DIASTOLIC BLOOD PRESSURE: 62 MMHG | HEART RATE: 83 BPM | SYSTOLIC BLOOD PRESSURE: 132 MMHG | HEIGHT: 65 IN | WEIGHT: 187.2 LBS

## 2019-08-26 DIAGNOSIS — G35 MULTIPLE SCLEROSIS (HCC): ICD-10-CM

## 2019-08-26 DIAGNOSIS — M54.16 RADICULOPATHY, LUMBAR REGION: Primary | ICD-10-CM

## 2019-08-26 PROCEDURE — 99215 OFFICE O/P EST HI 40 MIN: CPT | Performed by: PSYCHIATRY & NEUROLOGY

## 2019-08-26 NOTE — ASSESSMENT & PLAN NOTE
Pt notes new sxs today after bending over, pt felt a pulling sensation in her back and now with pain from left side back into left buttock and down the left leg with numbness of the 4th and 5th digits and ball of foot  Pt has had sxs similar in past  Last mri lumbar was from 2016 with known disc diseaseL5-S1:  Persistent mild degenerative disc and facet disease and tiny thin broad left central disc protrusion  The disc protrusion remains contiguous with the left S1 nerve root without evident nerve root displacement, compression or edema    The appearance is stable  This imaging is now over 3 yrs ago  Pt needs updated study in light of worsening sxs  Pt also to have emg of the left lower ext  Also referral to ortho

## 2019-08-26 NOTE — PATIENT INSTRUCTIONS
Multiple sclerosis ([facility] [address] )  Pt here for neuro follow up  Overall ms wise doing well  Pt remains on tecfidera  Pt just had updated mri head on aug 1 and stable comp to 12/ 2018  Pt also just had updated labs and lfts ok and cbc diff good with abs lymph of 1 51  Pt to cont with tecifera  Repeat labs every other month    Radiculopathy, lumbar region  Pt notes new sxs today after bending over, pt felt a pulling sensation in her back and now with pain from left side back into left buttock and down the left leg with numbness of the 4th and 5th digits and ball of foot  Pt has had sxs similar in past  Last mri lumbar was from 2016 with known disc diseaseL5-S1:  Persistent mild degenerative disc and facet disease and tiny thin broad left central disc protrusion  The disc protrusion remains contiguous with the left S1 nerve root without evident nerve root displacement, compression or edema    The appearance is stable  This imaging is now over 3 yrs ago  Pt needs updated study in light of worsening sxs  Pt also to have emg of the left lower ext  Also referral to ortho

## 2019-08-26 NOTE — PROGRESS NOTES
Patient ID: Ángel Urena is a 46 y o  female  Assessment/Plan:    Multiple sclerosis ([address] )  Pt here for neuro follow up  Overall ms wise doing well  Pt remains on tecfidera  Pt just had updated mri head on aug 1 and stable comp to 12/ 2018  Pt also just had updated labs and lfts ok and cbc diff good with abs lymph of 1 51  Pt to cont with tecifera  Repeat labs every other month    Radiculopathy, lumbar region  Pt notes new sxs today after bending over, pt felt a pulling sensation in her back and now with pain from left side back into left buttock and down the left leg with numbness of the 4th and 5th digits and ball of foot  Pt has had sxs similar in past  Last mri lumbar was from 2016 with known disc diseaseL5-S1:  Persistent mild degenerative disc and facet disease and tiny thin broad left central disc protrusion  The disc protrusion remains contiguous with the left S1 nerve root without evident nerve root displacement, compression or edema  The appearance is stable  This imaging is now over 3 yrs ago  Pt needs updated study in light of worsening sxs  Pt also to have emg of the left lower ext  Also referral to ortho       Diagnoses and all orders for this visit:    Radiculopathy, lumbar region           Subjective:    HPI    Pt is a 45 yo f with pmh of ms  Pt here with her 2 children today  pt last seen on 4/30/19 by diana storey  Per diana last note "patient notes symptoms began in January 2015 when she had a work injury to the left shoulder  In March 2015, she had left sided numbness going down her torso, down into her feet and legs  She also experienced urinary leakage without Valsalva  Patient then experienced right eye pain and fogginess of vision  She was seen by Dr James Das and had an OCT done  It revealed normal findings on the right but a sector defect on the left  MRI of the brain from May 13, 2015 revealed a few scattered nonspecific supratentorial WM lesions   No acute infarction, or hemorrhage or enh  MRI of the lumbar spine without contrast revealed small L5/S1 disc herniation, MRI of the T-spine revealed evidence of solitary 8mm lesion to the left of midline at T8/9 in the dorsal Cord  No pathological enh  MRI of the C-spine also done on June 13, 2015 revealed a solitary 5 mm focus of active demyelination in the right lateral cord at C2  Patient was given 3 days of IV  Patient with significant GI upset with the steroids and also some mild change in mood with increased anxiety  She was NMO negative and Lyme negative  She did not have an LP  Patient was started on Copaxone as her initial IMD med in 2015  She had a brief interval of being off med due to insurance  However, due to site reactions, patient stopped med and started Tecfidera in May 2016  Patient was subsequently told to stop her Tecifidera due to unplanned pregnancy in Nov 2017  Patient delivered her daughter on 5/20/32 without complication  She also had a tubal ligation  she restarted Tecfidera in January 2019 after stopping breastfeeding "  Kept above paragraph due to complexities of pt case  Pt last seen in April  Overall pt doing well from ms standpoint  No recent infections  No recent hospitalizations  Pt remains on tecfidera and [facility] med very well  No gi issues  Pt just had updated mri head on 8/1/19 and mri head stable comp to dec 2018  Pt had updated labs and wbc 4 99, abs lymphs 1 51 and gfr 119  Labs done on 8/24/19  Pt presently denies any new sxs  No loc  No sz  No falls or trip  No change in vision  No loss of vision  No diplopia  No change in bowel or bladder  Pt notes she did have dental issues recently with an old tooth that had been capped  Cap issue and pt needed root canal   However tooth cracked and pt needed tooth pulled earlier this month  Pt was put on abx for possible infection as well which she has completed    Pts biggest issue presently is left sided buttock pain and pain down the left leg   This is new over the past 2 weeks  Pt notes she even went to er due to her pain  Pt notes pain cross the back and into left buttock and down into the 4th and 5th toes which are numb  Pt notes some back issues in the past after a work releated injury  Currently pt just bent over to  something and felt the pain and pulling immediately  Pt had last mri lumbar spine in 2016 with known left L5/S1 radiculaopathy at that time  The disc protrusion remains contiguous with the left S1 nerve root without evident nerve root displacement, compression or edema  Per mri, The disc protrusion remains contiguous with the left S1 nerve root without evident nerve root displacement, compression or edema  pt needs updated imaging in light of her pain and her numbness on her foot  Neither the pain or the numbness appears related to her MS  No change in bowel or bladder  Total time spent today 40  minutes  Greater than 50% of total time was spent with the patient and / or family counseling and / or coordination of care     The following portions of the patient's history were reviewed and updated as appropriate: allergies, current medications, past family history, past medical history, past social history, past surgical history and problem list and ros rev  Objective:    Blood pressure 132/62, pulse 83, height 5' 5" (1 651 m), weight 84 9 kg (187 lb 3 2 oz), last menstrual period 08/01/2019, unknown if currently breastfeeding  Physical Exam   Constitutional: She appears well-developed and well-nourished  Eyes: Pupils are equal, round, and reactive to light  Lids are normal    Cardiovascular: Intact distal pulses  Neurological: She has normal strength and normal reflexes  Coordination normal    Psychiatric: Her speech is normal        Neurological Exam  Mental Status  Awake, alert and oriented to person, place and time  Recent and remote memory are intact   Speech is normal  Language is fluent with no aphasia  Attention and concentration are normal  Fund of knowledge is appropriate for level of education  Cranial Nerves  CN II: Visual acuity is normal  Visual fields full to confrontation  Right funduscopic exam: disc intact  Left funduscopic exam: disc intact  CN III, IV, VI: Extraocular movements intact bilaterally  Normal lids and orbits bilaterally  Pupils equal round and reactive to light bilaterally  CN V: Facial sensation is normal   CN VII: Full and symmetric facial movement  CN VIII: Hearing is normal   CN IX, X: Palate elevates symmetrically  Normal gag reflex  CN XI: Shoulder shrug strength is normal   CN XII: Tongue midline without atrophy or fasciculations  Motor  Normal muscle bulk throughout  Normal muscle tone  No abnormal involuntary movements  Strength is 5/5 throughout all four extremities  Sensory  Sensation is intact to light touch, pinprick, vibration and proprioception in all four extremities  Reflexes  Deep tendon reflexes are 2+ and symmetric in all four extremities with downgoing toes bilaterally  Coordination  Finger-to-nose, rapid alternating movements and heel-to-shin normal bilaterally without dysmetria  Gait  Casual gait is normal including stance, stride, and arm swing  ROS:    Review of Systems   Constitutional: Negative  Negative for appetite change and fever  HENT: Negative  Negative for hearing loss, tinnitus, trouble swallowing and voice change  Eyes: Negative  Negative for photophobia and pain  Respiratory: Negative  Negative for shortness of breath  Cardiovascular: Negative  Negative for palpitations  Gastrointestinal: Negative  Negative for nausea and vomiting  Endocrine: Negative  Negative for cold intolerance and heat intolerance  Genitourinary: Positive for urgency  Negative for dysuria and frequency  Musculoskeletal: Positive for back pain, gait problem and neck pain  Negative for myalgias  Skin: Negative    Negative for rash  Neurological: Positive for tremors, speech difficulty and numbness (bottom of L foot and toes)  Negative for dizziness, seizures, syncope, facial asymmetry, weakness, light-headedness and headaches  Memory problems  Tingling   Hematological: Negative  Does not bruise/bleed easily  Psychiatric/Behavioral: Positive for confusion  Negative for hallucinations and sleep disturbance

## 2019-10-21 ENCOUNTER — OFFICE VISIT (OUTPATIENT)
Dept: OBGYN CLINIC | Facility: HOSPITAL | Age: 39
End: 2019-10-21
Payer: COMMERCIAL

## 2019-10-21 ENCOUNTER — HOSPITAL ENCOUNTER (OUTPATIENT)
Dept: RADIOLOGY | Facility: HOSPITAL | Age: 39
Discharge: HOME | End: 2019-10-21
Attending: ORTHOPAEDIC SURGERY
Payer: COMMERCIAL

## 2019-10-21 VITALS
SYSTOLIC BLOOD PRESSURE: 134 MMHG | WEIGHT: 190 LBS | DIASTOLIC BLOOD PRESSURE: 78 MMHG | HEART RATE: 108 BPM | BODY MASS INDEX: 31.65 KG/M2 | HEIGHT: 65 IN

## 2019-10-21 DIAGNOSIS — M54.16 RADICULOPATHY, LUMBAR REGION: ICD-10-CM

## 2019-10-21 DIAGNOSIS — G89.29 CHRONIC BILATERAL LOW BACK PAIN WITH BILATERAL SCIATICA: Primary | ICD-10-CM

## 2019-10-21 DIAGNOSIS — M54.42 CHRONIC BILATERAL LOW BACK PAIN WITH BILATERAL SCIATICA: Primary | ICD-10-CM

## 2019-10-21 DIAGNOSIS — M54.41 CHRONIC BILATERAL LOW BACK PAIN WITH BILATERAL SCIATICA: Primary | ICD-10-CM

## 2019-10-21 PROCEDURE — 99203 OFFICE O/P NEW LOW 30 MIN: CPT | Performed by: ORTHOPAEDIC SURGERY

## 2019-10-21 PROCEDURE — 72110 X-RAY EXAM L-2 SPINE 4/>VWS: CPT

## 2019-10-21 RX ORDER — METHYLPREDNISOLONE 4 MG/1
TABLET ORAL
Qty: 21 TABLET | Refills: 0 | Status: SHIPPED | OUTPATIENT
Start: 2019-10-21 | End: 2020-04-13 | Stop reason: ALTCHOICE

## 2019-10-21 NOTE — PROGRESS NOTES
Assessment/Plan:    Radiculopathy, lumbar region  Patient presents for evaluation of chronic and worsening lower back pain as well as bilateral lower extremity pain left side worse than right  She reports pain numbness and tingling to her left foot which is also progressively worsening  She does have a history of multiple sclerosis  She was referred to us due to history of lumbar DDD  On physical exam she has a positive straight leg raise left lower extremity  She is tender to palpation across the lumbosacral spine  She has good strength in the L2-S1 distributions bilaterally  Sensation is grossly intact to light touch  X-ray lumbar spine is reviewed and this demonstrates disc space narrowing with foraminal stenosis at L5-S1  There is overall preservation of lordosis  Assessment and plan  Lower back and lumbar radicular symptoms in the setting of DDD and foraminal stenosis at L5-S1  She is already scheduled to obtain an MRI of the lumbar spine and I think this is reasonable  Medrol Dosepak as prescribed  Start physical therapy  Follow-up after MRI for further recommendations  · Chronic/Acute low back pain with left foot numbness  · Start physical therapy  · Start medrol dose pack  · Follow up after lumbar MRI        Problem List Items Addressed This Visit        Nervous and Auditory    Radiculopathy, lumbar region     Patient presents for evaluation of chronic and worsening lower back pain as well as bilateral lower extremity pain left side worse than right  She reports pain numbness and tingling to her left foot which is also progressively worsening  She does have a history of multiple sclerosis  She was referred to us due to history of lumbar DDD  On physical exam she has a positive straight leg raise left lower extremity  She is tender to palpation across the lumbosacral spine  She has good strength in the L2-S1 distributions bilaterally    Sensation is grossly intact to light touch     X-ray lumbar spine is reviewed and this demonstrates disc space narrowing with foraminal stenosis at L5-S1  There is overall preservation of lordosis  Assessment and plan  Lower back and lumbar radicular symptoms in the setting of DDD and foraminal stenosis at L5-S1  She is already scheduled to obtain an MRI of the lumbar spine and I think this is reasonable  Medrol Dosepak as prescribed  Start physical therapy  Follow-up after MRI for further recommendations  Relevant Medications    methylPREDNISolone 4 MG tablet therapy pack    Other Relevant Orders    XR spine lumbar minimum 4 views non injury    Ambulatory referral to Physical Therapy      Other Visit Diagnoses     Chronic bilateral low back pain with bilateral sciatica    -  Primary    Relevant Medications    methylPREDNISolone 4 MG tablet therapy pack    Other Relevant Orders    Ambulatory referral to Physical Therapy            Subjective:      Patient ID: Rafael Del Toro is a 46 y o  female  HPI   The patient presents for initial evaluation of low back and bilateral buttock pain  She has about 13 year history of low back issues worse since bending to  item 8/2019  She has history of MS and was told her MS has nothing to do with her current symptoms  She is scheduled for EMG and lumbar MRI  Today she complains of central low back/sacrum pain with bilateral buttock and posterolateral hip pain and left plantar foot numbness  Most activity aggravates  Rest alleviates  She does use naproxen with some benefit  She last had physical therapy and lumbar injections in 2006 for similar issues  The following portions of the patient's history were reviewed and updated as appropriate: allergies, current medications, past family history, past medical history, past social history, past surgical history and problem list     Review of Systems   Constitutional: Negative for chills, fever and unexpected weight change     HENT: Negative for hearing loss, nosebleeds and sore throat  Eyes: Negative for pain, redness and visual disturbance  Respiratory: Negative for cough, shortness of breath and wheezing  Cardiovascular: Negative for chest pain, palpitations and leg swelling  Gastrointestinal: Negative for abdominal pain, nausea and vomiting  Genitourinary: Negative for dyspareunia, dysuria and frequency  Skin: Negative for rash and wound  Neurological: Negative for dizziness, numbness and headaches  Psychiatric/Behavioral: Negative for decreased concentration and suicidal ideas  The patient is not nervous/anxious  Objective:      /78   Pulse (!) 108   Ht 5' 5" (1 651 m)   Wt 86 2 kg (190 lb)   BMI 31 62 kg/m²          Physical Exam   Constitutional: She is oriented to person, place, and time  She appears well-developed and well-nourished  HENT:   Head: Normocephalic  Eyes: Conjunctivae are normal    Neck: Normal range of motion  Cardiovascular: Normal rate  Pulmonary/Chest: Effort normal    Neurological: She is alert and oriented to person, place, and time  Skin: Skin is warm and dry  Patient ambulates without assistance   Tender to palpation over lumbosacral junction  Modified straight leg raise positive left, equivocal right  Strength L2-S1 5/5 bilaterally   Sensation L2-S1 intact bilaterally     Imaging:  Lumbar x-rays:  No osseous abnormalities  Decrease of L5-S1 disc height        Scribe Attestation    I,:   Guerline Bergeron am acting as a scribe while in the presence of the attending physician :        I,:   Rommel Treadwell MD personally performed the services described in this documentation    as scribed in my presence :

## 2019-10-21 NOTE — ASSESSMENT & PLAN NOTE
Patient presents for evaluation of chronic and worsening lower back pain as well as bilateral lower extremity pain left side worse than right  She reports pain numbness and tingling to her left foot which is also progressively worsening  She does have a history of multiple sclerosis  She was referred to us due to history of lumbar DDD  On physical exam she has a positive straight leg raise left lower extremity  She is tender to palpation across the lumbosacral spine  She has good strength in the L2-S1 distributions bilaterally  Sensation is grossly intact to light touch  X-ray lumbar spine is reviewed and this demonstrates disc space narrowing with foraminal stenosis at L5-S1  There is overall preservation of lordosis  Assessment and plan  Lower back and lumbar radicular symptoms in the setting of DDD and foraminal stenosis at L5-S1  She is already scheduled to obtain an MRI of the lumbar spine and I think this is reasonable  Medrol Dosepak as prescribed  Start physical therapy  Follow-up after MRI for further recommendations

## 2019-10-28 ENCOUNTER — EVALUATION (OUTPATIENT)
Dept: PHYSICAL THERAPY | Facility: CLINIC | Age: 39
End: 2019-10-28
Payer: COMMERCIAL

## 2019-10-28 DIAGNOSIS — M54.42 CHRONIC MIDLINE LOW BACK PAIN WITH LEFT-SIDED SCIATICA: Primary | ICD-10-CM

## 2019-10-28 DIAGNOSIS — G89.29 CHRONIC MIDLINE LOW BACK PAIN WITH LEFT-SIDED SCIATICA: Primary | ICD-10-CM

## 2019-10-28 DIAGNOSIS — M54.16 RADICULOPATHY, LUMBAR REGION: ICD-10-CM

## 2019-10-28 PROCEDURE — 97110 THERAPEUTIC EXERCISES: CPT | Performed by: PHYSICAL THERAPIST

## 2019-10-28 PROCEDURE — 97162 PT EVAL MOD COMPLEX 30 MIN: CPT | Performed by: PHYSICAL THERAPIST

## 2019-10-28 NOTE — PROGRESS NOTES
PT Evaluation     Today's date: 10/28/2019  Patient name: Miah Walter  : 1980  MRN: [id number]  Referring provider: Jm Iqbal MD  Dx:   Encounter Diagnosis     ICD-10-CM    1  Chronic midline low back pain with left-sided sciatica M54 42     G89 29    2  Radiculopathy, lumbar region M54 16                   Assessment  Assessment details: Miah Walter is a pleasant 46 y o  female who presents with signs and symptoms correlating with referring diagnosis  No further referral appears necessary at this time based upon examination results  The patient's greatest concerns are not being able to carry her kids without pain or work  She presents with a movement impairment diagnosis of decreased Lateral lumbar flexion to L  This also presents with decreased AROM in other lumbar and hip planes, decreased balance, ROM, positive neural tension tests B, positive SLR B leading to disc involvement diagnosis, and overall decreased tolerance to mechanical assessment except lateral glides to R side, decreasing symptoms  Negative prognostic indicators:chronicity  Positive prognostic indicators: good motivation  Please contact me if you have any further questions or recommendations  Thank you very much for the kind referral       Impairments: abnormal gait, abnormal or restricted ROM, abnormal movement, activity intolerance, impaired balance, impaired physical strength and pain with function    Symptom irritability: highUnderstanding of Dx/Px/POC: good   Prognosis: good    Goals  STGs  1  Decrease pain by 20% in 2-4 weeks  2  Improve lumbar ROM by 10 degrees in 2-4 weeks  3  Improve hip and LLE strength by 1/3 grade in 2-4 weeks  LTGs  1  Decrease pain by 60% in 6-8 weeks  2  Improve walking tolerance to >30 minutes in 6-8 weeks  3  Perform job activities without pain in 6-8 weeks  4  Play with kids w/o pain in 8 weeks         Plan  Patient would benefit from: skilled physical therapy  Referral necessary: No  Planned modality interventions: cryotherapy, TENS and thermotherapy: hydrocollator packs  Planned therapy interventions: manual therapy, therapeutic training, stretching, strengthening, therapeutic activities, therapeutic exercise, patient education, activity modification and neuromuscular re-education  Frequency: 2x week  Duration in weeks: 8  Treatment plan discussed with: patient        Subjective Evaluation    History of Present Illness  Mechanism of injury: Pt is a 46 y o  female presenting w/ pain across the back that has been in a flair up since August when she bent over holding her kid trying to  a bottle  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  Pt states there are no position that relieve the pain and she constantly has pain across the back and occasionally down her L leg in the posterior region  She states she has had PT in the past without relief  She states she is effected by weather and different temperatures increase her pain       Neurological signs: numbness in L foot  Red Flags: disturbed sleep  PMH: MS, DJD          Not a recurrent problem   Quality of life: good    Pain  Current pain ratin  At best pain ratin  At worst pain rating: 10  Location: midline low back   Relieving factors: medications  Aggravating factors: standing, stair climbing, walking, sitting and lifting  Progression: worsening    Social Support  Steps to enter house: yes (5)  Stairs in house: yes (3 flights)   Lives in: multiple-level home  Lives with: young children    Employment status: working (house keeper and previous aide)  Treatments  Previous treatment: physical therapy  Patient Goals  Patient goals for therapy: improved balance, increased strength and decreased pain          Objective     Active Range of Motion     Additional Active Range of Motion Details  Observation: pt presents with upright posture avoiding forward or backward bending, Pt decreased sitting tolerance on LLE  Balance: SLS LLE 5 seconds min sway RLE 5 sec mod sway  Gait: antalgic gait on LLE  Squat: Deep with pain throughout  Reflexes: WNL except L4 LLE diminished   Sensation: WNL     Slump:+B SLR:+B  Kal:+ L  Faddir: +L:     WALTER: pain in L3- sacrum with PA, intensifying towards sacrum   Palpation:TTP along spine and across low back into piriformis B L>R   Mechanical assessment:   Repeated  Flexion: irritated, and worse post  Extension: irritated/ peripheralized   Lateral to R: decreased symptoms no better         Strength/Myotome Testing     Left Hip   Planes of Motion   Flexion: 3  Extension: 3  Abduction: 3+  External rotation: 4  Internal rotation: 4    Right Hip   Planes of Motion   Flexion: 4  Extension: 3  Abduction: 3+  External rotation: 4  Internal rotation: 4    Left Knee   Flexion: 3  Extension: 3    Right Knee   Flexion: 4-  Extension: 4-    Left Ankle/Foot   Dorsiflexion: 4+  Plantar flexion: 4+  Great toe extension: 4+    Right Ankle/Foot   Dorsiflexion: 4+  Plantar flexion: 4+  Great toe extension: 4+      Flowsheet Rows      Most Recent Value   PT/OT G-Codes   Current Score  47   Projected Score  57             Precautions: MS, DJD    Daily Treatment Diary     Date 10/28            FOTO IE             Re-eval IE              Manuals 10/28         Lumbar rot mob                                                   Exercise Diary 10/28         Treadmill          Lateral glide to R  HEP         TA walk out with press          SLR flex           Standing hip Abd HEP         Hip ext standing HEP         Hamstring stretch          Quad stretch           Deadbug           SLS           Q/L stretch           LTR                    Progress to           Planks           Side planks          superman          Quad alt UE/LE                                        Modalities 10/28         MHP PRN

## 2019-10-29 ENCOUNTER — APPOINTMENT (OUTPATIENT)
Dept: LAB | Facility: CLINIC | Age: 39
End: 2019-10-29
Payer: COMMERCIAL

## 2019-10-30 ENCOUNTER — OFFICE VISIT (OUTPATIENT)
Dept: PHYSICAL THERAPY | Facility: CLINIC | Age: 39
End: 2019-10-30
Payer: COMMERCIAL

## 2019-10-30 DIAGNOSIS — M54.16 RADICULOPATHY, LUMBAR REGION: ICD-10-CM

## 2019-10-30 DIAGNOSIS — M54.42 CHRONIC MIDLINE LOW BACK PAIN WITH LEFT-SIDED SCIATICA: Primary | ICD-10-CM

## 2019-10-30 DIAGNOSIS — G89.29 CHRONIC MIDLINE LOW BACK PAIN WITH LEFT-SIDED SCIATICA: Primary | ICD-10-CM

## 2019-10-30 PROCEDURE — 97110 THERAPEUTIC EXERCISES: CPT

## 2019-10-30 NOTE — PROGRESS NOTES
Daily Note     Today's date: 10/30/2019  Patient name: Stephanie Hathaway  : 1980  MRN: [id number]  Referring provider: Nancy Ackerman MD  Dx:   Encounter Diagnosis     ICD-10-CM    1  Chronic midline low back pain with left-sided sciatica M54 42     G89 29    2  Radiculopathy, lumbar region M54 16        Start Time: 1615  Stop Time: 1700  Total time in clinic (min): 45 minutes    Subjective: Pt reports she is having some back pain today, states it is central and does not radiate to her LEs  Objective: See treatment diary below      Assessment: Tolerated treatment well  Patient demonstrated fatigue post treatment and would benefit from continued PT  Pt performed exercises as noted with minimal signs of increased pain, addressed form and technique and grimacing subsides  Pt performed PBall roll outs with reduction of symptoms  Continue to progress as able  Plan: Continue per plan of care        Precautions: MS, DJD    Daily Treatment Diary     Date 10/28 10/30           FOTO IE             Re-eval IE              Manuals 10/28 10/30        Lumbar rot mob                                                   Exercise Diary 10/28 10/30        Treadmill  6' @ 1 5 mph        Lateral glide to R  HEP 10x5"        TA walk out with press  GTB 10x ea        SLR flex   nv        Standing hip Abd HEP 2x10        Hip ext standing HEP 2x10        Hamstring stretch  3x30" R LE only         Quad stretch   nv        Deadbug   nv        SLS   nv        Q/L stretch   10x10"        LTR  10x10"                  Progress to           Planks           Side planks          superman          Quad alt UE/LE                                        Modalities 10/28 10/30        MHP PRN

## 2019-11-06 ENCOUNTER — OFFICE VISIT (OUTPATIENT)
Dept: PHYSICAL THERAPY | Facility: CLINIC | Age: 39
End: 2019-11-06
Payer: COMMERCIAL

## 2019-11-06 DIAGNOSIS — G89.29 CHRONIC MIDLINE LOW BACK PAIN WITH LEFT-SIDED SCIATICA: Primary | ICD-10-CM

## 2019-11-06 DIAGNOSIS — M54.16 RADICULOPATHY, LUMBAR REGION: ICD-10-CM

## 2019-11-06 DIAGNOSIS — M54.42 CHRONIC MIDLINE LOW BACK PAIN WITH LEFT-SIDED SCIATICA: Primary | ICD-10-CM

## 2019-11-06 PROCEDURE — 97110 THERAPEUTIC EXERCISES: CPT

## 2019-11-06 NOTE — PROGRESS NOTES
Daily Note     Today's date: 2019  Patient name: Miah Walter  : 1980  MRN: [id number]  Referring provider: Jm Iqbal MD  Dx:   Encounter Diagnosis     ICD-10-CM    1  Chronic midline low back pain with left-sided sciatica M54 42     G89 29    2  Radiculopathy, lumbar region M54 16        Start Time: 1550  Stop Time: 1640  Total time in clinic (min): 50 minutes    Subjective: Pt reports she is having some back pain today, states it is central and does not radiate to her LEs  Objective: See treatment diary below      Assessment: Tolerated treatment well  Patient demonstrated fatigue post treatment and would benefit from continued PT  Pt performed new exercises as noted with no signs of increased pain or adverse symptoms  Pt continues to have pain with HS stretch on the LLE, performed only RLE  Continue to progress as able  Plan: Continue per plan of care        Precautions: MS, DJD    Daily Treatment Diary     Date 10/28 10/30 11/6          FOTO IE             Re-eval IE              Manuals 10/28 10/30 11/6       Lumbar rot mob                                                   Exercise Diary 10/28 10/30 11/6       Treadmill  6' @ 1 5 mph 7' @ 2 0 mph       Lateral glide to R  HEP 10x5" 10x5"       TA walk out with press  GTB 10x ea GTB 10x ea       SLR flex   nv 2x10       Standing hip Abd HEP 2x10 2x10       Hip ext standing HEP 2x10 2x10       Hamstring stretch  3x30" R LE only  3x30" R LE only        Quad stretch   nv 3x30"       Deadbug   nv 10x       SLS   nv nv       Q/L stretch   10x10" 10x10"       LTR  10x10" 10x10"                 Progress to           Planks           Side planks          superman          Quad alt UE/LE                                        Modalities 10/28 10/30 116       MHP PRN

## 2019-11-11 ENCOUNTER — OFFICE VISIT (OUTPATIENT)
Dept: PHYSICAL THERAPY | Facility: CLINIC | Age: 39
End: 2019-11-11
Payer: COMMERCIAL

## 2019-11-11 DIAGNOSIS — M54.16 RADICULOPATHY, LUMBAR REGION: ICD-10-CM

## 2019-11-11 DIAGNOSIS — G89.29 CHRONIC MIDLINE LOW BACK PAIN WITH LEFT-SIDED SCIATICA: Primary | ICD-10-CM

## 2019-11-11 DIAGNOSIS — M54.42 CHRONIC MIDLINE LOW BACK PAIN WITH LEFT-SIDED SCIATICA: Primary | ICD-10-CM

## 2019-11-11 PROCEDURE — 97110 THERAPEUTIC EXERCISES: CPT | Performed by: PHYSICAL THERAPIST

## 2019-11-11 NOTE — PROGRESS NOTES
Daily Note     Today's date: 2019  Patient name: Aamir Arriola  : 1980  MRN: [id number]  Referring provider: Marisela Anderson MD  Dx:   Encounter Diagnosis     ICD-10-CM    1  Chronic midline low back pain with left-sided sciatica M54 42     G89 29    2  Radiculopathy, lumbar region M54 16                   Subjective: Pt states she is feeling okay right now with a little soreness in her B LEs especially in her quads  Pt reports 1/10 pain for the last week in the low back and a little into the butt  Objective: See treatment diary below      Assessment: Tolerated treatment well  Patient exhibited good technique with therapeutic exercises with minimal complaints of pain throughout session  She was only having issues with higher level activities such as planks  Plan: Continue per plan of care        Precautions: MS, ANGELOD    Daily Treatment Diary     Date 10/28 10/30 11/6 11/11         FOTO IE   79          Re-eval IE              Manuals 10/28 10/30 11/6 11/11      Lumbar rot mob                                                   Exercise Diary 10/28 10/30 11/6 11/11      Treadmill  6' @ 1 5 mph 7' @ 2 0 mph 6' 2 0      Lateral glide to R  HEP 10x5" 10x5" NP      TA walk out with press  GTB 10x ea GTB 10x ea BTB 10x5"       SLR flex   nv 2x10 2x10 w/ TA      Standing hip Abd HEP 2x10 2x10 SLR 2x10       Hip ext standing HEP 2x10 2x10       Hamstring stretch  3x30" R LE only  3x30" R LE only  3x20" LLE       Quad stretch   nv 3x30"       Deadbug   nv 10x 20"x3      SLS   nv nv Black 10"x5      Q/L stretch   10x10" 10x10" 20x3      LTR  10x10" 10x10" 10x5"                Progress to           Planks     10"x5      Side planks          superman    5"x10      Quad alt UE/LE                                        Modalities 10/28 10/30 11/6       MHP PRN

## 2019-11-13 ENCOUNTER — OFFICE VISIT (OUTPATIENT)
Dept: PHYSICAL THERAPY | Facility: CLINIC | Age: 39
End: 2019-11-13
Payer: COMMERCIAL

## 2019-11-13 DIAGNOSIS — G89.29 CHRONIC MIDLINE LOW BACK PAIN WITH LEFT-SIDED SCIATICA: Primary | ICD-10-CM

## 2019-11-13 DIAGNOSIS — M54.16 RADICULOPATHY, LUMBAR REGION: ICD-10-CM

## 2019-11-13 DIAGNOSIS — M54.42 CHRONIC MIDLINE LOW BACK PAIN WITH LEFT-SIDED SCIATICA: Primary | ICD-10-CM

## 2019-11-13 PROCEDURE — 97110 THERAPEUTIC EXERCISES: CPT

## 2019-11-13 NOTE — PROGRESS NOTES
Daily Note     Today's date: 2019  Patient name: Luis A Wyatt  : 1980  MRN: [id number]  Referring provider: Justin Lopez MD  Dx:   Encounter Diagnosis     ICD-10-CM    1  Chronic midline low back pain with left-sided sciatica M54 42     G89 29    2  Radiculopathy, lumbar region M54 16        Start Time: 1550  Stop Time: 1640  Total time in clinic (min): 50 minutes    Subjective: Pt states she is feeling okay today, no pain right now  Objective: See treatment diary below       Assessment: Tolerated treatment well  Patient exhibited good technique with therapeutic exercises with no complaints of pain throughout session  Pt continues to work towards goals of increased core strength and stability  Continue to progress as able  Plan: Continue per plan of care        Precautions: MS, DJD    Daily Treatment Diary     Date 10/28 10/30 11/6 11/11 11/13        FOTO IE   79          Re-eval IE              Manuals 10/28 10/30 11/6 11/11 11/13     Lumbar rot mob                                                   Exercise Diary 10/28 10/30 11/6 11/11 11/13     Treadmill  6' @ 1 5 mph 7' @ 2 0 mph 6' 2 0 8' 2 0     Lateral glide to R  HEP 10x5" 10x5" NP 10x5"     TA walk out with press  GTB 10x ea GTB 10x ea BTB 10x5"  BTB 10x5"     SLR flex   nv 2x10 2x10 w/ TA 2x10 w/ TA     Standing hip Abd HEP 2x10 2x10 SLR 2x10  SLR 2x10     Hip ext standing HEP 2x10 2x10       Hamstring stretch  3x30" R LE only  3x30" R LE only  3x20" LLE  3x20" LLE      Quad stretch   nv 3x30"       Deadbug   nv 10x 20"x3 3x20"     SLS   nv nv Black 10"x5 Black 10"x5     Q/L stretch   10x10" 10x10" 20x3 3x20"     LTR  10x10" 10x10" 10x5" 10x10"               Progress to           Planks     10"x5 5x10"     Side planks          superman    5"x10 10x5"     Quad alt UE/LE                                        Modalities 10/28 10/30 11/6       MHP PRN

## 2019-11-18 ENCOUNTER — OFFICE VISIT (OUTPATIENT)
Dept: PHYSICAL THERAPY | Facility: CLINIC | Age: 39
End: 2019-11-18
Payer: COMMERCIAL

## 2019-11-18 DIAGNOSIS — M54.42 CHRONIC MIDLINE LOW BACK PAIN WITH LEFT-SIDED SCIATICA: Primary | ICD-10-CM

## 2019-11-18 DIAGNOSIS — M54.16 RADICULOPATHY, LUMBAR REGION: ICD-10-CM

## 2019-11-18 DIAGNOSIS — G89.29 CHRONIC MIDLINE LOW BACK PAIN WITH LEFT-SIDED SCIATICA: Primary | ICD-10-CM

## 2019-11-18 PROCEDURE — 97110 THERAPEUTIC EXERCISES: CPT

## 2019-11-18 PROCEDURE — 97112 NEUROMUSCULAR REEDUCATION: CPT

## 2019-11-18 NOTE — PROGRESS NOTES
Daily Note     Today's date: 2019  Patient name: Yvrose Jones  : 1980  MRN: [id number]  Referring provider: Nancy Isidro MD  Dx:   Encounter Diagnosis     ICD-10-CM    1  Chronic midline low back pain with left-sided sciatica M54 42     G89 29    2  Radiculopathy, lumbar region M54 16                   Subjective: Pt states she is feeling okay today, no pain right now  Objective: See treatment diary below       Assessment: Tolerated treatment well  Patient exhibited good technique with therapeutic exercises with no complaints of pain throughout session  She also demonstrates improved strength with more challenging TE  Pt continues to work towards goals of increased core strength and stability  Continue to progress as able  Plan: Continue per plan of care        Precautions: MS, DJD    Daily Treatment Diary     Date 10/28 10/30 11/6 11/11 11/13 11/18       FOTO IE   79          Re-eval IE              Manuals 10/28 10/30 11/6 11/11 11/13 11/18    Lumbar rot mob                                                   Exercise Diary 10/28 10/30 11/6 11/11 11/13 11/18    Treadmill  6' @ 1 5 mph 7' @ 2 0 mph 6' 2 0 8' 2 0 8' 2 0    Lateral glide to R  HEP 10x5" 10x5" NP 10x5"     TA walk out with press  GTB 10x ea GTB 10x ea BTB 10x5"  BTB 10x5"     SLR flex   nv 2x10 2x10 w/ TA 2x10 w/ TA 2x10 w/ TA    Standing hip Abd HEP 2x10 2x10 SLR 2x10  SLR 2x10 SLR 2x10    Hip ext standing HEP 2x10 2x10       Hamstring stretch  3x30" R LE only  3x30" R LE only  3x20" LLE  3x20" LLE  3x30"  B LE    Quad stretch   nv 3x30"       Deadbug   nv 10x 20"x3 3x20"     SLS   nv nv Black 10"x5 Black 10"x5     Q/L stretch   10x10" 10x10" 20x3 3x20"     LTR  10x10" 10x10" 10x5" 10x10" 10x10"              Progress to           Planks     10"x5 5x10" 5x10"    Side planks      5" x 5    superman    5"x10 10x5" 10x5"    Quad alt UE/LE                                        Modalities 10/28 10/30 11/6       MHP PRN

## 2019-11-20 ENCOUNTER — EVALUATION (OUTPATIENT)
Dept: PHYSICAL THERAPY | Facility: CLINIC | Age: 39
End: 2019-11-20
Payer: COMMERCIAL

## 2019-11-20 DIAGNOSIS — G89.29 CHRONIC MIDLINE LOW BACK PAIN WITH LEFT-SIDED SCIATICA: Primary | ICD-10-CM

## 2019-11-20 DIAGNOSIS — M54.42 CHRONIC MIDLINE LOW BACK PAIN WITH LEFT-SIDED SCIATICA: Primary | ICD-10-CM

## 2019-11-20 DIAGNOSIS — M54.16 RADICULOPATHY, LUMBAR REGION: ICD-10-CM

## 2019-11-20 PROCEDURE — 97110 THERAPEUTIC EXERCISES: CPT | Performed by: PHYSICAL THERAPIST

## 2019-11-20 PROCEDURE — 97530 THERAPEUTIC ACTIVITIES: CPT | Performed by: PHYSICAL THERAPIST

## 2019-11-20 PROCEDURE — 97140 MANUAL THERAPY 1/> REGIONS: CPT | Performed by: PHYSICAL THERAPIST

## 2019-11-20 NOTE — PROGRESS NOTES
Daily Note     Today's date: 2019  Patient name: Pato Lara  : 1980  MRN: [id number]  Referring provider: Dedra Segundo MD  Dx:   Encounter Diagnosis     ICD-10-CM    1  Chronic midline low back pain with left-sided sciatica M54 42     G89 29    2  Radiculopathy, lumbar region M54 16                   Subjective: Pt has continued to have 0/10 pain for the last few weeks with no restrictions of movement or limitations with ADLs  Objective: See treatment diary below   Lumbar ROM: flexion 100 ext 40 LF B 40 degrees no pain  MMT WNL B   Balance SLS 30 seconds B no sway   Squat full no pain       Assessment: Tolerated treatment well  Patient demonstrated fatigue post treatment and exhibited good technique with therapeutic exercises with full ability to progress to (I) today at home with HEP and to return to therapy if necessary  She has currently achieved all goals at 3 weeks and is confident in self to continue to progress at home  Plan: Discharge post treatment today        Precautions: MS, DJD    Daily Treatment Diary     Date 10/28 10/30 11/6 11/11 11/13 11/18 11/20      FOTO IE   67    90       Re-eval IE              Manuals 10/28 10/30 11/6 11/11 11/13 11/18 11/20   Lumbar rot mob          Manual assessment       [address]                                  Exercise Diary 10/28 10/30 11/6 11/11 11/13 11/18 11/20   Treadmill  6' @ 1 5 mph 7' @ 2 0 mph 6' 2 0 8' 2 0 8' 2 0 Elliptical 5'   Lateral glide to R  HEP 10x5" 10x5" NP 10x5"     TA walk out with press  GTB 10x ea GTB 10x ea BTB 10x5"  BTB 10x5"     SLR flex   nv 2x10 2x10 w/ TA 2x10 w/ TA 2x10 w/ TA    Standing hip Abd HEP 2x10 2x10 SLR 2x10  SLR 2x10 SLR 2x10    Hip ext standing HEP 2x10 2x10       Hamstring stretch  3x30" R LE only  3x30" R LE only  3x20" LLE  3x20" LLE  3x30"  B LE    Quad stretch   nv 3x30"       Deadbug   nv 10x 20"x3 3x20"     SLS   nv nv Black 10"x5 Black 10"x5     Q/L stretch   10x10" 10x10" 20x3 3x20" LTR  10x10" 10x10" 10x5" 10x10" 10x10"              Progress to           Planks     10"x5 5x10" 5x10"    Side planks      5" x 5    superman    5"x10 10x5" 10x5"    Quad alt UE/LE          Wall sits      10x10"    Side steps       5 laps GTB    bosu mini squats      15x     Modalities 10/28 10/30 11/6       MHP PRN

## 2019-11-27 ENCOUNTER — HOSPITAL ENCOUNTER (OUTPATIENT)
Dept: NEUROLOGY | Facility: CLINIC | Age: 39
Discharge: HOME | End: 2019-11-27
Payer: COMMERCIAL

## 2019-11-27 DIAGNOSIS — M54.16 RADICULOPATHY, LUMBAR REGION: ICD-10-CM

## 2019-11-27 PROCEDURE — 95908 NRV CNDJ TST 3-4 STUDIES: CPT | Performed by: PSYCHIATRY & NEUROLOGY

## 2019-11-27 PROCEDURE — 95886 MUSC TEST DONE W/N TEST COMP: CPT | Performed by: PSYCHIATRY & NEUROLOGY

## 2019-12-04 ENCOUNTER — OFFICE VISIT (OUTPATIENT)
Dept: NEUROLOGY | Facility: CLINIC | Age: 39
End: 2019-12-04
Payer: COMMERCIAL

## 2019-12-04 VITALS
SYSTOLIC BLOOD PRESSURE: 124 MMHG | DIASTOLIC BLOOD PRESSURE: 65 MMHG | RESPIRATION RATE: 16 BRPM | HEIGHT: 65 IN | BODY MASS INDEX: 31.49 KG/M2 | HEART RATE: 83 BPM | WEIGHT: 189 LBS

## 2019-12-04 DIAGNOSIS — M25.552 LEFT HIP PAIN: ICD-10-CM

## 2019-12-04 DIAGNOSIS — R41.3 COMPLAINTS OF MEMORY DISTURBANCE: ICD-10-CM

## 2019-12-04 DIAGNOSIS — G35 MULTIPLE SCLEROSIS (HCC): Primary | ICD-10-CM

## 2019-12-04 DIAGNOSIS — G89.29 CHRONIC LEFT-SIDED LOW BACK PAIN WITH LEFT-SIDED SCIATICA: ICD-10-CM

## 2019-12-04 DIAGNOSIS — M54.42 CHRONIC LEFT-SIDED LOW BACK PAIN WITH LEFT-SIDED SCIATICA: ICD-10-CM

## 2019-12-04 PROCEDURE — 99214 OFFICE O/P EST MOD 30 MIN: CPT | Performed by: PHYSICIAN ASSISTANT

## 2019-12-04 RX ORDER — DIMETHYL FUMARATE 240 MG/1
CAPSULE ORAL
Qty: 180 CAPSULE | Refills: 1 | Status: SHIPPED | OUTPATIENT
Start: 2019-12-04 | End: 2020-07-10

## 2019-12-04 NOTE — PROGRESS NOTES
Patient ID: Ирина Collier is a 46 y o  female  Assessment/Plan:    Multiple sclerosis ([facility] [address] )  Patient overall stable  Denies any new MS symptoms at this time  She continues on Tecfidera with no issue  She is getting her labs done every other month, last completed 10/29/2019  Normal LFTs and absolute lymphs were 1 29  She will continue with routine lab monitoring  Last MRI brain was completed in August 2019 and stable  Exam is stable today  Low back pain  Patient notes ongoing low back pain, mainly on the left, with radiation into the left leg  She is also having some left hip pain  At timing of last visit, MRI lumbar spine was ordered, however this was denied by her insurance  She completed x-ray of the low back which demonstrated moderate disc narrowing at L5/S1  She saw Orthopedics who agreed MRI lumbar spine would be warranted  She has now completed a trial of physical therapy with little improvement  She had EMG of the left lower extremity which was normal   Will reorder MRI lumbar spine at this time  Left hip pain  Left hip x-ray ordered  She was also advised to continue to follow with Orthopedics for low back pain and now hip pain  Complaints of memory disturbance  Patient reports some nonspecific memory complaints  She admits to being more depressed lately due to some situational changes in her life  We discussed treatment for depression, including counseling/psychotherapy, as well as medications  She feels that once her situation changes her mood will change  She would like to hold off on anything at this time  Discussed that likely her memory complaints are due to depression and lack of sleep  I will check B12 and TSH  She will let me know if she would like any counseling  She did also discuss this with her PCP  She denies any SI  Patient to follow-up in 4 months or sooner if needed  She was advised to call the office for any new or worsening symptoms  Diagnoses and all orders for this visit:    Multiple sclerosis [facility])  -     Ambulatory referral to Ophthalmology; Future  -     TECFIDERA 240 MG CPDR; Take 1 po BID    Chronic left-sided low back pain with left-sided sciatica  -     MRI lumbar spine without contrast; Future    Left hip pain  -     XR hip/pelv 4+ vw left if performed; Future    Complaints of memory disturbance  -     TSH, 3rd generation with Free T4 reflex; Future  -     Vitamin B12; Future           Subjective:    HPI      Patient is a 46year old female who presents today for MS follow up, last seen in August 2019  To review, patient notes symptoms began in January 2015 when she had a work injury to the left shoulder  In March 2015, she had left sided numbness going down her torso, down into her feet and legs  She also experienced urinary leakage without Valsalva  Patient then experienced right eye pain and fogginess of vision  She was seen by Dr Bernardino Delacruz and had an OCT done  It revealed normal findings on the right but a sector defect on the left  MRI of the brain from May 13, 2015 revealed a few scattered nonspecific supratentorial WM lesions  No acute infarction, or hemorrhage or enh  MRI of the lumbar spine without contrast revealed small L5/S1 disc herniation, MRI of the T-spine revealed evidence of solitary 8mm lesion to the left of midline at T8/9 in the dorsal Cord  No pathological enh  MRI of the C-spine also done on June 13, 2015 revealed a solitary 5 mm focus of active demyelination in the right lateral cord at C2  Patient was given 3 days of IV steroids  Patient with significant GI upset with the steroids and also some mild change in mood with increased anxiety  She was NMO negative and Lyme negative  She did not have an LP  Patient was started on Copaxone as her initial IMD med in 2015  She had a brief interval of being off med due to insurance  However, due to site reactions, patient stopped med and started Tecfidera in May 2016  Patient was subsequently told to stop her Tecifidera due to unplanned pregnancy in Nov 2017  Patient delivered her daughter on 0/35/75 without complication  She also had a tubal ligation  She restarted Tecfidera in January 2019 after stopping breastfeeding  Today, patient reports she is overall doing ok from an MS standpoint  She does admit to increased depression, trouble sleeping, memory difficulty, which she feels is related to depression  The depression is related to some life changes and situational issues going on  She denies any suicidal ideation  She feels once her situation changes, her mood will be better  She continues with low back pain as well as left hip pain  She had an x-ray of the lumbar spine which showed moderate disc narrowing at L5/S1  She saw Orthopedics who agreed with MRI lumbar spine, however this was not approved by her insurance  She then did a trial of PT from 10/28/2019 through 11/20/2019  She also had an EMG of the left lower extremity on 11/27/2019, which was a normal study  She denies any bowel or bladder changes  She needs a new ophthalmologist as Dr Katharina Mckeon no longer accepts her insurance  She continues on her Tecfidera, no issues  She had labs done on 10/29/2019 with normal LFTs, absolute lymphs 1 29  MRI brain was updated in August 2019 and stable  The following portions of the patient's history were reviewed and updated as appropriate: current medications, past family history, past medical history, past social history, past surgical history and problem list          Objective:    Blood pressure 124/65, pulse 83, resp  rate 16, height 5' 5" (1 651 m), weight 85 7 kg (189 lb)    Physical Exam   Constitutional: She appears well-developed and well-nourished  HENT:   Head: Normocephalic and atraumatic  Eyes: Pupils are equal, round, and reactive to light  EOM are normal    Cardiovascular: Intact distal pulses     Neurological: She has normal strength and normal reflexes  Coordination normal    Skin: Skin is warm and dry  Psychiatric: She has a normal mood and affect  Her speech is normal        Neurological Exam  Mental Status   Oriented to person, place, time and situation  Recent and remote memory are intact  Speech is normal  Language is fluent with no aphasia  Attention and concentration are normal     Cranial Nerves  CN II: Visual fields full to confrontation  CN III, IV, VI: Extraocular movements intact bilaterally  Pupils equal round and reactive to light bilaterally  CN V: Facial sensation is normal   CN VII: Full and symmetric facial movement  CN VIII: Hearing is normal   CN IX, X: Palate elevates symmetrically  Normal gag reflex  CN XI: Shoulder shrug strength is normal   CN XII: Tongue midline without atrophy or fasciculations  Motor   Normal muscle tone  Strength is 5/5 throughout all four extremities  Sensory  Vibration abnormality: Decreased vibratory sensation in lower extremities bilaterally   Reflexes  Deep tendon reflexes are 2+ and symmetric in all four extremities with downgoing toes bilaterally  Coordination  Finger-to-nose, rapid alternating movements and heel-to-shin normal bilaterally without dysmetria  Gait  Casual gait is normal including stance, stride, and arm swing  Timed 25 foot walk = 6 48 seconds  ROS:    Review of Systems   Constitutional: Positive for fatigue  Negative for appetite change and fever  HENT: Negative  Negative for hearing loss, tinnitus, trouble swallowing and voice change  Eyes: Positive for visual disturbance (blurred)  Negative for photophobia and pain  Respiratory: Negative  Negative for shortness of breath  Cardiovascular: Negative  Negative for palpitations  Gastrointestinal: Negative  Negative for nausea and vomiting  Endocrine: Negative  Negative for cold intolerance and heat intolerance  Genitourinary: Positive for urgency  Negative for dysuria and frequency  Musculoskeletal: Positive for arthralgias  Negative for myalgias and neck pain  Skin: Negative  Negative for rash  Neurological: Positive for numbness  Negative for dizziness, tremors, seizures, syncope, facial asymmetry, speech difficulty, weakness, light-headedness and headaches  Memory problems  Slurred speech  Tingling  Clumsiness  Balance problems  Twitching     Hematological: Negative  Does not bruise/bleed easily  Psychiatric/Behavioral: Positive for confusion  Negative for hallucinations and sleep disturbance  The patient is nervous/anxious           Depression  Mood swings       I personally reviewed and updated the ROS as appropriate

## 2019-12-04 NOTE — PATIENT INSTRUCTIONS
Continue getting labs done every other month  Continue Tecfidera--I sent new script to [facility], let me know if there is an issue  XRAY left hip  MRI lumbar spine re-ordered now that you have had xray, EMG and trial of PT  Referral placed to [facility]  Let us know if they do not take your insurance, or call your insurance to find a covered ophthalmologist  If you would like counseling for depression, let me know  Can also discuss with your PCP  Follow up in 4 months    Call for any new symptoms

## 2019-12-05 PROBLEM — R41.3 COMPLAINTS OF MEMORY DISTURBANCE: Status: ACTIVE | Noted: 2019-12-05

## 2019-12-05 PROBLEM — M25.552 LEFT HIP PAIN: Status: ACTIVE | Noted: 2019-12-05

## 2019-12-05 NOTE — ASSESSMENT & PLAN NOTE
Left hip x-ray ordered  She was also advised to continue to follow with Orthopedics for low back pain and now hip pain

## 2019-12-05 NOTE — ASSESSMENT & PLAN NOTE
Patient notes ongoing low back pain, mainly on the left, with radiation into the left leg  She is also having some left hip pain  At timing of last visit, MRI lumbar spine was ordered, however this was denied by her insurance  She completed x-ray of the low back which demonstrated moderate disc narrowing at L5/S1  She saw Orthopedics who agreed MRI lumbar spine would be warranted  She has now completed a trial of physical therapy with little improvement  She had EMG of the left lower extremity which was normal   Will reorder MRI lumbar spine at this time

## 2019-12-05 NOTE — ASSESSMENT & PLAN NOTE
Patient overall stable  Denies any new MS symptoms at this time  She continues on Tecfidera with no issue  She is getting her labs done every other month, last completed 10/29/2019  Normal LFTs and absolute lymphs were 1 29  She will continue with routine lab monitoring  Last MRI brain was completed in August 2019 and stable  Exam is stable today

## 2019-12-05 NOTE — ASSESSMENT & PLAN NOTE
Patient reports some nonspecific memory complaints  She admits to being more depressed lately due to some situational changes in her life  We discussed treatment for depression, including counseling/psychotherapy, as well as medications  She feels that once her situation changes her mood will change  She would like to hold off on anything at this time  Discussed that likely her memory complaints are due to depression and lack of sleep  I will check B12 and TSH  She will let me know if she would like any counseling  She did also discuss this with her PCP    She denies any SI

## 2019-12-11 DIAGNOSIS — G35 MULTIPLE SCLEROSIS (HCC): Primary | ICD-10-CM

## 2019-12-11 NOTE — TELEPHONE ENCOUNTER
Matteo Padilla from [facility] called and states that she just talked to the pt, informed her that pt has been out of tecfidera for about 3 weeks  Asking if you want pt to start the initial dose 120 mg or restart 240 mg bid? Regi Delaney that pt was just seen on 12/4/19 and she told [facility] that she continues on tecfidera w/ no issue  Matteo Padilla states that pt did admit to her that she did not inform Mumtaz Meredith that she stopped taking it bec she was out of the med  Called pt and states that she forgot to tell Mumtaz Meredith that she was out of the medication  She did apologize  Pls advise                    [phone]

## 2019-12-12 NOTE — TELEPHONE ENCOUNTER
Called [facility] and provided verbal script for Tecfidera 120mg per your orders below  Patient will restart titration pack and then restart the 240mg as per your prescription sent on 12/4/19  Called patient and verified this with her  Advised patient to call us before she runs out of her prescription next time and the importance of continuing the medication  Advised to call [facility] to set up delivery

## 2019-12-12 NOTE — TELEPHONE ENCOUNTER
She should go on titration first   Please see if script is needed or can provide a verbal for Tecfidera 120mg take 1 po BID x 7 days #14 no refills

## 2019-12-17 DIAGNOSIS — G35 MULTIPLE SCLEROSIS (HCC): Primary | ICD-10-CM

## 2019-12-17 RX ORDER — DIMETHYL FUMARATE 120 MG/1
120 CAPSULE ORAL 2 TIMES DAILY
Qty: 14 CAPSULE | Refills: 0 | Status: SHIPPED | OUTPATIENT
Start: 2019-12-17 | End: 2020-04-13 | Stop reason: ALTCHOICE

## 2019-12-17 NOTE — TELEPHONE ENCOUNTER
Pt called and stated [facility] requesting script for 120mg  I made her aware we gave verbal  Pt states they want an electronic Rx  Please sign off

## 2019-12-19 DIAGNOSIS — G35 MULTIPLE SCLEROSIS (HCC): Primary | ICD-10-CM

## 2019-12-19 RX ORDER — DIMETHYL FUMARATE 120 MG/1
CAPSULE ORAL
Qty: 14 CAPSULE | Refills: 0 | Status: SHIPPED | OUTPATIENT
Start: 2019-12-19 | End: 2020-04-13 | Stop reason: ALTCHOICE

## 2019-12-19 NOTE — TELEPHONE ENCOUNTER
Last one went to [facility] mail order and [facility] is unable to transfer to their specialty branch

## 2019-12-24 ENCOUNTER — APPOINTMENT (OUTPATIENT)
Dept: LAB | Facility: CLINIC | Age: 39
End: 2019-12-24
Payer: COMMERCIAL

## 2019-12-24 DIAGNOSIS — R41.3 COMPLAINTS OF MEMORY DISTURBANCE: ICD-10-CM

## 2019-12-24 DIAGNOSIS — M54.16 RADICULOPATHY, LUMBAR REGION: ICD-10-CM

## 2019-12-24 LAB
ALBUMIN SERPL BCP-MCNC: 3.7 G/DL (ref 3.5–5)
ALP SERPL-CCNC: 61 U/L (ref 46–116)
ALT SERPL W P-5'-P-CCNC: 19 U/L (ref 12–78)
AST SERPL W P-5'-P-CCNC: 13 U/L (ref 5–45)
BASOPHILS # BLD AUTO: 0.02 THOUSANDS/ΜL (ref 0–0.1)
BASOPHILS NFR BLD AUTO: 0 % (ref 0–1)
BILIRUB DIRECT SERPL-MCNC: 0.08 MG/DL (ref 0–0.2)
BILIRUB SERPL-MCNC: 0.31 MG/DL (ref 0.2–1)
EOSINOPHIL # BLD AUTO: 0.18 THOUSAND/ΜL (ref 0–0.61)
EOSINOPHIL NFR BLD AUTO: 4 % (ref 0–6)
ERYTHROCYTE [DISTWIDTH] IN BLOOD BY AUTOMATED COUNT: 13.1 % (ref 11.6–15.1)
HCT VFR BLD AUTO: 36.5 % (ref 34.8–46.1)
HGB BLD-MCNC: 11.3 G/DL (ref 11.5–15.4)
IMM GRANULOCYTES # BLD AUTO: 0.01 THOUSAND/UL (ref 0–0.2)
IMM GRANULOCYTES NFR BLD AUTO: 0 % (ref 0–2)
LYMPHOCYTES # BLD AUTO: 1.25 THOUSANDS/ΜL (ref 0.6–4.47)
LYMPHOCYTES NFR BLD AUTO: 25 % (ref 14–44)
MCH RBC QN AUTO: 31.7 PG (ref 26.8–34.3)
MCHC RBC AUTO-ENTMCNC: 31 G/DL (ref 31.4–37.4)
MCV RBC AUTO: 103 FL (ref 82–98)
MONOCYTES # BLD AUTO: 0.46 THOUSAND/ΜL (ref 0.17–1.22)
MONOCYTES NFR BLD AUTO: 9 % (ref 4–12)
NEUTROPHILS # BLD AUTO: 3.02 THOUSANDS/ΜL (ref 1.85–7.62)
NEUTS SEG NFR BLD AUTO: 62 % (ref 43–75)
NRBC BLD AUTO-RTO: 0 /100 WBCS
PLATELET # BLD AUTO: 230 THOUSANDS/UL (ref 149–390)
PMV BLD AUTO: 10.1 FL (ref 8.9–12.7)
PROT SERPL-MCNC: 6.9 G/DL (ref 6.4–8.2)
RBC # BLD AUTO: 3.56 MILLION/UL (ref 3.81–5.12)
TSH SERPL DL<=0.05 MIU/L-ACNC: 1.27 UIU/ML (ref 0.36–3.74)
VIT B12 SERPL-MCNC: 345 PG/ML (ref 100–900)
WBC # BLD AUTO: 4.94 THOUSAND/UL (ref 4.31–10.16)

## 2019-12-24 PROCEDURE — 82607 VITAMIN B-12: CPT

## 2019-12-24 PROCEDURE — 36415 COLL VENOUS BLD VENIPUNCTURE: CPT

## 2019-12-24 PROCEDURE — 86235 NUCLEAR ANTIGEN ANTIBODY: CPT

## 2019-12-24 PROCEDURE — 84443 ASSAY THYROID STIM HORMONE: CPT

## 2019-12-24 PROCEDURE — 80076 HEPATIC FUNCTION PANEL: CPT

## 2019-12-24 PROCEDURE — 85025 COMPLETE CBC W/AUTO DIFF WBC: CPT

## 2019-12-26 LAB
ENA SS-A AB SER-ACNC: <0.2 AI (ref 0–0.9)
ENA SS-B AB SER-ACNC: <0.2 AI (ref 0–0.9)

## 2020-01-18 ENCOUNTER — HOSPITAL ENCOUNTER (OUTPATIENT)
Dept: RADIOLOGY | Facility: HOSPITAL | Age: 40
Discharge: HOME | End: 2020-01-18
Payer: COMMERCIAL

## 2020-01-18 DIAGNOSIS — M25.552 LEFT HIP PAIN: ICD-10-CM

## 2020-01-18 PROCEDURE — 73503 X-RAY EXAM HIP UNI 4/> VIEWS: CPT

## 2020-01-28 ENCOUNTER — TELEPHONE (OUTPATIENT)
Dept: NEUROLOGY | Facility: CLINIC | Age: 40
End: 2020-01-28

## 2020-01-28 NOTE — TELEPHONE ENCOUNTER
Patient made aware of below  States that she has not had the Lumbar MRI done yet but will schedule it today  Told her that the authorization  2020  Patient verbalizes understanding

## 2020-01-28 NOTE — TELEPHONE ENCOUNTER
----- Message from Michelle Warner PA-C sent at 1/27/2020  1:48 PM EST -----  Please let patient know left hip xray shows some mild arthritis  Should f/u with PCP for this  Also, has she done her lumbar MRI? I do not see it was completed  Is it scheduled?

## 2020-02-07 ENCOUNTER — HOSPITAL ENCOUNTER (OUTPATIENT)
Dept: MRI IMAGING | Facility: HOSPITAL | Age: 40
Discharge: HOME | End: 2020-02-07
Payer: COMMERCIAL

## 2020-02-07 DIAGNOSIS — M54.42 CHRONIC LEFT-SIDED LOW BACK PAIN WITH LEFT-SIDED SCIATICA: ICD-10-CM

## 2020-02-07 DIAGNOSIS — G89.29 CHRONIC LEFT-SIDED LOW BACK PAIN WITH LEFT-SIDED SCIATICA: ICD-10-CM

## 2020-02-07 PROCEDURE — 72148 MRI LUMBAR SPINE W/O DYE: CPT

## 2020-02-12 ENCOUNTER — TELEPHONE (OUTPATIENT)
Dept: NEUROLOGY | Facility: CLINIC | Age: 40
End: 2020-02-12

## 2020-02-12 NOTE — TELEPHONE ENCOUNTER
Pt made aware, she was agreeable to this  Asked that I send her Ortho's number via getbetter!  This was done

## 2020-02-12 NOTE — TELEPHONE ENCOUNTER
----- Message from Antonia Fuller PA-C sent at 2/11/2020  2:19 PM EST -----  Please let patient know her MRI shows a moderate to large disc herniation, at L5-S1, which may potentially be impacting the left L5 or S1 nerve roots  This is likely the cause of her symptoms  She already saw ortho and they suggested PT, which she completed with little relief  Ortho also wanted her to get this MRI  I would like her to go back to ortho, Dr Marlene Miller, so they can manage this for her  She is already established with him, last seen in October  Please have her call for appt  Thanks!

## 2020-03-11 ENCOUNTER — APPOINTMENT (OUTPATIENT)
Dept: LAB | Facility: CLINIC | Age: 40
End: 2020-03-11
Payer: COMMERCIAL

## 2020-03-11 ENCOUNTER — TELEPHONE (OUTPATIENT)
Dept: NEUROLOGY | Facility: CLINIC | Age: 40
End: 2020-03-11

## 2020-03-11 DIAGNOSIS — G35 MULTIPLE SCLEROSIS (HCC): Primary | ICD-10-CM

## 2020-03-11 DIAGNOSIS — G35 MULTIPLE SCLEROSIS (HCC): ICD-10-CM

## 2020-03-11 LAB
ALBUMIN SERPL BCP-MCNC: 3.9 G/DL (ref 3.5–5)
ALP SERPL-CCNC: 50 U/L (ref 46–116)
ALT SERPL W P-5'-P-CCNC: 14 U/L (ref 12–78)
AST SERPL W P-5'-P-CCNC: 10 U/L (ref 5–45)
BASOPHILS # BLD AUTO: 0.02 THOUSANDS/ΜL (ref 0–0.1)
BASOPHILS NFR BLD AUTO: 0 % (ref 0–1)
BILIRUB DIRECT SERPL-MCNC: 0.06 MG/DL (ref 0–0.2)
BILIRUB SERPL-MCNC: 0.27 MG/DL (ref 0.2–1)
EOSINOPHIL # BLD AUTO: 0.07 THOUSAND/ΜL (ref 0–0.61)
EOSINOPHIL NFR BLD AUTO: 1 % (ref 0–6)
ERYTHROCYTE [DISTWIDTH] IN BLOOD BY AUTOMATED COUNT: 13.2 % (ref 11.6–15.1)
HCT VFR BLD AUTO: 39.3 % (ref 34.8–46.1)
HGB BLD-MCNC: 12.4 G/DL (ref 11.5–15.4)
IMM GRANULOCYTES # BLD AUTO: 0.02 THOUSAND/UL (ref 0–0.2)
IMM GRANULOCYTES NFR BLD AUTO: 0 % (ref 0–2)
LYMPHOCYTES # BLD AUTO: 1.06 THOUSANDS/ΜL (ref 0.6–4.47)
LYMPHOCYTES NFR BLD AUTO: 19 % (ref 14–44)
MCH RBC QN AUTO: 31.3 PG (ref 26.8–34.3)
MCHC RBC AUTO-ENTMCNC: 31.6 G/DL (ref 31.4–37.4)
MCV RBC AUTO: 99 FL (ref 82–98)
MONOCYTES # BLD AUTO: 0.27 THOUSAND/ΜL (ref 0.17–1.22)
MONOCYTES NFR BLD AUTO: 5 % (ref 4–12)
NEUTROPHILS # BLD AUTO: 4.23 THOUSANDS/ΜL (ref 1.85–7.62)
NEUTS SEG NFR BLD AUTO: 75 % (ref 43–75)
NRBC BLD AUTO-RTO: 0 /100 WBCS
PLATELET # BLD AUTO: 235 THOUSANDS/UL (ref 149–390)
PMV BLD AUTO: 10.2 FL (ref 8.9–12.7)
PROT SERPL-MCNC: 6.9 G/DL (ref 6.4–8.2)
RBC # BLD AUTO: 3.96 MILLION/UL (ref 3.81–5.12)
WBC # BLD AUTO: 5.67 THOUSAND/UL (ref 4.31–10.16)

## 2020-03-11 PROCEDURE — 80076 HEPATIC FUNCTION PANEL: CPT

## 2020-03-11 PROCEDURE — 85025 COMPLETE CBC W/AUTO DIFF WBC: CPT

## 2020-03-11 PROCEDURE — 36415 COLL VENOUS BLD VENIPUNCTURE: CPT

## 2020-03-11 NOTE — TELEPHONE ENCOUNTER
Eloy Blount calling, states the patient is currently at [place] outpatient lab and there are no orders  Per office note, patient is to get labs done every other month  Checking LFTs and CBC per last orders on 12/24  Entered orders for today's labs and signed  Please enter new standing order for patient to continue this every 2 months if appropriate  Thanks!

## 2020-04-08 ENCOUNTER — TELEPHONE (OUTPATIENT)
Dept: NEUROLOGY | Facility: CLINIC | Age: 40
End: 2020-04-08

## 2020-04-10 ENCOUNTER — TELEPHONE (OUTPATIENT)
Dept: FAMILY MEDICINE CLINIC | Facility: CLINIC | Age: 40
End: 2020-04-10

## 2020-04-10 DIAGNOSIS — G35 MULTIPLE SCLEROSIS (HCC): Primary | ICD-10-CM

## 2020-04-13 ENCOUNTER — TELEMEDICINE (OUTPATIENT)
Dept: NEUROLOGY | Facility: CLINIC | Age: 40
End: 2020-04-13
Payer: COMMERCIAL

## 2020-04-13 DIAGNOSIS — G35 MULTIPLE SCLEROSIS (HCC): Primary | ICD-10-CM

## 2020-04-13 DIAGNOSIS — M54.16 RADICULOPATHY, LUMBAR REGION: ICD-10-CM

## 2020-04-13 PROCEDURE — 99214 OFFICE O/P EST MOD 30 MIN: CPT | Performed by: PHYSICIAN ASSISTANT

## 2020-04-23 ENCOUNTER — TELEPHONE (OUTPATIENT)
Dept: NEUROLOGY | Facility: CLINIC | Age: 40
End: 2020-04-23

## 2020-05-12 ENCOUNTER — TELEPHONE (OUTPATIENT)
Dept: FAMILY MEDICINE CLINIC | Facility: CLINIC | Age: 40
End: 2020-05-12

## 2020-05-13 DIAGNOSIS — Z20.2 STD EXPOSURE: Primary | ICD-10-CM

## 2020-05-15 ENCOUNTER — APPOINTMENT (OUTPATIENT)
Dept: LAB | Facility: HOSPITAL | Age: 40
End: 2020-05-15
Payer: COMMERCIAL

## 2020-05-15 ENCOUNTER — TELEMEDICINE (OUTPATIENT)
Dept: FAMILY MEDICINE CLINIC | Facility: CLINIC | Age: 40
End: 2020-05-15

## 2020-05-15 DIAGNOSIS — Z20.2 STD EXPOSURE: Primary | ICD-10-CM

## 2020-05-15 DIAGNOSIS — Z20.2 STD EXPOSURE: ICD-10-CM

## 2020-05-15 LAB
ALBUMIN SERPL BCP-MCNC: 3.7 G/DL (ref 3.5–5)
ALP SERPL-CCNC: 58 U/L (ref 46–116)
ALT SERPL W P-5'-P-CCNC: 19 U/L (ref 12–78)
AST SERPL W P-5'-P-CCNC: 17 U/L (ref 5–45)
BASOPHILS # BLD AUTO: 0.01 THOUSANDS/ΜL (ref 0–0.1)
BASOPHILS NFR BLD AUTO: 0 % (ref 0–1)
BILIRUB DIRECT SERPL-MCNC: 0.08 MG/DL (ref 0–0.2)
BILIRUB SERPL-MCNC: 0.26 MG/DL (ref 0.2–1)
EOSINOPHIL # BLD AUTO: 0.08 THOUSAND/ΜL (ref 0–0.61)
EOSINOPHIL NFR BLD AUTO: 2 % (ref 0–6)
ERYTHROCYTE [DISTWIDTH] IN BLOOD BY AUTOMATED COUNT: 14.1 % (ref 11.6–15.1)
HCT VFR BLD AUTO: 37.4 % (ref 34.8–46.1)
HCV AB SER QL: NORMAL
HGB BLD-MCNC: 11.8 G/DL (ref 11.5–15.4)
IMM GRANULOCYTES # BLD AUTO: 0.02 THOUSAND/UL (ref 0–0.2)
IMM GRANULOCYTES NFR BLD AUTO: 0 % (ref 0–2)
LYMPHOCYTES # BLD AUTO: 1.31 THOUSANDS/ΜL (ref 0.6–4.47)
LYMPHOCYTES NFR BLD AUTO: 26 % (ref 14–44)
MCH RBC QN AUTO: 32.2 PG (ref 26.8–34.3)
MCHC RBC AUTO-ENTMCNC: 31.6 G/DL (ref 31.4–37.4)
MCV RBC AUTO: 102 FL (ref 82–98)
MONOCYTES # BLD AUTO: 0.42 THOUSAND/ΜL (ref 0.17–1.22)
MONOCYTES NFR BLD AUTO: 8 % (ref 4–12)
NEUTROPHILS # BLD AUTO: 3.2 THOUSANDS/ΜL (ref 1.85–7.62)
NEUTS SEG NFR BLD AUTO: 64 % (ref 43–75)
NRBC BLD AUTO-RTO: 0 /100 WBCS
PLATELET # BLD AUTO: 224 THOUSANDS/UL (ref 149–390)
PMV BLD AUTO: 9.8 FL (ref 8.9–12.7)
PROT SERPL-MCNC: 6.9 G/DL (ref 6.4–8.2)
RBC # BLD AUTO: 3.67 MILLION/UL (ref 3.81–5.12)
WBC # BLD AUTO: 5.04 THOUSAND/UL (ref 4.31–10.16)

## 2020-05-15 PROCEDURE — 87491 CHLMYD TRACH DNA AMP PROBE: CPT

## 2020-05-15 PROCEDURE — 36415 COLL VENOUS BLD VENIPUNCTURE: CPT

## 2020-05-15 PROCEDURE — 86803 HEPATITIS C AB TEST: CPT

## 2020-05-15 PROCEDURE — T1015 CLINIC SERVICE: HCPCS | Performed by: FAMILY MEDICINE

## 2020-05-15 PROCEDURE — 80076 HEPATIC FUNCTION PANEL: CPT

## 2020-05-15 PROCEDURE — 87389 HIV-1 AG W/HIV-1&-2 AB AG IA: CPT

## 2020-05-15 PROCEDURE — 85025 COMPLETE CBC W/AUTO DIFF WBC: CPT

## 2020-05-15 PROCEDURE — 86592 SYPHILIS TEST NON-TREP QUAL: CPT

## 2020-05-15 PROCEDURE — 87591 N.GONORRHOEAE DNA AMP PROB: CPT

## 2020-05-15 PROCEDURE — G2012 BRIEF CHECK IN BY MD/QHP: HCPCS | Performed by: FAMILY MEDICINE

## 2020-05-15 RX ORDER — MULTIVITAMIN
1 TABLET ORAL DAILY
COMMUNITY
End: 2021-03-18 | Stop reason: ALTCHOICE

## 2020-05-15 RX ORDER — LANOLIN ALCOHOL/MO/W.PET/CERES
1000 CREAM (GRAM) TOPICAL DAILY
COMMUNITY
End: 2021-03-18 | Stop reason: ALTCHOICE

## 2020-05-17 LAB — HIV 1+2 AB+HIV1 P24 AG SERPL QL IA: NORMAL

## 2020-05-18 LAB
C TRACH DNA SPEC QL NAA+PROBE: NEGATIVE
N GONORRHOEA DNA SPEC QL NAA+PROBE: NEGATIVE
RPR SER QL: NORMAL

## 2020-06-15 ENCOUNTER — TELEPHONE (OUTPATIENT)
Dept: NEUROLOGY | Facility: CLINIC | Age: 40
End: 2020-06-15

## 2020-07-10 DIAGNOSIS — G35 MULTIPLE SCLEROSIS (HCC): ICD-10-CM

## 2020-07-10 RX ORDER — DIMETHYL FUMARATE 240 MG/1
CAPSULE ORAL
Qty: 60 CAPSULE | Refills: 5 | Status: SHIPPED | OUTPATIENT
Start: 2020-07-10 | End: 2021-06-30 | Stop reason: ALTCHOICE

## 2020-07-31 ENCOUNTER — TELEPHONE (OUTPATIENT)
Dept: NEUROLOGY | Facility: CLINIC | Age: 40
End: 2020-07-31

## 2020-07-31 NOTE — TELEPHONE ENCOUNTER
Retrieved FMLA forms from [place], reviewed patients chart Em Galvez last completed FMLA forms, in media imported 4/4/18 attached to 3/8/18 encounter "Routine PreNatal w/Dr Buddy Ribeiro" but form was filled out by our office  Clerical- Dropped charges for FMLA Update- $15 dated 7/31/20  LMOM for patient to inform her forms were received, informed of 2 week turn around and $15 charge  Asked to CB to pay or stop into office    If patient calls back please collect payment    Clinical- Scanned copy of forms into media, attached to this encounter

## 2020-08-03 NOTE — TELEPHONE ENCOUNTER
Spoke with patient confirmed we received forms and that there is a $15 charge, patient asked to call back to give payment over the phone   If patient calls back please transfer to [phone] to collect payment, charges dated 7/31/20

## 2020-08-06 NOTE — TELEPHONE ENCOUNTER
Form completed and emailed to [facility] for [employer]  Please have Jade Tony review and sign  Patient will need to be contacted when form is signed to see where she would like this sent  Thanks!

## 2020-08-06 NOTE — TELEPHONE ENCOUNTER
LM for patient regarding FMLA paper work  Let her know to please give our office a call back to see what she would like top do with it  FYI It will be scanned into chart

## 2020-08-06 NOTE — TELEPHONE ENCOUNTER
Patient calling in  She requested we email form to  [email or web address]  [email or web address]    And place copy in mail  ty!

## 2020-08-10 NOTE — ASSESSMENT & PLAN NOTE
MS remains stable  Patient will continue on Tecfidera  She will be getting new LFTs and CBC along with RF and CCP Ab when she gets her new bloodwork  Followup in 6 months with Jess Zamora  New MRI brain to be discussed on next followup

## 2020-08-11 ENCOUNTER — OFFICE VISIT (OUTPATIENT)
Dept: NEUROLOGY | Facility: CLINIC | Age: 40
End: 2020-08-11
Payer: COMMERCIAL

## 2020-08-11 VITALS
DIASTOLIC BLOOD PRESSURE: 70 MMHG | HEIGHT: 65 IN | SYSTOLIC BLOOD PRESSURE: 130 MMHG | BODY MASS INDEX: 33.72 KG/M2 | HEART RATE: 64 BPM | WEIGHT: 202.4 LBS | TEMPERATURE: 97.3 F

## 2020-08-11 DIAGNOSIS — G35 MULTIPLE SCLEROSIS (HCC): Primary | ICD-10-CM

## 2020-08-11 DIAGNOSIS — M79.18 MYOFASCIAL PAIN SYNDROME, CERVICAL: ICD-10-CM

## 2020-08-11 DIAGNOSIS — M54.10 BILATERAL RADIATING LEG PAIN: ICD-10-CM

## 2020-08-11 PROCEDURE — 4004F PT TOBACCO SCREEN RCVD TLK: CPT | Performed by: PSYCHIATRY & NEUROLOGY

## 2020-08-11 PROCEDURE — 3008F BODY MASS INDEX DOCD: CPT | Performed by: PSYCHIATRY & NEUROLOGY

## 2020-08-11 PROCEDURE — 99214 OFFICE O/P EST MOD 30 MIN: CPT | Performed by: PSYCHIATRY & NEUROLOGY

## 2020-08-11 RX ORDER — GABAPENTIN 100 MG/1
100 CAPSULE ORAL 3 TIMES DAILY
Qty: 90 CAPSULE | Refills: 2 | Status: SHIPPED | OUTPATIENT
Start: 2020-08-11 | End: 2021-02-08 | Stop reason: SDUPTHER

## 2020-08-11 NOTE — ASSESSMENT & PLAN NOTE
New onset tight tenderness localized to neck and worsens with physical activity most likely due to myofascial pain  Gabapentin 100mg TID ordered for pain

## 2020-08-11 NOTE — PROGRESS NOTES
Patient ID: Arnaldo Kim is a 45 y o  female  Assessment/Plan:    Multiple sclerosis ([facility] )  MS remains stable  Patient will continue on Tecfidera  She will be getting new LFTs and CBC along with RF and CCP Ab when she gets her new bloodwork  Followup in 6 months with Delvin Dodson  New MRI brain to be discussed on next followup  Radiculopathy, lumbar region  2/2020 MRI lumbar spine- moderate to large left paramedian protrusion/superior extraction type disc herniation which results in central and lateral recess stenosis- impacting left L5 or S1 nerve root  Patient's new bilateral leg pain does not likely fit radiculopathy picture because of no numbness, weakness, and no new urinary problems, and no nocturnal tenderness  Bilateral radiating leg pain  Shooting sharp bilateral leg pain with standing and ambulation less likely radiculopathy; no numbness or weakness, negative straight leg raise test  Pain most likely due to chronic arthritic changes  Ordered gabapentin 100mg TID for leg pain  RF, CCP Abs, and ESR also ordered for rule out of Rheumatoid Arthritis      Myofascial pain syndrome, cervical  New onset tight tenderness localized to neck and worsens with physical activity most likely due to myofascial pain  Gabapentin 100mg TID ordered for pain  Diagnoses and all orders for this visit:    Multiple sclerosis ([facility] )  -     RF Screen w/ Reflex to Titer; Future  -     Cyclic citrul peptide antibody, IgG; Future  -     gabapentin (NEURONTIN) 100 mg capsule; Take 1 capsule (100 mg total) by mouth 3 (three) times a day  -     Sedimentation rate, automated; Future    Myofascial pain syndrome, cervical    Bilateral radiating leg pain           Subjective:    Patient is a 45year old woman presenting for MS followup  Patient had no new MS flares (no new visual problems, nerve pain, weakness, numbness, balance problems) since her virtual visit 4/13  Patient is doing well on Tecfidera  Patient has no diarrhea, nausea, vomiting, abdominal pain  Since her last visit, she has had new onset bilateral leg pain 2 months ago  Pain is sharp shooting and radiates from her feet to knee  Pain only occurs when bearing weight (standing/ambulation)  Pain is a 7/10 pain at worst and is worst at work  She does not have the pain when resting and sleeping  She used ibuprofen for the pain which only partially alleviates it  She did not have the same kind of pain before but she did have "vibratory" sensation in her legs bilaterally few years ago, which she took gabapentin  She is no longer on gabapentin, but seems when she did take gabapentin she had no issues  Patient has chronic back pain, which was reduced with PT, which was last done in winter 2020  Her other new complaint is her lower neck tightness that comes with an aching 8/10 pain  Pain is nonradiating and is worse later in the day and worse with physical activity  Ibuprofen for the tenderness is partially alleviating  She otherwise has no new infections, hospitalizations since her last visit  She denies new vision problems (patient visited [facility] ophthalmology 6/2020- ophthalmology unconcerned about any visual problems), no sudden vision loss, no visual disturbances, no new urinary incontinence, no syncope, no balance problems, falls, head trauma, fevers, headaches  The following portions of the patient's history were reviewed and updated as appropriate: allergies, current medications, past family history, past medical history, past social history, past surgical history and problem list          Objective:    Blood pressure 130/70, pulse 64, temperature (!) 97 3 °F (36 3 °C), height 5' 5" (1 651 m), weight 91 8 kg (202 lb 6 4 oz), unknown if currently breastfeeding  Physical Exam  Constitutional:       Appearance: Normal appearance  HENT:      Head: Normocephalic and atraumatic     Eyes:      Extraocular Movements: Extraocular movements intact  Pupils: Pupils are equal, round, and reactive to light  Neck:      Musculoskeletal: Normal range of motion and neck supple  Muscular tenderness present  Neurological:      Mental Status: She is alert and oriented to person, place, and time  Cranial Nerves: Cranial nerves are intact  Sensory: Sensation is intact  Motor: Motor function is intact  Coordination: Coordination is intact  Romberg sign negative  Deep Tendon Reflexes: Strength normal and reflexes are normal and symmetric  Psychiatric:         Speech: Speech normal          Neurological Exam  Mental Status  Alert  Oriented to person, place and time  Speech is normal  Language is fluent with no aphasia  Attention and concentration are normal     Cranial Nerves  CN II: Visual acuity is normal  Visual fields full to confrontation  CN III, IV, VI: Extraocular movements intact bilaterally  Pupils equal round and reactive to light bilaterally  CN V: Facial sensation is normal   CN VII: Full and symmetric facial movement  CN XI: Shoulder shrug strength is normal   CN XII: Tongue midline without atrophy or fasciculations  Motor  Normal muscle bulk throughout  No fasciculations present  Normal muscle tone  No abnormal involuntary movements  Strength is 5/5 throughout all four extremities  Sensory  Sensation is intact to light touch, pinprick, vibration and proprioception in all four extremities  Negative straight leg raise test bilaterally  Reflexes  Deep tendon reflexes are 2+ and symmetric in all four extremities with downgoing toes bilaterally  Coordination  Finger-to-nose, rapid alternating movements and heel-to-shin normal bilaterally without dysmetria  Gait  Normal casual, toe, heel and tandem gait  Romberg is absent  ROS:    Review of Systems   Constitutional: Negative  Negative for appetite change and fever  HENT: Negative    Negative for hearing loss, tinnitus, trouble swallowing and voice change  Eyes: Negative  Negative for photophobia and pain  Respiratory: Negative  Negative for shortness of breath  Cardiovascular: Negative  Negative for palpitations  Gastrointestinal: Negative  Negative for nausea and vomiting  Endocrine: Negative  Negative for cold intolerance  Genitourinary: Negative  Negative for dysuria, frequency and urgency  Musculoskeletal: Negative for myalgias and neck pain  Pain in both legs and behind the shoulder blade   Skin: Negative  Negative for rash  Allergic/Immunologic: Negative  Neurological: Negative  Negative for dizziness, tremors, seizures, syncope, facial asymmetry, speech difficulty, weakness, light-headedness, numbness and headaches  Hematological: Negative  Does not bruise/bleed easily  Psychiatric/Behavioral: Negative for confusion, hallucinations and sleep disturbance          Mood swing

## 2020-08-11 NOTE — ASSESSMENT & PLAN NOTE
2/2020 MRI lumbar spine- moderate to large left paramedian protrusion/superior extraction type disc herniation which results in central and lateral recess stenosis- impacting left L5 or S1 nerve root  Patient's new bilateral leg pain does not likely fit radiculopathy picture because of no numbness, weakness, and no new urinary problems, and no nocturnal tenderness

## 2020-08-11 NOTE — ASSESSMENT & PLAN NOTE
Shooting sharp bilateral leg pain with standing and ambulation less likely radiculopathy; no numbness or weakness, negative straight leg raise test  Pain most likely due to chronic arthritic changes      Ordered gabapentin 100mg TID for leg pain  RF, CCP Abs, and ESR also ordered for rule out of Rheumatoid Arthritis

## 2020-09-01 ENCOUNTER — APPOINTMENT (OUTPATIENT)
Dept: LAB | Facility: CLINIC | Age: 40
End: 2020-09-01
Payer: COMMERCIAL

## 2020-09-01 DIAGNOSIS — G35 MULTIPLE SCLEROSIS (HCC): ICD-10-CM

## 2020-09-01 LAB — ERYTHROCYTE [SEDIMENTATION RATE] IN BLOOD: 11 MM/HOUR (ref 0–19)

## 2020-09-01 PROCEDURE — 86200 CCP ANTIBODY: CPT

## 2020-09-01 PROCEDURE — 85652 RBC SED RATE AUTOMATED: CPT

## 2020-09-01 PROCEDURE — 86430 RHEUMATOID FACTOR TEST QUAL: CPT

## 2020-09-02 LAB — RHEUMATOID FACT SER QL LA: NEGATIVE

## 2020-09-03 LAB — CCP IGA+IGG SERPL IA-ACNC: 4 UNITS (ref 0–19)

## 2020-09-08 ENCOUNTER — TELEPHONE (OUTPATIENT)
Dept: NEUROLOGY | Facility: CLINIC | Age: 40
End: 2020-09-08

## 2020-09-08 NOTE — TELEPHONE ENCOUNTER
Called patient and she was made aware  No other questions or concerns at this time, but I did let her know to call in if she did

## 2020-09-08 NOTE — TELEPHONE ENCOUNTER
----- Message from Sadie Hughes MD sent at 9/8/2020 11:27 AM EDT -----  Thanks [facility]  Team, pls let the pt know her arthritis labs were all normal    Thanks    ----- Message -----  From: Rebeca Boone MD  Sent: 9/4/2020   8:50 PM EDT  To: MD Dr Elvia Santoyo,    Here are Sed Rate, RF, and CCP Ab for the patient  RF and CCP Ab are negative and Sed Rate is normal range

## 2020-09-16 ENCOUNTER — TELEPHONE (OUTPATIENT)
Dept: NEUROLOGY | Facility: CLINIC | Age: 40
End: 2020-09-16

## 2020-09-16 DIAGNOSIS — G35 MULTIPLE SCLEROSIS (HCC): ICD-10-CM

## 2020-09-16 NOTE — TELEPHONE ENCOUNTER
Please clarify if generic tecfidera is an option  I believe this was just released in late august by [employer] [facility] [employer]  I believe this is literally brand new as a generic  Would like to make sure med is actually avail in pharmacies for distribution and also see if there are any studies for comparison to brand available before switching  Ask pt what is the cost difference and when will pharmacy actually have the generic preparation? ??  Need to make sure ok to make switch

## 2020-09-16 NOTE — TELEPHONE ENCOUNTER
Pt called and states that she has been taking brand name Teo Fiddler but her insurance informed her that if she switch to generic, she won't have to pay any copay  Brand name copay is only $3    Pt is agreeable to switch to generic if Dr Kevin Garcia is agreeable   Pls advise              [phone] ok to leave detailed message

## 2020-09-17 NOTE — TELEPHONE ENCOUNTER
Spoke with Angelika about this yesterday  They report that a lot of insurance companies are requiring pts to switch to generic version now that it is available in pharmacies  Per below, brand name copay is $3  Generic would not have a copay  Difference is $3  Pt's insurance company reached her about this  Can prescribe if agreeable and if there are any problems the pharmacy will notify our office  Per angelika, if brand Tecfidera is still preferred, we will need to note brand medically necessary on our script

## 2020-09-17 NOTE — TELEPHONE ENCOUNTER
Left patient a detailed voicemail with Dr Pat Marking message  Asked her to call back and let us know if she would like the generic prescribed  Awaiting call back

## 2020-09-17 NOTE — TELEPHONE ENCOUNTER
Let pt know this information and that the generic is very new  We have not seen any pts on med as of yet so cannot comment on how doing in clinical practice  If she would like to try generic from economic standpoint , we could write for it    Not sure if it is even in epic yet for rxing???

## 2020-09-21 NOTE — TELEPHONE ENCOUNTER
To order, would enter Rx for dimethyl fumarate 240mg and note "generic" in the script  I do not believe there is a start form for this yet  Tecfidera start form would continue to be completed  Would need to sign "substitution permitted" if appropriate  This patient would not require a new start form as she was already taking Tecfidera and it would be the same start form at this time

## 2020-09-21 NOTE — TELEPHONE ENCOUNTER
CANDIDO  Discussed below with patient  She states she is willing to try the generic version of Tecfidera  She is aware that if she were to experience any adverse events she should call our office  Pt states she missed a shipment of medication this weekend because she was not home  She is calling to reschedule delivery of this  She is unsure if brand or generic medication was sent out  Once received, she will call our office with this information  She will then request a new Rx for generic tecfidera to be sent  Does not want script sent now to avoid causing problems with current shipment

## 2020-09-21 NOTE — TELEPHONE ENCOUNTER
Great   Thanks I will be on lookout for her followup message  Can you please check now how to order the generic rx  I have not done this yet  Do we have to go thru a new start form? ??

## 2020-12-22 ENCOUNTER — TELEPHONE (OUTPATIENT)
Dept: NEUROLOGY | Facility: CLINIC | Age: 40
End: 2020-12-22

## 2021-01-30 ENCOUNTER — APPOINTMENT (OUTPATIENT)
Dept: LAB | Age: 41
End: 2021-01-30
Payer: COMMERCIAL

## 2021-02-08 ENCOUNTER — OFFICE VISIT (OUTPATIENT)
Dept: NEUROLOGY | Facility: CLINIC | Age: 41
End: 2021-02-08
Payer: COMMERCIAL

## 2021-02-08 ENCOUNTER — TELEPHONE (OUTPATIENT)
Dept: NEUROLOGY | Facility: CLINIC | Age: 41
End: 2021-02-08

## 2021-02-08 VITALS
DIASTOLIC BLOOD PRESSURE: 63 MMHG | HEART RATE: 77 BPM | SYSTOLIC BLOOD PRESSURE: 125 MMHG | WEIGHT: 213.2 LBS | BODY MASS INDEX: 35.52 KG/M2 | HEIGHT: 65 IN | RESPIRATION RATE: 18 BRPM

## 2021-02-08 DIAGNOSIS — H93.13 TINNITUS OF BOTH EARS: ICD-10-CM

## 2021-02-08 DIAGNOSIS — G35 MULTIPLE SCLEROSIS (HCC): Primary | ICD-10-CM

## 2021-02-08 DIAGNOSIS — R42 VERTIGO: ICD-10-CM

## 2021-02-08 PROCEDURE — 99214 OFFICE O/P EST MOD 30 MIN: CPT | Performed by: PHYSICIAN ASSISTANT

## 2021-02-08 RX ORDER — GABAPENTIN 100 MG/1
200 CAPSULE ORAL 3 TIMES DAILY
Qty: 540 CAPSULE | Refills: 1 | Status: SHIPPED | OUTPATIENT
Start: 2021-02-08 | End: 2021-06-30 | Stop reason: ALTCHOICE

## 2021-02-08 NOTE — PROGRESS NOTES
Patient ID: Aamir Arriola is a 45 y o  female  Assessment/Plan:    Multiple sclerosis ([facility] )  Patient remains stable, has recently changed from brand name Tecfidera to generic dimethyl fumarate due to insurance reasons  She has not noticed any issues with the switch  Labs last completed 01/30/2021 with absolute lymphocytes of 1 23  Normal LFTs  She will continue getting labs done every other month with a standing order  I placed a new standing order today  Last MRI brain was about 1 5 years ago and will update at this time  She complains of some positional vertigo and tinnitus ( see below ), so will order MRI brain with attention to the IACs  She has had increased neuropathic pain in the feet  Will increase gabapentin to 200mg TID  She notes she has been under great deal of stress and reports some mood changes and difficulty sleeping  I encouraged her to follow-up with her PCP regarding this  I am also going to have our  find her a psychologist as she wishes to see a therapist       We discussed the COVID-19 vaccine today  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  She is still contemplating  Discussed that in general, we are encouraging or MS patients, especially in high risk setting such as the one she works in, to get the vaccine, as the risk of getting COVID and complications from the virus are worse than potential risk of the vaccine causing a flare-up of her MS  Discussed the [facility] MS Society statement, which she can view on their website  Exam is stable today  Tinnitus of both ears  MRI brain with attn to IACs and referral to ENT for hearing eval and further workup  Vertigo  Patient notes occasional positional vertigo lasting less than 1 min  Likely BPPV  Ordering MRI brain with attn to IACs  Could consider vestibular rehab, although only occurring intermittently currently      Patient to return in 4 months or sooner if needed  She was advised to call for any new or worsening symptoms  Diagnoses and all orders for this visit:    Multiple sclerosis ([facility] [address] [facility] )  -     MRI brain IAC wo and w contrast; Future  -     CBC and differential; Standing  -     Hepatic function panel; Standing  -     CBC and differential  -     Hepatic function panel  -     gabapentin (NEURONTIN) 100 mg capsule; Take 2 capsules (200 mg total) by mouth 3 (three) times a day    Tinnitus of both ears  -     MRI brain IAC wo and w contrast; Future  -     Ambulatory Referral to Otolaryngology; Future    Vertigo  -     MRI brain IAC wo and w contrast; Future  -     Ambulatory Referral to Otolaryngology; Future    Other orders  -     TobraDex ophthalmic ointment; APPLY OINTMENT TO RIGHT UPPER LID 3 TIMES A DAY X 10 DAYS, THEN DISCONTINUE           Subjective:    HPI    Patient is a 45year old female who presents today for MS follow up, last seen in August 2020  To review, patient notes symptoms began in January 2015 when she had a work injury to the left shoulder  In March 2015, she had left sided numbness going down her torso, down into her feet and legs  She also experienced urinary leakage without Valsalva  Patient then experienced right eye pain and fogginess of vision  She was seen by Dr Camilo Gowers and had an OCT done  It revealed normal findings on the right but a sector defect on the left  MRI of the brain from May 13, 2015 revealed a few scattered nonspecific supratentorial WM lesions  No acute infarction, or hemorrhage or enh  MRI of the lumbar spine without contrast revealed small L5/S1 disc herniation, MRI of the T-spine revealed evidence of solitary 8mm lesion to the left of midline at T8/9 in the dorsal Cord  No pathological enh  MRI of the C-spine also done on June 13, 2015 revealed a solitary 5 mm focus of active demyelination in the right lateral cord at C2  Patient was given 3 days of IV steroids   Patient with significant GI upset with the steroids and also some mild change in mood with increased anxiety  She was NMO negative and Lyme negative  She did not have an LP  Patient was started on Copaxone as her initial IMD med in 2015  She had a brief interval of being off med due to insurance  However, due to site reactions, patient stopped Copaxone and started Tecfidera in May 2016  Patient was subsequently told to stop her Tecifidera due to unplanned pregnancy in Nov 2017  Patient delivered her daughter on 4/48/41 without complication  She also had a tubal ligation  She restarted Tecfidera in January 2019 after stopping breastfeeding  Patient had c/o low back pain as well as left hip pain  She had an x-ray of the lumbar spine which showed moderate disc narrowing at L5/S1  She saw Orthopedics who agreed with MRI lumbar spine, however this was not approved by her insurance  She then did a trial of PT from 10/28/2019 through 11/20/2019  She also had an EMG of the left lower extremity on 11/27/2019, which was a normal study  MRI lumbar spine was completed on 2/7/2020  This demonstrated moderate to large left paramedian protrusion/superior extrusion type disc herniation which results in central and lateral recess stenosis potentially impacting exiting left L5 nerve root or descending left S1 nerve root  Patient was advised to return to ortho, who she was already established with  She has not seen ortho again  Today, patient reports she has been under a lot of stress at work and at home  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  She is trying to assist her kids with virtual learning  Patient notes she declined the COVID-19 vaccine when offered at work, is "still on the fence" about it  She notes occasional vertigo about once a week, occurs while working  It lasts less than a minute, usually associated with position change  She also notes tinnitus, believe in bilateral ears  It comes and goes    Unsure of hearing loss except when she has the tinnitus  Denies speech or swallowing issues  No vision issues except she has been seeing her ophthalmologist to have styes removed from her right eye  No bowel or bladder changes  She notes mood changes (anxiety/depression), and poor sleep  Due to insurance reasons, she changed from brand name Tecfidera to generic dimethyl fumarate in December 2020  She has not noticed any difference between brand and generic  She last had labs done on 1/30/2021  Normal LFTs  CBC with WBCs 5 18, abs lymphs 1 23  The following portions of the patient's history were reviewed and updated as appropriate: allergies, past family history, past medical history, past social history, past surgical history and problem list          Objective:    Blood pressure 125/63, pulse 77, resp  rate 18, height 5' 4 75" (1 645 m), weight 96 7 kg (213 lb 3 2 oz), unknown if currently breastfeeding  Physical Exam  Constitutional:       Appearance: Normal appearance  HENT:      Head: Normocephalic and atraumatic  Eyes:      Extraocular Movements: EOM normal       Pupils: Pupils are equal, round, and reactive to light  Comments: Redness of the upper lid of the right eye, s/p stye removal with optho last week   Neurological:      Mental Status: She is alert  Coordination: Coordination is intact  Deep Tendon Reflexes: Strength normal and reflexes are normal and symmetric  Psychiatric:         Mood and Affect: Mood normal          Speech: Speech normal          Behavior: Behavior normal          Neurological Exam  Mental Status  Alert  Oriented to person, place, time and situation  Speech is normal  Language is fluent with no aphasia  Attention and concentration are normal     Cranial Nerves  CN II: Vision test: Redness of the upper lid of the right eye, s/p stye removal with optho last week  Visual fields full to confrontation  CN III, IV, VI: Extraocular movements intact bilaterally   Pupils equal round and reactive to light bilaterally  CN V: Facial sensation is normal   CN VII: Full and symmetric facial movement  CN VIII: Hearing is normal   CN IX, X: Palate elevates symmetrically  CN XI: Shoulder shrug strength is normal   CN XII: Tongue midline without atrophy or fasciculations  Motor   Normal muscle tone  Strength is 5/5 throughout all four extremities  Sensory  Light touch is normal in upper and lower extremities  Reflexes  Deep tendon reflexes are 2+ and symmetric in all four extremities with downgoing toes bilaterally  Coordination  Finger-to-nose, rapid alternating movements and heel-to-shin normal bilaterally without dysmetria  Gait  Casual gait is normal including stance, stride, and arm swing  ROS:    Review of Systems   Constitutional: Positive for fatigue  Negative for appetite change and fever  HENT: Negative  Negative for hearing loss, tinnitus (bilateral ears), trouble swallowing and voice change  Eyes: Negative  Negative for photophobia and pain  Bilateral eye twitching   Respiratory: Negative  Negative for shortness of breath  Cardiovascular: Negative  Negative for palpitations  Gastrointestinal: Negative  Negative for nausea and vomiting  Endocrine: Negative  Negative for cold intolerance  Genitourinary: Positive for urgency  Negative for dysuria and frequency  Musculoskeletal: Negative  Negative for myalgias and neck pain  Skin: Negative  Negative for rash  Allergic/Immunologic: Negative  Neurological: Positive for dizziness, speech difficulty (slurring words ) and numbness (arms, hands, legs and feet)  Negative for tremors, seizures, syncope, facial asymmetry, weakness, light-headedness and headaches  Hematological: Bruises/bleeds easily  Psychiatric/Behavioral: Positive for confusion (at times) and sleep disturbance (at times)  Negative for hallucinations  The patient is nervous/anxious  Memory issues   Word finding issues   Depression, anxiety and mood swings     I personally reviewed and updated the ROS as appropriate

## 2021-02-08 NOTE — ASSESSMENT & PLAN NOTE
Patient remains stable, has recently changed from brand name Tecfidera to generic dimethyl fumarate due to insurance reasons  She has not noticed any issues with the switch  Labs last completed 01/30/2021 with absolute lymphocytes of 1 23  Normal LFTs  She will continue getting labs done every other month with a standing order  I placed a new standing order today  Last MRI brain was about 1 5 years ago and will update at this time  She complains of some positional vertigo and tinnitus ( see below ), so will order MRI brain with attention to the IACs  She has had increased neuropathic pain in the feet  Will increase gabapentin to 200mg TID  She notes she has been under great deal of stress and reports some mood changes and difficulty sleeping  I encouraged her to follow-up with her PCP regarding this  I am also going to have our  find her a psychologist as she wishes to see a therapist       We discussed the COVID-19 vaccine today  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

## 2021-02-08 NOTE — ASSESSMENT & PLAN NOTE
Patient notes occasional positional vertigo lasting less than 1 min  Likely BPPV  Ordering MRI brain with attn to IACs  Could consider vestibular rehab, although only occurring intermittently currently

## 2021-02-08 NOTE — PATIENT INSTRUCTIONS
Continue generic dimethyl fumarate (generic Tecfidera)  Increase gabapentin to 200mg three times a day  Continue labs every other month  Will order updated MRI brain  Referral to ENT  Follow up in 4 months

## 2021-02-08 NOTE — TELEPHONE ENCOUNTER
Patient came into office today for an in office visit and brought FMLA forms to be renewes with her that need to be completed by 2/9/21  Patient is aware of the nurses completing forms and the two week timeframe for them to be completed  Advised of fee 15 00

## 2021-02-09 NOTE — TELEPHONE ENCOUNTER
KANDI called the patient to learn if she has any preferences in a therapist/psychologist  She did not answer the phone  KANDI left her a VM explaining the reason for the call and asked her to please call KANDI back

## 2021-02-10 NOTE — TELEPHONE ENCOUNTER
Called patient and made aware that forms are complete, but there is a section that she has to sign  She will come in the office tomorrow between 3 pm  and 4 pm to sign and have me fax them in  Patient will call if anything is going to change before hand

## 2021-02-12 NOTE — TELEPHONE ENCOUNTER
KANDI made a 2nd attempt to contact the patient  She did not answer the phone  KANDI left her a VM explaining the reason for the call and asked her to please call KANDI back

## 2021-02-16 NOTE — TELEPHONE ENCOUNTER
MSW spoke with the patient  She confirmed that she would like assistance finding a mental health provider  She had no preferences other than not wanting to travel further than 15 miles from her home in [facility]  MSW will research providers and mail a list to her home

## 2021-02-17 NOTE — TELEPHONE ENCOUNTER
The mental health agencies below are in network with the patient's insurance and are location in the [facility] area  MSW will mail a copy of this list to her home  [facility] in [facility] [address], [address]  [address] [facility] and [address] [facility]   [address] [facility], [address] [place] [address]  [address] [place] [address]  [address], [address]   [place] [address] [place] [address], [address] [facility]  [phone]    [id number] [place] [address],8Th Floor   [address] [facility]  [facility], [address]  [address] [facility]  [address]  [facility], [address]  [address] [facility]  [address] [facility]  [facility], [address]   [phone]     [facility]   [address], [address]  [address] [facility]  [address]   [address] [facility]   [facility], [address] [facility]   [phone]

## 2021-02-19 NOTE — TELEPHONE ENCOUNTER
List of agencies have been mailed to to the patient's home  MSW will be available to assist with any further needs in regards to this patient

## 2021-03-02 ENCOUNTER — HOSPITAL ENCOUNTER (OUTPATIENT)
Dept: MRI IMAGING | Facility: HOSPITAL | Age: 41
Discharge: HOME | End: 2021-03-02
Payer: COMMERCIAL

## 2021-03-02 DIAGNOSIS — H93.13 TINNITUS OF BOTH EARS: ICD-10-CM

## 2021-03-02 DIAGNOSIS — R42 VERTIGO: ICD-10-CM

## 2021-03-02 DIAGNOSIS — G35 MULTIPLE SCLEROSIS (HCC): ICD-10-CM

## 2021-03-02 PROCEDURE — A9585 GADOBUTROL INJECTION: HCPCS | Performed by: PHYSICIAN ASSISTANT

## 2021-03-02 PROCEDURE — 70553 MRI BRAIN STEM W/O & W/DYE: CPT

## 2021-03-02 PROCEDURE — G1004 CDSM NDSC: HCPCS

## 2021-03-02 RX ADMIN — GADOBUTROL 10 ML: 604.72 INJECTION INTRAVENOUS at 08:38

## 2021-03-04 ENCOUNTER — TELEPHONE (OUTPATIENT)
Dept: NEUROLOGY | Facility: CLINIC | Age: 41
End: 2021-03-04

## 2021-03-04 NOTE — TELEPHONE ENCOUNTER
----- Message from Linda Valera PA-C sent at 3/4/2021  9:54 AM EST -----  Please let patient know MRI brain was stable  No pathology to account for her vertigo and tinnitus  Would have her seen ENT, as discussed at her visit  I did place the referral already but do not see she is scheduled  Thanks!

## 2021-03-04 NOTE — TELEPHONE ENCOUNTER
Called patient with results and she does not have an appointment with ENT at this time, but she will call them to schedule an appointment

## 2021-03-12 ENCOUNTER — HOSPITAL ENCOUNTER (EMERGENCY)
Facility: HOSPITAL | Age: 41
Discharge: HOME | End: 2021-03-12
Payer: COMMERCIAL

## 2021-03-12 ENCOUNTER — APPOINTMENT (EMERGENCY)
Dept: CT IMAGING | Facility: HOSPITAL | Age: 41
End: 2021-03-12
Payer: COMMERCIAL

## 2021-03-12 ENCOUNTER — OFFICE VISIT (OUTPATIENT)
Dept: URGENT CARE | Age: 41
End: 2021-03-12
Payer: COMMERCIAL

## 2021-03-12 ENCOUNTER — TELEPHONE (OUTPATIENT)
Dept: NEUROLOGY | Facility: CLINIC | Age: 41
End: 2021-03-12

## 2021-03-12 VITALS
RESPIRATION RATE: 20 BRPM | OXYGEN SATURATION: 100 % | WEIGHT: 218 LBS | SYSTOLIC BLOOD PRESSURE: 163 MMHG | TEMPERATURE: 98 F | HEIGHT: 64 IN | HEART RATE: 76 BPM | BODY MASS INDEX: 37.22 KG/M2 | DIASTOLIC BLOOD PRESSURE: 84 MMHG

## 2021-03-12 VITALS
BODY MASS INDEX: 36.61 KG/M2 | SYSTOLIC BLOOD PRESSURE: 135 MMHG | WEIGHT: 218.31 LBS | RESPIRATION RATE: 18 BRPM | TEMPERATURE: 96 F | HEART RATE: 69 BPM | DIASTOLIC BLOOD PRESSURE: 75 MMHG | OXYGEN SATURATION: 100 %

## 2021-03-12 DIAGNOSIS — N30.01 ACUTE CYSTITIS WITH HEMATURIA: Primary | ICD-10-CM

## 2021-03-12 DIAGNOSIS — R35.0 URINE FREQUENCY: ICD-10-CM

## 2021-03-12 DIAGNOSIS — G51.0 BELL'S PALSY: Primary | ICD-10-CM

## 2021-03-12 DIAGNOSIS — R10.2 PELVIC PAIN: ICD-10-CM

## 2021-03-12 DIAGNOSIS — N89.8 VAGINAL DISCHARGE: ICD-10-CM

## 2021-03-12 LAB
ALBUMIN SERPL BCP-MCNC: 4.1 G/DL (ref 3–5.2)
ALP SERPL-CCNC: 50 U/L (ref 43–122)
ALT SERPL W P-5'-P-CCNC: 9 U/L (ref 9–52)
ANION GAP SERPL CALCULATED.3IONS-SCNC: 7 MMOL/L (ref 5–14)
APTT PPP: 31 SECONDS (ref 23–37)
AST SERPL W P-5'-P-CCNC: 23 U/L (ref 14–36)
BACTERIA UR QL AUTO: NORMAL /HPF
BASOPHILS # BLD AUTO: 0 THOUSANDS/ΜL (ref 0–0.1)
BASOPHILS NFR BLD AUTO: 0 % (ref 0–1)
BILIRUB SERPL-MCNC: 0.4 MG/DL
BILIRUB UR QL STRIP: NEGATIVE
BUN SERPL-MCNC: 10 MG/DL (ref 5–25)
CALCIUM SERPL-MCNC: 9.5 MG/DL (ref 8.4–10.2)
CHLORIDE SERPL-SCNC: 102 MMOL/L (ref 97–108)
CLARITY UR: CLEAR
CO2 SERPL-SCNC: 29 MMOL/L (ref 22–30)
COLOR UR: ABNORMAL
CREAT SERPL-MCNC: 0.49 MG/DL (ref 0.6–1.2)
EOSINOPHIL # BLD AUTO: 0.1 THOUSAND/ΜL (ref 0–0.4)
EOSINOPHIL NFR BLD AUTO: 1 % (ref 0–6)
ERYTHROCYTE [DISTWIDTH] IN BLOOD BY AUTOMATED COUNT: 14 %
EXT PREG TEST URINE: NEGATIVE
EXT. CONTROL ED NAV: NORMAL
GFR SERPL CREATININE-BSD FRML MDRD: 123 ML/MIN/1.73SQ M
GLUCOSE SERPL-MCNC: 90 MG/DL (ref 70–99)
GLUCOSE UR STRIP-MCNC: NEGATIVE MG/DL
HCT VFR BLD AUTO: 34.4 % (ref 36–46)
HGB BLD-MCNC: 11.4 G/DL (ref 12–16)
HGB UR QL STRIP.AUTO: 25
INR PPP: 1 (ref 0.84–1.19)
KETONES UR STRIP-MCNC: NEGATIVE MG/DL
LEUKOCYTE ESTERASE UR QL STRIP: NEGATIVE
LYMPHOCYTES # BLD AUTO: 1.2 THOUSANDS/ΜL (ref 0.5–4)
LYMPHOCYTES NFR BLD AUTO: 18 % (ref 25–45)
MCH RBC QN AUTO: 31.7 PG (ref 26–34)
MCHC RBC AUTO-ENTMCNC: 33 G/DL (ref 31–36)
MCV RBC AUTO: 96 FL (ref 80–100)
MONOCYTES # BLD AUTO: 0.5 THOUSAND/ΜL (ref 0.2–0.9)
MONOCYTES NFR BLD AUTO: 8 % (ref 1–10)
NEUTROPHILS # BLD AUTO: 4.9 THOUSANDS/ΜL (ref 1.8–7.8)
NEUTS SEG NFR BLD AUTO: 73 % (ref 45–65)
NITRITE UR QL STRIP: NEGATIVE
NON-SQ EPI CELLS URNS QL MICRO: NORMAL /HPF
PH UR STRIP.AUTO: 7 [PH]
PLATELET # BLD AUTO: 223 THOUSANDS/UL (ref 150–450)
PMV BLD AUTO: 8.5 FL (ref 8.9–12.7)
POTASSIUM SERPL-SCNC: 3.8 MMOL/L (ref 3.6–5)
PROT SERPL-MCNC: 7 G/DL (ref 5.9–8.4)
PROT UR STRIP-MCNC: NEGATIVE MG/DL
PROTHROMBIN TIME: 13.3 SECONDS (ref 11.6–14.5)
RBC # BLD AUTO: 3.58 MILLION/UL (ref 4–5.2)
RBC #/AREA URNS AUTO: NORMAL /HPF
SL AMB  POCT GLUCOSE, UA: ABNORMAL
SL AMB LEUKOCYTE ESTERASE,UA: ABNORMAL
SL AMB POCT BILIRUBIN,UA: ABNORMAL
SL AMB POCT BLOOD,UA: ABNORMAL
SL AMB POCT CLARITY,UA: ABNORMAL
SL AMB POCT COLOR,UA: ABNORMAL
SL AMB POCT KETONES,UA: ABNORMAL
SL AMB POCT NITRITE,UA: ABNORMAL
SL AMB POCT PH,UA: 8
SL AMB POCT SPECIFIC GRAVITY,UA: 1.01
SL AMB POCT URINE PROTEIN: ABNORMAL
SL AMB POCT UROBILINOGEN: 1
SODIUM SERPL-SCNC: 138 MMOL/L (ref 137–147)
SP GR UR STRIP.AUTO: 1.01 (ref 1–1.04)
UROBILINOGEN UA: NEGATIVE MG/DL
WBC # BLD AUTO: 6.8 THOUSAND/UL (ref 4.5–11)
WBC #/AREA URNS AUTO: NORMAL /HPF

## 2021-03-12 PROCEDURE — 87591 N.GONORRHOEAE DNA AMP PROB: CPT | Performed by: PHYSICIAN ASSISTANT

## 2021-03-12 PROCEDURE — 96374 THER/PROPH/DIAG INJ IV PUSH: CPT

## 2021-03-12 PROCEDURE — 99284 EMERGENCY DEPT VISIT MOD MDM: CPT

## 2021-03-12 PROCEDURE — 99283 EMERGENCY DEPT VISIT LOW MDM: CPT | Performed by: PHYSICIAN ASSISTANT

## 2021-03-12 PROCEDURE — 81001 URINALYSIS AUTO W/SCOPE: CPT

## 2021-03-12 PROCEDURE — G1004 CDSM NDSC: HCPCS

## 2021-03-12 PROCEDURE — 85610 PROTHROMBIN TIME: CPT

## 2021-03-12 PROCEDURE — 36415 COLL VENOUS BLD VENIPUNCTURE: CPT

## 2021-03-12 PROCEDURE — 85730 THROMBOPLASTIN TIME PARTIAL: CPT

## 2021-03-12 PROCEDURE — 81025 URINE PREGNANCY TEST: CPT

## 2021-03-12 PROCEDURE — 81003 URINALYSIS AUTO W/O SCOPE: CPT

## 2021-03-12 PROCEDURE — 80053 COMPREHEN METABOLIC PANEL: CPT

## 2021-03-12 PROCEDURE — 85025 COMPLETE CBC W/AUTO DIFF WBC: CPT

## 2021-03-12 PROCEDURE — 87491 CHLMYD TRACH DNA AMP PROBE: CPT | Performed by: PHYSICIAN ASSISTANT

## 2021-03-12 PROCEDURE — G0382 LEV 3 HOSP TYPE B ED VISIT: HCPCS | Performed by: PHYSICIAN ASSISTANT

## 2021-03-12 PROCEDURE — 86618 LYME DISEASE ANTIBODY: CPT

## 2021-03-12 PROCEDURE — 81002 URINALYSIS NONAUTO W/O SCOPE: CPT | Performed by: PHYSICIAN ASSISTANT

## 2021-03-12 PROCEDURE — 70450 CT HEAD/BRAIN W/O DYE: CPT

## 2021-03-12 PROCEDURE — 87086 URINE CULTURE/COLONY COUNT: CPT | Performed by: PHYSICIAN ASSISTANT

## 2021-03-12 RX ORDER — AZITHROMYCIN 250 MG/1
500 TABLET, FILM COATED ORAL EVERY 24 HOURS
Qty: 2 TABLET | Refills: 0 | Status: SHIPPED | OUTPATIENT
Start: 2021-03-12 | End: 2021-03-13

## 2021-03-12 RX ORDER — METHYLPREDNISOLONE SODIUM SUCCINATE 125 MG/2ML
125 INJECTION, POWDER, LYOPHILIZED, FOR SOLUTION INTRAMUSCULAR; INTRAVENOUS ONCE
Status: COMPLETED | OUTPATIENT
Start: 2021-03-12 | End: 2021-03-12

## 2021-03-12 RX ORDER — PREDNISONE 50 MG/1
50 TABLET ORAL DAILY
Qty: 5 TABLET | Refills: 0 | Status: SHIPPED | OUTPATIENT
Start: 2021-03-12 | End: 2021-03-18 | Stop reason: ALTCHOICE

## 2021-03-12 RX ORDER — VALACYCLOVIR HYDROCHLORIDE 1 G/1
1000 TABLET, FILM COATED ORAL 3 TIMES DAILY
Qty: 21 TABLET | Refills: 0 | Status: SHIPPED | OUTPATIENT
Start: 2021-03-12 | End: 2021-06-08 | Stop reason: ALTCHOICE

## 2021-03-12 RX ORDER — CEFTRIAXONE SODIUM 250 MG/1
250 INJECTION, POWDER, FOR SOLUTION INTRAMUSCULAR; INTRAVENOUS ONCE
Status: COMPLETED | OUTPATIENT
Start: 2021-03-12 | End: 2021-03-12

## 2021-03-12 RX ORDER — VALACYCLOVIR HYDROCHLORIDE 500 MG/1
1000 TABLET, FILM COATED ORAL ONCE
Status: COMPLETED | OUTPATIENT
Start: 2021-03-12 | End: 2021-03-12

## 2021-03-12 RX ADMIN — VALACYCLOVIR HYDROCHLORIDE 1000 MG: 500 TABLET, FILM COATED ORAL at 12:26

## 2021-03-12 RX ADMIN — CEFTRIAXONE SODIUM 250 MG: 250 INJECTION, POWDER, FOR SOLUTION INTRAMUSCULAR; INTRAVENOUS at 19:49

## 2021-03-12 RX ADMIN — METHYLPREDNISOLONE SODIUM SUCCINATE 125 MG: 125 INJECTION, POWDER, LYOPHILIZED, FOR SOLUTION INTRAMUSCULAR; INTRAVENOUS at 10:59

## 2021-03-12 NOTE — Clinical Note
Nicolette Sagastume was seen and treated in our emergency department on 3/12/2021  Diagnosis:     Claudia Carter    She may return on this date: 03/17/2021    No work due to Medical condition related to patients MS  If you have any questions or concerns, please don't hesitate to call        Shahram Webster MD    ______________________________           _______________          _______________  Hospital Representative                              Date                                Time

## 2021-03-12 NOTE — TELEPHONE ENCOUNTER
Patient calling to report new symptoms  Noticed on Monday that her tongue was numb  Thursday her lips were puffy and became numb  And today she reports she is having twitches in her right eye and pain from her jaw into her ear  She denies any recent dental work  No recent illnesses  No increased fatigue  Patient does report recent loose stools with urgency  Patient also reports some pelvic pain/yellowish discharge  I advised her to call PCP to r/o infection  Patient states she was planning to go to an urgent care today for this  Patient also reports two days last week she experienced dizziness and black spots in eyes  This lasted appx  5 minutes and has not occurred again  Patient no longer having tinnitus  She does state that she feels her left ear is clogged  Patient was referred to ENT at last office visit (2/8)  She has not done so yet  Patient has had IV steroids in the past  Tolerated well  No hx of diabetes or psych  No drug use  Patient is on Tecfidera 240 mg and Gabapentin 200mg -2 capsules TID  She has not missed any doses  Last MRI 3/2  Stable results  [phone] to leave ms  Arlon Lesch - Please advise  Thanks

## 2021-03-12 NOTE — ED PROVIDER NOTES
History  Chief Complaint   Patient presents with    Facial Numbness     states Monday noted some numbness in tongue and then Thursday numbness to lips, now right side face with some pain on right side of face today  Eating and drinking ok  36-year-old female history multiple sclerosis presents secondary to right facial symptoms which have been going on for a few days  Patient states she noticed she was having some numbness in her mouth of her tongue 4 days ago  Patient stated she had no difficulty swallowing at that time period but the symptoms have been persistent  Patient states that yesterday she noticed she was having numbness on her right side of her lips and her face which have been persistent and associated with some weakness to the right side of her face  This morning patient noticed she was having some pain sensation over the right side of the face which is why she came to the emergency department  Patient does have history multiple sclerosis she is awake alert oriented she denies any weakness of her upper lower extremities  Patient denies any difficulty swelling denies any visual involvement  Patient does have an NIH stroke scale score of 2 secondary to right facial weakness and numbness however uncertain when the symptoms started since she has been having symptoms for approximately 4 days  She is not a candidate for stroke alert at this time  Prior to Admission Medications   Prescriptions Last Dose Informant Patient Reported? Taking? Multiple Vitamin (MULTIVITAMIN) tablet  Self Yes No   Sig: Take 1 tablet by mouth daily   TECFIDERA 240 MG CPDR  Self No No   Sig: TAKE ONE CAPSULE BY MOUTH TWICE DAILY  STORE IN ORIGINAL CONTAINER AT ROOM TEMPERATURE     TobraDex ophthalmic ointment   Yes No   Sig: APPLY OINTMENT TO RIGHT UPPER LID 3 TIMES A DAY X 10 DAYS, THEN DISCONTINUE   gabapentin (NEURONTIN) 100 mg capsule   No No   Sig: Take 2 capsules (200 mg total) by mouth 3 (three) times a day   ibuprofen (MOTRIN) 600 mg tablet  Self Yes No   Sig: Take 600 mg by mouth every 6 (six) hours as needed   vitamin B-12 (VITAMIN B-12) 1,000 mcg tablet  Self Yes No   Sig: Take 1,000 mcg by mouth daily      Facility-Administered Medications: None       Past Medical History:   Diagnosis Date    Abnormal Pap smear of cervix     Depression     Last assessed: 8/1/12    History of chlamydia infection     History of gonorrhea     MS (multiple sclerosis) ([address] )     Ovarian cyst     Varicella     HX DISEASE       Past Surgical History:   Procedure Laterality Date    KNEE ARTHROSCOPY      PA LAP,TUBAL CAUTERY Bilateral 10/5/2018    Procedure: LIGATION TUBAL LAPAROSCOPIC - FALOPE RINGS;  Surgeon: Cherelle Ardon MD;  [facility];  Service: Gynecology       Family History   Problem Relation Age of Onset    Hypertension Mother     Hypertension Father     Macular degeneration Father     Glaucoma Family     Macular degeneration Family     Hypertension Family     Anuerysm Sister     Migraines Sister     No Known Problems Daughter     Asthma Son     ADD / ADHD Son     Allergies Son      I have reviewed and agree with the history as documented  E-Cigarette/Vaping    E-Cigarette Use Never User      E-Cigarette/Vaping Substances     Social History     Tobacco Use    Smoking status: Current Every Day Smoker     Packs/day: 0 50     Types: Cigarettes    Smokeless tobacco: Never Used   Substance Use Topics    Alcohol use: No    Drug use: No       Review of Systems   Constitutional: Negative for chills and fever  HENT: Negative for congestion  Eyes: Negative for visual disturbance  Respiratory: Negative for shortness of breath  Cardiovascular: Negative for chest pain  Gastrointestinal: Negative for abdominal pain  Endocrine: Negative for cold intolerance  Genitourinary: Negative for frequency  Musculoskeletal: Negative for gait problem  Skin: Negative for rash     Neurological: Positive for facial asymmetry, weakness and numbness  Negative for dizziness  Psychiatric/Behavioral: Negative for behavioral problems and confusion  Physical Exam  Physical Exam  Vitals signs and nursing note reviewed  Constitutional:       Appearance: She is well-developed  HENT:      Head: Normocephalic and atraumatic  Right Ear: Tympanic membrane normal       Left Ear: Tympanic membrane normal       Nose: Nose normal       Mouth/Throat:      Mouth: Mucous membranes are dry  Eyes:      Extraocular Movements: Extraocular movements intact  Conjunctiva/sclera: Conjunctivae normal       Pupils: Pupils are equal, round, and reactive to light  Neck:      Musculoskeletal: Normal range of motion and neck supple  Cardiovascular:      Rate and Rhythm: Normal rate and regular rhythm  Heart sounds: Normal heart sounds  Pulmonary:      Effort: Pulmonary effort is normal       Breath sounds: Normal breath sounds  Abdominal:      General: Bowel sounds are normal       Palpations: Abdomen is soft  Musculoskeletal: Normal range of motion  Skin:     General: Skin is warm and dry  Capillary Refill: Capillary refill takes less than 2 seconds  Neurological:      Mental Status: She is alert and oriented to person, place, and time  Cranial Nerves: Cranial nerve deficit present  Sensory: Sensory deficit present  Motor: Weakness present  Coordination: Coordination normal       Gait: Gait normal       Deep Tendon Reflexes: Reflexes normal       Comments: Patient has what appears to be a right-sided facial palsy weakness of right forehead mid face and lower face muscles are patient has decreased sensation to light touch over entire right face     Psychiatric:         Behavior: Behavior normal          Vital Signs  ED Triage Vitals [03/12/21 1027]   Temperature Pulse Respirations Blood Pressure SpO2   (!) 96 °F (35 6 °C) 80 16 149/85 100 %      Temp Source Heart Rate Source Patient Position - Orthostatic VS BP Location FiO2 (%)   Tympanic Monitor Lying Left arm --      Pain Score       4           Vitals:    03/12/21 1027   BP: 149/85   Pulse: 80   Patient Position - Orthostatic VS: Lying         Visual Acuity      ED Medications  Medications   methylPREDNISolone sodium succinate (Solu-MEDROL) injection 125 mg (has no administration in time range)       Diagnostic Studies  Results Reviewed     Procedure Component Value Units Date/Time    APTT [[id number]]     Lab Status: No result Specimen: Blood     POCT pregnancy, urine [[id number]]     Lab Status: No result     UA w Reflex to Microscopic w Reflex to Culture [[id number]]     Lab Status: No result Specimen: Urine     Lyme Antibody Profile with reflex to WB [[id number]]     Lab Status: No result Specimen: Blood     CBC and differential [[id number]]     Lab Status: No result Specimen: Blood     Comprehensive metabolic panel [[id number]]     Lab Status: No result Specimen: Blood     Protime-INR [[id number]]     Lab Status: No result Specimen: Blood                  CT head without contrast    (Results Pending)              Procedures  Procedures         ED Course                 Stroke Assessment     Row Name 03/12/21 1032             NIH Stroke Scale    Interval  --      Level of Consciousness (1a )  0      LOC Questions (1b )  0      LOC Commands (1c )  0      Best Gaze (2 )  0      Visual (3 )  0      Facial Palsy (4 )  1      Motor Arm, Left (5a )  0      Motor Arm, Right (5b )  0      Motor Leg, Left (6a )  0      Motor Leg, Right (6b )  0      Limb Ataxia (7 )  0      Sensory (8 )  1      Best Language (9 )  0      Dysarthria (10 )  0      Extinction and Inattention (11 ) (Formerly Neglect)  0      Total  2          First Filed Value   TPA Decision  Patient not a TPA candidate  Patient is not a candidate options  Unclear time of onset outside appropriate time window                                  MDM  Number of Diagnoses or Management Options  Diagnosis management comments: Patient was monitored in the emergency department for number of hours CT scan demonstrates no acute findings labs demonstrate no acute abnormality clinical presentation she was that of a Bell's palsy or an acute nerve palsy secondary to an MS flare  I consult to the patient's Neuro team  Teodoro MILLER, here at [facility] is to treat the patient for Bell's palsy they will reach out schedule follow-up appointment for the patient within the week  Disposition  Final diagnoses:   None     ED Disposition     None      Follow-up Information    None         Patient's Medications   Discharge Prescriptions    No medications on file     No discharge procedures on file      PDMP Review     None          ED Provider  Electronically Signed by           Bertha Bailey MD  03/12/21 0565

## 2021-03-12 NOTE — TELEPHONE ENCOUNTER
Patient in the ER, received a tiger text from ER physician  He believes she has Bell's palsy  Her recent MRI was stable  Please see if we can get her in to see us in the next 1-2 weeks  Thanks!

## 2021-03-13 LAB
B BURGDOR IGG+IGM SER-ACNC: 84
BACTERIA UR CULT: NORMAL
C TRACH DNA SPEC QL NAA+PROBE: NEGATIVE
N GONORRHOEA DNA SPEC QL NAA+PROBE: NEGATIVE

## 2021-03-13 NOTE — PROGRESS NOTES
[facility]        NAME: Aamir Arriola is a 45 y o  female  : 1980    MRN: [id number]  DATE: 2021  TIME: 8:17 PM    Assessment and Plan   Acute cystitis with hematuria [N30 01]  1  Acute cystitis with hematuria  azithromycin (ZITHROMAX) 250 mg tablet   2  Urine frequency  POCT urine dip    Chlamydia/GC amplified DNA by PCR    Urine culture    CANCELED: Throat culture   3  Pelvic pain     4  Vaginal discharge  cefTRIAXone (ROCEPHIN) injection 250 mg    azithromycin (ZITHROMAX) 250 mg tablet     Patient's symptoms of concern for gonorrhea and chlamydial infection particularly if her description of thick yellow vaginal discharge  Recommended empiric  treatment for gonorrhea and chlamydia  Patient is comfortable this plan Rocephin injection administered in the office today azithromycin prescription sent  To patient's pharmacy  Discussed that this should also treat any urine symptoms  Take antibiotic as directed  Urine will be sent for culture, will call you if antibiotic does not cover  Bacteria present urine  If symptoms not improved in 2-3 days, follow-up with PCP  If symptoms worsen, report to the emergency department  If desire additional STI testing, follow-up with PCP  Patient Instructions       Follow up with PCP in 3-5 days  Proceed to  ER if symptoms worsen  Chief Complaint     Chief Complaint   Patient presents with    Possible UTI     pt was at the e r this morning for dx: bells palsy  pt expressed in er she was having vaginal bleeding, discharge and has noted blood in urine  presusure with urination, pelvic pressure  History of Present Illness        52-year-old female presents with complaints of abdominal pain and pressure frequency and urgency for the last month  Patient additionally notes that she has had some thick yellowish vaginal discharge with occasional blood spotting     Patient has not had any new sexual partners but states that approximately a month ago her and her significant other were having issues in arguing frequently and she is not sure that he may not have had a sexual encounter outside of their relationship,   And request testing for gonorrhea and chlamydia with her urine dip today  Of note patient with the ER early this morning and diagnosis Bell's palsy she states that she complains of these symptoms at that time but they more or more worried about her Bell palsy and insuring she did have a stroke  They did not do a pelvic exam and did not start her on any antibiotics  She has no other concerns or complaints at this time  Review of Systems   Review of Systems   Constitutional: Negative for chills and fever  Gastrointestinal: Positive for abdominal pain (  Pelvic)  Genitourinary: Positive for dysuria, flank pain, frequency, urgency, vaginal discharge and vaginal pain  Musculoskeletal: Negative for back pain  Current Medications       Current Outpatient Medications:     azithromycin (ZITHROMAX) 250 mg tablet, Take 2 tablets (500 mg total) by mouth every 24 hours for 1 day, Disp: 2 tablet, Rfl: 0    gabapentin (NEURONTIN) 100 mg capsule, Take 2 capsules (200 mg total) by mouth 3 (three) times a day, Disp: 540 capsule, Rfl: 1    ibuprofen (MOTRIN) 600 mg tablet, Take 600 mg by mouth every 6 (six) hours as needed, Disp: , Rfl: 0    Multiple Vitamin (MULTIVITAMIN) tablet, Take 1 tablet by mouth daily, Disp: , Rfl:     predniSONE 50 mg tablet, Take 1 tablet (50 mg total) by mouth daily, Disp: 5 tablet, Rfl: 0    TECFIDERA 240 MG CPDR, TAKE ONE CAPSULE BY MOUTH TWICE DAILY   STORE IN ORIGINAL CONTAINER AT ROOM TEMPERATURE , Disp: 60 capsule, Rfl: 5    TobraDex ophthalmic ointment, APPLY OINTMENT TO RIGHT UPPER LID 3 TIMES A DAY X 10 DAYS, THEN DISCONTINUE, Disp: , Rfl:     valACYclovir (VALTREX) 1,000 mg tablet, Take 1 tablet (1,000 mg total) by mouth 3 (three) times a day for 7 days, Disp: 21 tablet, Rfl: 0    vitamin B-12 (VITAMIN B-12) 1,000 mcg tablet, Take 1,000 mcg by mouth daily, Disp: , Rfl:   No current facility-administered medications for this visit  Current Allergies     Allergies as of 03/12/2021    (No Known Allergies)            The following portions of the patient's history were reviewed and updated as appropriate: allergies, current medications, past family history, past medical history, past social history, past surgical history and problem list      Past Medical History:   Diagnosis Date    Abnormal Pap smear of cervix     Depression     Last assessed: 8/1/12    History of chlamydia infection     History of gonorrhea     [facility] [address]) [facility] [address]     Ovarian cyst     Varicella     HX DISEASE       Past Surgical History:   Procedure Laterality Date    KNEE ARTHROSCOPY      [place] LAP,TUBAL CAUTERY Bilateral 10/5/2018    Procedure: LIGATION TUBAL LAPAROSCOPIC - Ben Rachel;  Surgeon: Madyson Morejon MD;  [facility];  Service: Gynecology       Family History   Problem Relation Age of Onset    Hypertension Mother     Hypertension Father     Macular degeneration Father     Glaucoma Family     Macular degeneration Family     Hypertension Family     Anuerysm Sister     Migraines Sister     No Known Problems Daughter     Asthma Son     ADD / ADHD Son     Allergies Son          Medications have been verified  Objective   /84   Pulse 76   Temp 98 °F (36 7 °C)   Resp 20   Ht 5' 4" (1 626 m)   Wt 98 9 kg (218 lb)   LMP 03/02/2021 (Exact Date)   SpO2 100%   BMI 37 42 kg/m²   Patient's last menstrual period was 03/02/2021 (exact date)  Physical Exam     Physical Exam  Vitals signs and nursing note reviewed  Constitutional:       General: She is awake  She is not in acute distress  Appearance: Normal appearance  She is well-developed and well-groomed  She is not ill-appearing, toxic-appearing or diaphoretic     HENT:      Head: Normocephalic and atraumatic  Right Ear: Hearing and external ear normal       Left Ear: Hearing and external ear normal       Nose: Nose normal       Mouth/Throat:      Lips: Pink  No lesions  Eyes:      General: Lids are normal  Vision grossly intact  Gaze aligned appropriately  Extraocular Movements: Extraocular movements intact  Conjunctiva/sclera: Conjunctivae normal       Comments:  No ptosis or  asymmetry of the face noted at this time  Neck:      Musculoskeletal: Normal range of motion  Abdominal:      Tenderness: There is abdominal tenderness in the suprapubic area  There is no right CVA tenderness or left CVA tenderness  Skin:     General: Skin is warm and dry  Neurological:      Mental Status: She is alert and oriented to person, place, and time  Coordination: Coordination is intact  Gait: Gait is intact  Psychiatric:         Attention and Perception: Attention and perception normal          Mood and Affect: Mood and affect normal          Speech: Speech normal          Behavior: Behavior normal  Behavior is cooperative

## 2021-03-13 NOTE — PATIENT INSTRUCTIONS
Take antibiotic as directed  Urine will be sent for culture, will call you if antibiotic does not cover  If symptoms not improved in 2-3 days, follow-up with PCP  If symptoms worsen, report to the emergency department  If desire additional STI testing, follow-up with PCP  Chlamydia   AMBULATORY CARE:   Chlamydia  is a sexually transmitted infection (STI)  It is caused by a bacteria most often spread through vaginal, oral, or anal sex  You have an increased risk of chlamydia if you have another STI, such as gonorrhea  Your risk is also higher if you have more than 1 sex partner  Common symptoms include the following: You may have no signs or symptoms  Even without signs and symptoms, you can still pass the infection on to your sex partner  The signs and symptoms of chlamydia include the following:  Vaginal redness or itching    Discharge from your vagina, penis, or rectum    Pain with urination     Pain during sex     Abdominal pain     Testicular pain    Fever    Seek care immediately if:   You have a fever  You have nausea or vomit repeatedly  You have severe abdominal pain  Contact your healthcare provider if:   Your signs or symptoms last longer than 1 week or get worse during treatment  Your signs or symptoms return after treatment  You have pain during sex  You have questions or concerns about your condition or care  Treatment for chlamydia  may include antibiotics to treat the bacterial infection  Untreated chlamydia may cause permanent damage to your reproductive system (ovaries, uterus, fallopian tubes), chronic pain, or difficulty getting pregnant  It may also cause an ectopic pregnancy (pregnancy outside of the uterus)  Prevent the spread of chlamydia:   Wash your hands often  Use soap and water  Wash your hands after you use the bathroom  This helps prevent the infection from spreading to other parts of your body, such as your eyes      Use a latex condom during sex to prevent chlamydia and other STIs  Use a new condom each time you have sex  Talk to your sex partners  Tell anyone you have had sex with in the last 3 months that you have chlamydia  They may also be infected and need treatment  Ask your sex partners to get tested before you have sex  Get regular screenings for STIs  Ask your healthcare provider how often to get tested for STIs  He may tell you to get tested after you have sex with a new partner  Do not have sex until you and your partner have taken all of your antibiotics  Ask your healthcare provider when it is safe to have sex  Manage your symptoms:   Keep your genital area clean and dry  Take showers instead of baths, and use unscented soap  Do not douche unless your healthcare provider says it is okay  Do not use feminine hygiene sprays or powders  Tell your healthcare provider if you are pregnant:  You can spread chlamydia to your baby while you are pregnant  Your baby could get an eye infection or pneumonia  Chlamydia may also cause your baby to be born too early  Early treatment may prevent your baby from getting chlamydia  Follow up with your healthcare provider as directed: You may need to return regularly for tests  Write down your questions so you remember to ask them during your visits  © Copyright [address] Information is for End User's use only and may not be sold, redistributed or otherwise used for commercial purposes  All illustrations and images included in CareNotes® are the copyrighted property of A D A M , Inc  or [facility] Ashley Gomez   The above information is an  only  It is not intended as medical advice for individual conditions or treatments  Talk to your doctor, nurse or pharmacist before following any medical regimen to see if it is safe and effective for you    Gonorrhea   AMBULATORY CARE:   Gonorrhea , or gonococcal urethritis, is a sexually transmitted infection (STI) caused by bacteria  It is spread by unprotected oral, vaginal, or anal sex  Gonorrhea causes inflammation of the urethra  The urethra is the tube where urine passes from the bladder to the outside of the body  Anyone with multiple sexual partners is at higher risk for gonorrhea  Common symptoms include the following:   Feeling like you need to urinate more frequently than usual    Pain or burning when you urinate    Pain in your lower abdomen, penis, or vaginal area    Pain when you have sex    Thick, yellow-green discharge coming from your penis or vagina    Fever    Seek care immediately if:   Chest pain or trouble breathing    Pain and swelling in your scrotum if you are male    Pain in your abdomen or joints    Contact your healthcare provider if:   You have a fever  You have chills, a cough, or feel weak and achy  You have questions or concerns about your condition or care  Treatment for gonorrhea  may include antibiotics to treat the bacterial infection  Both you and your sexual partner have to be treated to prevent gonorrhea from spreading  Prevent the spread of gonorrhea:   Use a condom  during oral, vaginal, and anal sex  Ask for more information about the correct way to use condoms  Do not have sex with someone who has gonorrhea  This includes oral, vaginal, and anal sex  Do not have sex while you or your partner are being treated for gonorrhea  Ask when it is safe to have sex  Tell your healthcare provider if you are pregnant  Gonorrhea can be passed to an infant during birth  Follow up with your healthcare provider as directed:  Write down your questions so you remember to ask them during your visits  © Copyright [address] Information is for End User's use only and may not be sold, redistributed or otherwise used for commercial purposes   All illustrations and images included in CareNotes® are the copyrighted property of [employer]  or Shira Sahu  The above information is an  only  It is not intended as medical advice for individual conditions or treatments  Talk to your doctor, nurse or pharmacist before following any medical regimen to see if it is safe and effective for you  Urinary Tract Infection in Women   AMBULATORY CARE:   A urinary tract infection (UTI)  is caused by bacteria that get inside your urinary tract  Most bacteria that enter your urinary tract come out when you urinate  If the bacteria stay in your urinary tract, you may get an infection  Your urinary tract includes your kidneys, ureters, bladder, and urethra  Urine is made in your kidneys, and it flows from the ureters to the bladder  Urine leaves the bladder through the urethra  A UTI is more common in your lower urinary tract, which includes your bladder and urethra  Common symptoms include the following:   · Urinating more often or waking from sleep to urinate    · Pain or burning when you urinate    · Pain or pressure in your lower abdomen     · Urine that smells bad    · Blood in your urine    · Leaking urine    Seek care immediately if:   · You are urinating very little or not at all  · You have a high fever with shaking chills  · You have side or back pain that gets worse  Call your doctor if:   · You have a fever  · You do not feel better after 2 days of taking antibiotics  · You are vomiting  · You have questions or concerns about your condition or care  Treatment for a UTI  may include antibiotics to treat a bacterial infection  You may also need medicines to decrease pain and burning, or decrease the urge to urinate often  If you have UTIs often (called recurrent UTIs), you may be given antibiotics to take regularly  You will be given directions for when and how to use antibiotics  The goal is to prevent UTIs but not cause antibiotic resistance by using antibiotics too often  Prevent a UTI:   · Empty your bladder often    Urinate and empty your bladder as soon as you feel the need  Do not hold your urine for long periods of time  · Wipe from front to back after you urinate or have a bowel movement  This will help prevent germs from getting into your urinary tract through your urethra  · Drink liquids as directed  Ask how much liquid to drink each day and which liquids are best for you  You may need to drink more liquids than usual to help flush out the bacteria  Do not drink alcohol, caffeine, or citrus juices  These can irritate your bladder and increase your symptoms  Your healthcare provider may recommend cranberry juice to help prevent a UTI  · Urinate after you have sex  This can help flush out bacteria passed during sex  · Do not douche or use feminine deodorants  These can change the chemical balance in your vagina  · Change sanitary pads or tampons often  This will help prevent germs from getting into your urinary tract  · Talk to your healthcare provider about your birth control method  You may need to change your method if it is increasing your risk for UTIs  · Wear cotton underwear and clothes that are loose  Tight pants and nylon underwear can trap moisture and cause bacteria to grow  · Vaginal estrogen may be recommended  This medicine helps prevent UTIs in women who have gone through menopause or are in josh-menopause  · Do pelvic muscle exercises often  Pelvic muscle exercises may help you start and stop urinating  Strong pelvic muscles may help you empty your bladder easier  Squeeze these muscles tightly for 5 seconds like you are trying to hold back urine  Then relax for 5 seconds  Gradually work up to squeezing for 10 seconds  Do 3 sets of 15 repetitions a day, or as directed  Follow up with your healthcare provider as directed:  Write down your questions so you remember to ask them during your visits     © Copyright [address] Information is for End User's use only and may not be sold, redistributed or otherwise used for commercial purposes  All illustrations and images included in CareNotes® are the copyrighted property of A D A M [employer] Inc  or Shira Sahu  The above information is an  only  It is not intended as medical advice for individual conditions or treatments  Talk to your doctor, nurse or pharmacist before following any medical regimen to see if it is safe and effective for you

## 2021-03-15 NOTE — TELEPHONE ENCOUNTER
Patient was discharged home from the ER  She was diagnosed with Bell's Palsy  Patient was prescribed Prednisone 50mg daily (2 days left) and Valtrex 1g TID  For 7 days  Patient reports right side of tongue is still numb  Puffiness of lips is decreased  Right facial droop is slightly improved  Patient no longer has pain from jaw to ear  CT of the head done in ER was negative  ER doctor recommended artifical tear eye drops during day and lubricating eye ointment at night  Patient reviewed labs in [address]  Saw that urinalysis showed  blood in urine  Patient went to [facility]'s urgent care Friday night  She was diagnosed with a UTI and given an IM shot of Rochepin  Patient was scheduled for an appt on 3/18  Anne-Marie Krishnamurthy    [phone] to leave detailed msg

## 2021-03-18 ENCOUNTER — OFFICE VISIT (OUTPATIENT)
Dept: NEUROLOGY | Facility: CLINIC | Age: 41
End: 2021-03-18
Payer: COMMERCIAL

## 2021-03-18 VITALS
HEART RATE: 78 BPM | BODY MASS INDEX: 35.75 KG/M2 | WEIGHT: 214.6 LBS | RESPIRATION RATE: 16 BRPM | SYSTOLIC BLOOD PRESSURE: 127 MMHG | DIASTOLIC BLOOD PRESSURE: 60 MMHG | HEIGHT: 65 IN

## 2021-03-18 DIAGNOSIS — G35 MULTIPLE SCLEROSIS (HCC): ICD-10-CM

## 2021-03-18 DIAGNOSIS — R31.29 MICROSCOPIC HEMATURIA: ICD-10-CM

## 2021-03-18 DIAGNOSIS — G51.0 BELL'S PALSY: Primary | ICD-10-CM

## 2021-03-18 PROCEDURE — 99214 OFFICE O/P EST MOD 30 MIN: CPT | Performed by: PHYSICIAN ASSISTANT

## 2021-03-18 PROCEDURE — 3008F BODY MASS INDEX DOCD: CPT | Performed by: PHYSICIAN ASSISTANT

## 2021-03-18 PROCEDURE — 4004F PT TOBACCO SCREEN RCVD TLK: CPT | Performed by: PHYSICIAN ASSISTANT

## 2021-03-18 NOTE — ASSESSMENT & PLAN NOTE
Recently seen in the ED on 3/12/21 for right sided facial weakness, along with tongue numbness and right facial numbness, which had been present x 4 days  I personally discussed with ED physician while she was there  CTH negative  She had just had MRI brain with attn IACs 10 days earlier which was stable  ED felt presentation was consistent with Bell's palsy and she was prescribed oral prednisone and valacyclovir  Lyme negative  She continues with right facial weakness and numbness, has some eye twitching  Discussed things will continue to improve over time, although I am unable to predict if she will have complete recovery  She can continue with the facial exercises, using lubricating drops/gel and patching the right eye at night  For work, 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

## 2021-03-18 NOTE — ASSESSMENT & PLAN NOTE
Patient was discussing recent "UTI" (not treated in ED but later that day went to [facility] and was given abx, although culture was then negative), and we discussed the blood in her urine (says she was not on her period)  I then looked back and saw microscopic hematuria on every UA in the last 5 years  I have asked her to make an appt with her PCP to discuss this and she may need referral to urology    Patient will call to schedule with PCP (has not seen one in a while and listed PCP is no longer at the office she goes to, so I do not have anyone to send a chart message to)

## 2021-03-18 NOTE — PROGRESS NOTES
Patient ID: Andres Echavarria is a 45 y o  female  Assessment/Plan:    Patient presents today for a fit in visit due to recent ED visit last week  She was last seen in February for her routine MS follow up visit  Bell's palsy  Recently seen in the ED on 3/12/21 for right sided facial weakness, along with tongue numbness and right facial numbness, which had been present x 4 days  I personally discussed with ED physician while she was there  CTH negative  She had just had MRI brain with attn IACs 10 days earlier which was stable  ED felt presentation was consistent with Bell's palsy and she was prescribed oral prednisone and valacyclovir  Lyme negative  She continues with right facial weakness and numbness, has some eye twitching  Discussed things will continue to improve over time, although I am unable to predict if she will have complete recovery  She can continue with the facial exercises, using lubricating drops/gel and patching the right eye at night  For work, 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)  She is to call for any new or worsening symptoms    Multiple sclerosis ([facility] [address] )  MS has been overall stable  She remains on Tecfidera, now on generic dimethyl fumarate as of Dec 2020  MRI brain stable on 3/2/21    Labs have been stable  Abs lymphs were 1 2 in the ED on 3/12  She will continue to get labs done every other month with her standing order  She continues on gabapentin for neuropathic pain in the feet  Exam is stable today  She will return for her next scheduled MS follow up on 6/8/21, or sooner if needed  Microscopic hematuria  Patient was discussing recent "UTI" (not treated in ED but later that day went to [facility] and was given abx, although culture was then negative), and we discussed the blood in her urine (says she was not on her period)  I then looked back and saw microscopic hematuria on every UA in the last 5 years        I have asked her to make an appt with her PCP to discuss this and she may need referral to urology  Patient will call to schedule with PCP (has not seen one in a while and listed PCP is no longer at the office she goes to, so I do not have anyone to send a chart message to)       Diagnoses and all orders for this visit:    Bell's palsy    Multiple sclerosis ([facility] [address] )    Microscopic hematuria           Subjective:    HPI    Patient is a 45year old female who presents today for MS follow up, last seen in February 2021  [facility] visit is occurring earlier than planned due to a recent ED visit  To review, patient notes symptoms began in January 2015 when she had a work injury to the left shoulder  In March 2015, she had left sided numbness going down her torso, down into her feet and legs  She also experienced urinary leakage without Valsalva  Patient then experienced right eye pain and fogginess of vision  She was seen by Dr Taylor Mcdonnell and had an OCT done  It revealed normal findings on the right but a sector defect on the left  MRI of the brain from May 13, 2015 revealed a few scattered nonspecific supratentorial WM lesions  No acute infarction, or hemorrhage or enh  MRI of the lumbar spine without contrast revealed small L5/S1 disc herniation, MRI of the T-spine revealed evidence of solitary 8mm lesion to the left of midline at T8/9 in the dorsal Cord  No pathological enh  MRI of the C-spine also done on June 13, 2015 revealed a solitary 5 mm focus of active demyelination in the right lateral cord at C2  Patient was given 3 days of IV steroids  Patient with significant GI upset with the steroids and also some mild change in mood with increased anxiety  She was NMO negative and Lyme negative  She did not have an LP  Patient was started on Copaxone as her initial IMD med in 2015  She had a brief interval of being off med due to insurance   However, due to site reactions, patient stopped Copaxone and started Tecfidera in May 2016  Patient was subsequently told to stop her Tecifidera due to unplanned pregnancy in Nov 2017  Patient delivered her daughter on 7/31/78 without complication  She also had a tubal ligation  She restarted Tecfidera in January 2019 after stopping breastfeeding  Patient had c/o low back pain as well as left hip pain  She had an x-ray of the lumbar spine which showed moderate disc narrowing at L5/S1  She saw Orthopedics who agreed with MRI lumbar spine, however this was not approved by her insurance  She then did a trial of PT from 10/28/2019 through 11/20/2019  She also had an EMG of the left lower extremity on 11/27/2019, which was a normal study  MRI lumbar spine was completed on 2/7/2020  This demonstrated moderate to large left paramedian protrusion/superior extrusion type disc herniation which results in central and lateral recess stenosis potentially impacting exiting left L5 nerve root or descending left S1 nerve root  Patient was advised to return to ortho, who she was already established with  She has not seen ortho again  Due to insurance reasons, she changed from brand name Tecfidera to generic dimethyl fumarate in December 2020  She has not noticed any difference between brand and generic  Patient called our office on 3/12/21 reporting onset of tongue numbness on 3/8  She then started experiencing numbness of her right lip and weakness of the right face on 3/11  On 3/12 she started to experience a bit of pain in the right face, which is why she called the office and then presented to the ED (prior to hearing back from our office)  Per the ER note, she had weakness of the entire right face, as well as decreased sensation of the entire right face  Due to symptoms x 4 days, no stroke alert called, as well as this appeared like Bells palsy    I received a tiger text from the ED physician reporting her presentation was most concerning for Bells palsy and asking if any further workup/treatment advised  CTH negative  Lyme negative, UA negative  She had a brain MRI with attn to IACs (due to occasional vertigo and tinnitus) completed on 3/2/21 which showed stable MS, no pathology in the IACs  She was discharged from the ED with a prednisone taper and valacyclovir  Today she reports she still has right facial weakness, decreased eye closure, tongue and facial numbness on the right  Denies vision changes (except blurry when eyes are dry)  Occasionally swallowing is an issue (due to numbness)  No hearing loss, but has hyperacusis in the right ear  She is using lubricating drops and gel in the eye and patching it at night  She is doing some facial exercises  The following portions of the patient's history were reviewed and updated as appropriate: current medications, past family history, past medical history, past social history, past surgical history and problem list          Objective:    Blood pressure 127/60, pulse 78, resp  rate 16, height 5' 5" (1 651 m), weight 97 3 kg (214 lb 9 6 oz), last menstrual period 03/02/2021    Physical Exam  Constitutional:       Appearance: Normal appearance  She is well-developed  HENT:      Head: Normocephalic and atraumatic  Eyes:      Extraocular Movements: EOM normal       Pupils: Pupils are equal, round, and reactive to light  Skin:     General: Skin is warm and dry  Neurological:      Mental Status: She is alert  Coordination: Coordination is intact  Deep Tendon Reflexes: Strength normal and reflexes are normal and symmetric  Psychiatric:         Mood and Affect: Mood normal          Speech: Speech normal          Behavior: Behavior normal          Neurological Exam  Mental Status  Alert  Oriented to person, place, time and situation  Speech is normal  Language is fluent with no aphasia  Attention and concentration are normal     Cranial Nerves  CN II: Visual fields full to confrontation    CN III, IV, VI: Extraocular movements intact bilaterally  Pupils equal round and reactive to light bilaterally  CN V: Decreased facial sensation on the right compared to left  CN VII: Right facial weakness--decreased eye closure and blinking, decreased wrinkling of the forehead, unable to fully smile on the right   CN VIII: Patient reports hyperacusis in the right ear  CN IX, X: Palate elevates symmetrically  CN XI: Shoulder shrug strength is normal   CN XII: Tongue midline without atrophy or fasciculations  Motor   Normal muscle tone  Strength is 5/5 throughout all four extremities  Sensory  Light touch is normal in upper and lower extremities  Reflexes  Deep tendon reflexes are 2+ and symmetric in all four extremities with downgoing toes bilaterally  Coordination  Finger-to-nose, rapid alternating movements and heel-to-shin normal bilaterally without dysmetria  Gait  Casual gait is normal including stance, stride, and arm swing  ROS:    Review of Systems   Constitutional: Negative  Negative for appetite change and fever  HENT: Positive for tinnitus  Negative for hearing loss, trouble swallowing and voice change  Eyes: Positive for visual disturbance (spots, blurry vision)  Negative for photophobia and pain  Respiratory: Negative  Negative for shortness of breath  Cardiovascular: Negative  Negative for palpitations  Gastrointestinal: Negative  Negative for nausea and vomiting  Endocrine: Negative  Negative for cold intolerance  Genitourinary: Negative  Negative for dysuria, frequency and urgency  Musculoskeletal: Negative  Negative for myalgias and neck pain  Skin: Negative  Negative for rash  Allergic/Immunologic: Negative  Neurological: Positive for dizziness, speech difficulty, weakness (right side of face) and numbness (twitching right eye lid , mouth and tongue)  Negative for tremors, seizures, syncope, facial asymmetry, light-headedness and headaches  Hematological: Negative  Does not bruise/bleed easily  Psychiatric/Behavioral: Positive for sleep disturbance  Negative for confusion and hallucinations       I personally reviewed and updated the ROS as appropriate

## 2021-03-18 NOTE — PATIENT INSTRUCTIONS
Continue dimethyl fumarate   Next labs due in May    Continue facial exercises for Bell's palsy along with lubricating drops/gel and patching as needed    Call PCP to make a follow up    Make sure to address the blood in the urine     Follow up in June as scheduled, or sooner if needed

## 2021-03-18 NOTE — ASSESSMENT & PLAN NOTE
MS has been overall stable  She remains on Tecfidera, now on generic dimethyl fumarate as of Dec 2020  MRI brain stable on 3/2/21    Labs have been stable  Abs lymphs were 1 2 in the ED on 3/12  She will continue to get labs done every other month with her standing order  She continues on gabapentin for neuropathic pain in the feet  Exam is stable today  She will return for her next scheduled MS follow up on 6/8/21, or sooner if needed

## 2021-06-02 ENCOUNTER — TELEPHONE (OUTPATIENT)
Dept: FAMILY MEDICINE CLINIC | Facility: CLINIC | Age: 41
End: 2021-06-02

## 2021-06-02 DIAGNOSIS — G35 MULTIPLE SCLEROSIS (HCC): ICD-10-CM

## 2021-06-02 DIAGNOSIS — R31.29 MICROSCOPIC HEMATURIA: ICD-10-CM

## 2021-06-02 DIAGNOSIS — G51.0 BELL'S PALSY: Primary | ICD-10-CM

## 2021-06-08 ENCOUNTER — OFFICE VISIT (OUTPATIENT)
Dept: NEUROLOGY | Facility: CLINIC | Age: 41
End: 2021-06-08
Payer: COMMERCIAL

## 2021-06-08 VITALS
WEIGHT: 215.8 LBS | HEIGHT: 65 IN | BODY MASS INDEX: 35.96 KG/M2 | HEART RATE: 81 BPM | DIASTOLIC BLOOD PRESSURE: 63 MMHG | SYSTOLIC BLOOD PRESSURE: 118 MMHG

## 2021-06-08 DIAGNOSIS — G35 MULTIPLE SCLEROSIS (HCC): ICD-10-CM

## 2021-06-08 DIAGNOSIS — G51.0 BELL'S PALSY: ICD-10-CM

## 2021-06-08 PROCEDURE — 99214 OFFICE O/P EST MOD 30 MIN: CPT | Performed by: PHYSICIAN ASSISTANT

## 2021-06-08 PROCEDURE — 3008F BODY MASS INDEX DOCD: CPT | Performed by: PHYSICIAN ASSISTANT

## 2021-06-08 NOTE — ASSESSMENT & PLAN NOTE
Patient with right-sided Bell's palsy in March 2021, which has recovered very nicely  She feels she is almost 100% at baseline as far as facial movement goes and face looked symmetric on exam with excellent movements  She still has some hyperacusis  She was having some issues with tinnitus even before the Bell's palsy and was referred to ENT, and she has not yet made the appt  I encouraged her to schedule

## 2021-06-08 NOTE — PROGRESS NOTES
Patient ID: Luca Kumar is a 37 y o  female  Assessment/Plan:    Multiple sclerosis ([facility] [address] )  Patient remains clinically stable  She has not been taking dimethyl fumarate since around April  She was having issues remembering to take her medication and then had trouble requesting a refill from her speciality pharmacy  She is interested in a different DMT since she has trouble with BID dosing of dimethyl fumarate  We discussed DMT options today  She says she would ideally like an infusion  Due to stability of her disease, I would be hesitant to put her on B-cell depleting therapy due to risks likely outweigh the benefits  Explained this to the patient  She was previously JCV negative, and Tysabri may be an option if she is still JCV negative  It has been a few years since it was checked, so will recheck her JCV  We also discussed Aubagio, as this is a once a day oral med, and may have better compliance  She does not want anymore children and had a tubal ligation  I gave her some info to look over on both meds and ordered labs  We will have a visit in 2-3 weeks to further discuss  MRI brain last updated 3/2/2021 and stable  I also again discussed the importance of seeing her PCP for follow up  At last visit I noted microscopic hematuria on every UA for the past several years and advised she follow up with PCP  She says she will make an appt  Bell's palsy  Patient with right-sided Bell's palsy in March 2021, which has recovered very nicely  She feels she is almost 100% at baseline as far as facial movement goes and face looked symmetric on exam with excellent movements  She still has some hyperacusis  She was having some issues with tinnitus even before the Bell's palsy and was referred to ENT, and she has not yet made the appt  I encouraged her to schedule         Diagnoses and all orders for this visit:    Bell's palsy  -     Ambulatory referral to Neurology    Multiple sclerosis [facility])  -     Ambulatory referral to Neurology  -     Quantiferon TB Gold Plus; Future  -     STRATIFY JCV(TM) AB W/RFX TO INHIBITION ASSAY; Future  -     Comprehensive metabolic panel; Future  -     CBC and differential; Future  -     Varicella zoster antibody, IgG; Future  -     Vitamin B12; Future  -     Vitamin D 25 hydroxy; Future           Subjective:    HPI    Patient is a 37year old female who presents today for MS follow up, last seen in March 2021  To review, patient notes symptoms began in January 2015 when she had a work injury to the left shoulder  In March 2015, she had left sided numbness going down her torso, down into her feet and legs  She also experienced urinary leakage without Valsalva  Patient then experienced right eye pain and fogginess of vision  She was seen by Dr Lula Funk and had an OCT done  It revealed normal findings on the right but a sector defect on the left  MRI of the brain from May 13, 2015 revealed a few scattered nonspecific supratentorial WM lesions  No acute infarction, or hemorrhage or enh  MRI of the lumbar spine without contrast revealed small L5/S1 disc herniation, MRI of the T-spine revealed evidence of solitary 8mm lesion to the left of midline at T8/9 in the dorsal Cord  No pathological enh  MRI of the C-spine also done on June 13, 2015 revealed a solitary 5 mm focus of active demyelination in the right lateral cord at C2  Patient was given 3 days of IV steroids  Patient with significant GI upset with the steroids and also some mild change in mood with increased anxiety  She was NMO negative and Lyme negative  She did not have an LP  Patient was started on Copaxone as her initial IMD med in 2015  She had a brief interval of being off med due to insurance  However, due to site reactions, patient stopped Copaxone and started Tecfidera in May 2016  Patient was subsequently told to stop her Tecfidera due to unplanned pregnancy in Nov 2017   Patient delivered her daughter on 6/46/70 without complication  She also had a tubal ligation  She restarted Tecfidera in January 2019 after she was done breastfeeding  Patient had c/o low back pain as well as left hip pain  She had an x-ray of the lumbar spine which showed moderate disc narrowing at L5/S1  She saw Orthopedics who agreed with MRI lumbar spine, however this was not approved by her insurance  She then did a trial of PT from 10/28/2019 through 11/20/2019  She also had an EMG of the left lower extremity on 11/27/2019, which was a normal study  MRI lumbar spine was completed on 2/7/2020  This demonstrated moderate to large left paramedian protrusion/superior extrusion type disc herniation which results in central and lateral recess stenosis potentially impacting exiting left L5 nerve root or descending left S1 nerve root  Patient was advised to return to ortho, who she was already established with  She has not seen ortho again  Due to insurance reasons, she changed from brand name Tecfidera to generic dimethyl fumarate in December 2020  She has not noticed any difference between brand and generic  Patient called our office on 3/12/21 reporting onset of tongue numbness on 3/8  She then started experiencing numbness of her right lip and weakness of the right face on 3/11  On 3/12 she started to experience a bit of pain in the right face, which is why she called the office and then presented to the ED (prior to hearing back from our office)  Per the ER note, she had weakness of the entire right face, as well as decreased sensation of the entire right face  Due to symptoms x 4 days, no stroke alert called, as well as this appeared like Bells palsy  I received a tiger text from the ED physician reporting her presentation was most concerning for Bells palsy and asking if any further workup/treatment advised  CTH negative  Lyme negative, UA negative    She had a brain MRI with attn to IACs (due to occasional vertigo and tinnitus) completed on 3/2/21 which showed stable MS, no pathology in the IACs  She was discharged from the ED with a prednisone taper and valacyclovir  She was seen in the office on 3/18/2021 and was doing ok, still with right facial weakness, decreased eye closure, tongue and facial numbness on the right, hyperacusis  Today, patient reports she is doing ok from a MS standpoint  She has been on generic dimethyl fumarate and today admits she is not compliant with the medication, forgetting to take it, and in fact has not taken it at all since April  She was having trouble requesting a refill from her speciality pharmacy  She also stopped her gabapentin due to she was concerned it caused weight gain  She gained 30lbs back since losing weight after her baby  Her Bell's palsy has recovered very well, feels face is essentially back to baseline  She did acupuncture and facial exercises  The following portions of the patient's history were reviewed and updated as appropriate: current medications, past family history, past medical history, past social history, past surgical history and problem list          Objective:    Blood pressure 118/63, pulse 81, height 5' 4 8" (1 646 m), weight 97 9 kg (215 lb 12 8 oz)    Physical Exam  Constitutional:       Appearance: Normal appearance  She is well-developed  HENT:      Head: Normocephalic and atraumatic  Eyes:      Extraocular Movements: EOM normal       Pupils: Pupils are equal, round, and reactive to light  Skin:     General: Skin is warm and dry  Neurological:      Mental Status: She is alert  Coordination: Coordination is intact  Deep Tendon Reflexes: Strength normal and reflexes are normal and symmetric  Psychiatric:         Mood and Affect: Mood normal          Speech: Speech normal          Behavior: Behavior normal          Neurological Exam  Mental Status  Alert  Oriented to person, place, time and situation   Speech is normal  Language is fluent with no aphasia  Attention and concentration are normal     Cranial Nerves  CN II: Visual fields full to confrontation  CN III, IV, VI: Extraocular movements intact bilaterally  Pupils equal round and reactive to light bilaterally  CN V: Facial sensation is normal   CN VII: Full and symmetric facial movement  CN VIII: Hearing is normal   CN IX, X: Palate elevates symmetrically  CN XI: Shoulder shrug strength is normal   CN XII: Tongue midline without atrophy or fasciculations  Motor   Normal muscle tone  Strength is 5/5 throughout all four extremities  Sensory  Light touch is normal in upper and lower extremities  Reflexes  Deep tendon reflexes are 2+ and symmetric in all four extremities with downgoing toes bilaterally  Coordination  Finger-to-nose, rapid alternating movements and heel-to-shin normal bilaterally without dysmetria  Gait  Casual gait is normal including stance, stride, and arm swing  ROS:    Review of Systems   Constitutional: Negative  Negative for appetite change and fever  HENT: Negative  Negative for hearing loss, tinnitus, trouble swallowing and voice change  Right ear sensitive to sound and states it feels clogged   Eyes: Negative  Negative for photophobia and pain  Respiratory: Negative  Negative for shortness of breath  Cardiovascular: Negative  Negative for palpitations  Gastrointestinal: Negative  Negative for nausea and vomiting  Upset stomach at lunch only   Endocrine: Negative  Negative for cold intolerance  Genitourinary: Negative  Negative for dysuria, frequency and urgency  Musculoskeletal: Negative  Negative for myalgias and neck pain  Skin: Negative  Negative for rash  Allergic/Immunologic: Negative  Neurological: Negative  Negative for dizziness, tremors, seizures, syncope, facial asymmetry, speech difficulty, weakness, light-headedness, numbness and headaches  Hematological: Negative  Does not bruise/bleed easily  Psychiatric/Behavioral: Negative  Negative for confusion, hallucinations and sleep disturbance       I personally reviewed and updated the ROS as appropriate

## 2021-06-08 NOTE — ASSESSMENT & PLAN NOTE
Patient remains clinically stable  She has not been taking dimethyl fumarate since around April  She was having issues remembering to take her medication and then had trouble requesting a refill from her speciality pharmacy  She is interested in a different DMT since she has trouble with BID dosing of dimethyl fumarate  We discussed DMT options today  She says she would ideally like an infusion  Due to stability of her disease, I would be hesitant to put her on B-cell depleting therapy due to risks likely outweigh the benefits  Explained this to the patient  She was previously JCV negative, and Tysabri may be an option if she is still JCV negative  It has been a few years since it was checked, so will recheck her JCV  We also discussed Aubagio, as this is a once a day oral med, and may have better compliance  She does not want anymore children and had a tubal ligation  I gave her some info to look over on both meds and ordered labs  We will have a visit in 2-3 weeks to further discuss  MRI brain last updated 3/2/2021 and stable  I also again discussed the importance of seeing her PCP for follow up  At last visit I noted microscopic hematuria on every UA for the past several years and advised she follow up with PCP  She says she will make an appt

## 2021-06-30 ENCOUNTER — TELEMEDICINE (OUTPATIENT)
Dept: NEUROLOGY | Facility: CLINIC | Age: 41
End: 2021-06-30
Payer: COMMERCIAL

## 2021-06-30 DIAGNOSIS — G35 MULTIPLE SCLEROSIS (HCC): Primary | ICD-10-CM

## 2021-06-30 PROCEDURE — 99213 OFFICE O/P EST LOW 20 MIN: CPT | Performed by: PHYSICIAN ASSISTANT

## 2021-06-30 NOTE — PROGRESS NOTES
Virtual Regular Visit      Assessment/Plan:    Problem List Items Addressed This Visit        Nervous and Auditory    Multiple sclerosis ([facility] [address] ) - Primary     Patient last seen 3 weeks ago  At that time admitted to not taking dimethyl fumarate since April  She was having issues remembering to take her medication and then had trouble requesting a refill from her speciality pharmacy  She inquired about alternative DMTs  She did not get the labs done ordered at last visit, which were going to help determine next DMT  We had discussed possibility of Tysabri if JCV negative  Also discussed Aubagio (no plans for more children, has tubal ligation)  She says she is most interested in [address]  [facility], but will still consider Tysabri  I have asked her to get the labs done and once back, will have nursing reach out to her and see what she has decided on  Fortunately she has remained clinically stable, no new symptoms  MRI brain from March 2021 was stable  Patient will keep her next scheduled visit in October, but we will touch base in the next week after she gets her labs done  She can come into the office to sign a start form for the medication we choose  Reason for visit is   Chief Complaint   Patient presents with    Multiple Sclerosis     FU    Virtual Regular Visit        Encounter provider Delvin Dodson PA-C    Provider located at [address] [id number]      Recent Visits  No visits were found meeting these conditions  Showing recent visits within past 7 days and meeting all other requirements  Today's Visits  Date Type Provider Dept   06/30/21 [address] [place] [address],  [id number], [facility] [place]   Showing today's visits and meeting all other requirements  Future Appointments  No visits were found meeting these conditions    Showing future appointments within next 150 days and meeting all other requirements       The patient was identified by name and date of birth  Rita Gongora was informed that this is a telemedicine visit and that the visit is being conducted through [address] [employer] and patient was informed that this is a secure, HIPAA-compliant platform  She agrees to proceed     My office door was closed  No one else was in the room  She acknowledged consent and understanding of privacy and security of the video platform  The patient has agreed to participate and understands they can discontinue the visit at any time  Patient is aware this is a billable service  Subjective    HPI   Patient is a 37year old female who presents today for MS follow up, last seen on 6/8/21  To review, patients symptoms began in January 2015 when she had a work injury to the left shoulder  In March 2015, she had left sided numbness going down her torso, down into her feet and legs  She also experienced urinary leakage without Valsalva  Patient then experienced right eye pain and fogginess of vision  She was seen by Dr Boni Zhao and had an OCT done  It revealed normal findings on the right but a sector defect on the left  MRI of the brain from May 13, 2015 revealed a few scattered nonspecific supratentorial WM lesions  No acute infarction, or hemorrhage or enh  MRI of the lumbar spine without contrast revealed small L5/S1 disc herniation, MRI of the T-spine revealed evidence of solitary 8mm lesion to the left of midline at T8/9 in the dorsal Cord  No pathological enh  MRI of the C-spine also done on June 13, 2015 revealed a solitary 5 mm focus of active demyelination in the right lateral cord at C2  Patient was given 3 days of IV steroids  Patient with significant GI upset with the steroids and also some mild change in mood with increased anxiety  She was NMO negative and Lyme negative  She did not have an LP  Patient was started on Copaxone as her initial IMD med in 2015   She had a brief interval of being off med due to insurance  However, due to site reactions, patient stopped Copaxone and started Tecfidera in May 2016  Patient was subsequently told to stop her Tecfidera due to unplanned pregnancy in Nov 2017  Patient delivered her daughter on 2/76/98 without complication  She also had a tubal ligation  She restarted Tecfidera in January 2019 after she was done breastfeeding  Patient had c/o low back pain as well as left hip pain  She had an x-ray of the lumbar spine which showed moderate disc narrowing at L5/S1  She saw Orthopedics who agreed with MRI lumbar spine, however this was not approved by her insurance  She then did a trial of PT from 10/28/2019 through 11/20/2019  She also had an EMG of the left lower extremity on 11/27/2019, which was a normal study  MRI lumbar spine was completed on 2/7/2020  This demonstrated moderate to large left paramedian protrusion/superior extrusion type disc herniation which results in central and lateral recess stenosis potentially impacting exiting left L5 nerve root or descending left S1 nerve root  Patient was advised to return to ortho, who she was already established with  She has not seen ortho again  Due to insurance reasons, she changed from brand name Tecfidera to generic dimethyl fumarate in December 2020  Patient called our office on 3/12/21 reporting onset of tongue numbness on 3/8  She then started experiencing numbness of her right lip and weakness of the right face on 3/11  On 3/12 she started to experience a bit of pain in the right face, which is why she called the office and then presented to the ED (prior to hearing back from our office)  Per the ER note, she had weakness of the entire right face, as well as decreased sensation of the entire right face  Due to symptoms x 4 days, no stroke alert called, as well as this appeared like Bells palsy    I received a tiger text from the ED physician reporting her presentation was most concerning for Tees palsy and asking if any further workup/treatment advised  CTH negative  Lyme negative, UA negative  She had a brain MRI with attn to IACs (due to occasional vertigo and tinnitus) completed on 3/2/21 which showed stable MS, no pathology in the IACs  She was discharged from the ED with a prednisone taper and valacyclovir  She was seen in the office on 3/18/2021 and was doing ok, still with right facial weakness, decreased eye closure, tongue and facial numbness on the right, hyperacusis  At timing of her visit on 6/8/21 she admitted that she was not compliant with her dimethyl fumarate, forgetting to take it, and in fact had not taken it at all since April 2021  She was having trouble requesting a refill from her specialty pharmacy but never informed our office about this  She had also stopped her gabapentin due to she was concerned it caused weight gain  We discussed alternative DMTs and I asked her to get labs done and follow up in a few weeks  Today, patient reports she is doing ok, denies new symptoms  She did not get the labs done ordered at last visit, including JCV testing  She is currently sick with URI/sinus/cough  She was tested for COVID at work last week and it was neg        Past Medical History:   Diagnosis Date    Abnormal Pap smear of cervix     Depression     Last assessed: 8/1/12    History of chlamydia infection     History of gonorrhea     MS (multiple sclerosis) ([facility] [address] )     Ovarian cyst     Varicella     HX DISEASE       Past Surgical History:   Procedure Laterality Date    KNEE ARTHROSCOPY      [place] LAP,TUBAL CAUTERY Bilateral 10/5/2018    Procedure: LIGATION TUBAL LAPAROSCOPIC - FALOPE RINGS;  Surgeon: Sophy Forman MD;  Location: [facility];  Service: Gynecology       Current Outpatient Medications   Medication Sig Dispense Refill    gabapentin (NEURONTIN) 100 mg capsule Take 2 capsules (200 mg total) by mouth 3 (three) times a day (Patient not taking: Reported on 6/30/2021) 540 capsule 1    TECFIDERA 240 MG CPDR TAKE ONE CAPSULE BY MOUTH TWICE DAILY  STORE IN ORIGINAL CONTAINER AT ROOM TEMPERATURE  (Patient not taking: Reported on 6/30/2021) 60 capsule 5     No current facility-administered medications for this visit  No Known Allergies    Review of Systems   Constitutional: Negative  Negative for appetite change and fever  HENT: Negative for trouble swallowing and voice change  Eyes: Positive for photophobia and visual disturbance  Negative for pain  Respiratory: Negative  Negative for shortness of breath  Cardiovascular: Negative  Negative for palpitations  Gastrointestinal: Positive for nausea  Negative for vomiting  Endocrine: Negative  Negative for cold intolerance  Genitourinary: Positive for urgency  Negative for dysuria and frequency  Musculoskeletal: Positive for back pain  Negative for neck pain  Skin: Negative  Negative for rash  Neurological: Positive for dizziness, facial asymmetry (hx of bells palsy right side ) and numbness (ocassionally in legs and hands )  Negative for tremors, seizures, syncope, speech difficulty, weakness, light-headedness and headaches  Hematological: Bruises/bleeds easily  Psychiatric/Behavioral: Positive for sleep disturbance (staying asleep )  Negative for confusion and hallucinations  Video Exam    There were no vitals filed for this visit  Physical Exam  Constitutional:       Appearance: Normal appearance  HENT:      Head: Normocephalic and atraumatic  Neurological:      Mental Status: She is alert        Comments: Mental status: AO x 3, able to follow commands, no dysarthria or aphasia    CN 1: not tested  CN 2: not tested  CN 3, 4, 6: no noticeable palsy, EOMs intact  CN 5: not tested  CN 7: face symmetrical  CN 8: hearing intact to voice  CN 9: not tested  CN 10: not tested  CN 11: shoulder shrug symmetric   CN 12: tongue midline     Motor:  Able to move all 4 limbs symmetrically  No abnormal movements appreciated      Psychiatric:         Mood and Affect: Mood normal          Behavior: Behavior normal           I spent 8 minutes directly with the patient during this visit      [address] acknowledges that she has consented to an online visit or consultation  She understands that the online visit is based solely on information provided by her, and that, in the absence of a face-to-face physical evaluation by the physician, the diagnosis she receives is both limited and provisional in terms of accuracy and completeness  This is not intended to replace a full medical face-to-face evaluation by the physician  [place] understands and accepts these terms

## 2021-06-30 NOTE — ASSESSMENT & PLAN NOTE
Patient last seen 3 weeks ago  At that time admitted to not taking dimethyl fumarate since April  She was having issues remembering to take her medication and then had trouble requesting a refill from her speciality pharmacy  She inquired about alternative DMTs  She did not get the labs done ordered at last visit, which were going to help determine next DMT  We had discussed possibility of Tysabri if JCV negative  Also discussed Aubagio (no plans for more children, has tubal ligation)  She says she is most interested in [place], but will still consider Tysabri  I have asked her to get the labs done and once back, will have nursing reach out to her and see what she has decided on  Fortunately she has remained clinically stable, no new symptoms  MRI brain from March 2021 was stable  Patient will keep her next scheduled visit in October, but we will touch base in the next week after she gets her labs done  She can come into the office to sign a start form for the medication we choose

## 2021-06-30 NOTE — PATIENT INSTRUCTIONS
Please get labs done that were ordered at the last visit and we will touch base after that  Follow up in October as scheduled or sooner if needed  Please make a follow up with your PCP

## 2021-07-14 ENCOUNTER — APPOINTMENT (OUTPATIENT)
Dept: LAB | Age: 41
End: 2021-07-14
Payer: COMMERCIAL

## 2021-07-14 DIAGNOSIS — G35 MULTIPLE SCLEROSIS (HCC): ICD-10-CM

## 2021-07-14 LAB
25(OH)D3 SERPL-MCNC: 30.8 NG/ML (ref 30–100)
ALBUMIN SERPL BCP-MCNC: 3.6 G/DL (ref 3.5–5)
ALP SERPL-CCNC: 56 U/L (ref 46–116)
ALT SERPL W P-5'-P-CCNC: 17 U/L (ref 12–78)
ANION GAP SERPL CALCULATED.3IONS-SCNC: 6 MMOL/L (ref 4–13)
AST SERPL W P-5'-P-CCNC: 13 U/L (ref 5–45)
BASOPHILS # BLD AUTO: 0.03 THOUSANDS/ΜL (ref 0–0.1)
BASOPHILS NFR BLD AUTO: 1 % (ref 0–1)
BILIRUB DIRECT SERPL-MCNC: 0.11 MG/DL (ref 0–0.2)
BILIRUB SERPL-MCNC: 0.37 MG/DL (ref 0.2–1)
BUN SERPL-MCNC: 12 MG/DL (ref 5–25)
CALCIUM SERPL-MCNC: 9.2 MG/DL (ref 8.3–10.1)
CHLORIDE SERPL-SCNC: 106 MMOL/L (ref 100–108)
CO2 SERPL-SCNC: 24 MMOL/L (ref 21–32)
CREAT SERPL-MCNC: 0.54 MG/DL (ref 0.6–1.3)
EOSINOPHIL # BLD AUTO: 0.52 THOUSAND/ΜL (ref 0–0.61)
EOSINOPHIL NFR BLD AUTO: 8 % (ref 0–6)
ERYTHROCYTE [DISTWIDTH] IN BLOOD BY AUTOMATED COUNT: 14 % (ref 11.6–15.1)
GFR SERPL CREATININE-BSD FRML MDRD: 118 ML/MIN/1.73SQ M
GLUCOSE SERPL-MCNC: 85 MG/DL (ref 65–140)
HCT VFR BLD AUTO: 36.3 % (ref 34.8–46.1)
HGB BLD-MCNC: 11.6 G/DL (ref 11.5–15.4)
IMM GRANULOCYTES # BLD AUTO: 0.02 THOUSAND/UL (ref 0–0.2)
IMM GRANULOCYTES NFR BLD AUTO: 0 % (ref 0–2)
LYMPHOCYTES # BLD AUTO: 1.5 THOUSANDS/ΜL (ref 0.6–4.47)
LYMPHOCYTES NFR BLD AUTO: 23 % (ref 14–44)
MCH RBC QN AUTO: 31.9 PG (ref 26.8–34.3)
MCHC RBC AUTO-ENTMCNC: 32 G/DL (ref 31.4–37.4)
MCV RBC AUTO: 100 FL (ref 82–98)
MONOCYTES # BLD AUTO: 0.46 THOUSAND/ΜL (ref 0.17–1.22)
MONOCYTES NFR BLD AUTO: 7 % (ref 4–12)
NEUTROPHILS # BLD AUTO: 4.03 THOUSANDS/ΜL (ref 1.85–7.62)
NEUTS SEG NFR BLD AUTO: 61 % (ref 43–75)
NRBC BLD AUTO-RTO: 0 /100 WBCS
PLATELET # BLD AUTO: 266 THOUSANDS/UL (ref 149–390)
PMV BLD AUTO: 10.1 FL (ref 8.9–12.7)
POTASSIUM SERPL-SCNC: 4.2 MMOL/L (ref 3.5–5.3)
PROT SERPL-MCNC: 7.1 G/DL (ref 6.4–8.2)
RBC # BLD AUTO: 3.64 MILLION/UL (ref 3.81–5.12)
SODIUM SERPL-SCNC: 136 MMOL/L (ref 136–145)
VIT B12 SERPL-MCNC: 335 PG/ML (ref 100–900)
WBC # BLD AUTO: 6.56 THOUSAND/UL (ref 4.31–10.16)

## 2021-07-14 PROCEDURE — 86787 VARICELLA-ZOSTER ANTIBODY: CPT

## 2021-07-14 PROCEDURE — 86480 TB TEST CELL IMMUN MEASURE: CPT

## 2021-07-14 PROCEDURE — 86711 JOHN CUNNINGHAM ANTIBODY: CPT

## 2021-07-14 PROCEDURE — 82607 VITAMIN B-12: CPT

## 2021-07-14 PROCEDURE — 80053 COMPREHEN METABOLIC PANEL: CPT

## 2021-07-14 PROCEDURE — 36415 COLL VENOUS BLD VENIPUNCTURE: CPT | Performed by: PHYSICIAN ASSISTANT

## 2021-07-14 PROCEDURE — 82248 BILIRUBIN DIRECT: CPT | Performed by: PHYSICIAN ASSISTANT

## 2021-07-14 PROCEDURE — 85025 COMPLETE CBC W/AUTO DIFF WBC: CPT | Performed by: PHYSICIAN ASSISTANT

## 2021-07-14 PROCEDURE — 82306 VITAMIN D 25 HYDROXY: CPT

## 2021-07-15 LAB — VZV IGG SER IA-ACNC: NORMAL

## 2021-07-16 LAB
GAMMA INTERFERON BACKGROUND BLD IA-ACNC: 0.02 IU/ML
M TB IFN-G BLD-IMP: NEGATIVE
M TB IFN-G CD4+ BCKGRND COR BLD-ACNC: 0 IU/ML
M TB IFN-G CD4+ BCKGRND COR BLD-ACNC: 0 IU/ML
MITOGEN IGNF BCKGRD COR BLD-ACNC: 6.79 IU/ML

## 2021-07-29 ENCOUNTER — TELEPHONE (OUTPATIENT)
Dept: NEUROLOGY | Facility: CLINIC | Age: 41
End: 2021-07-29

## 2021-07-29 LAB — MISCELLANEOUS LAB TEST RESULT: NORMAL

## 2021-07-29 NOTE — TELEPHONE ENCOUNTER
Spoke w/patient  Advised of results and recommendations below  Patient verbalized understanding  She is still interested in starting Tysabri  Patient has not made an appt w/her PCP yet  She states she will do this asap  I asked patient to make our office aware after she has seen PCP

## 2021-07-29 NOTE — TELEPHONE ENCOUNTER
----- Message from Makayla Garcia PA-C sent at 7/29/2021  9:12 AM EDT -----  Can you please check on JCV result? It says "active in process" since 7/14/21  I have her other labs back, waiting on JCV, patient consider Tysabri    Thanks

## 2021-07-29 NOTE — TELEPHONE ENCOUNTER
Called [facility] ext  [phone]  Spoke w/Virginia Gallegos, lab results are being sent now  They will fax to [phone] as soon as they are received

## 2021-07-29 NOTE — TELEPHONE ENCOUNTER
JCV first indeterminate with index of 0 [phone], but final result negative  We had discussed Tysabri, which would be an option  Can you see if she is still interested in that? However, I have asked her several times to see her PCP for longstanding microscopic hematuria on UAs over the last few years  Does not appear she has seen PCP    Please make sure she makes an appt with PCP ASAP

## 2021-08-09 ENCOUNTER — TELEPHONE (OUTPATIENT)
Dept: NEUROLOGY | Facility: CLINIC | Age: 41
End: 2021-08-09

## 2021-08-09 ENCOUNTER — OFFICE VISIT (OUTPATIENT)
Dept: FAMILY MEDICINE CLINIC | Facility: CLINIC | Age: 41
End: 2021-08-09

## 2021-08-09 VITALS
HEART RATE: 71 BPM | WEIGHT: 200 LBS | BODY MASS INDEX: 34.15 KG/M2 | OXYGEN SATURATION: 98 % | TEMPERATURE: 97.5 F | RESPIRATION RATE: 20 BRPM | DIASTOLIC BLOOD PRESSURE: 72 MMHG | SYSTOLIC BLOOD PRESSURE: 116 MMHG | HEIGHT: 64 IN

## 2021-08-09 DIAGNOSIS — Z12.31 ENCOUNTER FOR SCREENING MAMMOGRAM FOR MALIGNANT NEOPLASM OF BREAST: ICD-10-CM

## 2021-08-09 DIAGNOSIS — H69.91 EUSTACHIAN TUBE DISORDER, RIGHT: ICD-10-CM

## 2021-08-09 DIAGNOSIS — G51.0 BELL'S PALSY: ICD-10-CM

## 2021-08-09 DIAGNOSIS — Z00.00 ANNUAL PHYSICAL EXAM: ICD-10-CM

## 2021-08-09 DIAGNOSIS — Z12.31 ENCOUNTER FOR SCREENING MAMMOGRAM FOR BREAST CANCER: ICD-10-CM

## 2021-08-09 DIAGNOSIS — H93.8X1 CLOGGED EAR, RIGHT: ICD-10-CM

## 2021-08-09 DIAGNOSIS — Z87.448 HISTORY OF HEMATURIA: Primary | ICD-10-CM

## 2021-08-09 DIAGNOSIS — R31.29 MICROSCOPIC HEMATURIA: ICD-10-CM

## 2021-08-09 LAB
SL AMB  POCT GLUCOSE, UA: NORMAL
SL AMB LEUKOCYTE ESTERASE,UA: POSITIVE
SL AMB POCT BILIRUBIN,UA: NEGATIVE
SL AMB POCT BLOOD,UA: 250
SL AMB POCT CLARITY,UA: ABNORMAL
SL AMB POCT COLOR,UA: ABNORMAL
SL AMB POCT KETONES,UA: NEGATIVE
SL AMB POCT NITRITE,UA: NEGATIVE
SL AMB POCT PH,UA: 5
SL AMB POCT SPECIFIC GRAVITY,UA: 1.02
SL AMB POCT URINE PROTEIN: ABNORMAL
SL AMB POCT UROBILINOGEN: NORMAL

## 2021-08-09 PROCEDURE — 99386 PREV VISIT NEW AGE 40-64: CPT | Performed by: FAMILY MEDICINE

## 2021-08-09 PROCEDURE — 81002 URINALYSIS NONAUTO W/O SCOPE: CPT | Performed by: FAMILY MEDICINE

## 2021-08-09 PROCEDURE — 3008F BODY MASS INDEX DOCD: CPT | Performed by: PHYSICIAN ASSISTANT

## 2021-08-09 PROCEDURE — 87086 URINE CULTURE/COLONY COUNT: CPT | Performed by: FAMILY MEDICINE

## 2021-08-09 RX ORDER — FLUTICASONE PROPIONATE 50 MCG
1 SPRAY, SUSPENSION (ML) NASAL DAILY
Qty: 16 G | Refills: 1 | Status: SHIPPED | OUTPATIENT
Start: 2021-08-09

## 2021-08-09 NOTE — PATIENT INSTRUCTIONS
Wellness Visit for Adults   AMBULATORY CARE:   A wellness visit  is when you see your healthcare provider to get screened for health problems  Your healthcare provider will also give you advice on how to stay healthy  Write down your questions so you remember to ask them  Ask your healthcare provider how often you should have a wellness visit  What happens at a wellness visit:  Your healthcare provider will ask about your health, and your family history of health problems  This includes high blood pressure, heart disease, and cancer  He or she will ask if you have symptoms that concern you, if you smoke, and about your mood  You may also be asked about your intake of medicines, supplements, food, and alcohol  Any of the following may be done:  · Your weight  will be checked  Your height may also be checked so your body mass index (BMI) can be calculated  Your BMI shows if you are at a healthy weight  · Your blood pressure  and heart rate will be checked  Your temperature may also be checked  · Blood and urine tests  may be done  Blood tests may be done to check your cholesterol levels  Abnormal cholesterol levels increase your risk for heart disease and stroke  You may also need a blood or urine test to check for diabetes if you are at increased risk  Urine tests may be done to look for signs of an infection or kidney disease  · A physical exam  includes checking your heartbeat and lungs with a stethoscope  Your healthcare provider may also check your skin to look for sun damage  · Screening tests  may be recommended  A screening test is done to check for diseases that may not cause symptoms  The screening tests you may need depend on your age, gender, family history, and lifestyle habits  For example, colorectal screening may be recommended if you are 48years old or older  Screening tests you need if you are a woman:   · A Pap smear  is used to screen for cervical cancer   Pap smears are usually done every 3 to 5 years depending on your age  You may need them more often if you have had abnormal Pap smear test results in the past  Ask your healthcare provider how often you should have a Pap smear  · A mammogram  is an x-ray of your breasts to screen for breast cancer  Experts recommend mammograms every 2 years starting at age 48 years  You may need a mammogram at age 52 years or younger if you have an increased risk for breast cancer  Talk to your healthcare provider about when you should start having mammograms and how often you need them  Vaccines you may need:   · Get an influenza vaccine  every year  The influenza vaccine protects you from the flu  Several types of viruses cause the flu  The viruses change over time, so new vaccines are made each year  · Get a tetanus-diphtheria (Td) booster vaccine  every 10 years  This vaccine protects you against tetanus and diphtheria  Tetanus is a severe infection that may cause painful muscle spasms and lockjaw  Diphtheria is a severe bacterial infection that causes a thick covering in the back of your mouth and throat  · Get a human papillomavirus (HPV) vaccine  if you are female and aged 23 to 32 or male 23 to 24 and never received it  This vaccine protects you from HPV infection  HPV is the most common infection spread by sexual contact  HPV may also cause vaginal, penile, and anal cancers  · Get a pneumococcal vaccine  if you are aged 72 years or older  The pneumococcal vaccine is an injection given to protect you from pneumococcal disease  Pneumococcal disease is an infection caused by pneumococcal bacteria  The infection may cause pneumonia, meningitis, or an ear infection  · Get a shingles vaccine  if you are 60 or older, even if you have had shingles before  The shingles vaccine is an injection to protect you from the varicella-zoster virus  This is the same virus that causes chickenpox   Shingles is a painful rash that develops in people who had chickenpox or have been exposed to the virus  How to eat healthy:  My Plate is a model for planning healthy meals  It shows the types and amounts of foods that should go on your plate  Fruits and vegetables make up about half of your plate, and grains and protein make up the other half  A serving of dairy is included on the side of your plate  The amount of calories and serving sizes you need depends on your age, gender, weight, and height  Examples of healthy foods are listed below:  · Eat a variety of vegetables  such as dark green, red, and orange vegetables  You can also include canned vegetables low in sodium (salt) and frozen vegetables without added butter or sauces  · Eat a variety of fresh fruits , canned fruit in 100% juice, frozen fruit, and dried fruit  · Include whole grains  At least half of the grains you eat should be whole grains  Examples include whole-wheat bread, wheat pasta, brown rice, and whole-grain cereals such as oatmeal     · Eat a variety of protein foods such as seafood (fish and shellfish), lean meat, and poultry without skin (turkey and chicken)  Examples of lean meats include pork leg, shoulder, or tenderloin, and beef round, sirloin, tenderloin, and extra lean ground beef  Other protein foods include eggs and egg substitutes, beans, peas, soy products, nuts, and seeds  · Choose low-fat dairy products such as skim or 1% milk or low-fat yogurt, cheese, and cottage cheese  · Limit unhealthy fats  such as butter, hard margarine, and shortening  Exercise:  Exercise at least 30 minutes per day on most days of the week  Some examples of exercise include walking, biking, dancing, and swimming  You can also fit in more physical activity by taking the stairs instead of the elevator or parking farther away from stores  Include muscle strengthening activities 2 days each week  Regular exercise provides many health benefits   It helps you manage your weight, and decreases your risk for type 2 diabetes, heart disease, stroke, and high blood pressure  Exercise can also help improve your mood  Ask your healthcare provider about the best exercise plan for you  General health and safety guidelines:   · Do not smoke  Nicotine and other chemicals in cigarettes and cigars can cause lung damage  Ask your healthcare provider for information if you currently smoke and need help to quit  E-cigarettes or smokeless tobacco still contain nicotine  Talk to your healthcare provider before you use these products  · Limit alcohol  A drink of alcohol is 12 ounces of beer, 5 ounces of wine, or 1½ ounces of liquor  · Lose weight, if needed  Being overweight increases your risk of certain health conditions  These include heart disease, high blood pressure, type 2 diabetes, and certain types of cancer  · Protect your skin  Do not sunbathe or use tanning beds  Use sunscreen with a SPF 15 or higher  Apply sunscreen at least 15 minutes before you go outside  Reapply sunscreen every 2 hours  Wear protective clothing, hats, and sunglasses when you are outside  · Drive safely  Always wear your seatbelt  Make sure everyone in your car wears a seatbelt  A seatbelt can save your life if you are in an accident  Do not use your cell phone when you are driving  This could distract you and cause an accident  Pull over if you need to make a call or send a text message  · Practice safe sex  Use latex condoms if are sexually active and have more than one partner  Your healthcare provider may recommend screening tests for sexually transmitted infections (STIs)  · Wear helmets, lifejackets, and protective gear  Always wear a helmet when you ride a bike or motorcycle, go skiing, or play sports that could cause a head injury  Wear protective equipment when you play sports  Wear a lifejacket when you are on a boat or doing water sports      © Copyright [employer] 2021 Information is for End User's use only and may not be sold, redistributed or otherwise used for commercial purposes  All illustrations and images included in CareNotes® are the copyrighted property of [employer] D A M [employer]  or Shira Sahu  The above information is an  only  It is not intended as medical advice for individual conditions or treatments  Talk to your doctor, nurse or pharmacist before following any medical regimen to see if it is safe and effective for you  Wellness Visit for Adults   AMBULATORY CARE:   A wellness visit  is when you see your healthcare provider to get screened for health problems  Your healthcare provider will also give you advice on how to stay healthy  Write down your questions so you remember to ask them  Ask your healthcare provider how often you should have a wellness visit  What happens at a wellness visit:  Your healthcare provider will ask about your health, and your family history of health problems  This includes high blood pressure, heart disease, and cancer  He or she will ask if you have symptoms that concern you, if you smoke, and about your mood  You may also be asked about your intake of medicines, supplements, food, and alcohol  Any of the following may be done:  · Your weight  will be checked  Your height may also be checked so your body mass index (BMI) can be calculated  Your BMI shows if you are at a healthy weight  · Your blood pressure  and heart rate will be checked  Your temperature may also be checked  · Blood and urine tests  may be done  Blood tests may be done to check your cholesterol levels  Abnormal cholesterol levels increase your risk for heart disease and stroke  You may also need a blood or urine test to check for diabetes if you are at increased risk  Urine tests may be done to look for signs of an infection or kidney disease  · A physical exam  includes checking your heartbeat and lungs with a stethoscope   Your healthcare provider may also check your skin to look for sun damage  · Screening tests  may be recommended  A screening test is done to check for diseases that may not cause symptoms  The screening tests you may need depend on your age, gender, family history, and lifestyle habits  For example, colorectal screening may be recommended if you are 48years old or older  Screening tests you need if you are a woman:   · A Pap smear  is used to screen for cervical cancer  Pap smears are usually done every 3 to 5 years depending on your age  You may need them more often if you have had abnormal Pap smear test results in the past  Ask your healthcare provider how often you should have a Pap smear  · A mammogram  is an x-ray of your breasts to screen for breast cancer  Experts recommend mammograms every 2 years starting at age 48 years  You may need a mammogram at age 52 years or younger if you have an increased risk for breast cancer  Talk to your healthcare provider about when you should start having mammograms and how often you need them  Vaccines you may need:   · Get an influenza vaccine  every year  The influenza vaccine protects you from the flu  Several types of viruses cause the flu  The viruses change over time, so new vaccines are made each year  · Get a tetanus-diphtheria (Td) booster vaccine  every 10 years  This vaccine protects you against tetanus and diphtheria  Tetanus is a severe infection that may cause painful muscle spasms and lockjaw  Diphtheria is a severe bacterial infection that causes a thick covering in the back of your mouth and throat  · Get a human papillomavirus (HPV) vaccine  if you are female and aged 23 to 32 or male 23 to 24 and never received it  This vaccine protects you from HPV infection  HPV is the most common infection spread by sexual contact  HPV may also cause vaginal, penile, and anal cancers  · Get a pneumococcal vaccine  if you are aged 72 years or older   The pneumococcal vaccine is an injection given to protect you from pneumococcal disease  Pneumococcal disease is an infection caused by pneumococcal bacteria  The infection may cause pneumonia, meningitis, or an ear infection  · Get a shingles vaccine  if you are 60 or older, even if you have had shingles before  The shingles vaccine is an injection to protect you from the varicella-zoster virus  This is the same virus that causes chickenpox  Shingles is a painful rash that develops in people who had chickenpox or have been exposed to the virus  How to eat healthy:  My Plate is a model for planning healthy meals  It shows the types and amounts of foods that should go on your plate  Fruits and vegetables make up about half of your plate, and grains and protein make up the other half  A serving of dairy is included on the side of your plate  The amount of calories and serving sizes you need depends on your age, gender, weight, and height  Examples of healthy foods are listed below:  · Eat a variety of vegetables  such as dark green, red, and orange vegetables  You can also include canned vegetables low in sodium (salt) and frozen vegetables without added butter or sauces  · Eat a variety of fresh fruits , canned fruit in 100% juice, frozen fruit, and dried fruit  · Include whole grains  At least half of the grains you eat should be whole grains  Examples include whole-wheat bread, wheat pasta, brown rice, and whole-grain cereals such as oatmeal     · Eat a variety of protein foods such as seafood (fish and shellfish), lean meat, and poultry without skin (turkey and chicken)  Examples of lean meats include pork leg, shoulder, or tenderloin, and beef round, sirloin, tenderloin, and extra lean ground beef  Other protein foods include eggs and egg substitutes, beans, peas, soy products, nuts, and seeds  · Choose low-fat dairy products such as skim or 1% milk or low-fat yogurt, cheese, and cottage cheese      · Limit unhealthy fats such as butter, hard margarine, and shortening  Exercise:  Exercise at least 30 minutes per day on most days of the week  Some examples of exercise include walking, biking, dancing, and swimming  You can also fit in more physical activity by taking the stairs instead of the elevator or parking farther away from stores  Include muscle strengthening activities 2 days each week  Regular exercise provides many health benefits  It helps you manage your weight, and decreases your risk for type 2 diabetes, heart disease, stroke, and high blood pressure  Exercise can also help improve your mood  Ask your healthcare provider about the best exercise plan for you  General health and safety guidelines:   · Do not smoke  Nicotine and other chemicals in cigarettes and cigars can cause lung damage  Ask your healthcare provider for information if you currently smoke and need help to quit  E-cigarettes or smokeless tobacco still contain nicotine  Talk to your healthcare provider before you use these products  · Limit alcohol  A drink of alcohol is 12 ounces of beer, 5 ounces of wine, or 1½ ounces of liquor  · Lose weight, if needed  Being overweight increases your risk of certain health conditions  These include heart disease, high blood pressure, type 2 diabetes, and certain types of cancer  · Protect your skin  Do not sunbathe or use tanning beds  Use sunscreen with a SPF 15 or higher  Apply sunscreen at least 15 minutes before you go outside  Reapply sunscreen every 2 hours  Wear protective clothing, hats, and sunglasses when you are outside  · Drive safely  Always wear your seatbelt  Make sure everyone in your car wears a seatbelt  A seatbelt can save your life if you are in an accident  Do not use your cell phone when you are driving  This could distract you and cause an accident  Pull over if you need to make a call or send a text message  · Practice safe sex    Use latex condoms if are sexually active and have more than one partner  Your healthcare provider may recommend screening tests for sexually transmitted infections (STIs)  · Wear helmets, lifejackets, and protective gear  Always wear a helmet when you ride a bike or motorcycle, go skiing, or play sports that could cause a head injury  Wear protective equipment when you play sports  Wear a lifejacket when you are on a boat or doing water sports  © Copyright [employer] 2021 Information is for End User's use only and may not be sold, redistributed or otherwise used for commercial purposes  All illustrations and images included in CareNotes® are the copyrighted property of [employer] M [employer] Inc  or Shira Gomez   The above information is an  only  It is not intended as medical advice for individual conditions or treatments  Talk to your doctor, nurse or pharmacist before following any medical regimen to see if it is safe and effective for you  Cholesterol and Your Health   AMBULATORY CARE:   Cholesterol  is a waxy, fat-like substance  Your body uses cholesterol to make hormones and new cells, and to protect nerves  Cholesterol is made by your body  It also comes from certain foods you eat, such as meat and dairy products  Your healthcare provider can help you set goals for your cholesterol levels  He or she can help you create a plan to meet your goals  Cholesterol level goals: Your cholesterol level goals depend on your risk for heart disease, your age, and your other health conditions  The following are general guidelines:  · Total cholesterol  includes low-density lipoprotein (LDL), high-density lipoprotein (HDL), and triglyceride levels  The total cholesterol level should be lower than 200 mg/dL and is best at about 150 mg/dL  · LDL cholesterol  is called bad cholesterol  because it forms plaque in your arteries  As plaque builds up, your arteries become narrow, and less blood flows through   When plaque decreases blood flow to your heart, you may have chest pain  If plaque completely blocks an artery that brings blood to your heart, you may have a heart attack  Plaque can break off and form blood clots  Blood clots may block arteries in your brain and cause a stroke  The level should be less than 130 mg/dL and is best at about 100 mg/dL  · HDL cholesterol  is called good cholesterol  because it helps remove LDL cholesterol from your arteries  It does this by attaching to LDL cholesterol and carrying it to your liver  Your liver breaks down LDL cholesterol so your body can get rid of it  High levels of HDL cholesterol can help prevent a heart attack and stroke  Low levels of HDL cholesterol can increase your risk for heart disease, heart attack, and stroke  The level should be 60 mg/dL or higher  · Triglycerides  are a type of fat that store energy from foods you eat  High levels of triglycerides also cause plaque buildup  This can increase your risk for a heart attack or stroke  If your triglyceride level is high, your LDL cholesterol level may also be high  The level should be less than 150 mg/dL  Any of the following can increase your risk for high cholesterol:   · Smoking cigarettes    · Being overweight or obese, or not getting enough exercise    · Drinking large amounts of alcohol    · A medical condition such as hypertension (high blood pressure) or diabetes    · Certain genes passed from your parents to you    · Age older than 65 years    What you need to know about having your cholesterol levels checked: Adults 21to 39years of age should have their cholesterol levels checked every 4 to 6 years  Adults 45 years or older should have their cholesterol checked every 1 to 2 years  You may need your cholesterol checked more often, or at a younger age, if you have risk factors for heart disease  You may also need to have your cholesterol checked more often if you have other health conditions, such as diabetes  Blood tests are used to check cholesterol levels  Blood tests measure your levels of triglycerides, LDL cholesterol, and HDL cholesterol  How healthy fats affect your cholesterol levels:  Healthy fats, also called unsaturated fats, help lower LDL cholesterol and triglyceride levels  Healthy fats include the following:  · Monounsaturated fats  are found in foods such as olive oil, canola oil, avocado, nuts, and olives  · Polyunsaturated fats,  such as omega 3 fats, are found in fish, such as salmon, trout, and tuna  They can also be found in plant foods such as flaxseed, walnuts, and soybeans  How unhealthy fats affect your cholesterol levels:  Unhealthy fats increase LDL cholesterol and triglyceride levels  They are found in foods high in cholesterol, saturated fat, and trans fat:  · Cholesterol  is found in eggs, dairy, and meat  · Saturated fat  is found in butter, cheese, ice cream, whole milk, and coconut oil  Saturated fat is also found in meat, such as sausage, hot dogs, and bologna  · Trans fat  is found in liquid oils and is used in fried and baked foods  Foods that contain trans fats include chips, crackers, muffins, sweet rolls, microwave popcorn, and cookies  Treatment  for high cholesterol will also decrease your risk of heart disease, heart attack, and stroke  Treatment may include any of the following:  · Lifestyle changes  may include food, exercise, weight loss, and quitting smoking  You may also need to decrease the amount of alcohol you drink  Your healthcare provider will want you to start with lifestyle changes  Other treatment may be added if lifestyle changes are not enough  Your healthcare provider may recommend you work with a team to manage hyperlipidemia  The team may include medical experts such as a dietitian, an exercise or physical therapist, and a behavior therapist  Your family members may be included in helping you create lifestyle changes      · Medicines  may be given to lower your LDL cholesterol, triglyceride levels, or total cholesterol level  You may need medicines to lower your cholesterol if any of the following is true:    ? You have a history of stroke, TIA, unstable angina, or a heart attack  ? Your LDL cholesterol level is 190 mg/dL or higher  ? You are age 36 to 76 years, have diabetes or heart disease risk factors, and your LDL cholesterol is 70 mg/dL or higher  · Supplements  include fish oil, red yeast rice, and garlic  Fish oil may help lower your triglyceride and LDL cholesterol levels  It may also increase your HDL cholesterol level  Red yeast rice may help decrease your total cholesterol level and LDL cholesterol level  Garlic may help lower your total cholesterol level  Do not take any supplements without talking to your healthcare provider  Food changes you can make to lower your cholesterol levels:  A dietitian can help you create a healthy eating plan  He or she can show you how to read food labels and choose foods low in saturated fat, trans fats, and cholesterol  · Decrease the total amount of fat you eat  Choose lean meats, fat-free or 1% fat milk, and low-fat dairy products, such as yogurt and cheese  Try to limit or avoid red meats  Limit or do not eat fried foods or baked goods, such as cookies  · Replace unhealthy fats with healthy fats  Cook foods in olive oil or canola oil  Choose soft margarines that are low in saturated fat and trans fat  Seeds, nuts, and avocados are other examples of healthy fats  · Eat foods with omega-3 fats  Examples include salmon, tuna, mackerel, walnuts, and flaxseed  Eat fish 2 times per week  Pregnant women should not eat fish that have high levels of mercury, such as shark, swordfish, and elisa mackerel  · Increase the amount of high-fiber foods you eat  High-fiber foods can help lower your LDL cholesterol  Aim to get between 20 and 30 grams of fiber each day   Fruits and vegetables are high in fiber  Eat at least 5 servings each day  Other high-fiber foods are whole-grain or whole-wheat breads, pastas, or cereals, and brown rice  Eat 3 ounces of whole-grain foods each day  Increase fiber slowly  You may have abdominal discomfort, bloating, and gas if you add fiber to your diet too quickly  · Eat healthy protein foods  Examples include low-fat dairy products, skinless chicken and turkey, fish, and nuts  · Limit foods and drinks that are high in sugar  Your dietitian or healthcare provider can help you create daily limits for high-sugar foods and drinks  The limit may be lower if you have diabetes or another health condition  Limits can also help you lose weight if needed  Lifestyle changes you can make to lower your cholesterol levels:   · Maintain a healthy weight  Ask your healthcare provider what a healthy weight is for you  Ask him or her to help you create a weight loss plan if needed  Weight loss can decrease your total cholesterol and triglyceride levels  Weight loss may also help keep your blood pressure at a healthy level  · Be physically active throughout the day  Physical activity, such as exercise, can help lower your total cholesterol level and maintain a healthy weight  Physical activity can also help increase your HDL cholesterol level  Work with your healthcare provider to create an program that is right for you  Get at least 30 to 40 minutes of moderate physical activity most days of the week  Examples of exercise include brisk walking, swimming, or biking  Also include strength training at least 2 times each week  Your healthcare providers can help you create a physical activity plan  · Do not smoke  Nicotine and other chemicals in cigarettes and cigars can raise your cholesterol levels  Ask your healthcare provider for information if you currently smoke and need help to quit  E-cigarettes or smokeless tobacco still contain nicotine   Talk to your healthcare provider before you use these products  · Limit or do not drink alcohol  Alcohol can increase your triglyceride levels  Ask your healthcare provider before you drink alcohol  Ask how much is okay for you to drink in 24 hours or 1 week  Follow up with your doctor as directed:  Write down your questions so you remember to ask them during your visits  © Copyright [employer] 2021 Information is for End User's use only and may not be sold, redistributed or otherwise used for commercial purposes  All illustrations and images included in CareNotes® are the copyrighted property of [employer]  or [address] [place], [place] [address]sim   The above information is an  only  It is not intended as medical advice for individual conditions or treatments  Talk to your doctor, nurse or pharmacist before following any medical regimen to see if it is safe and effective for you

## 2021-08-09 NOTE — TELEPHONE ENCOUNTER
Patient walked into office and dropped off [facility] papers to update for intermittent leave  Patient aware of 15 00 fee, and  timeframe of 2 weeks  Forms scanned into chart, fees dropped  Patient was not able to pay form fee today so please collect fee prior to forms being faxed to her employer

## 2021-08-09 NOTE — PROGRESS NOTES
[facility]    NAME: Sharyn Henao  AGE: 37 y o  SEX: female  : 1980     DATE: 2021     Assessment and Plan:     Problem List Items Addressed This Visit        Nervous and Auditory    Bell's palsy    Relevant Orders    Ambulatory Referral to Otolaryngology       Genitourinary    Microscopic hematuria     Repeat UA and US, if positive for hematuria, or any concerning findings will refer to Urology           Other Visit Diagnoses     History of hematuria    -  Primary    Relevant Orders    POCT urine dip (Completed)    Urine culture    US kidney and bladder    Clogged ear, right        Relevant Orders    Ambulatory Referral to Otolaryngology    Eustachian tube disorder, right        Relevant Medications    fluticasone (FLONASE) 50 mcg/act nasal spray    Annual physical exam              Immunizations and preventive care screenings were discussed with patient today  Appropriate education was printed on patient's after visit summary  Counseling:  Dental Health: discussed importance of regular tooth brushing, flossing, and dental visits  · Exercise: the importance of regular exercise/physical activity was discussed  Recommend exercise 3-5 times per week for at least 30 minutes  No follow-ups on file  Chief Complaint:     Chief Complaint   Patient presents with    New Patient Visit     36 y/o     Blood in Urine     ongoing for a few years     Earache     right "pt states it feels clog"       History of Present Illness:     Adult Annual Physical   Patient here for a comprehensive physical exam  The patient reports problems - ear pain  Diet and Physical Activity  · Diet/Nutrition: well balanced diet  · Exercise: no formal exercise        Depression Screening  PHQ-9 Depression Screening    PHQ-9:   Frequency of the following problems over the past two weeks:      Little interest or pleasure in doing things: 0 - not at all  Feeling down, depressed, or hopeless: 0 - not at all  PHQ-2 Score: 0       General Health  · Sleep: sleeps well  · Hearing: normal - bilateral   · Vision: goes for regular eye exams  · Dental: no dental visits for >1 year  /GYN Health  · Patient is: premenopausal  · Last menstrual period: 7/18/2021  · Contraceptive method: tubal ligation  Review of Systems:     Review of Systems   HENT: Positive for ear pain (described as pressure in her R ear )  Musculoskeletal: Positive for back pain  All other systems reviewed and are negative       Past Medical History:     Past Medical History:   Diagnosis Date    Abnormal Pap smear of cervix     Depression     Last assessed: 8/1/12    History of Bell's palsy     History of chlamydia infection     History of gonorrhea     [facility]     Ovarian cyst     Varicella     HX DISEASE      Past Surgical History:     Past Surgical History:   Procedure Laterality Date    KNEE ARTHROSCOPY      [place] LAP,TUBAL CAUTERY Bilateral 10/5/2018    Procedure: LIGATION TUBAL LAPAROSCOPIC - FALOPE RINGS;  Surgeon: Dre Lemos MD;  [facility];  Service: Gynecology      Social History:     Social History     Socioeconomic History    Marital status: Single     Spouse name: None    Number of children: None    Years of education: 15    Highest education level: None   Occupational History    Occupation: [employer]   Tobacco Use    Smoking status: Current Every Day Smoker     Packs/day: 0 50     Types: Cigarettes    Smokeless tobacco: Never Used   Vaping Use    Vaping Use: Never used   Substance and Sexual Activity    Alcohol use: No    Drug use: No    Sexual activity: Yes     Partners: Male     Birth control/protection: Female Sterilization   Other Topics Concern    None   Social History Narrative    None     Social Determinants of Health     Financial Resource Strain:     Difficulty of Paying Living Expenses:    Food Insecurity:     Worried About [address] in the Last Year:     [address] [facility] [address] in the Last Year:    Transportation Needs:     Lack of Transportation (Medical):  Lack of Transportation (Non-Medical):    Physical Activity:     Days of Exercise per Week:     Minutes of Exercise per Session:    Stress:     Feeling of Stress :    Social Connections:     Frequency of Communication with Friends and Family:     Frequency of Social Gatherings with Friends and Family:     Attends Hoahaoism Services:     Active Member of Clubs or Organizations:     Attends Club or Organization Meetings:     Marital Status:    Intimate Partner Violence:     Fear of Current or Ex-Partner:     Emotionally Abused:     Physically Abused:     Sexually Abused:       Family History:     Family History   Problem Relation Age of Onset    Hypertension Mother     Hypertension Father     Macular degeneration Father     Glaucoma Family     Macular degeneration Family     Hypertension Family     Anuerysm Sister     Migraines Sister     No Known Problems Daughter     Asthma Son     ADD / ADHD Son     Allergies Son       Current Medications:     Current Outpatient Medications   Medication Sig Dispense Refill    fluticasone (FLONASE) 50 mcg/act nasal spray 1 spray into each nostril daily 16 g 1     No current facility-administered medications for this visit  Allergies:     No Known Allergies   Physical Exam:     /72 (BP Location: Right arm, Patient Position: Sitting, Cuff Size: Standard)   Pulse 71   Temp 97 5 °F (36 4 °C) (Temporal)   Resp 20   Ht 5' 4" (1 626 m)   Wt 90 7 kg (200 lb)   LMP 07/22/2021 (Exact Date)   SpO2 98%   BMI 34 33 kg/m²     Physical Exam  Vitals and nursing note reviewed  Constitutional:       General: She is not in acute distress  Appearance: She is well-developed  HENT:      Head: Normocephalic and atraumatic        Right Ear: Hearing, ear canal and external ear normal  Tympanic membrane is erythematous and retracted  Left Ear: Hearing, tympanic membrane, ear canal and external ear normal       Nose: Nose normal    Eyes:      Conjunctiva/sclera: Conjunctivae normal    Cardiovascular:      Rate and Rhythm: Normal rate and regular rhythm  Heart sounds: No murmur heard  Pulmonary:      Effort: Pulmonary effort is normal  No respiratory distress  Breath sounds: Normal breath sounds  Abdominal:      Palpations: Abdomen is soft  Tenderness: There is no abdominal tenderness  Musculoskeletal:      Cervical back: Neck supple  Skin:     General: Skin is warm and dry  Neurological:      Mental Status: She is alert and oriented to person, place, and time            MD Ted [place] [address]

## 2021-08-11 LAB — BACTERIA UR CULT: NORMAL

## 2021-08-17 NOTE — TELEPHONE ENCOUNTER
FMLA forms has been completed,signed and faxed to [address]  FMLA form are scanned into patient chart and mail originals to patient

## 2021-08-22 ENCOUNTER — OFFICE VISIT (OUTPATIENT)
Dept: URGENT CARE | Age: 41
End: 2021-08-22
Payer: COMMERCIAL

## 2021-08-22 ENCOUNTER — APPOINTMENT (OUTPATIENT)
Dept: RADIOLOGY | Age: 41
End: 2021-08-22
Payer: COMMERCIAL

## 2021-08-22 VITALS
DIASTOLIC BLOOD PRESSURE: 70 MMHG | TEMPERATURE: 97.2 F | WEIGHT: 198 LBS | OXYGEN SATURATION: 100 % | SYSTOLIC BLOOD PRESSURE: 121 MMHG | HEART RATE: 86 BPM | RESPIRATION RATE: 18 BRPM | HEIGHT: 64 IN | BODY MASS INDEX: 33.8 KG/M2

## 2021-08-22 DIAGNOSIS — S99.921A INJURY OF TOE ON RIGHT FOOT, INITIAL ENCOUNTER: ICD-10-CM

## 2021-08-22 DIAGNOSIS — S93.104A DISLOCATION OF PHALANX OF RIGHT FOOT, INITIAL ENCOUNTER: Primary | ICD-10-CM

## 2021-08-22 DIAGNOSIS — S92.524A CLOSED NONDISPLACED FRACTURE OF MIDDLE PHALANX OF LESSER TOE OF RIGHT FOOT, INITIAL ENCOUNTER: ICD-10-CM

## 2021-08-22 DIAGNOSIS — S93.104A DISLOCATION OF PHALANX OF RIGHT FOOT, INITIAL ENCOUNTER: ICD-10-CM

## 2021-08-22 PROCEDURE — 73660 X-RAY EXAM OF TOE(S): CPT

## 2021-08-22 RX ORDER — ACETAMINOPHEN AND CODEINE PHOSPHATE 300; 30 MG/1; MG/1
1 TABLET ORAL EVERY 4 HOURS PRN
Qty: 15 TABLET | Refills: 0 | Status: SHIPPED | OUTPATIENT
Start: 2021-08-22

## 2021-08-22 NOTE — PROGRESS NOTES
[facility]        NAME: Yayo Quigley is a 37 y o  female  : 1980    MRN: [id number]  DATE: 2021  TIME: 3:19 PM    Assessment and Plan   Dislocation of phalanx of right foot, initial encounter [S93 104A]  1  Dislocation of phalanx of right foot, initial encounter  XR toe right fifth min 2 views    XR toe right fifth min 2 views    Ambulatory referral to Podiatry    acetaminophen-codeine (TYLENOL #3) 300-30 mg per tablet    Post Op Shoe    Orthopedic injury treatment   2  Closed nondisplaced fracture of middle phalanx of lesser toe of right foot, initial encounter  Ambulatory referral to Podiatry    acetaminophen-codeine (TYLENOL #3) 300-30 mg per tablet    Post Op Shoe    Orthopedic injury treatment         Patient Instructions      use Tylenol No  3 for severe pain   Motrin and/or Tylenol as needed for mild-to-moderate pain   follow-up with Podiatry  Follow up with PCP in 3-5 days  Proceed to  ER if symptoms worsen  Chief Complaint     Chief Complaint   Patient presents with    Toe Pain     Right 5th toe pain/injury last night         History of Present Illness        60-year-old female presents with right little toe injury  Patient reports that she is complaining drinking last night in injured foot  Rib been having pain all day today and thinks that it might be dislocated  Denies any numbness or tingling    Toe Pain   The incident occurred 12 to 24 hours ago  The incident occurred at home  The injury mechanism was a direct blow  The pain is present in the right toes  The quality of the pain is described as aching  The pain is moderate  The pain has been constant since onset  Pertinent negatives include no inability to bear weight, loss of motion, loss of sensation, muscle weakness, numbness or tingling  She reports no foreign bodies present  Nothing aggravates the symptoms  She has tried nothing for the symptoms  The treatment provided no relief         Review of Systems Review of Systems   Constitutional: Negative  HENT: Negative  Eyes: Negative  Respiratory: Negative  Cardiovascular: Negative  Gastrointestinal: Negative  Musculoskeletal: Positive for arthralgias  Skin: Negative  Neurological: Negative  Negative for tingling and numbness  Current Medications       Current Outpatient Medications:     acetaminophen-codeine (TYLENOL #3) 300-30 mg per tablet, Take 1 tablet by mouth every 4 (four) hours as needed for moderate pain, Disp: 15 tablet, Rfl: 0    fluticasone (FLONASE) 50 mcg/act nasal spray, 1 spray into each nostril daily (Patient not taking: Reported on 8/22/2021), Disp: 16 g, Rfl: 1    Current Allergies     Allergies as of 08/22/2021    (No Known Allergies)            The following portions of the patient's history were reviewed and updated as appropriate: allergies, current medications, past family history, past medical history, past social history, past surgical history and problem list      Past Medical History:   Diagnosis Date    Abnormal Pap smear of cervix     Depression     Last assessed: 8/1/12    History of Bell's palsy     History of chlamydia infection     History of gonorrhea     [facility]     Ovarian cyst     Varicella     HX DISEASE       Past Surgical History:   Procedure Laterality Date    KNEE ARTHROSCOPY      [place] LAP,TUBAL CAUTERY Bilateral 10/5/2018    Procedure: LIGATION TUBAL LAPAROSCOPIC - FALOPE RINGS;  Surgeon: Forrestine Dakin, MD;  [facility];  Service: Gynecology       Family History   Problem Relation Age of Onset    Hypertension Mother     Hypertension Father     Macular degeneration Father     Glaucoma Family     Macular degeneration Family     Hypertension Family     Anuerysm Sister     Migraines Sister     No Known Problems Daughter     Asthma Son     ADD / ADHD Son     Allergies Son          Medications have been verified          Objective   /70 (BP Location: Right arm, Patient Position: Sitting, Cuff Size: Standard)   Pulse 86   Temp (!) 97 2 °F (36 2 °C) (Tympanic)   Resp 18   Ht 5' 4" (1 626 m)   Wt 89 8 kg (198 lb)   SpO2 100%   BMI 33 99 kg/m²   No LMP recorded  Physical Exam     Physical Exam  Vitals and nursing note reviewed  Constitutional:       General: She is not in acute distress  Appearance: She is well-developed  HENT:      Head: Normocephalic and atraumatic  Right Ear: External ear normal       Left Ear: External ear normal       Nose: Nose normal       Mouth/Throat:      Pharynx: No oropharyngeal exudate  Eyes:      General:         Right eye: No discharge  Left eye: No discharge  Conjunctiva/sclera: Conjunctivae normal    Pulmonary:      Effort: Pulmonary effort is normal  No respiratory distress  Musculoskeletal:      Cervical back: Normal range of motion and neck supple  Right foot: Decreased range of motion  Normal capillary refill  Swelling, tenderness and bony tenderness present  No deformity, bunion, Charcot foot, foot drop, prominent metatarsal heads, laceration or crepitus  Normal pulse  Skin:     General: Skin is warm and dry  Neurological:      Mental Status: She is alert and oriented to person, place, and time  x-rays reviewed  Dislocation noted with fracture      Orthopedic injury treatment    Date/Time: 8/22/2021 3:16 PM  Performed by: Ana Dias PA-C  Authorized by: Ana Dias PA-C     Patient Location:  Clinic  Verbal consent obtained?: Yes    Written consent obtained?: Yes    Risks and benefits: Risks, benefits and alternatives were discussed    Consent given by:  Patient  Patient states understanding of procedure being performed: Yes    Patient identity confirmed:  Verbally with patient  Injury location:  Toe  Location details:  Right fifth toe  Injury type:  Fracture-dislocation  Fracture type: middle phalanx    Neurovascular status: Neurovascularly intact Distal perfusion: normal    Neurological function: normal    Range of motion: reduced    Local anesthesia used?: Yes    Anesthesia:  Digital block  Local anesthetic:  Lidocaine 1% without epinephrine  Anesthetic total (ml):  3  Manipulation performed?: Yes    Skin traction used?: Yes    Skeletal traction used?: Yes    Reduction successful?: Yes    Confirmation: Reduction confirmed by x-ray    Immobilization:  Tape (Postop shoe)  Neurovascular status: Neurovascularly intact    Distal perfusion: normal    Neurological function: normal    Range of motion: improved    Patient tolerance:  Patient tolerated the procedure well with no immediate complications    Use tape to gregorio-tape toes    Placed in postop shoe

## 2021-08-22 NOTE — LETTER
August 22, 2021     Patient: Ayesha Harada   YOB: 1980   Date of Visit: 8/22/2021       To Whom it May Concern:    Jeremy Paniagua was seen in my clinic on 8/22/2021  She may return to work on 08/24/2021  If you have any questions or concerns, please don't hesitate to call  Sincerely,          Delmy Barros PA-C        CC: No Recipients

## 2021-08-22 NOTE — PATIENT INSTRUCTIONS
use Tylenol No  3 for severe pain   Motrin and/or Tylenol as needed for mild-to-moderate pain   follow-up with Podiatry  Follow up with PCP in 3-5 days  Proceed to  ER if symptoms worsen    Toe Fracture   AMBULATORY CARE:   A toe fracture  is a break in a bone in your toe  Common signs and symptoms include the following:   · Pain, redness, swelling, or bruising    · Not being able to bend or move your toe    · Not being able to walk or put weight on your toe    · Toe is bent at an angle that is not normal    Seek care immediately if:   · Blood soaks through your bandage  · You have severe pain in your toe  · Your toe is cold or numb  Call your doctor if:   · You have a fever  · Your pain does not go away, even after treatment  · Your toe continues to hurt even after it has healed  · You have questions or concerns about your condition or care  Treatment for a toe fracture  may include any of the following:  · Buddy tape, elastic bandage, or a splint  may be used to support your toe in its correct position  Buddy tape means your broken toe and the toe next to it are taped together  · A support device  such as a cane, crutches, walking boot, or hard-soled shoe may be needed  These help protect your toe and limit movement so it can heal          · Medicines  may be given to prevent or treat pain or a bacterial infection  · Closed reduction  is used to move your bones back into place without surgery  · Surgery  may be needed if the bone is out of place or the toe joint is damaged  Wires, pins, or other hardware may be used to keep your bone in place while it heals  Self-care:   · Rest  your toe so that it can heal  Return to normal activities as directed  · Apply ice  on your toe for 15 to 20 minutes every hour or as directed  Use an ice pack, or put crushed ice in a plastic bag  Cover it with a towel before you put it on your toe   Ice helps prevent tissue damage and decreases swelling and pain  · Elevate  your toe above the level of your heart as often as you can  This will help decrease swelling and pain  Prop your toe on pillows or blankets to keep it elevated comfortably  Follow up with your doctor as directed: You may need to return in 2 to 4 weeks  Write down your questions so you remember to ask them during your visits  © Copyright [employer] 2021 Information is for End User's use only and may not be sold, redistributed or otherwise used for commercial purposes  All illustrations and images included in CareNotes® are the copyrighted property of [employer]  or [facility] Ashley Gomez   The above information is an  only  It is not intended as medical advice for individual conditions or treatments  Talk to your doctor, nurse or pharmacist before following any medical regimen to see if it is safe and effective for you

## 2021-08-28 ENCOUNTER — HOSPITAL ENCOUNTER (OUTPATIENT)
Dept: ULTRASOUND IMAGING | Facility: HOSPITAL | Age: 41
Discharge: HOME | End: 2021-08-28
Payer: COMMERCIAL

## 2021-08-28 DIAGNOSIS — Z87.448 HISTORY OF HEMATURIA: ICD-10-CM

## 2021-08-28 PROCEDURE — 76770 US EXAM ABDO BACK WALL COMP: CPT

## 2021-10-07 ENCOUNTER — OFFICE VISIT (OUTPATIENT)
Dept: OTOLARYNGOLOGY | Facility: CLINIC | Age: 41
End: 2021-10-07
Payer: COMMERCIAL

## 2021-10-07 ENCOUNTER — OFFICE VISIT (OUTPATIENT)
Dept: AUDIOLOGY | Facility: CLINIC | Age: 41
End: 2021-10-07
Payer: COMMERCIAL

## 2021-10-07 VITALS
SYSTOLIC BLOOD PRESSURE: 111 MMHG | DIASTOLIC BLOOD PRESSURE: 70 MMHG | TEMPERATURE: 97.8 F | WEIGHT: 194.2 LBS | HEART RATE: 80 BPM | HEIGHT: 64 IN | BODY MASS INDEX: 33.16 KG/M2

## 2021-10-07 DIAGNOSIS — R42 VERTIGO: ICD-10-CM

## 2021-10-07 DIAGNOSIS — Z01.10 NORMAL HEARING TEST: ICD-10-CM

## 2021-10-07 DIAGNOSIS — H93.13 TINNITUS OF BOTH EARS: ICD-10-CM

## 2021-10-07 DIAGNOSIS — H93.8X1 CLOGGED EAR, RIGHT: ICD-10-CM

## 2021-10-07 DIAGNOSIS — H93.8X1 SENSATION OF PLUGGED EAR ON RIGHT SIDE: ICD-10-CM

## 2021-10-07 DIAGNOSIS — H93.231 HYPERACUSIS OF RIGHT EAR: Primary | ICD-10-CM

## 2021-10-07 DIAGNOSIS — H93.13 TINNITUS OF BOTH EARS: Primary | ICD-10-CM

## 2021-10-07 DIAGNOSIS — H61.21 IMPACTED CERUMEN OF RIGHT EAR: ICD-10-CM

## 2021-10-07 DIAGNOSIS — G51.0 BELL'S PALSY: ICD-10-CM

## 2021-10-07 PROCEDURE — 99244 OFF/OP CNSLTJ NEW/EST MOD 40: CPT | Performed by: SPECIALIST

## 2021-10-07 PROCEDURE — 92557 COMPREHENSIVE HEARING TEST: CPT | Performed by: AUDIOLOGIST

## 2021-10-07 PROCEDURE — 92568 ACOUSTIC REFL THRESHOLD TST: CPT | Performed by: AUDIOLOGIST

## 2021-10-08 ENCOUNTER — APPOINTMENT (OUTPATIENT)
Dept: RADIOLOGY | Age: 41
End: 2021-10-08
Payer: COMMERCIAL

## 2021-10-08 DIAGNOSIS — M79.674 PAIN IN TOE OF RIGHT FOOT: ICD-10-CM

## 2021-10-08 PROCEDURE — 73660 X-RAY EXAM OF TOE(S): CPT

## 2021-10-11 ENCOUNTER — TELEPHONE (OUTPATIENT)
Dept: NEUROLOGY | Facility: CLINIC | Age: 41
End: 2021-10-11

## 2021-10-11 ENCOUNTER — TELEMEDICINE (OUTPATIENT)
Dept: NEUROLOGY | Facility: CLINIC | Age: 41
End: 2021-10-11
Payer: COMMERCIAL

## 2021-10-11 DIAGNOSIS — E55.9 VITAMIN D DEFICIENCY: ICD-10-CM

## 2021-10-11 DIAGNOSIS — G35 MULTIPLE SCLEROSIS (HCC): Primary | ICD-10-CM

## 2021-10-11 DIAGNOSIS — E53.8 DEFICIENCY OF VITAMIN B12: ICD-10-CM

## 2021-10-11 PROCEDURE — 99214 OFFICE O/P EST MOD 30 MIN: CPT | Performed by: PSYCHIATRY & NEUROLOGY

## 2021-11-10 ENCOUNTER — TELEPHONE (OUTPATIENT)
Dept: NEUROLOGY | Facility: CLINIC | Age: 41
End: 2021-11-10

## 2021-11-11 ENCOUNTER — TELEPHONE (OUTPATIENT)
Dept: NEUROLOGY | Facility: CLINIC | Age: 41
End: 2021-11-11

## 2021-12-13 ENCOUNTER — TELEPHONE (OUTPATIENT)
Dept: NEUROLOGY | Facility: CLINIC | Age: 41
End: 2021-12-13

## 2021-12-14 ENCOUNTER — TELEPHONE (OUTPATIENT)
Dept: NEUROLOGY | Facility: CLINIC | Age: 41
End: 2021-12-14

## 2022-01-13 ENCOUNTER — TELEPHONE (OUTPATIENT)
Dept: NEUROLOGY | Facility: CLINIC | Age: 42
End: 2022-01-13

## 2022-01-13 DIAGNOSIS — G35 MULTIPLE SCLEROSIS (HCC): Primary | ICD-10-CM

## 2022-01-13 NOTE — TELEPHONE ENCOUNTER
Patient of Dr Michelle Sena, last  visit 10/11    She called to advise she tested positive for COVID and has symptoms  She is asking if she should hold aubagio? Please provide recommendation  Thank you      Please respond via my chart

## 2022-01-13 NOTE — TELEPHONE ENCOUNTER
Pt is overdue for labs  Please check when she started the aubagio  Supposed to be doing monthly lab draws and none in emr  Ok to hold med but med will still be in system due to long half life  When did she start med and when did the covid sxs start? How is she feeling?

## 2022-01-17 ENCOUNTER — TELEPHONE (OUTPATIENT)
Dept: NEUROLOGY | Facility: CLINIC | Age: 42
End: 2022-01-17

## 2022-01-17 NOTE — TELEPHONE ENCOUNTER
Pt reports she is supposed to return to work  Needs FMLA forms for [employer] completed monthly as other coworkers are abusing this  Forms last completed and signed by Verline Necessary on 12/14/21  Pt does not require any changes to form  Verline Necessary, are you agreeable to nursing team updating date on forms and submitting back to pt's employer with no other changes? Or would you like new form completed?

## 2022-01-17 NOTE — TELEPHONE ENCOUNTER
It does not matter to me, really up to what her HR prefers I guess    If there are no changes and they just need it re-submitted, can use the old form if her HR is ok with that

## 2022-01-17 NOTE — TELEPHONE ENCOUNTER
Pt reviewed message: "Last read by Blossom Higgins at 4:07 PM on 1/13/2022 "    No response from pt received  Called pt and reviewed message below  She reports she did not start Aubagio until 12/27/21 so she is waiting closer to the 30 day kayla as instructed to have monthly labs completed  COVID symptoms started 1/11/22 with cough and "cold like" symptoms  Tested on 1/13/22, result was positive  Pt had sore throat, nasal congestion, runny nose, and cough  Doing better now and slowly improving  Now has cough, still needing to blow her nose  Sore throat only at times  Pt did stop Aubagio with last dose on 1/13/22  Asking when she should restart this? Please advise  [phone]  Okay to leave detailed message  Prefers attempted call prior to MyChart message

## 2022-01-18 DIAGNOSIS — G35 MULTIPLE SCLEROSIS (HCC): Primary | ICD-10-CM

## 2022-01-18 RX ORDER — TERIFLUNOMIDE 7 MG/1
7 TABLET, FILM COATED ORAL DAILY
Qty: 14 TABLET | Refills: 0 | Status: SHIPPED | OUTPATIENT
Start: 2022-01-18 | End: 2022-07-25

## 2022-01-18 NOTE — TELEPHONE ENCOUNTER
Spoke w/patient  Went over message below  Patient verbalized understanding to all  She does still have a cough  She was now allowed to return to work  She will update office on 1/27/22  Patient will require 2 weeks worth of Aubagio 7mg when she restarts medication  Script pended below  Confirmed specialty pharmacy w/Perla @ [facility]  [facility]  [phone]  Dr Felisha Hill - please sign if agreeable

## 2022-01-18 NOTE — TELEPHONE ENCOUNTER
Looks like pt only on aubagio for about 18 days before stopping at time of covid infection  Would stay off med at least 2 weeks  If still with covid sxs in 2 weeks, pt will need to check with pcp as well before restarting med  Once pt restarts, we can then check labs about 3 weeks after re start  Please verify that pt was on 7mg aubagio dose to start  She will need to restart at the 7mg dose for one month and then go to the 14 mg dose on month 2  Please see if she will need about 2 weeks worth of the 7 mg dosing due to restart

## 2022-01-20 NOTE — TELEPHONE ENCOUNTER
Bhanu from [facility] called to confirm that aubagio 7 mg was ordered for 2 week supply  I confirmed same as patient had been ill and was instructed to restart medication  Bhanu verbalized understanding and will fill same

## 2022-01-20 NOTE — TELEPHONE ENCOUNTER
Form updated, signed and faxed to [phone]  Patient made aware  Originals placed in bin to be scanned into chart

## 2022-02-02 ENCOUNTER — TELEPHONE (OUTPATIENT)
Dept: NEUROLOGY | Facility: CLINIC | Age: 42
End: 2022-02-02

## 2022-02-02 DIAGNOSIS — G35 MULTIPLE SCLEROSIS (HCC): Primary | ICD-10-CM

## 2022-02-02 NOTE — TELEPHONE ENCOUNTER
Guru with [facility] pharmacy calling  States Aubagio 7mg needs new PA  Pt is restarting therapy  PA submitted on CMM, Key: [id number]  Awaiting determination

## 2022-02-04 NOTE — TELEPHONE ENCOUNTER
Received fax stating new request was not processed as Aubagio 7mg was previously approved and filled  Called [facility] at [phone] and spoke with Colletta Able  Explained 7mg dose is needed again as pt is restarting therapy  He reopened PA request  He will also request for 14mg PA can be extended as this expires 3/1/22  He marked requests as urgent, TAT is 24 hours  Awaiting determination

## 2022-02-08 RX ORDER — RENAGEL 800 MG/1
14 TABLET ORAL DAILY
Qty: 90 TABLET | Refills: 3 | Status: SHIPPED | OUTPATIENT
Start: 2022-02-08 | End: 2022-07-25

## 2022-02-08 NOTE — TELEPHONE ENCOUNTER
Called [facility]  Spoke w/Tiffany  Aubagio 7mg - approved from 2/4/22 - 3/4/22  Aubagio 14mg - 3/5/22- 5/4/22    Questioned why maintenance dose only approved for two months  Noelle Larkin spoke w/Pharmacist  Pharmacist will have technician update approval for Aubagio 14mg and extend it to 9/4/22  She states we will receive an updated letter reflecting new approval date  Called [facility] [phone]  Spoke w/Devin  Advised him of PA approval for Aubagio 7mg and Aubagio 14mg  Corinne Farmer will fill 7mg script and contact patient for delivery  He requires a new script for Aubagio 14 mg  New script pended below  Dr Laura Aguilar - please sign if agreeable

## 2022-04-20 ENCOUNTER — OFFICE VISIT (OUTPATIENT)
Dept: NEUROLOGY | Facility: CLINIC | Age: 42
End: 2022-04-20
Payer: COMMERCIAL

## 2022-04-20 VITALS
SYSTOLIC BLOOD PRESSURE: 108 MMHG | TEMPERATURE: 97.6 F | HEART RATE: 77 BPM | WEIGHT: 186.6 LBS | BODY MASS INDEX: 31.86 KG/M2 | RESPIRATION RATE: 18 BRPM | DIASTOLIC BLOOD PRESSURE: 55 MMHG | HEIGHT: 64 IN

## 2022-04-20 DIAGNOSIS — G35 MULTIPLE SCLEROSIS (HCC): Primary | ICD-10-CM

## 2022-04-20 PROCEDURE — 99214 OFFICE O/P EST MOD 30 MIN: CPT | Performed by: PHYSICIAN ASSISTANT

## 2022-04-20 NOTE — PROGRESS NOTES
Patient ID: Georgai Saab is a 37 y o  female  Assessment/Plan:    Multiple sclerosis ([place] [id number] )  Patient with MS diagnosed in 2015  She was on Tecfidera, then dimethyl fumarate from 2016 until she stopped on her own in April 2021 due to forgetting to take her medication  Alternative DMTs were discussed and she chose Aubagio, which was initiated in Dec 2021 (delayed start due to her having COVID)  She only took the med for 18 day and got COVID again in Jan 2022  She then resumed Aubagio in late January but tells me today she stopped the medication after a few weeks (estimates stopped in late Feb) due to hair shedding  We discussed she should always inform our office if she is having difficulty with medication or if she wants to stop medication  She has essentially been off DMT for 1 year except for the brief time she was on Aubagio earlier this year  Alternative DMTs were discussed  We discussed possibility of resuming dimethyl fumarate (she had no problems with it other than non-compliance), resuming Aubagio (discussed hair shedding is common in the first 6 months but then should resolve), or consider Tysabri, which was also discussed last year  She was JCV neg in July 2021  Decision was made to update MRI brain and labs, including JCV, and then she will decide on DMT  I will call her once her labs are back and she will have a decision (she would consider Tysabri if JCV neg)  Fortunately she has remained clinically stable with no new symptoms or changes on exam      Reminded her to take her B12 and D supplements--1000mcg of B12 and 4,000IU of vit D3  She will return in 3 months or sooner if needed       Diagnoses and all orders for this visit:    Multiple sclerosis ([place] [id number] )  -     MRI brain MS wo and w contrast; Future  -     CBC and differential; Future  -     Comprehensive metabolic panel; Future  -     STRATIFY JCV(TM) AB W/RFX TO INHIBITION ASSAY;  Future Subjective:    HPI    Patient is a 37year old female who presents today for MS follow up, last seen in October 2021  To review, patients symptoms began in January 2015 when she had a work injury to the left shoulder  In March 2015, she had left sided numbness going down her torso, down into her feet and legs  She also experienced urinary leakage without Valsalva  Patient then experienced right eye pain and fogginess of vision  She was seen by Dr Rosario Hoffman and had an OCT done  It revealed normal findings on the right but a sector defect on the left  MRI of the brain from May 13, 2015 revealed a few scattered nonspecific supratentorial WM lesions  No acute infarction, or hemorrhage or enh  MRI of the lumbar spine without contrast revealed small L5/S1 disc herniation, MRI of the T-spine revealed evidence of solitary 8mm lesion to the left of midline at T8/9 in the dorsal Cord  No pathological enh  MRI of the C-spine also done on June 13, 2015 revealed a solitary 5 mm focus of active demyelination in the right lateral cord at C2  Patient was given 3 days of IV steroids  Patient with significant GI upset with the steroids and also some mild change in mood with increased anxiety  She was NMO negative and Lyme negative  She did not have an LP  Patient was started on Copaxone as her initial IMD med in 2015  She had a brief interval of being off med due to insurance  However, due to site reactions, patient stopped Copaxone and started Tecfidera in May 2016  Patient was subsequently told to stop her Tecfidera due to unplanned pregnancy in Nov 2017  Patient delivered her daughter on 8/71/32 without complication  She also had a tubal ligation  She restarted Tecfidera in January 2019 after she was done breastfeeding  Patient had c/o low back pain as well as left hip pain  She had an x-ray of the lumbar spine which showed moderate disc narrowing at L5/S1   She saw Orthopedics who agreed with MRI lumbar spine, however this was not approved by her insurance  She then did a trial of PT from 10/28/2019 through 11/20/2019  She also had an EMG of the left lower extremity on 11/27/2019, which was a normal study  MRI lumbar spine was completed on 2/7/2020  This demonstrated moderate to large left paramedian protrusion/superior extrusion type disc herniation which results in central and lateral recess stenosis potentially impacting exiting left L5 nerve root or descending left S1 nerve root  Patient was advised to return to ortho, who she was already established with  She has not seen ortho again  Due to insurance reasons, she changed from brand name Tecfidera to generic dimethyl fumarate in December 2020  Patient called our office on 3/12/21 reporting onset of tongue numbness on 3/8  She then started experiencing numbness of her right lip and weakness of the right face on 3/11  On 3/12 she started to experience a bit of pain in the right face, which is why she called the office and then presented to the ED (prior to hearing back from our office)  Per the ER note, she had weakness of the entire right face, as well as decreased sensation of the entire right face  Due to symptoms x 4 days, no stroke alert called, as well as this appeared like Bells palsy  I received a tiger text from the ED physician reporting her presentation was most concerning for Bells palsy and asking if any further workup/treatment advised  CTH negative  Lyme negative, UA negative  She had a brain MRI with attn to IACs (due to occasional vertigo and tinnitus) completed on 3/2/21 which showed stable MS, no pathology in the IACs  She was discharged from the ED with a prednisone taper and valacyclovir  She was seen in the office on 3/18/2021 and was doing ok, still with right facial weakness, decreased eye closure, tongue and facial numbness on the right, hyperacusis         At timing of her visit on 6/8/21 she admitted that she was not compliant with her dimethyl fumarate, forgetting to take it, and in fact had not taken it at all since April 2021  She was having trouble requesting a refill from her specialty pharmacy but never informed our office about this  She had also stopped her gabapentin due to she was concerned it caused weight gain  Alternative DMTs were discussed at visits in June 2021  Patient had JCV testing done and negative with index 0 33  Tysabri and Aubagio were discussed  At timing of October 2021 visit with Dr Rashmi Vail, patient chose Aubagio  Start form was signed  Patient started Aubagio in Dec 2021 (7mg x 1 month then increase to 14mg)  She was on Aubagio for about 18 days and tested positive for COVID in Jan 2022 (she also had COVID prior to starting Aubagio in November 2021)  She held the [facility] for a few weeks until COVID symptoms improved and then restarted Aubagio 7mg before increase to 14mg  Today, patient reports she actually stopped Aubagio, never called our office to report this  She stopped the medication because she noticed her hair was coming out while brushing her hair  She estimates she took the medication for only a few weeks from late January to late February 2022  She denies any new MS symptoms at this time, feels things have overall been stable  She is no longer working  She reports her sleep is very poor, interrupted, which makes her tired  She denies vision changes, recently had updated eye exam, which is in the chart and was reviewed  No speech/swallowing difficulty  No bowel/bladder changes  No new weakness, falls  The following portions of the patient's history were reviewed and updated as appropriate: allergies, current medications, past family history, past medical history, past social history, past surgical history and problem list          Objective:    Blood pressure 108/55, pulse 77, temperature 97 6 °F (36 4 °C), temperature source Tympanic, resp   rate 18, height 5' 4" (1 626 m), weight 84 6 kg (186 lb 9 6 oz), not currently breastfeeding  Physical Exam  Constitutional:       Appearance: Normal appearance  HENT:      Head: Normocephalic and atraumatic  Eyes:      Extraocular Movements: EOM normal       Pupils: Pupils are equal, round, and reactive to light  Neurological:      Mental Status: She is alert  Coordination: Coordination is intact  Deep Tendon Reflexes: Strength normal and reflexes are normal and symmetric  Psychiatric:         Mood and Affect: Mood normal          Speech: Speech normal          Behavior: Behavior normal          Neurological Exam  Mental Status  Alert  Oriented to person, place, time and situation  Speech is normal  Language is fluent with no aphasia  Attention and concentration are normal     Cranial Nerves  CN II: Visual fields full to confrontation  CN III, IV, VI: Extraocular movements intact bilaterally  Pupils equal round and reactive to light bilaterally  CN V: Facial sensation is normal   CN VII: Full and symmetric facial movement  CN VIII: Hearing is normal   CN IX, X: Palate elevates symmetrically  CN XI: Shoulder shrug strength is normal   CN XII: Tongue midline without atrophy or fasciculations  Motor   Normal muscle tone  Strength is 5/5 throughout all four extremities  Sensory  Sensation is intact to light touch, pinprick, vibration and proprioception in all four extremities  Reflexes  Deep tendon reflexes are 2+ and symmetric in all four extremities with downgoing toes bilaterally  Coordination  Finger-to-nose, rapid alternating movements and heel-to-shin normal bilaterally without dysmetria  Gait  Casual gait is normal including stance, stride, and arm swing  ROS:    Review of Systems   Constitutional: Positive for fatigue  Negative for chills and fever  HENT: Positive for tinnitus (bilateral ears)  Negative for ear pain and sore throat      Eyes: Negative for pain and visual disturbance  Respiratory: Negative for cough and shortness of breath  Cardiovascular: Negative for chest pain and palpitations  Gastrointestinal: Negative for abdominal pain and vomiting  Genitourinary: Positive for urgency  Negative for dysuria and hematuria  Musculoskeletal: Negative for arthralgias, back pain and neck pain  Skin: Negative for color change and rash  Neurological: Positive for dizziness, tremors (back of bilateral thighs ) and numbness (bilateral hands and feet)  Negative for seizures and syncope  Pt states cramps in bilateral legs and toes     Psychiatric/Behavioral: Positive for sleep disturbance (trouble staying asleep)  Pt states memory has been worsening sometimes forgets words   All other systems reviewed and are negative      I personally reviewed and updated the ROS as appropriate

## 2022-04-20 NOTE — PATIENT INSTRUCTIONS
Will update blood work  Once I get the results, I will call you and we can discuss DMT options  Options include returning to dimethyl fumarate, Aubagio or Tysabri if JCV negative  Will update MRI brain  Start vitamin D3 4,000IU daily    Start B12 1,000mcg daily  Follow up in 3 months

## 2022-04-21 ENCOUNTER — APPOINTMENT (OUTPATIENT)
Dept: LAB | Facility: CLINIC | Age: 42
End: 2022-04-21
Payer: COMMERCIAL

## 2022-04-21 DIAGNOSIS — G35 MULTIPLE SCLEROSIS (HCC): ICD-10-CM

## 2022-04-21 LAB
ALBUMIN SERPL BCP-MCNC: 3.9 G/DL (ref 3.5–5)
ALP SERPL-CCNC: 51 U/L (ref 46–116)
ALT SERPL W P-5'-P-CCNC: 24 U/L (ref 12–78)
ANION GAP SERPL CALCULATED.3IONS-SCNC: 2 MMOL/L (ref 4–13)
AST SERPL W P-5'-P-CCNC: 16 U/L (ref 5–45)
BILIRUB SERPL-MCNC: 0.28 MG/DL (ref 0.2–1)
BUN SERPL-MCNC: 18 MG/DL (ref 5–25)
CALCIUM SERPL-MCNC: 9.5 MG/DL (ref 8.3–10.1)
CHLORIDE SERPL-SCNC: 107 MMOL/L (ref 100–108)
CO2 SERPL-SCNC: 28 MMOL/L (ref 21–32)
CREAT SERPL-MCNC: 0.57 MG/DL (ref 0.6–1.3)
GFR SERPL CREATININE-BSD FRML MDRD: 116 ML/MIN/1.73SQ M
GLUCOSE P FAST SERPL-MCNC: 86 MG/DL (ref 65–99)
POTASSIUM SERPL-SCNC: 4.3 MMOL/L (ref 3.5–5.3)
PROT SERPL-MCNC: 7.1 G/DL (ref 6.4–8.2)
SODIUM SERPL-SCNC: 137 MMOL/L (ref 136–145)

## 2022-04-21 PROCEDURE — 86711 JOHN CUNNINGHAM ANTIBODY: CPT

## 2022-04-21 PROCEDURE — 80053 COMPREHEN METABOLIC PANEL: CPT

## 2022-04-21 NOTE — ASSESSMENT & PLAN NOTE
Patient with MS diagnosed in 2015  She was on Tecfidera, then dimethyl fumarate from 2016 until she stopped on her own in April 2021 due to forgetting to take her medication  Alternative DMTs were discussed and she chose Aubagio, which was initiated in Dec 2021 (delayed start due to her having COVID)  She only took the med for 18 day and got COVID again in Jan 2022  She then resumed Aubagio in late January but tells me today she stopped the medication after a few weeks (estimates stopped in late Feb) due to hair shedding  We discussed she should always inform our office if she is having difficulty with medication or if she wants to stop medication  She has essentially been off DMT for 1 year except for the brief time she was on Aubagio earlier this year  Alternative DMTs were discussed  We discussed possibility of resuming dimethyl fumarate (she had no problems with it other than non-compliance), resuming Aubagio (discussed hair shedding is common in the first 6 months but then should resolve), or consider Tysabri, which was also discussed last year  She was JCV neg in July 2021  Decision was made to update MRI brain and labs, including JCV, and then she will decide on DMT  I will call her once her labs are back and she will have a decision (she would consider Tysabri if JCV neg)  Fortunately she has remained clinically stable with no new symptoms or changes on exam      Reminded her to take her B12 and D supplements--1000mcg of B12 and 4,000IU of vit D3      She will return in 3 months or sooner if needed

## 2022-04-30 LAB — MISCELLANEOUS LAB TEST RESULT: NORMAL

## 2022-05-07 ENCOUNTER — OFFICE VISIT (OUTPATIENT)
Dept: URGENT CARE | Age: 42
End: 2022-05-07

## 2022-05-07 VITALS
OXYGEN SATURATION: 99 % | WEIGHT: 184 LBS | HEIGHT: 65 IN | DIASTOLIC BLOOD PRESSURE: 80 MMHG | HEART RATE: 74 BPM | RESPIRATION RATE: 16 BRPM | BODY MASS INDEX: 30.66 KG/M2 | TEMPERATURE: 97.8 F | SYSTOLIC BLOOD PRESSURE: 124 MMHG

## 2022-05-07 DIAGNOSIS — B02.9 HERPES ZOSTER WITHOUT COMPLICATION: ICD-10-CM

## 2022-05-07 DIAGNOSIS — L08.9 LOCAL INFECTION OF THE SKIN AND SUBCUTANEOUS TISSUE, UNSPECIFIED: Primary | ICD-10-CM

## 2022-05-07 RX ORDER — VALACYCLOVIR HYDROCHLORIDE 1 G/1
1000 TABLET, FILM COATED ORAL 3 TIMES DAILY
Qty: 21 TABLET | Refills: 0 | Status: SHIPPED | OUTPATIENT
Start: 2022-05-07 | End: 2022-05-14

## 2022-05-07 RX ORDER — CEPHALEXIN 500 MG/1
500 CAPSULE ORAL EVERY 8 HOURS SCHEDULED
Qty: 15 CAPSULE | Refills: 0 | Status: SHIPPED | OUTPATIENT
Start: 2022-05-07 | End: 2022-05-12

## 2022-05-07 NOTE — PROGRESS NOTES
[facility]        NAME: Tristan Aly is a 37 y o  female  : 1980    MRN: [id number]  DATE: May 7, 2022  TIME: 2:12 PM    Assessment and Plan   Local infection of the skin and subcutaneous tissue, unspecified [L08 9]  1  Local infection of the skin and subcutaneous tissue, unspecified     2  Herpes zoster without complication           Patient Instructions       Follow up with PCP in 3-5 days  Proceed to  ER if symptoms worsen  Chief Complaint     Chief Complaint   Patient presents with    Rash     Right sided upper arm skin rash with drainagef for approx four days  Pt reports right sided facial itching, tingling, and numbness  History of Present Illness       She has history of repeated change goals and also recent Bell's palsy  Pruritic draining sore on the right lateral upper arm x4 days  Review of Systems   Review of Systems   Skin: Positive for wound           Current Medications       Current Outpatient Medications:     acetaminophen-codeine (TYLENOL #3) 300-30 mg per tablet, Take 1 tablet by mouth every 4 (four) hours as needed for moderate pain (Patient not taking: Reported on 10/11/2021), Disp: 15 tablet, Rfl: 0    fluticasone (FLONASE) 50 mcg/act nasal spray, 1 spray into each nostril daily (Patient not taking: Reported on 2022 ), Disp: 16 g, Rfl: 1    Teriflunomide (Aubagio) 14 MG TABS, Take 1 tablet (14 mg total) by mouth daily (Patient not taking: Reported on 2022 ), Disp: 90 tablet, Rfl: 3    Teriflunomide (Aubagio) 7 MG TABS, Take 1 tablet (7 mg total) by mouth daily (Patient not taking: Reported on 2022 ), Disp: 14 tablet, Rfl: 0    Current Allergies     Allergies as of 2022    (No Known Allergies)            The following portions of the patient's history were reviewed and updated as appropriate: allergies, current medications, past family history, past medical history, past social history, past surgical history and problem list  Past Medical History:   Diagnosis Date    Abnormal Pap smear of cervix     Depression     Last assessed: 8/1/12    History of Bell's palsy     History of chlamydia infection     History of gonorrhea     MS (multiple sclerosis) ([address] )     Ovarian cyst     Varicella     HX DISEASE       Past Surgical History:   Procedure Laterality Date    KNEE ARTHROSCOPY      WI LAP,TUBAL CAUTERY Bilateral 10/5/2018    Procedure: LIGATION TUBAL LAPAROSCOPIC - FALOPE RINGS;  Surgeon: Lorenzo Quinones MD;  Location: [facility];  Service: Gynecology       Family History   Problem Relation Age of Onset    Hypertension Mother     Cataracts Mother     Hypertension Father     Macular degeneration Father     Glaucoma Family     Macular degeneration Family     Hypertension Family     Anuerysm Sister     Migraines Sister     No Known Problems Daughter     Asthma Son     ADD / ADHD Son     Allergies Son          Medications have been verified  Objective   /80   Pulse 74   Temp 97 8 °F (36 6 °C)   Resp 16   Ht 5' 4 75" (1 645 m)   Wt 83 5 kg (184 lb)   SpO2 99%   BMI 30 86 kg/m²   No LMP recorded  Physical Exam     Physical Exam  Skin:     Comments: Circular erosion of the skin right lateral upper arm  It is draining, they are small vesicles, and looks eroded

## 2022-05-07 NOTE — PATIENT INSTRUCTIONS
I am not sure if this is viral shingles or bacterial infection  We will treat for both  If this is not clearing in the next several days she must be recheck

## 2022-05-14 ENCOUNTER — HOSPITAL ENCOUNTER (OUTPATIENT)
Dept: MRI IMAGING | Facility: HOSPITAL | Age: 42
Discharge: HOME | End: 2022-05-14
Payer: COMMERCIAL

## 2022-05-14 DIAGNOSIS — G35 MULTIPLE SCLEROSIS (HCC): ICD-10-CM

## 2022-05-14 PROCEDURE — A9585 GADOBUTROL INJECTION: HCPCS | Performed by: PHYSICIAN ASSISTANT

## 2022-05-14 PROCEDURE — G1004 CDSM NDSC: HCPCS

## 2022-05-14 PROCEDURE — 70553 MRI BRAIN STEM W/O & W/DYE: CPT

## 2022-05-14 RX ADMIN — GADOBUTROL 8 ML: 604.72 INJECTION INTRAVENOUS at 09:17

## 2022-06-16 ENCOUNTER — TELEPHONE (OUTPATIENT)
Dept: OBGYN CLINIC | Facility: CLINIC | Age: 42
End: 2022-06-16

## 2022-06-16 DIAGNOSIS — B37.3 VAGINAL YEAST INFECTION: Primary | ICD-10-CM

## 2022-06-16 RX ORDER — FLUCONAZOLE 150 MG/1
150 TABLET ORAL EVERY OTHER DAY
Qty: 2 TABLET | Refills: 0 | Status: SHIPPED | OUTPATIENT
Start: 2022-06-16 | End: 2022-06-19

## 2022-06-16 NOTE — TELEPHONE ENCOUNTER
Pt lmom - states has yeast infection and requesting script for diflucan be sent to [facility]

## 2022-06-19 NOTE — PROGRESS NOTES
Provider Notification    Re: K- 3.3, please place K replacement orders    Action: Notified Dr. Starks at #[id number]    Response: K replacement protocol placed.     Triage Note - OB  Rickey Olivo 40 y o  female MRN: [id number], : 1980  Pt of Dr Tiana Johnson  Unit/Bed#: L&D 329-02 Encounter: [id number]    Chief Complaint: "I am not sure if my boyfriend cheated on me"    Subjective:    HPI: 40 y o   at 19w9d with c/o vaginal discharge, vaginal itching, burning sensation on urination  neg vaginal bleeding   neg loss of fluid   neg contractions   pos fetal movement      Febrile sx: denies fever, chills   GI sx: denies nausea & vomiting, diarrhea, constipation    sx: admits to dysuria, vaginal discharge, vaginal pruritis; denies hematuria     Date of last bowel movement: this morning      Objective:  Blood type: A+  Estimated Date of Delivery: 18    Vitals:   Vitals:    18 1723   BP: 132/76   Pulse: 102   Resp: 18   Temp: 98 °F (36 7 °C)   SpO2: 100%     There is no height or weight on file to calculate BMI  FHR: 140s     Speculum exam: cervix appears long and closed      1 drop of normal saline applied to slide prepared from swab collected from vaginal sidewall fluid:    No motile trichomonads visualized when viewed at 40x power on Brightfield microscopy      Amsel's criteria: 0/4     absence of greyish-white, thin, homogenous discharge that smoothly coats the vaginal walls     pH <4 5     Negative Whiff test     Absence of clue cells when viewed at 40x power on Brightfield microscopy     1 drop of KOH applied to slide prepared from swab collected from vaginal sidewall fluid revealed an absence of pseudohyphae when viewed at 40x power on Brightfield microscopy     Urine dip:     ketones neg    specific gravity 1 030    ph 6 0    nitrites neg     leuks trace     all other measured parameters negative  GC/CT collected         Assessment:  40 y o  female   at 19w9d  Plan:  1) STI testing: GC/CT collected    2) Discussed with Dr Tiana Johnson: d/c home with labor precautions  3) Discharge instructions given to patient and labor precautions reviewed      Ronald Goetz MD, Resident - OB-GYN  4/9/2018 6:22 PM

## 2022-07-13 ENCOUNTER — TELEPHONE (OUTPATIENT)
Dept: NEUROLOGY | Facility: CLINIC | Age: 42
End: 2022-07-13

## 2022-07-22 DIAGNOSIS — G35 MULTIPLE SCLEROSIS (HCC): Primary | ICD-10-CM

## 2022-07-25 ENCOUNTER — APPOINTMENT (OUTPATIENT)
Dept: LAB | Facility: HOSPITAL | Age: 42
End: 2022-07-25
Payer: COMMERCIAL

## 2022-07-25 ENCOUNTER — OFFICE VISIT (OUTPATIENT)
Dept: NEUROLOGY | Facility: CLINIC | Age: 42
End: 2022-07-25
Payer: COMMERCIAL

## 2022-07-25 VITALS
TEMPERATURE: 97.7 F | RESPIRATION RATE: 18 BRPM | WEIGHT: 182.8 LBS | SYSTOLIC BLOOD PRESSURE: 117 MMHG | DIASTOLIC BLOOD PRESSURE: 59 MMHG | HEIGHT: 65 IN | HEART RATE: 70 BPM | BODY MASS INDEX: 30.46 KG/M2

## 2022-07-25 DIAGNOSIS — G35 MULTIPLE SCLEROSIS (HCC): ICD-10-CM

## 2022-07-25 PROCEDURE — 99214 OFFICE O/P EST MOD 30 MIN: CPT | Performed by: PHYSICIAN ASSISTANT

## 2022-07-25 PROCEDURE — 36415 COLL VENOUS BLD VENIPUNCTURE: CPT

## 2022-07-25 PROCEDURE — 86711 JOHN CUNNINGHAM ANTIBODY: CPT

## 2022-07-25 NOTE — ASSESSMENT & PLAN NOTE
Patient with MS diagnosed in 2015  She was on Tecfidera, then dimethyl fumarate from 2016 until she stopped on her own in April 2021 due to forgetting to take her medication  Alternative DMTs were discussed and she chose Aubagio, which was initiated in Dec 2021 (delayed start due to her having COVID)  She only took the med for 18 days and got COVID again in Jan 2022  She then resumed Aubagio in late January but she stopped the medication after a few weeks (estimates stopped in late Feb) due to hair shedding  She never informed our office of this until she was seen in April 2022  She had updated JCV testing in April which was negative with index 0 22  MRI brain May 2022 was stable from a brain standpoint, but evidence of new lesion in the cervicomedullary junction/upper cervical cord  Last c-spine imaging was in 2019  --will update cord imaging at this time (c-spine and t-spine)  We again discussed alternative DMTs  Options include resuming dimethyl fumarate (she had no problems with it other than non-compliance), resuming Aubagio (discussed hair shedding is common in the first 6 months but then should resolve), or Tysabri, which she has considered in the past   since she is JCV negative, she wants to pursue Tysabri  Extensively discussed side effects and PML risk  Will update JCV again before submitting start form, which she did sign today  She has remained clinically stable, although reports some numbness of left anterior thigh, clinically more concerning for meralgia paresthetica  Her exam is stable today      She will return in 3 months or sooner if needed

## 2022-07-25 NOTE — PROGRESS NOTES
Patient ID: Rita Gongora is a 40 y o  female  Assessment/Plan:    Multiple sclerosis ([facility] )  Patient with MS diagnosed in 2015  She was on Tecfidera, then dimethyl fumarate from 2016 until she stopped on her own in April 2021 due to forgetting to take her medication  Alternative DMTs were discussed and she chose Aubagio, which was initiated in Dec 2021 (delayed start due to her having COVID)  She only took the med for 18 days and got COVID again in Jan 2022  She then resumed Aubagio in late January but she stopped the medication after a few weeks (estimates stopped in late Feb) due to hair shedding  She never informed our office of this until she was seen in April 2022  She had updated JCV testing in April which was negative with index 0 22  MRI brain May 2022 was stable from a brain standpoint, but evidence of new lesion in the cervicomedullary junction/upper cervical cord  Last c-spine imaging was in 2019  --will update cord imaging at this time (c-spine and t-spine)  We again discussed alternative DMTs  Options include resuming dimethyl fumarate (she had no problems with it other than non-compliance), resuming Aubagio (discussed hair shedding is common in the first 6 months but then should resolve), or Tysabri, which she has considered in the past   since she is JCV negative, she wants to pursue Tysabri  Extensively discussed side effects and PML risk  Will update JCV again before submitting start form, which she did sign today  She has remained clinically stable, although reports some numbness of left anterior thigh, clinically more concerning for meralgia paresthetica  Her exam is stable today      She will return in 3 months or sooner if needed       Diagnoses and all orders for this visit:    Multiple sclerosis ([facility] )  -     Ambulatory Referral to Neurology  -     MRI cervical spine with and without contrast; Future  -     MRI thoracic spine with and without contrast; Future  - STRATIFY JCV(TM) AB W/RFX TO INHIBITION ASSAY; Future           Subjective:    HPI    Patient is a 40year old female who presents today for MS follow up, last seen in April 2022  To review, patients symptoms began in January 2015 when she had a work injury to the left shoulder  In March 2015, she had left sided numbness going down her torso, down into her feet and legs  She also experienced urinary leakage without Valsalva  Patient then experienced right eye pain and fogginess of vision  She was seen by Dr Nisreen Barnard and had an OCT done  It revealed normal findings on the right but a sector defect on the left  MRI of the brain from May 13, 2015 revealed a few scattered nonspecific supratentorial WM lesions  No acute infarction, or hemorrhage or enh  MRI of the lumbar spine without contrast revealed small L5/S1 disc herniation, MRI of the T-spine revealed evidence of solitary 8mm lesion to the left of midline at T8/9 in the dorsal Cord  No pathological enh  MRI of the C-spine also done on June 13, 2015 revealed a solitary 5 mm focus of active demyelination in the right lateral cord at C2  Patient was given 3 days of IV steroids  Patient with significant GI upset with the steroids and also some mild change in mood with increased anxiety  She was NMO negative and Lyme negative  She did not have an LP  Patient was started on Copaxone as her initial IMD med in 2015  She had a brief interval of being off med due to insurance  However, due to site reactions, patient stopped Copaxone and started Tecfidera in May 2016  Patient was subsequently told to stop her Tecfidera due to unplanned pregnancy in Nov 2017  Patient delivered her daughter on 6/07/12 without complication  She also had a tubal ligation  She restarted Tecfidera in January 2019 after she was done breastfeeding  Patient had c/o low back pain as well as left hip pain  She had an x-ray of the lumbar spine which showed moderate disc narrowing at L5/S1   She saw Orthopedics who agreed with MRI lumbar spine, however this was not approved by her insurance  She then did a trial of PT from 10/28/2019 through 11/20/2019  She also had an EMG of the left lower extremity on 11/27/2019, which was a normal study  MRI lumbar spine was completed on 2/7/2020  This demonstrated moderate to large left paramedian protrusion/superior extrusion type disc herniation which results in central and lateral recess stenosis potentially impacting exiting left L5 nerve root or descending left S1 nerve root  Patient was advised to return to ortho, who she was already established with  She has not seen ortho again  Due to insurance reasons, she changed from brand name Tecfidera to generic dimethyl fumarate in December 2020  Patient called our office on 3/12/21 reporting onset of tongue numbness on 3/8  She then started experiencing numbness of her right lip and weakness of the right face on 3/11  On 3/12 she started to experience a bit of pain in the right face, which is why she called the office and then presented to the ED (prior to hearing back from our office)  Per the ER note, she had weakness of the entire right face, as well as decreased sensation of the entire right face  Due to symptoms x 4 days, no stroke alert called, as well as this appeared like Bells palsy  I received a tiger text from the ED physician reporting her presentation was most concerning for Bells palsy and asking if any further workup/treatment advised  CTH negative  Lyme negative, UA negative  She had a brain MRI with attn to IACs (due to occasional vertigo and tinnitus) completed on 3/2/21 which showed stable MS, no pathology in the IACs  She was discharged from the ED with a prednisone taper and valacyclovir  She was seen in the office on 3/18/2021 and was doing ok, still with right facial weakness, decreased eye closure, tongue and facial numbness on the right, hyperacusis         At timing of her visit on 6/8/21 she admitted that she was not compliant with her dimethyl fumarate, forgetting to take it, and in fact had not taken it at all since April 2021  She was having trouble requesting a refill from her specialty pharmacy but never informed our office about this  She had also stopped her gabapentin due to she was concerned it caused weight gain  Alternative DMTs were discussed at visits in June 2021  Patient had JCV testing done and negative with index 0 33  Tysabri and Aubagio were discussed  At timing of October 2021 visit with Dr Kevin Garcia, patient chose Aubagio  Start form was signed  Patient started Aubagio in Dec 2021 (7mg x 1 month then increase to 14mg)  She was on Aubagio for about 18 days and tested positive for COVID in Jan 2022 (she also had COVID prior to starting Aubagio in November 2021)  She held the [facility] for a few weeks until COVID symptoms improved and then restarted Aubagio 7mg before increase to 14mg  At timing of patients visit in April 2022 she informed me that she had stopped Aubagio, never called our office to report this  She stopped the medication because she noticed her hair was coming out while brushing her hair  She estimated that she took the medication for only a few weeks from late January to late February 2022  She reported stability of her MS symptoms, denied any new symptoms  We discussed either resuming dimethyl fumarate, Aubagio, or considering Tysabri  She had labs done 4/21/22-JCV negative with index 0 22  MRI brain completed 5/14/22 showed stable supratentorial white matter disease without progression  There was a new white matter lesion seen laterally in the upper cervical cord at the cervical medullary junction, eccentrically on the right, indicative of progressive demyelinating disease  Today, patient reports she is overall feeling well  She has some numbness in the left anterior thigh, no leg weakness    No bowel or bladder changes  Otherwise, no new symptoms  She has thought about DMTs and would like to pursue Tysabri  The following portions of the patient's history were reviewed and updated as appropriate: current medications, past family history, past medical history, past social history, past surgical history and problem list          Objective:    Blood pressure 117/59, pulse 70, temperature 97 7 °F (36 5 °C), temperature source Tympanic, resp  rate 18, height 5' 4 75" (1 645 m), weight 82 9 kg (182 lb 12 8 oz)    Physical Exam  Constitutional:       Appearance: Normal appearance  HENT:      Head: Normocephalic and atraumatic  Eyes:      Extraocular Movements: EOM normal       Pupils: Pupils are equal, round, and reactive to light  Neurological:      Mental Status: She is alert  Coordination: Coordination is intact  Deep Tendon Reflexes: Strength normal and reflexes are normal and symmetric  Psychiatric:         Mood and Affect: Mood normal          Speech: Speech normal          Behavior: Behavior normal          Neurological Exam  Mental Status  Alert  Oriented to person, place, time and situation  Speech is normal  Language is fluent with no aphasia  Attention and concentration are normal     Cranial Nerves  CN II: Visual fields full to confrontation  CN III, IV, VI: Extraocular movements intact bilaterally  Pupils equal round and reactive to light bilaterally  CN V: Facial sensation is normal   CN VII: Full and symmetric facial movement  CN VIII: Hearing is normal   CN IX, X: Palate elevates symmetrically  CN XI: Shoulder shrug strength is normal   CN XII: Tongue midline without atrophy or fasciculations  Motor   Normal muscle tone  Strength is 5/5 throughout all four extremities  Sensory  Light touch is normal in upper and lower extremities  Reflexes  Deep tendon reflexes are 2+ and symmetric in all four extremities      Coordination    Finger-to-nose, rapid alternating movements and heel-to-shin normal bilaterally without dysmetria  Gait  Casual gait is normal including stance, stride, and arm swing  ROS:    Review of Systems   Constitutional: Negative for chills and fever  HENT: Positive for ear pain (sensitivity to sound in right ear)  Negative for sore throat  Eyes: Negative for pain and visual disturbance  Respiratory: Negative for cough and shortness of breath  Cardiovascular: Negative for chest pain and palpitations  Gastrointestinal: Negative for abdominal pain and vomiting  Genitourinary: Positive for urgency  Negative for dysuria and hematuria  Musculoskeletal: Positive for back pain, neck pain and neck stiffness (at times)  Negative for arthralgias  Skin: Negative for color change and rash  Neurological: Positive for dizziness (while grocery shopping sudden dizziness), tremors (bilateral hands , thumb twitching ) and numbness (left leg)  Negative for seizures and syncope  Cramping in bilateral toes  Psychiatric/Behavioral: Positive for sleep disturbance (at times)  All other systems reviewed and are negative      I personally reviewed and updated the ROS as appropriate

## 2022-07-27 ENCOUNTER — TELEPHONE (OUTPATIENT)
Dept: NEUROLOGY | Facility: CLINIC | Age: 42
End: 2022-07-27

## 2022-07-27 NOTE — TELEPHONE ENCOUNTER
Rev note, if pt changes to [facility] if updated jcv negative, pt will need to be adriane to dr richter

## 2022-08-04 LAB
GALACTOMANNAN AG SERPL IA-ACNC: 0.28
JCPYV AB SERPL QL IA: ABNORMAL
JCPYV AB SERPL QL IA: ABNORMAL
SL AMB JCV AB BY INHIBITION: NEGATIVE
SPECIMEN STATUS: ABNORMAL

## 2022-08-08 ENCOUNTER — TELEPHONE (OUTPATIENT)
Dept: NEUROLOGY | Facility: CLINIC | Age: 42
End: 2022-08-08

## 2022-08-08 NOTE — TELEPHONE ENCOUNTER
----- Message from Yusuf Flowers PA-C sent at 8/5/2022  7:57 AM EDT -----  Please let patient know JCV is still negative and I can submit start form for Yony  She signed it at her last visit  Thanks!

## 2022-08-08 NOTE — TELEPHONE ENCOUNTER
See below  Please let me know if you need assistance with start form submission or let me know once submitted and I will f/u!

## 2022-08-08 NOTE — TELEPHONE ENCOUNTER
Message  Received: 3 days ago  [facility] [address] [place] [address] Clinical Team 3  Please let patient know JCV is still negative and I can submit start form for Landyblayne   She signed it at her last visit  Lorenzo Anderson!

## 2022-08-19 ENCOUNTER — HOSPITAL ENCOUNTER (OUTPATIENT)
Dept: MRI IMAGING | Facility: HOSPITAL | Age: 42
Discharge: HOME | End: 2022-08-19
Payer: COMMERCIAL

## 2022-08-19 DIAGNOSIS — G35 MULTIPLE SCLEROSIS (HCC): ICD-10-CM

## 2022-08-19 PROCEDURE — 72156 MRI NECK SPINE W/O & W/DYE: CPT

## 2022-08-19 PROCEDURE — A9585 GADOBUTROL INJECTION: HCPCS | Performed by: PHYSICIAN ASSISTANT

## 2022-08-19 PROCEDURE — G1004 CDSM NDSC: HCPCS

## 2022-08-19 PROCEDURE — 72157 MRI CHEST SPINE W/O & W/DYE: CPT

## 2022-08-19 RX ADMIN — GADOBUTROL 8 ML: 604.72 INJECTION INTRAVENOUS at 09:06

## 2022-08-24 ENCOUNTER — TELEPHONE (OUTPATIENT)
Dept: NEUROLOGY | Facility: CLINIC | Age: 42
End: 2022-08-24

## 2022-08-24 NOTE — TELEPHONE ENCOUNTER
Please let patient know MRI c-spine and MRI t-spine both show 1 new lesion (in each area) since her last MRIs of the cord in 2019  The lesions are non-enhancing, so not currently active  This does represent disease progression  She has not been consistent with DMTs over the last several years  We recently submitted Tysabri start form (see 8/8/22 encounter)  Can we follow up on that? She needs to re-start DMT  Thanks!

## 2022-08-25 ENCOUNTER — TELEPHONE (OUTPATIENT)
Dept: NEUROLOGY | Facility: CLINIC | Age: 42
End: 2022-08-25

## 2022-08-25 NOTE — TELEPHONE ENCOUNTER
Tysabri start form submitted on 8/10/22, MS navigator not made aware  Spoke with Rosa Sandoval with [phone]  PA is needed for Tysabri  Confirmed with Ant Nicholson, pt will need to establish care with Dr Burns  Next appt can be changed to be with this provider  PA faxed with clinicals, awaiting determination  Called and Left a message on pt's answering machine for a call back  Need to reschedule pt's appt to be with Dr Burns  You can access the FollowMyHealth Patient Portal offered by [facility] by registering at the following website: [email or web address] By joining [employer]’s FollowMyHealth portal, you will also be able to view your health information using other applications (apps) compatible with our system.

## 2022-08-25 NOTE — TELEPHONE ENCOUNTER
Called and Left a message on pt's answering machine for a call back  Addressing Tysabri start in new encounter

## 2022-08-26 DIAGNOSIS — G35 MULTIPLE SCLEROSIS (HCC): Primary | ICD-10-CM

## 2022-08-26 RX ORDER — SODIUM CHLORIDE 9 MG/ML
20 INJECTION, SOLUTION INTRAVENOUS ONCE
Status: CANCELLED | OUTPATIENT
Start: 2022-09-13

## 2022-08-26 RX ORDER — ACETAMINOPHEN 325 MG/1
650 TABLET ORAL ONCE
Status: CANCELLED | OUTPATIENT
Start: 2022-09-13 | End: 2022-09-07

## 2022-08-26 NOTE — TELEPHONE ENCOUNTER
Pt provided with update  She is agreeable to changing f/u visit to be with Dr Burns  Visit changed  Upon further review of chart, approval letter was received:    Tysabri is approved with [facility] from 8/25/22 to 2/25/23 for 6 visits  Auth # [id number]  Therapy plan entered and sent for signature  Referral updated

## 2022-08-31 NOTE — TELEPHONE ENCOUNTER
Plan is signed  Called [facility], left voicemail requesting scheduling for pt

## 2022-09-02 ENCOUNTER — TELEPHONE (OUTPATIENT)
Dept: NEUROLOGY | Facility: CLINIC | Age: 42
End: 2022-09-02

## 2022-09-02 NOTE — TELEPHONE ENCOUNTER
Pt is scheduled for first Tysabri infusion on 9/13/22  Infusions ordered every 4 weeks  Last CBC/CMP 4/21/22  Last JCV 7/25/22 0 28 (final result negative)  Last MRI brain 5/14/22, t-spine and c-spine 8/19/22    Okay to proceed with infusion? Tysabri is approved with [employer] from 8/25/22 to 2/25/23 for 6 visits  Auth # [id number]  Touch auth valid at [facility] infusion center

## 2022-09-02 NOTE — TELEPHONE ENCOUNTER
Pt has been scheduled for 9/13/22  Called and Left a message on pt's answering machine for a call back  [place] message sent

## 2022-09-13 ENCOUNTER — HOSPITAL ENCOUNTER (OUTPATIENT)
Dept: INFUSION CENTER | Facility: CLINIC | Age: 42
Discharge: HOME | End: 2022-09-13
Payer: COMMERCIAL

## 2022-09-13 VITALS
RESPIRATION RATE: 18 BRPM | DIASTOLIC BLOOD PRESSURE: 74 MMHG | HEART RATE: 82 BPM | SYSTOLIC BLOOD PRESSURE: 118 MMHG | TEMPERATURE: 98.2 F

## 2022-09-13 DIAGNOSIS — G35 MULTIPLE SCLEROSIS (HCC): Primary | ICD-10-CM

## 2022-09-13 LAB
ALBUMIN SERPL BCP-MCNC: 3.8 G/DL (ref 3.5–5)
ALP SERPL-CCNC: 53 U/L (ref 46–116)
ALT SERPL W P-5'-P-CCNC: 22 U/L (ref 12–78)
ANION GAP SERPL CALCULATED.3IONS-SCNC: 10 MMOL/L (ref 4–13)
AST SERPL W P-5'-P-CCNC: 27 U/L (ref 5–45)
BASOPHILS # BLD AUTO: 0.03 THOUSANDS/ΜL (ref 0–0.1)
BASOPHILS NFR BLD AUTO: 1 % (ref 0–1)
BILIRUB SERPL-MCNC: 0.29 MG/DL (ref 0.2–1)
BUN SERPL-MCNC: 14 MG/DL (ref 5–25)
CALCIUM SERPL-MCNC: 8.8 MG/DL (ref 8.3–10.1)
CHLORIDE SERPL-SCNC: 103 MMOL/L (ref 96–108)
CO2 SERPL-SCNC: 26 MMOL/L (ref 21–32)
CREAT SERPL-MCNC: 0.56 MG/DL (ref 0.6–1.3)
EOSINOPHIL # BLD AUTO: 0.07 THOUSAND/ΜL (ref 0–0.61)
EOSINOPHIL NFR BLD AUTO: 1 % (ref 0–6)
ERYTHROCYTE [DISTWIDTH] IN BLOOD BY AUTOMATED COUNT: 13.9 % (ref 11.6–15.1)
GFR SERPL CREATININE-BSD FRML MDRD: 116 ML/MIN/1.73SQ M
GLUCOSE P FAST SERPL-MCNC: 91 MG/DL (ref 65–99)
GLUCOSE SERPL-MCNC: 91 MG/DL (ref 65–140)
HCT VFR BLD AUTO: 37.6 % (ref 34.8–46.1)
HGB BLD-MCNC: 12.3 G/DL (ref 11.5–15.4)
IMM GRANULOCYTES # BLD AUTO: 0.02 THOUSAND/UL (ref 0–0.2)
IMM GRANULOCYTES NFR BLD AUTO: 0 % (ref 0–2)
LYMPHOCYTES # BLD AUTO: 1.57 THOUSANDS/ΜL (ref 0.6–4.47)
LYMPHOCYTES NFR BLD AUTO: 24 % (ref 14–44)
MCH RBC QN AUTO: 32.8 PG (ref 26.8–34.3)
MCHC RBC AUTO-ENTMCNC: 32.7 G/DL (ref 31.4–37.4)
MCV RBC AUTO: 100 FL (ref 82–98)
MONOCYTES # BLD AUTO: 0.36 THOUSAND/ΜL (ref 0.17–1.22)
MONOCYTES NFR BLD AUTO: 6 % (ref 4–12)
NEUTROPHILS # BLD AUTO: 4.39 THOUSANDS/ΜL (ref 1.85–7.62)
NEUTS SEG NFR BLD AUTO: 68 % (ref 43–75)
NRBC BLD AUTO-RTO: 0 /100 WBCS
PLATELET # BLD AUTO: 225 THOUSANDS/UL (ref 149–390)
PMV BLD AUTO: 10.2 FL (ref 8.9–12.7)
POTASSIUM SERPL-SCNC: 3.9 MMOL/L (ref 3.5–5.3)
PROT SERPL-MCNC: 7.5 G/DL (ref 6.4–8.4)
RBC # BLD AUTO: 3.75 MILLION/UL (ref 3.81–5.12)
SODIUM SERPL-SCNC: 139 MMOL/L (ref 135–147)
WBC # BLD AUTO: 6.44 THOUSAND/UL (ref 4.31–10.16)

## 2022-09-13 PROCEDURE — 96367 TX/PROPH/DG ADDL SEQ IV INF: CPT

## 2022-09-13 PROCEDURE — 80053 COMPREHEN METABOLIC PANEL: CPT | Performed by: PSYCHIATRY & NEUROLOGY

## 2022-09-13 PROCEDURE — 96365 THER/PROPH/DIAG IV INF INIT: CPT

## 2022-09-13 PROCEDURE — 85025 COMPLETE CBC W/AUTO DIFF WBC: CPT | Performed by: PSYCHIATRY & NEUROLOGY

## 2022-09-13 RX ORDER — ACETAMINOPHEN 325 MG/1
650 TABLET ORAL ONCE
Status: COMPLETED | OUTPATIENT
Start: 2022-09-13 | End: 2022-09-13

## 2022-09-13 RX ORDER — SODIUM CHLORIDE 9 MG/ML
20 INJECTION, SOLUTION INTRAVENOUS ONCE
Status: CANCELLED | OUTPATIENT
Start: 2022-10-11

## 2022-09-13 RX ORDER — SODIUM CHLORIDE 9 MG/ML
20 INJECTION, SOLUTION INTRAVENOUS ONCE
Status: COMPLETED | OUTPATIENT
Start: 2022-09-13 | End: 2022-09-13

## 2022-09-13 RX ORDER — ACETAMINOPHEN 325 MG/1
650 TABLET ORAL ONCE
Status: CANCELLED | OUTPATIENT
Start: 2022-10-11 | End: 2022-10-11

## 2022-09-13 RX ADMIN — DIPHENHYDRAMINE HYDROCHLORIDE 50 MG: 50 INJECTION, SOLUTION INTRAMUSCULAR; INTRAVENOUS at 10:58

## 2022-09-13 RX ADMIN — NATALIZUMAB 300 MG: 300 INJECTION INTRAVENOUS at 11:30

## 2022-09-13 RX ADMIN — SODIUM CHLORIDE 20 ML/HR: 0.9 INJECTION, SOLUTION INTRAVENOUS at 10:58

## 2022-09-13 RX ADMIN — ACETAMINOPHEN 650 MG: 325 TABLET, FILM COATED ORAL at 10:57

## 2022-09-13 NOTE — PROGRESS NOTES
Pt tolerated tysabri and 1 hr observation period without incident    Pt was provided with future appt and AVS

## 2022-09-30 ENCOUNTER — TELEPHONE (OUTPATIENT)
Dept: NEUROLOGY | Facility: CLINIC | Age: 42
End: 2022-09-30

## 2022-09-30 NOTE — TELEPHONE ENCOUNTER
Pt is scheduled for Tysabri on 10/11/22  Last infusion 9/13/22, infusions every 4 weeks  Confirmed scheduling is correct  Last CBC/CMP 9/13/22  Last JCV 7/25/22 0 28 (final result negative)  Last MRI brain 5/14/22, t-spine and c-spine 8/19/22     Okay to proceed with infusion?     Tysabri is approved with [facility] from 8/25/22 to 2/25/23 for 6 visits   Auth # [id number]      Touch auth valid until 3/14/23 at [facility] infusion center

## 2022-10-11 ENCOUNTER — HOSPITAL ENCOUNTER (OUTPATIENT)
Dept: INFUSION CENTER | Facility: CLINIC | Age: 42
Discharge: HOME | End: 2022-10-11
Payer: COMMERCIAL

## 2022-10-11 VITALS
SYSTOLIC BLOOD PRESSURE: 130 MMHG | HEART RATE: 79 BPM | RESPIRATION RATE: 18 BRPM | DIASTOLIC BLOOD PRESSURE: 79 MMHG | TEMPERATURE: 98.1 F

## 2022-10-11 DIAGNOSIS — G35 MULTIPLE SCLEROSIS (HCC): Primary | ICD-10-CM

## 2022-10-11 PROCEDURE — 96367 TX/PROPH/DG ADDL SEQ IV INF: CPT

## 2022-10-11 PROCEDURE — 96365 THER/PROPH/DIAG IV INF INIT: CPT

## 2022-10-11 RX ORDER — SODIUM CHLORIDE 9 MG/ML
20 INJECTION, SOLUTION INTRAVENOUS ONCE
Status: COMPLETED | OUTPATIENT
Start: 2022-10-11 | End: 2022-10-11

## 2022-10-11 RX ORDER — SODIUM CHLORIDE 9 MG/ML
20 INJECTION, SOLUTION INTRAVENOUS ONCE
OUTPATIENT
Start: 2022-11-08

## 2022-10-11 RX ORDER — ACETAMINOPHEN 325 MG/1
650 TABLET ORAL ONCE
Status: COMPLETED | OUTPATIENT
Start: 2022-10-11 | End: 2022-10-11

## 2022-10-11 RX ORDER — ACETAMINOPHEN 325 MG/1
650 TABLET ORAL ONCE
OUTPATIENT
Start: 2022-11-08 | End: 2022-11-08

## 2022-10-11 RX ADMIN — ACETAMINOPHEN 650 MG: 325 TABLET ORAL at 10:00

## 2022-10-11 RX ADMIN — SODIUM CHLORIDE 20 ML/HR: 0.9 INJECTION, SOLUTION INTRAVENOUS at 09:59

## 2022-10-11 RX ADMIN — NATALIZUMAB 300 MG: 300 INJECTION INTRAVENOUS at 10:31

## 2022-10-11 RX ADMIN — DIPHENHYDRAMINE HYDROCHLORIDE 50 MG: 50 INJECTION, SOLUTION INTRAMUSCULAR; INTRAVENOUS at 10:01

## 2022-10-11 NOTE — PROGRESS NOTES
Pt arrived to unit without complaint  Pt tolerated Tysabri without incident  Observed for 1 hour post infusion without incident  AVS declined, but aware of future appts  Pt left unit in stable condition

## 2022-10-25 ENCOUNTER — OFFICE VISIT (OUTPATIENT)
Dept: NEUROLOGY | Facility: CLINIC | Age: 42
End: 2022-10-25
Payer: COMMERCIAL

## 2022-10-25 VITALS
SYSTOLIC BLOOD PRESSURE: 121 MMHG | DIASTOLIC BLOOD PRESSURE: 57 MMHG | WEIGHT: 193.8 LBS | HEART RATE: 79 BPM | TEMPERATURE: 97.1 F | HEIGHT: 65 IN | BODY MASS INDEX: 32.29 KG/M2

## 2022-10-25 DIAGNOSIS — E53.8 DEFICIENCY OF VITAMIN B12: ICD-10-CM

## 2022-10-25 DIAGNOSIS — M54.16 RADICULOPATHY, LUMBAR REGION: ICD-10-CM

## 2022-10-25 DIAGNOSIS — M79.18 MYOFASCIAL PAIN SYNDROME, CERVICAL: ICD-10-CM

## 2022-10-25 DIAGNOSIS — G44.89 OTHER HEADACHE SYNDROME: ICD-10-CM

## 2022-10-25 DIAGNOSIS — Z82.49 FAMILY HISTORY OF CEREBRAL ANEURYSM: ICD-10-CM

## 2022-10-25 DIAGNOSIS — G51.0 BELL'S PALSY: ICD-10-CM

## 2022-10-25 DIAGNOSIS — M54.10 BILATERAL RADIATING LEG PAIN: ICD-10-CM

## 2022-10-25 DIAGNOSIS — G35 MULTIPLE SCLEROSIS (HCC): Primary | ICD-10-CM

## 2022-10-25 DIAGNOSIS — R91.1 LUNG NODULE SEEN ON IMAGING STUDY: ICD-10-CM

## 2022-10-25 DIAGNOSIS — R41.3 COMPLAINTS OF MEMORY DISTURBANCE: ICD-10-CM

## 2022-10-25 PROCEDURE — 96372 THER/PROPH/DIAG INJ SC/IM: CPT | Performed by: PSYCHIATRY & NEUROLOGY

## 2022-10-25 PROCEDURE — 99215 OFFICE O/P EST HI 40 MIN: CPT | Performed by: PSYCHIATRY & NEUROLOGY

## 2022-10-25 RX ORDER — CYANOCOBALAMIN 1000 UG/ML
INJECTION, SOLUTION INTRAMUSCULAR; SUBCUTANEOUS
Qty: 8 ML | Refills: 0 | Status: SHIPPED | OUTPATIENT
Start: 2022-10-25

## 2022-10-25 RX ORDER — BACLOFEN 10 MG/1
10 TABLET ORAL 2 TIMES DAILY
Qty: 60 TABLET | Refills: 0 | Status: SHIPPED | OUTPATIENT
Start: 2022-10-25

## 2022-10-25 RX ORDER — KETOROLAC TROMETHAMINE 10 MG/1
10 TABLET, FILM COATED ORAL EVERY 6 HOURS PRN
Qty: 10 TABLET | Refills: 0 | Status: SHIPPED | OUTPATIENT
Start: 2022-10-25

## 2022-10-25 RX ORDER — CYANOCOBALAMIN 1000 UG/ML
1000 INJECTION, SOLUTION INTRAMUSCULAR; SUBCUTANEOUS
Status: SHIPPED | OUTPATIENT
Start: 2022-10-25

## 2022-10-25 RX ADMIN — CYANOCOBALAMIN 1000 MCG: 1000 INJECTION, SOLUTION INTRAMUSCULAR; SUBCUTANEOUS at 14:45

## 2022-10-25 NOTE — PROGRESS NOTES
Patient ID: Katarina Lawrence is a 40 y o  female  Assessment/Plan:           Problem List Items Addressed This Visit        Nervous and Auditory    Multiple sclerosis ([address] ) - Primary    Relevant Medications    baclofen 10 mg tablet    ketorolac (TORADOL) 10 mg tablet    Other Relevant Orders    Ambulatory Referral to Physical Therapy    Radiculopathy, lumbar region    Bell's palsy    Relevant Medications    baclofen 10 mg tablet       Musculoskeletal and Integument    Myofascial pain syndrome, cervical    Relevant Orders    Ambulatory Referral to Physical Therapy       Other    Lung nodule seen on imaging study    Complaints of memory disturbance    Relevant Medications    cyanocobalamin injection 1,000 mcg    Bilateral radiating leg pain    Deficiency of vitamin B12    Relevant Medications    cyanocobalamin injection 1,000 mcg    cyanocobalamin 1,000 mcg/mL    Cyanocobalamin 1000 MCG SUBL      Other Visit Diagnoses     Other headache syndrome        Relevant Medications    baclofen 10 mg tablet    ketorolac (TORADOL) 10 mg tablet    Other Relevant Orders    CTA head w wo contrast    Family history of cerebral aneurysm        Relevant Orders    CTA head w wo contrast         Mrs Katiana Lea has presented to [facility] multiple [address] for follow-up  Patient has established care with Patricia Gavin and Dr Edy Lopez  Patient has longstanding multiple sclerosis as she had developed MS relapse with acute demyelinating plaques appreciated at C3 and T11 requiring medical regimen adjustment  Patient had successfully started Tysabri 300 mg q 4 weeks  Patient reports having significant fatigue next day after Tysabri  Patient has CARL virus index 0 28  Patient is to consider taking Benadryl for next 2 more dose, we can safely discontinue Benadryl going forward at that time  Patient will be offered adjusting her regimen to every 6 weeks in the light of indeterminate CARL virus index      Patient is the mother of 4 kids, patient described urgency with no signs of urinary incontinence  Patient has strong family history of brain aneurysm in her sister requiring surgical intervention; CTA head with without contrast will be advised considering patient has worsening headache  Baclofen 10 mg and Toradol 10 mg were advised to be used intermittently for abortive regimen of headache in upper neck and shoulder tightness  Patient is to resume vitamin B12 supplements with known history vitamin B12 deficiency and worsening MCV  Cyanocobalamin 1000 mcg intramuscular in treatment was provided today, patient is to continue B12 supplements once a week for 3 more weeks and then once a month; patient is to continue vitamin B12 1000 mcg sublingual supplements  Patient is to follow with Trini Bloom within 3 months  Subjective:  MS and related issue    HPI   Mrs Ashley Mak is a 50-year-old female who presented to [address] for follow-up on multiple sclerosis and related issues  Todays complaints are:  stiffness in joints, speech difficulty, numbness both hands and headaches  Pt states she get cramps on right hand and shaking on both legs  Since last office visit with Trini Bloom on July 25, 2022 patient described no new focal neurological deficit  Patient stated she had developed migraine headaches which lasted for 2 days and made her feel tired and sluggish with upper neck and shoulder stiffness  Headache described as as burning zapping sensation which last few seconds and then became achy throbbing and pushing in frontal, occipital and upper neck and shoulder area  Headache usually last 5-10 hours and may come 2-3 times a week  When patient has headache she has stiff neck and concentration problem  Patient recognized several triggering factors of her headache including stress, missing meals, menstruation  MS diagnosed in 2015   She was on Tecfidera, then dimethyl fumarate from 2016 until she stopped on her own in April 2021 due to forgetting to take her medication  Alternative DMTs were discussed and she chose Aubagio, which was initiated in Dec 2021 (delayed start due to her having COVID)  She only took the med for 18 days and got COVID again in Jan 2022  She then resumed Aubagio in late January but she stopped the medication after a few weeks (estimates stopped in late Feb) due to hair shedding  She never informed our office of this until she was seen in April 2022      She had updated JCV testing in April which was negative with index 0 22  MRI brain May 2022 was stable from a brain standpoint, but evidence of new lesion in the cervicomedullary junction/upper cervical cord  Last c-spine imaging was in 2019      The following portions of the patient's history were reviewed and updated as appropriate:   She  has a past medical history of Abnormal Pap smear of cervix, Depression, History of Bell's palsy, History of chlamydia infection, History of gonorrhea, MS (multiple sclerosis) ([facility] [address] ), Ovarian cyst, and Varicella  She   Patient Active Problem List    Diagnosis Date Noted   • Deficiency of vitamin B12 10/11/2021   • Vitamin D deficiency 10/11/2021   • Bell's palsy 03/18/2021   • Microscopic hematuria 03/18/2021   • Tinnitus of both ears 02/08/2021   • Vertigo 02/08/2021   • Bilateral radiating leg pain 08/11/2020   • Myofascial pain syndrome, cervical 08/11/2020   • Left hip pain 12/05/2019   • Complaints of memory disturbance 12/05/2019   • Paresthesia    • Low back pain    • Radiculopathy, lumbar region 08/26/2019   • Vaginal discharge 10/31/2018   • Incisional infection 10/31/2018   • S/P tubal ligation 10/16/2018   • Yeast infection 10/16/2018   • Request for sterilization 10/01/2018   • Multiple sclerosis ([address] ) 02/22/2018   • Lung nodule seen on imaging study 05/31/2017     She  has a past surgical history that includes Knee arthroscopy and pr lap,tubal cautery (Bilateral, 10/5/2018)    Her family history includes ADD / ADHD in her son; Allergies in her son; Anuerysm in her sister; Asthma in her son; Cataracts in her mother; Dementia in her mother; Glaucoma in her family; Hypertension in her family, father, and mother; Macular degeneration in her family and father; Migraines in her sister; Multiple sclerosis in her paternal aunt; No Known Problems in her daughter  She  reports that she has been smoking cigarettes  She has been smoking about 0 50 packs per day  She has never used smokeless tobacco  She reports current alcohol use  She reports that she does not use drugs    Current Outpatient Medications   Medication Sig Dispense Refill   • baclofen 10 mg tablet Take 1 tablet (10 mg total) by mouth 2 (two) times a day 60 tablet 0   • cyanocobalamin 1,000 mcg/mL Take B12 1000 mcg IM once a week for 3 weeks, then once a month for 5 months 8 mL 0   • Cyanocobalamin 1000 MCG SUBL Place 1 tablet (1,000 mcg total) under the tongue daily 90 tablet 0   • ketorolac (TORADOL) 10 mg tablet Take 1 tablet (10 mg total) by mouth every 6 (six) hours as needed for moderate pain 10 tablet 0   • acetaminophen-codeine (TYLENOL #3) 300-30 mg per tablet Take 1 tablet by mouth every 4 (four) hours as needed for moderate pain (Patient not taking: No sig reported) 15 tablet 0   • fluticasone (FLONASE) 50 mcg/act nasal spray 1 spray into each nostril daily (Patient not taking: No sig reported) 16 g 1   • valACYclovir (VALTREX) 1,000 mg tablet Take 1 tablet (1,000 mg total) by mouth 3 (three) times a day for 7 days 21 tablet 0     Current Facility-Administered Medications   Medication Dose Route Frequency Provider Last Rate Last Admin   • cyanocobalamin injection 1,000 mcg  1,000 mcg Intramuscular Q30 Days Prince Sparks MD   1,000 mcg at 10/25/22 1445     Current Outpatient Medications on File Prior to Visit   Medication Sig   • acetaminophen-codeine (TYLENOL #3) 300-30 mg per tablet Take 1 tablet by mouth every 4 (four) right tonsillitis hours as needed for moderate pain (Patient not taking: No sig reported)   • fluticasone (FLONASE) 50 mcg/act nasal spray 1 spray into each nostril daily (Patient not taking: No sig reported)   • valACYclovir (VALTREX) 1,000 mg tablet Take 1 tablet (1,000 mg total) by mouth 3 (three) times a day for 7 days     No current facility-administered medications on file prior to visit  She has No Known Allergies            Objective:    Blood pressure 121/57, pulse 79, temperature (!) 97 1 °F (36 2 °C), temperature source Skin, height 5' 5" (1 651 m), weight 87 9 kg (193 lb 12 8 oz), not currently breastfeeding  Physical Exam    Neurological Exam  CONSTITUTIONAL: NAD, pleasant  NECK: supple, no lymphadenopathy, no thyromegaly, no JVD  CARDIOVASCULAR: RRR, normal S1S2, no murmurs, no rubs  RESP: clear to auscultation bilaterally, no wheezes/rhonchi/rales  ABDOMEN: soft, non tender, non distended  SKIN: no rash or skin lesions  EXTREMITIES: no edema, pulses 2+bilaterally  PSYCH: appropriate mood and affect  NEUROLOGIC COMPREHENSIVE EXAM: Patient is oriented to person, place and time, NAD; appropriate affect  CN II, III, IV, V, VI, VII,VIII,IX,X,XI-XII intact with EOMI, PERRLA, OKN intact, VF grossly intact, fundi poorly visualized secondary to pupillary constriction; symmetric face noted  Motor: 5/5 UE/LE bilateral symmetric; Sensory: intact to light touch and pinprick bilaterally; normal vibration sensation feet bilaterally; Coordination within normal limits on FTN and VILMA testing; DTR: 2/4 through, no Babinski, no clonus  Tandem gait is intact  Romberg: absent  ROS:  12 points of review of system was reviewed with the patient and was unremarkable with exception: see HPI  Review of Systems   Constitutional: Negative  Negative for appetite change and fever  HENT: Negative  Negative for hearing loss, tinnitus, trouble swallowing and voice change  Eyes: Negative    Negative for photophobia, pain and visual disturbance  Respiratory: Negative  Negative for shortness of breath  Cardiovascular: Negative  Negative for palpitations  Gastrointestinal: Negative  Negative for nausea and vomiting  Endocrine: Negative  Negative for cold intolerance  Genitourinary: Negative  Negative for dysuria, frequency and urgency  Musculoskeletal: Negative  Negative for gait problem, myalgias and neck pain  Stiffness in joints  Skin: Negative  Negative for rash  Allergic/Immunologic: Negative  Neurological: Positive for speech difficulty, numbness (both hands) and headaches  Negative for dizziness, tremors, seizures, syncope, facial asymmetry, weakness and light-headedness  Pt states cramps on right hand and shaking on both legs  Hematological: Negative  Does not bruise/bleed easily  Psychiatric/Behavioral: Negative  Negative for confusion, hallucinations and sleep disturbance

## 2022-10-31 ENCOUNTER — TELEPHONE (OUTPATIENT)
Dept: NEUROLOGY | Facility: CLINIC | Age: 42
End: 2022-10-31

## 2022-10-31 DIAGNOSIS — G35 MULTIPLE SCLEROSIS (HCC): Primary | ICD-10-CM

## 2022-10-31 RX ORDER — ACETAMINOPHEN 325 MG/1
650 TABLET ORAL ONCE
Status: CANCELLED | OUTPATIENT
Start: 2022-11-08 | End: 2022-11-08

## 2022-10-31 RX ORDER — SODIUM CHLORIDE 9 MG/ML
20 INJECTION, SOLUTION INTRAVENOUS ONCE
Status: CANCELLED | OUTPATIENT
Start: 2022-11-08

## 2022-10-31 NOTE — TELEPHONE ENCOUNTER
Agree with Benadryl adjustment followed by infusion timing adjustment;  Patient is cleared for infusion

## 2022-10-31 NOTE — TELEPHONE ENCOUNTER
Pt is scheduled for Tysabri on 11/8/22  Last infusion 10/11/22, infusions every 4 weeks  Confirmed scheduling is correct      Last CBC/CMP 9/13/22  Last JCV 7/25/22 0 28 (final result negative)  Last MRI brain 5/14/22, t-spine and c-spine 8/19/22    Per last office note, "Patient is to consider taking Benadryl for next 2 more dose, we can safely discontinue Benadryl going forward at that time  Patient will be offered adjusting her regimen to every 6 weeks in the light of indeterminate CARL virus index "     To confirm, should I adjust plan to allow for Benadryl for next 2 infusions only and start infusions every 6 weeks after 11/8/22 infusion?     Okay to proceed with infusion?     Tysabri is approved with [employer] from 8/25/22 to 2/25/23 for 6 visits   Auth # [id number]      Touch auth valid until 3/14/23 at [facility] infusion center

## 2022-11-01 ENCOUNTER — TELEPHONE (OUTPATIENT)
Dept: NEUROLOGY | Facility: CLINIC | Age: 42
End: 2022-11-01

## 2022-11-01 DIAGNOSIS — E53.8 DEFICIENCY OF VITAMIN B12: Primary | ICD-10-CM

## 2022-11-01 NOTE — TELEPHONE ENCOUNTER
Hi, good morning  This is Desmond Torres,  82  I need syringes for my b 12 injections, and the pharmacy said that they need a script telling them what size syringes  [facility] pharmacy on [address] in [place], please give me a call back at [phone]   Thanks

## 2022-11-03 RX ORDER — SYRINGE W-NEEDLE,DISPOSAB,3 ML 25GX5/8"
SYRINGE, EMPTY DISPOSABLE MISCELLANEOUS
Qty: 8 EACH | Refills: 0 | Status: SHIPPED | OUTPATIENT
Start: 2022-11-03

## 2022-11-03 NOTE — TELEPHONE ENCOUNTER
Script for syringes for B12 administration pended below  Dr Татьяна Jeffery - please sign if agreeable

## 2022-11-03 NOTE — TELEPHONE ENCOUNTER
Spoke w/patient  Advised her script for syringes has been sent to [facility]  Patient verbalized understanding and appreciation

## 2022-11-04 ENCOUNTER — HOSPITAL ENCOUNTER (OUTPATIENT)
Dept: CT IMAGING | Facility: HOSPITAL | Age: 42
End: 2022-11-04
Attending: PSYCHIATRY & NEUROLOGY

## 2022-11-04 DIAGNOSIS — G44.89 OTHER HEADACHE SYNDROME: ICD-10-CM

## 2022-11-04 DIAGNOSIS — Z82.49 FAMILY HISTORY OF CEREBRAL ANEURYSM: ICD-10-CM

## 2022-11-04 RX ADMIN — IOHEXOL 85 ML: 350 INJECTION, SOLUTION INTRAVENOUS at 09:59

## 2022-11-08 ENCOUNTER — HOSPITAL ENCOUNTER (OUTPATIENT)
Dept: INFUSION CENTER | Facility: CLINIC | Age: 42
Discharge: HOME | End: 2022-11-08

## 2022-11-08 ENCOUNTER — TELEPHONE (OUTPATIENT)
Dept: NEUROLOGY | Facility: CLINIC | Age: 42
End: 2022-11-08

## 2022-11-08 VITALS
RESPIRATION RATE: 18 BRPM | TEMPERATURE: 98.2 F | SYSTOLIC BLOOD PRESSURE: 128 MMHG | HEART RATE: 75 BPM | DIASTOLIC BLOOD PRESSURE: 85 MMHG

## 2022-11-08 DIAGNOSIS — G35 MULTIPLE SCLEROSIS (HCC): Primary | ICD-10-CM

## 2022-11-08 LAB
ALBUMIN SERPL BCP-MCNC: 4 G/DL (ref 3.5–5)
ALP SERPL-CCNC: 57 U/L (ref 46–116)
ALT SERPL W P-5'-P-CCNC: 25 U/L (ref 12–78)
ANION GAP SERPL CALCULATED.3IONS-SCNC: 6 MMOL/L (ref 4–13)
BASOPHILS # BLD AUTO: 0.04 THOUSANDS/ÂΜL (ref 0–0.1)
BASOPHILS NFR BLD AUTO: 1 % (ref 0–1)
BILIRUB SERPL-MCNC: 0.25 MG/DL (ref 0.2–1)
BUN SERPL-MCNC: 11 MG/DL (ref 5–25)
CALCIUM SERPL-MCNC: 9.2 MG/DL (ref 8.3–10.1)
CHLORIDE SERPL-SCNC: 102 MMOL/L (ref 96–108)
CO2 SERPL-SCNC: 28 MMOL/L (ref 21–32)
CREAT SERPL-MCNC: 0.52 MG/DL (ref 0.6–1.3)
EOSINOPHIL # BLD AUTO: 0.32 THOUSAND/ÂΜL (ref 0–0.61)
EOSINOPHIL NFR BLD AUTO: 4 % (ref 0–6)
ERYTHROCYTE [DISTWIDTH] IN BLOOD BY AUTOMATED COUNT: 14.7 % (ref 11.6–15.1)
GFR SERPL CREATININE-BSD FRML MDRD: 119 ML/MIN/1.73SQ M
GLUCOSE SERPL-MCNC: 83 MG/DL (ref 65–140)
HCT VFR BLD AUTO: 37.4 % (ref 34.8–46.1)
HGB BLD-MCNC: 12.1 G/DL (ref 11.5–15.4)
IMM GRANULOCYTES # BLD AUTO: 0.04 THOUSAND/UL (ref 0–0.2)
IMM GRANULOCYTES NFR BLD AUTO: 1 % (ref 0–2)
LYMPHOCYTES # BLD AUTO: 2.83 THOUSANDS/ÂΜL (ref 0.6–4.47)
LYMPHOCYTES NFR BLD AUTO: 36 % (ref 14–44)
MCH RBC QN AUTO: 32.1 PG (ref 26.8–34.3)
MCHC RBC AUTO-ENTMCNC: 32.4 G/DL (ref 31.4–37.4)
MCV RBC AUTO: 99 FL (ref 82–98)
MONOCYTES # BLD AUTO: 0.46 THOUSAND/ÂΜL (ref 0.17–1.22)
MONOCYTES NFR BLD AUTO: 6 % (ref 4–12)
NEUTROPHILS # BLD AUTO: 4.22 THOUSANDS/ÂΜL (ref 1.85–7.62)
NEUTS SEG NFR BLD AUTO: 52 % (ref 43–75)
NRBC BLD AUTO-RTO: 1 /100 WBCS
PLATELET # BLD AUTO: 224 THOUSANDS/UL (ref 149–390)
PMV BLD AUTO: 11.1 FL (ref 8.9–12.7)
POTASSIUM SERPL-SCNC: 4.5 MMOL/L (ref 3.5–5.3)
PROT SERPL-MCNC: 7.9 G/DL (ref 6.4–8.4)
RBC # BLD AUTO: 3.77 MILLION/UL (ref 3.81–5.12)
SODIUM SERPL-SCNC: 136 MMOL/L (ref 135–147)
WBC # BLD AUTO: 7.91 THOUSAND/UL (ref 4.31–10.16)

## 2022-11-08 RX ORDER — ACETAMINOPHEN 325 MG/1
650 TABLET ORAL ONCE
Status: COMPLETED | OUTPATIENT
Start: 2022-11-08 | End: 2022-11-08

## 2022-11-08 RX ORDER — SODIUM CHLORIDE 9 MG/ML
20 INJECTION, SOLUTION INTRAVENOUS ONCE
OUTPATIENT
Start: 2022-12-20

## 2022-11-08 RX ORDER — SODIUM CHLORIDE 9 MG/ML
20 INJECTION, SOLUTION INTRAVENOUS ONCE
Status: COMPLETED | OUTPATIENT
Start: 2022-11-08 | End: 2022-11-08

## 2022-11-08 RX ORDER — ACETAMINOPHEN 325 MG/1
650 TABLET ORAL ONCE
OUTPATIENT
Start: 2022-12-20 | End: 2022-12-20

## 2022-11-08 RX ADMIN — DIPHENHYDRAMINE HYDROCHLORIDE 50 MG: 50 INJECTION, SOLUTION INTRAMUSCULAR; INTRAVENOUS at 10:23

## 2022-11-08 RX ADMIN — SODIUM CHLORIDE 20 ML/HR: 0.9 INJECTION, SOLUTION INTRAVENOUS at 10:20

## 2022-11-08 RX ADMIN — NATALIZUMAB 300 MG: 300 INJECTION INTRAVENOUS at 10:52

## 2022-11-08 RX ADMIN — ACETAMINOPHEN 650 MG: 325 TABLET ORAL at 10:21

## 2022-11-08 NOTE — PROGRESS NOTES
Pt arrived to unit without complaint  Labs drawn and sent  Pt tolerated Tysabri without incident  Pt observed for 1 hour post infusion without incident  AVS declined, but aware of future appts  Pt left unit in stable condition

## 2022-11-08 NOTE — TELEPHONE ENCOUNTER
----- Message from Darrin Garcia sent at 11/7/2022  4:21 PM EST -----  Regarding: Significant Findings  Radiology called to report Significant findings on Pts CT  Relaying the message

## 2022-11-09 NOTE — TELEPHONE ENCOUNTER
IMPRESSION:   Normal CT of the brain      1-2 mm outpouching from the communicating segment of the left ICA may represent an infundibulum or tiny aneurysm  Follow-up CT angiogram recommended in 6 months      The study was marked in EPIC for significant notification  Dr Andrea Olmstead - any further recommendations other than f/u CTA in 6 mos? Patient has f/u appt 1/2023 daija/Nuria

## 2022-11-11 NOTE — TELEPHONE ENCOUNTER
Patient is to keep BP <140/80 mmHg and call 911 if she develops intractable headache    Agree with repeating CTA in 6 months

## 2022-11-11 NOTE — TELEPHONE ENCOUNTER
Spoke w/patient  Advised her of recommendations below  Patient verbalized understanding  She will begin to monitor her BP  She has no hx of hypertension  Of Note - sister (half sibling/same father) had a brain aneurysm  Patient unsure of details as she does not have a close relationship w/sibling      Dr Yumiko Velasquez

## 2022-12-09 ENCOUNTER — TELEPHONE (OUTPATIENT)
Dept: NEUROLOGY | Facility: CLINIC | Age: 42
End: 2022-12-09

## 2022-12-09 DIAGNOSIS — G35 MULTIPLE SCLEROSIS (HCC): Primary | ICD-10-CM

## 2022-12-09 NOTE — TELEPHONE ENCOUNTER
Pt is scheduled for Tysabri on 12/20/22  Last infusion 11/8/22, infusions every 6 weeks  Confirmed scheduling is correct      Last CBC/CMP 11/8/22  Last JCV 7/25/22 0 28 (final result negative)  Last MRI brain 5/14/22, t-spine and c-spine 8/19/22    Pt will be due for updated JCV level by 1/25/23  Order entered  [employer] message sent       Okay to proceed with infusion?     Tysabri is approved with [facility] [employer] from 8/25/22 to 2/25/23 for 6 visits (3 visits used so far)   Auth # [id number]      Touch auth valid until 3/14/23 at [facility]

## 2022-12-20 ENCOUNTER — HOSPITAL ENCOUNTER (OUTPATIENT)
Dept: INFUSION CENTER | Facility: CLINIC | Age: 42
Discharge: HOME | End: 2022-12-20

## 2022-12-20 VITALS
HEART RATE: 79 BPM | DIASTOLIC BLOOD PRESSURE: 62 MMHG | SYSTOLIC BLOOD PRESSURE: 117 MMHG | RESPIRATION RATE: 18 BRPM | TEMPERATURE: 97.4 F

## 2022-12-20 DIAGNOSIS — G35 MULTIPLE SCLEROSIS (HCC): Primary | ICD-10-CM

## 2022-12-20 LAB
ALBUMIN SERPL BCP-MCNC: 4.2 G/DL (ref 3.5–5)
ALP SERPL-CCNC: 65 U/L (ref 46–116)
ALT SERPL W P-5'-P-CCNC: 21 U/L (ref 12–78)
ANION GAP SERPL CALCULATED.3IONS-SCNC: 9 MMOL/L (ref 4–13)
AST SERPL W P-5'-P-CCNC: 22 U/L (ref 5–45)
BASOPHILS # BLD AUTO: 0.04 THOUSANDS/ÂΜL (ref 0–0.1)
BASOPHILS NFR BLD AUTO: 1 % (ref 0–1)
BILIRUB SERPL-MCNC: 0.31 MG/DL (ref 0.2–1)
BUN SERPL-MCNC: 16 MG/DL (ref 5–25)
CALCIUM SERPL-MCNC: 9.4 MG/DL (ref 8.3–10.1)
CHLORIDE SERPL-SCNC: 100 MMOL/L (ref 96–108)
CO2 SERPL-SCNC: 27 MMOL/L (ref 21–32)
CREAT SERPL-MCNC: 0.62 MG/DL (ref 0.6–1.3)
EOSINOPHIL # BLD AUTO: 0.31 THOUSAND/ÂΜL (ref 0–0.61)
EOSINOPHIL NFR BLD AUTO: 4 % (ref 0–6)
ERYTHROCYTE [DISTWIDTH] IN BLOOD BY AUTOMATED COUNT: 14.8 % (ref 11.6–15.1)
GFR SERPL CREATININE-BSD FRML MDRD: 112 ML/MIN/1.73SQ M
GLUCOSE SERPL-MCNC: 97 MG/DL (ref 65–140)
HCT VFR BLD AUTO: 40.2 % (ref 34.8–46.1)
HGB BLD-MCNC: 13 G/DL (ref 11.5–15.4)
IMM GRANULOCYTES # BLD AUTO: 0.03 THOUSAND/UL (ref 0–0.2)
IMM GRANULOCYTES NFR BLD AUTO: 0 % (ref 0–2)
LYMPHOCYTES # BLD AUTO: 3.06 THOUSANDS/ÂΜL (ref 0.6–4.47)
LYMPHOCYTES NFR BLD AUTO: 38 % (ref 14–44)
MCH RBC QN AUTO: 32.3 PG (ref 26.8–34.3)
MCHC RBC AUTO-ENTMCNC: 32.3 G/DL (ref 31.4–37.4)
MCV RBC AUTO: 100 FL (ref 82–98)
MONOCYTES # BLD AUTO: 0.42 THOUSAND/ÂΜL (ref 0.17–1.22)
MONOCYTES NFR BLD AUTO: 5 % (ref 4–12)
NEUTROPHILS # BLD AUTO: 4.22 THOUSANDS/ÂΜL (ref 1.85–7.62)
NEUTS SEG NFR BLD AUTO: 52 % (ref 43–75)
NRBC BLD AUTO-RTO: 0 /100 WBCS
PLATELET # BLD AUTO: 268 THOUSANDS/UL (ref 149–390)
PMV BLD AUTO: 9.9 FL (ref 8.9–12.7)
POTASSIUM SERPL-SCNC: 3.9 MMOL/L (ref 3.5–5.3)
PROT SERPL-MCNC: 8.2 G/DL (ref 6.4–8.4)
RBC # BLD AUTO: 4.03 MILLION/UL (ref 3.81–5.12)
SODIUM SERPL-SCNC: 136 MMOL/L (ref 135–147)
WBC # BLD AUTO: 8.08 THOUSAND/UL (ref 4.31–10.16)

## 2022-12-20 RX ORDER — SODIUM CHLORIDE 9 MG/ML
20 INJECTION, SOLUTION INTRAVENOUS ONCE
Status: COMPLETED | OUTPATIENT
Start: 2022-12-20 | End: 2022-12-20

## 2022-12-20 RX ORDER — ACETAMINOPHEN 325 MG/1
650 TABLET ORAL ONCE
Status: COMPLETED | OUTPATIENT
Start: 2022-12-20 | End: 2022-12-20

## 2022-12-20 RX ORDER — SODIUM CHLORIDE 9 MG/ML
20 INJECTION, SOLUTION INTRAVENOUS ONCE
OUTPATIENT
Start: 2023-01-31

## 2022-12-20 RX ORDER — ACETAMINOPHEN 325 MG/1
650 TABLET ORAL ONCE
OUTPATIENT
Start: 2023-01-31 | End: 2023-01-31

## 2022-12-20 RX ADMIN — SODIUM CHLORIDE 20 ML/HR: 0.9 INJECTION, SOLUTION INTRAVENOUS at 10:04

## 2022-12-20 RX ADMIN — NATALIZUMAB 300 MG: 300 INJECTION INTRAVENOUS at 10:38

## 2022-12-20 RX ADMIN — ACETAMINOPHEN 650 MG: 325 TABLET ORAL at 10:04

## 2022-12-20 RX ADMIN — DIPHENHYDRAMINE HYDROCHLORIDE 50 MG: 50 INJECTION, SOLUTION INTRAMUSCULAR; INTRAVENOUS at 10:04

## 2022-12-20 NOTE — PROGRESS NOTES
Pt presents for Tysabri, touch online checklist completed  Labs collected with IV insertion  Pt aware she is due for JCV level by 1/25  Tolerated treatment and completed 1 hour observation without incident   Future appt scheduled, pt declined AVS

## 2023-01-10 ENCOUNTER — TELEPHONE (OUTPATIENT)
Dept: NEUROLOGY | Facility: CLINIC | Age: 43
End: 2023-01-10

## 2023-01-16 ENCOUNTER — APPOINTMENT (OUTPATIENT)
Dept: LAB | Facility: CLINIC | Age: 43
End: 2023-01-16

## 2023-01-16 DIAGNOSIS — G35 MULTIPLE SCLEROSIS (HCC): ICD-10-CM

## 2023-01-16 LAB
ALBUMIN SERPL BCP-MCNC: 3.8 G/DL (ref 3.5–5)
ALP SERPL-CCNC: 57 U/L (ref 46–116)
ALT SERPL W P-5'-P-CCNC: 21 U/L (ref 12–78)
ANION GAP SERPL CALCULATED.3IONS-SCNC: 4 MMOL/L (ref 4–13)
AST SERPL W P-5'-P-CCNC: 15 U/L (ref 5–45)
BASOPHILS # BLD AUTO: 0.03 THOUSANDS/ÂΜL (ref 0–0.1)
BASOPHILS NFR BLD AUTO: 0 % (ref 0–1)
BILIRUB SERPL-MCNC: 0.19 MG/DL (ref 0.2–1)
BUN SERPL-MCNC: 14 MG/DL (ref 5–25)
CALCIUM SERPL-MCNC: 9.7 MG/DL (ref 8.3–10.1)
CHLORIDE SERPL-SCNC: 108 MMOL/L (ref 96–108)
CO2 SERPL-SCNC: 26 MMOL/L (ref 21–32)
CREAT SERPL-MCNC: 0.61 MG/DL (ref 0.6–1.3)
EOSINOPHIL # BLD AUTO: 0.45 THOUSAND/ÂΜL (ref 0–0.61)
EOSINOPHIL NFR BLD AUTO: 6 % (ref 0–6)
ERYTHROCYTE [DISTWIDTH] IN BLOOD BY AUTOMATED COUNT: 14.9 % (ref 11.6–15.1)
GFR SERPL CREATININE-BSD FRML MDRD: 112 ML/MIN/1.73SQ M
GLUCOSE P FAST SERPL-MCNC: 108 MG/DL (ref 65–99)
HCT VFR BLD AUTO: 37.2 % (ref 34.8–46.1)
HGB BLD-MCNC: 12.1 G/DL (ref 11.5–15.4)
IMM GRANULOCYTES # BLD AUTO: 0.03 THOUSAND/UL (ref 0–0.2)
IMM GRANULOCYTES NFR BLD AUTO: 0 % (ref 0–2)
LYMPHOCYTES # BLD AUTO: 2.79 THOUSANDS/ÂΜL (ref 0.6–4.47)
LYMPHOCYTES NFR BLD AUTO: 35 % (ref 14–44)
MCH RBC QN AUTO: 32.7 PG (ref 26.8–34.3)
MCHC RBC AUTO-ENTMCNC: 32.5 G/DL (ref 31.4–37.4)
MCV RBC AUTO: 101 FL (ref 82–98)
MONOCYTES # BLD AUTO: 0.65 THOUSAND/ÂΜL (ref 0.17–1.22)
MONOCYTES NFR BLD AUTO: 8 % (ref 4–12)
NEUTROPHILS # BLD AUTO: 4.11 THOUSANDS/ÂΜL (ref 1.85–7.62)
NEUTS SEG NFR BLD AUTO: 51 % (ref 43–75)
NRBC BLD AUTO-RTO: 1 /100 WBCS
PLATELET # BLD AUTO: 243 THOUSANDS/UL (ref 149–390)
PMV BLD AUTO: 10.4 FL (ref 8.9–12.7)
POTASSIUM SERPL-SCNC: 4 MMOL/L (ref 3.5–5.3)
PROT SERPL-MCNC: 7.1 G/DL (ref 6.4–8.4)
RBC # BLD AUTO: 3.7 MILLION/UL (ref 3.81–5.12)
SODIUM SERPL-SCNC: 138 MMOL/L (ref 135–147)
WBC # BLD AUTO: 8.06 THOUSAND/UL (ref 4.31–10.16)

## 2023-01-23 ENCOUNTER — TELEPHONE (OUTPATIENT)
Dept: NEUROLOGY | Facility: CLINIC | Age: 43
End: 2023-01-23

## 2023-01-23 NOTE — TELEPHONE ENCOUNTER
Pt is scheduled for Tysabri on 1/31/23  Last infusion 12/20/22, infusions every 6 weeks  Confirmed scheduling is correct      Last CBC/CMP 1/16/23  Last JCV 7/25/22 0 28 (final result negative)- updated level in progress  Last MRI brain 5/14/22, t-spine and c-spine 8/19/22     Okay to proceed with infusion?     [facility] from 8/25/22 to 2/25/23 for 6 visits  Auth # [id number]      Touch auth valid until 3/14/23 at [facility] infusion center      *New PA needed after this infusion

## 2023-01-24 ENCOUNTER — OFFICE VISIT (OUTPATIENT)
Dept: NEUROLOGY | Facility: CLINIC | Age: 43
End: 2023-01-24

## 2023-01-24 VITALS
HEIGHT: 65 IN | TEMPERATURE: 96.4 F | SYSTOLIC BLOOD PRESSURE: 145 MMHG | WEIGHT: 208 LBS | DIASTOLIC BLOOD PRESSURE: 65 MMHG | RESPIRATION RATE: 18 BRPM | HEART RATE: 88 BPM | BODY MASS INDEX: 34.66 KG/M2

## 2023-01-24 DIAGNOSIS — F43.21 SITUATIONAL DEPRESSION: ICD-10-CM

## 2023-01-24 DIAGNOSIS — G35 MULTIPLE SCLEROSIS (HCC): Primary | ICD-10-CM

## 2023-01-24 DIAGNOSIS — R93.89 ABNORMAL COMPUTED TOMOGRAPHY ANGIOGRAPHY (CTA): ICD-10-CM

## 2023-01-24 RX ORDER — NATALIZUMAB 300 MG/15ML
INJECTION INTRAVENOUS
COMMUNITY

## 2023-01-24 NOTE — PROGRESS NOTES
Patient ID: Gilles Blackwell is a 40 y o  female  Assessment/Plan:    Multiple sclerosis ([facility] )  Patient with MS diagnosed in 2015  She was on Tecfidera, then dimethyl fumarate from 2016 until she stopped on her own in April 2021 due to forgetting to take her medication  Alternative DMTs were discussed and she chose Aubagio, which was initiated in Dec 2021 (delayed start due to her having COVID)  She only took the med for 18 days and got COVID again in Jan 2022  She then resumed Aubagio in late January but she stopped the medication after a few weeks (estimates stopped in late Feb) due to hair shedding  She never informed our office of this until she was seen in April 2022  MRI brain May 2022 was stable from a brain standpoint, but evidence of new lesion in the cervicomedullary junction/upper cervical cord  Last c-spine imaging was in 2019  MRI c-spine August 2022 with a new, but non-enhancing lesion involving right lateral cord at cervicomedullary junction and mid/dorsal cord at level C3  Stable punctuate focus within right lateral cord at level C2  MRI t-spine August 2022 with a new, but non-enhancing lesion within left dorsolateral cord at level T11  Stable small demyelinating focus at level T8-9 (this was also compared to 2019)  She chose to start Tysabri as her next DMT  She is JCV negative, but index is in the indeterminate range  She had first infusion on 9/13/22, then 4 weeks later on 10/12/22  When she was seen by Dr Nancy Amador in late October, it was advised her infusions be spaced out to every 6 weeks given indeterminate JCV titer  She tolerates Tysabri well overall  JCV checked last week is pending  Scheduled for next infusion on 1/31/23  She is not having any new, focal symptoms at this time  Her exam is stable  Would like to update MRI brain and c-spine at this time  She asked about disability paperwork (social security)  She was denied last year and is re-applying  Discussed our office does not fill out additional paperwork for SSI, records can be requested  Her paperwork included functional capacity eval, which we do not perform  She is going to ask her PCP, but she may need to see an occupational medicine provider  Abnormal computed tomography angiography (CTA)  Patient had reported headaches and a family history of cerebral aneurysm in her half sister  CTA head 11/4/22 with 1-2mm outpouching of the communicating segment of the left ICA, which may represent an infundibulum or tiny aneurysm  Follow up CTA recommended in 6 months (May 2023)  If still concern for possible aneurysm at that point, will refer to neurosurgery  Situational depression  Patient reports currently going through a separation, which has been rough on her  She has been interested in speaking with a psychologist and has been calling placed, but told she needs a referral  I did give her a generic psychology referral today  If PCP referral is needed, she will contact her PCP  I encouraged her to discuss this with her PCP as well  Patient will return in 3-4 months or sooner if needed  Diagnoses and all orders for this visit:    Multiple sclerosis ([facility] )  -     MRI brain MS wo and w contrast; Future  -     MRI cervical spine with and without contrast; Future    Situational depression  -     Ambulatory Referral to Psychology; Future    Abnormal computed tomography angiography (CTA)    Other orders  -     natalizumab (Tysabri) 300 mg/15 mL; Inject into a catheter in a vein           Subjective:    HPI    Patient is a 40year old female who presents today for MS follow up, last seen in October 2022  To review, patient’s symptoms began in January 2015 when she had a work injury to the left shoulder  In March 2015, she had left sided numbness going down her torso, down into her feet and legs  She also experienced urinary leakage without Valsalva   Patient then experienced right eye pain and fogginess of vision  She was seen by Dr Samantha Matamoros and had an OCT done  It revealed normal findings on the right but a sector defect on the left  MRI of the brain from May 13, 2015 revealed a few scattered nonspecific supratentorial WM lesions  No acute infarction, or hemorrhage or enh  MRI of the lumbar spine without contrast revealed small L5/S1 disc herniation, MRI of the T-spine revealed evidence of solitary 8mm lesion to the left of midline at T8/9 in the dorsal Cord  No pathological enh  MRI of the C-spine also done on June 13, 2015 revealed a solitary 5 mm focus of active demyelination in the right lateral cord at C2  Patient was given 3 days of IV steroids  Patient with significant GI upset with the steroids and also some mild change in mood with increased anxiety  She was NMO negative and Lyme negative  She did not have an LP  Patient was started on Copaxone as her initial DMT in 2015  She had a brief interval of being off med due to insurance  However, due to site reactions, patient stopped Copaxone and started Tecfidera in May 2016  Patient was subsequently told to stop her Tecfidera due to unplanned pregnancy in Nov 2017  Patient delivered her daughter on 5/56/22 without complication  She also had a tubal ligation  She restarted Tecfidera in January 2019 after she was done breastfeeding  Patient had c/o low back pain as well as left hip pain  She had an x-ray of the lumbar spine which showed moderate disc narrowing at L5/S1  She saw Orthopedics who agreed with MRI lumbar spine, however this was not approved by her insurance  She then did a trial of PT from 10/28/2019 through 11/20/2019  She also had an EMG of the left lower extremity on 11/27/2019, which was a normal study  MRI lumbar spine was completed on 2/7/2020    This demonstrated moderate to large left paramedian protrusion/superior extrusion type disc herniation which results in central and lateral recess stenosis potentially impacting exiting left L5 nerve root or descending left S1 nerve root  Patient was advised to return to ortho, who she was already established with  She has not seen ortho again  Due to insurance reasons, she changed from brand name Tecfidera to generic dimethyl fumarate in December 2020  Patient called our office on 3/12/21 reporting onset of tongue numbness on 3/8  She then started experiencing numbness of her right lip and weakness of the right face on 3/11  On 3/12 she started to experience a bit of pain in the right face, which is why she called the office and then presented to the ED (prior to hearing back from our office)  Per the ER note, she had weakness of the entire right face, as well as decreased sensation of the entire right face  Due to symptoms x 4 days, no stroke alert called, as well as this appeared like Bell’s palsy  I received a tiger text from the ED physician reporting her presentation was most concerning for Bell’s palsy and asking if any further workup/treatment advised  CTH negative  Lyme negative, UA negative  She had a brain MRI with attn to IACs (due to occasional vertigo and tinnitus) completed on 3/2/21 which showed stable MS, no pathology in the IACs  She was discharged from the ED with a prednisone taper and valacyclovir  She was seen in the office on 3/18/2021 and was doing ok, still with right facial weakness, decreased eye closure, tongue and facial numbness on the right, hyperacusis  At timing of her visit on 6/8/21 she admitted that she was not compliant with her dimethyl fumarate, forgetting to take it, and in fact had not taken it at all since April 2021  She was having trouble requesting a refill from her specialty pharmacy but never informed our office about this  She had also stopped her gabapentin due to she was concerned it caused weight gain  Alternative DMTs were discussed at visits in June 2021   Patient had JCV testing done and negative with index 0 33  Tysabri and Aubagio were discussed  At timing of October 2021 visit with Dr Roberto Lock, patient chose Aubagio  Start form was signed  Patient started Aubagio in Dec 2021 (7mg x 1 month then increase to 14mg)  She was on Aubagio for about 18 days and tested positive for COVID in Jan 2022 (she also had COVID prior to starting Aubagio in November 2021)  She held the [facility] for a few weeks until COVID symptoms improved and then restarted Aubagio 7mg before increase to 14mg  At timing of patient’s visit in April 2022 she informed me that she had stopped Aubagio, never called our office to report this  She stopped the medication because she noticed her hair was coming out while brushing her hair  She estimated that she took the medication for only a few weeks from late January to late February 2022  She reported stability of her MS symptoms, denied any new symptoms  We discussed either resuming dimethyl fumarate, Aubagio, or considering Tysabri  She had labs done 4/21/22-JCV negative with index 0 22  MRI brain completed 5/14/22 showed stable supratentorial white matter disease without progression  There was a new white matter lesion seen laterally in the upper cervical cord at the cervical medullary junction, eccentrically on the right, indicative of progressive demyelinating disease  At timing of her visit in July 2022, she chose to initiate Tysabri  Updated JCV testing completed 7/25/22 and negative with index 0 28 (first indeterminate)  She had updated cord imaging on 8/19/22  MRI c-spine compared to 1/14/19 with new demyelinating foci involving right lateral cord at cervicomedullary junction and mid/dorsal cord at level C3  Stable punctuate focus within right lateral cord at level C2  No active demyelination or cord atrophy  MRI t-spine compared to 1/4/19 with new small demyelinating focus within left dorsolateral cord at level T11  Stable small demyelinating focus at level T8-9    No active demyelination or cord atrophy  First Tysabri infusion was given 9/13/22  She was seen by Dr Dilia Owen for [place] due to Tysabri on 10/25/22  Due to JCV index being in indeterminate range, it was advised she have infusions every 6 weeks  Patient reported headaches with a family history of brain aneurysm in her half sister, therefore CTA was ordered  CTA head 11/4/22 showing 1-2 mm outpouching from the communicating segment of the left ICA may represent an infundibulum or tiny aneurysm  Follow-up CT angiogram recommended in 6 months  Today, patient reports things have been overall stable from a MS standpoint  Unfortunately, she is going through a separation and this has been very rough on her  She would like to see a psychologist, has called a few places, but told she needs a referral   She is not sleeping well  She denies any new, focal MS symptoms at this time  She notes she was denied for social security disability last year and is applying again, brought in some extensive paperwork and wondering if we could fill that out  The following portions of the patient's history were reviewed and updated as appropriate: current medications, past family history, past medical history, past social history, past surgical history and problem list          Objective:    Blood pressure 145/65, pulse 88, temperature (!) 96 4 °F (35 8 °C), temperature source Tympanic, resp  rate 18, height 5' 5" (1 651 m), weight 94 3 kg (208 lb)    Physical Exam  Constitutional:       Appearance: Normal appearance  HENT:      Head: Normocephalic and atraumatic  Neurological:      Mental Status: She is alert  Psychiatric:         Mood and Affect: Mood normal          Behavior: Behavior normal          Neurological Exam  Mental Status  Alert  Sensory  Light touch is normal in upper and lower extremities       Coordination  Right: Finger-to-nose normal Left: Finger-to-nose normal         ROS:    Review of Systems Constitutional: Positive for fatigue  Negative for chills and fever  HENT: Positive for trouble swallowing (pt stated started about a month ago at times)  Negative for ear pain and sore throat  Eyes: Negative for pain and visual disturbance  Respiratory: Negative for cough and shortness of breath  Cardiovascular: Negative for chest pain and palpitations  Gastrointestinal: Negative for abdominal pain and vomiting  Genitourinary: Negative for dysuria and hematuria  Musculoskeletal: Positive for back pain (lower back), myalgias (right leg and foot), neck pain and neck stiffness  Negative for arthralgias  Skin: Negative for color change and rash  Neurological: Positive for dizziness, numbness (left arm) and headaches  Negative for seizures and syncope  Psychiatric/Behavioral: Positive for sleep disturbance  The patient is nervous/anxious  Depression     All other systems reviewed and are negative        I personally reviewed and updated the ROS as appropriate

## 2023-01-24 NOTE — ASSESSMENT & PLAN NOTE
Patient reports currently going through a separation, which has been rough on her  She has been interested in speaking with a psychologist and has been calling placed, but told she needs a referral  I did give her a generic psychology referral today  If PCP referral is needed, she will contact her PCP  I encouraged her to discuss this with her PCP as well

## 2023-01-24 NOTE — ASSESSMENT & PLAN NOTE
Patient had reported headaches and a family history of cerebral aneurysm in her half sister  CTA head 11/4/22 with 1-2mm outpouching of the communicating segment of the left ICA, which may represent an infundibulum or tiny aneurysm  Follow up CTA recommended in 6 months (May 2023)  If still concern for possible aneurysm at that point, will refer to neurosurgery

## 2023-01-24 NOTE — ASSESSMENT & PLAN NOTE
Patient with MS diagnosed in 2015  She was on Tecfidera, then dimethyl fumarate from 2016 until she stopped on her own in April 2021 due to forgetting to take her medication  Alternative DMTs were discussed and she chose Aubagio, which was initiated in Dec 2021 (delayed start due to her having COVID)  She only took the med for 18 days and got COVID again in Jan 2022  She then resumed Aubagio in late January but she stopped the medication after a few weeks (estimates stopped in late Feb) due to hair shedding  She never informed our office of this until she was seen in April 2022  MRI brain May 2022 was stable from a brain standpoint, but evidence of new lesion in the cervicomedullary junction/upper cervical cord  Last c-spine imaging was in 2019  MRI c-spine August 2022 with a new, but non-enhancing lesion involving right lateral cord at cervicomedullary junction and mid/dorsal cord at level C3  Stable punctuate focus within right lateral cord at level C2  MRI t-spine August 2022 with a new, but non-enhancing lesion within left dorsolateral cord at level T11  Stable small demyelinating focus at level T8-9 (this was also compared to 2019)  She chose to start Tysabri as her next DMT  She is JCV negative, but index is in the indeterminate range  She had first infusion on 9/13/22, then 4 weeks later on 10/12/22  When she was seen by Dr Werner Brood in late October, it was advised her infusions be spaced out to every 6 weeks given indeterminate JCV titer  She tolerates Tysabri well overall  JCV checked last week is pending  Scheduled for next infusion on 1/31/23  She is not having any new, focal symptoms at this time  Her exam is stable  Would like to update MRI brain and c-spine at this time  She asked about disability paperwork (social security)  She was denied last year and is re-applying  Discussed our office does not fill out additional paperwork for SSI, records can be requested    Her paperwork included functional capacity eval, which we do not perform  She is going to ask her PCP, but she may need to see an occupational medicine provider

## 2023-01-24 NOTE — PATIENT INSTRUCTIONS
Proceed with Tysabri next week  Start vitamin D3 4,000IU daily  Referral to psychology    Also discuss with PCP  MRI brain and cervical spine  Will repeat CTA in the Spring   Follow up in 3-4 months or sooner if needed  Call for any new symptoms

## 2023-01-27 LAB — MISCELLANEOUS LAB TEST RESULT: NORMAL

## 2023-01-31 ENCOUNTER — HOSPITAL ENCOUNTER (OUTPATIENT)
Dept: INFUSION CENTER | Facility: CLINIC | Age: 43
Discharge: HOME | End: 2023-01-31

## 2023-01-31 VITALS
RESPIRATION RATE: 18 BRPM | TEMPERATURE: 96.8 F | DIASTOLIC BLOOD PRESSURE: 78 MMHG | HEART RATE: 84 BPM | SYSTOLIC BLOOD PRESSURE: 127 MMHG

## 2023-01-31 DIAGNOSIS — G35 MULTIPLE SCLEROSIS (HCC): Primary | ICD-10-CM

## 2023-01-31 RX ORDER — SODIUM CHLORIDE 9 MG/ML
20 INJECTION, SOLUTION INTRAVENOUS ONCE
OUTPATIENT
Start: 2023-02-27

## 2023-01-31 RX ORDER — ACETAMINOPHEN 325 MG/1
650 TABLET ORAL ONCE
OUTPATIENT
Start: 2023-02-27 | End: 2023-02-27

## 2023-01-31 RX ORDER — ACETAMINOPHEN 325 MG/1
650 TABLET ORAL ONCE
Status: COMPLETED | OUTPATIENT
Start: 2023-01-31 | End: 2023-01-31

## 2023-01-31 RX ORDER — SODIUM CHLORIDE 9 MG/ML
20 INJECTION, SOLUTION INTRAVENOUS ONCE
Status: COMPLETED | OUTPATIENT
Start: 2023-01-31 | End: 2023-01-31

## 2023-01-31 RX ADMIN — SODIUM CHLORIDE 20 ML/HR: 0.9 INJECTION, SOLUTION INTRAVENOUS at 10:23

## 2023-01-31 RX ADMIN — ACETAMINOPHEN 650 MG: 325 TABLET ORAL at 10:21

## 2023-01-31 RX ADMIN — NATALIZUMAB 300 MG: 300 INJECTION INTRAVENOUS at 10:49

## 2023-02-07 ENCOUNTER — OFFICE VISIT (OUTPATIENT)
Dept: FAMILY MEDICINE CLINIC | Facility: CLINIC | Age: 43
End: 2023-02-07

## 2023-02-07 ENCOUNTER — PATIENT OUTREACH (OUTPATIENT)
Dept: FAMILY MEDICINE CLINIC | Facility: CLINIC | Age: 43
End: 2023-02-07

## 2023-02-07 VITALS
HEIGHT: 65 IN | RESPIRATION RATE: 18 BRPM | HEART RATE: 92 BPM | OXYGEN SATURATION: 99 % | TEMPERATURE: 97.6 F | DIASTOLIC BLOOD PRESSURE: 86 MMHG | SYSTOLIC BLOOD PRESSURE: 114 MMHG | WEIGHT: 209.6 LBS | BODY MASS INDEX: 34.92 KG/M2

## 2023-02-07 DIAGNOSIS — M25.561 CHRONIC PAIN OF RIGHT KNEE: ICD-10-CM

## 2023-02-07 DIAGNOSIS — M85.60 BONE CYST: ICD-10-CM

## 2023-02-07 DIAGNOSIS — L30.9 ECZEMA OF HAND: ICD-10-CM

## 2023-02-07 DIAGNOSIS — G89.29 CHRONIC PAIN OF RIGHT KNEE: ICD-10-CM

## 2023-02-07 DIAGNOSIS — Z59.9 FINANCIAL DIFFICULTIES: Primary | ICD-10-CM

## 2023-02-07 SDOH — ECONOMIC STABILITY - INCOME SECURITY: PROBLEM RELATED TO HOUSING AND ECONOMIC CIRCUMSTANCES, UNSPECIFIED: Z59.9
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
## 2023-02-09 ENCOUNTER — TELEPHONE (OUTPATIENT)
Dept: FAMILY MEDICINE CLINIC | Facility: CLINIC | Age: 43
End: 2023-02-09

## 2023-02-09 NOTE — TELEPHONE ENCOUNTER
[place] [id number] form received on 02/09/2023  to be completed by PCP  Copy made and placed in PCP folder  Forms to be delivered to PCP mailbox at assigned time      DISABILITY APPEAL

## 2023-02-11 ENCOUNTER — HOSPITAL ENCOUNTER (OUTPATIENT)
Dept: MRI IMAGING | Facility: HOSPITAL | Age: 43
Discharge: HOME | End: 2023-02-11

## 2023-02-11 DIAGNOSIS — G35 MULTIPLE SCLEROSIS (HCC): ICD-10-CM

## 2023-02-11 RX ADMIN — GADOBUTROL 9 ML: 604.72 INJECTION INTRAVENOUS at 13:22

## 2023-02-13 ENCOUNTER — DOCUMENTATION (OUTPATIENT)
Dept: NEUROLOGY | Facility: CLINIC | Age: 43
End: 2023-02-13

## 2023-02-13 NOTE — PROGRESS NOTES
Tysabri TOUCH authorization submitted, valid until 9/13/23 at [facility] center

## 2023-02-15 ENCOUNTER — TELEPHONE (OUTPATIENT)
Dept: NEUROLOGY | Facility: CLINIC | Age: 43
End: 2023-02-15

## 2023-02-17 NOTE — TELEPHONE ENCOUNTER
Received fax stating Tysabri does not need PA  However, this is likely because previous PA does not  until 23  Apparently they will not review ahead of time  Will need to resubmit PA on/after 23

## 2023-03-01 NOTE — TELEPHONE ENCOUNTER
Tysabri is approved with [facility] from 3/14/23 to 9/14/23 for 6 visits  Auth # [id number]

## 2023-03-02 ENCOUNTER — TELEPHONE (OUTPATIENT)
Dept: NEUROLOGY | Facility: CLINIC | Age: 43
End: 2023-03-02

## 2023-03-02 NOTE — TELEPHONE ENCOUNTER
Pt is scheduled for Tysabri on 3/14/23  Last infusion 1/31/23, infusions every 6 weeks  Confirmed scheduling is correct      Last CBC/CMP 1/16/23  Last JCV 1/16/23 0 20 (final result negative)  Last MRI 2/11/23     Okay to proceed with infusion?     Tysabri is approved with [facility] from 3/14/23 to 9/14/23 for 6 visits   Auth # [id number]      Touch auth valid until 9/13/23 at [facility]

## 2023-03-07 ENCOUNTER — OFFICE VISIT (OUTPATIENT)
Dept: FAMILY MEDICINE CLINIC | Facility: CLINIC | Age: 43
End: 2023-03-07

## 2023-03-07 VITALS
HEIGHT: 65 IN | RESPIRATION RATE: 18 BRPM | OXYGEN SATURATION: 99 % | HEART RATE: 66 BPM | SYSTOLIC BLOOD PRESSURE: 118 MMHG | BODY MASS INDEX: 36.15 KG/M2 | TEMPERATURE: 97.2 F | DIASTOLIC BLOOD PRESSURE: 64 MMHG | WEIGHT: 217 LBS

## 2023-03-07 DIAGNOSIS — Z12.31 ENCOUNTER FOR SCREENING MAMMOGRAM FOR MALIGNANT NEOPLASM OF BREAST: ICD-10-CM

## 2023-03-07 DIAGNOSIS — M54.16 RADICULOPATHY, LUMBAR REGION: Primary | ICD-10-CM

## 2023-03-07 DIAGNOSIS — N92.0 MENORRHAGIA WITH REGULAR CYCLE: ICD-10-CM

## 2023-03-07 NOTE — PROGRESS NOTES
Name: Zayra Lo      : 1980      MRN: [id number]  Encounter Provider: Andres Ferrera MD  Encounter Date: 3/7/2023   Encounter department: [address] [place] [address] Ave     1  Radiculopathy, lumbar region    2  Encounter for screening mammogram for malignant neoplasm of breast  -     Mammo screening bilateral w 3d & cad; Future; Expected date: 2023    3  Menorrhagia with regular cycle  -     US pelvis complete non OB; Future; Expected date: 2023         Subjective      39 yo female with known MS followed by Neurology here today with the following concerns:  Continues with back pain at times is mild but at times can be debilitating making it hard for her to get out of bed, severe accompanied by spasms      Review of Systems   Musculoskeletal: Positive for arthralgias and back pain  All other systems reviewed and are negative        Current Outpatient Medications on File Prior to Visit   Medication Sig   • baclofen 10 mg tablet Take 1 tablet (10 mg total) by mouth 2 (two) times a day (Patient not taking: Reported on 2023)   • cyanocobalamin 1,000 mcg/mL Take B12 1000 mcg IM once a week for 3 weeks, then once a month for 5 months   • Cyanocobalamin 1000 MCG SUBL Place 1 tablet (1,000 mcg total) under the tongue daily   • fluticasone (FLONASE) 50 mcg/act nasal spray 1 spray into each nostril daily (Patient not taking: Reported on 2023)   • ketorolac (TORADOL) 10 mg tablet Take 1 tablet (10 mg total) by mouth every 6 (six) hours as needed for moderate pain   • natalizumab (Tysabri) 300 mg/15 mL Inject into a catheter in a vein   • Syringe/Needle, Disp, (SYRINGE 3CC/25GX1") 25G X 1" 3 ML MISC Administer B12 1000mcg IM once per week for three (3) weeks; then once per month for five (5) months   • triamcinolone (KENALOG) 0 1 % ointment Apply topically 2 (two) times a day       Objective     /64 (BP Location: Right arm, Patient Position: Sitting, Cuff Size: Standard)   Pulse 66   Temp (!) 97 2 °F (36 2 °C) (Temporal)   Resp 18   Ht 5' 5" (1 651 m)   Wt 98 4 kg (217 lb)   LMP 02/10/2023   SpO2 99%   BMI 36 11 kg/m²     Physical Exam  Vitals and nursing note reviewed  Constitutional:       Appearance: She is well-developed  HENT:      Head: Normocephalic  Right Ear: External ear normal       Left Ear: External ear normal       Nose: Nose normal    Eyes:      Conjunctiva/sclera: Conjunctivae normal       Pupils: Pupils are equal, round, and reactive to light  Neck:      Thyroid: No thyromegaly  Cardiovascular:      Rate and Rhythm: Normal rate and regular rhythm  Heart sounds: Normal heart sounds  Pulmonary:      Effort: Pulmonary effort is normal       Breath sounds: Normal breath sounds  Abdominal:      Palpations: Abdomen is soft  Tenderness: There is no abdominal tenderness  There is no guarding or rebound  Musculoskeletal:         General: Tenderness present  Normal range of motion  Cervical back: Normal range of motion and neck supple  Thoracic back: Spasms and tenderness present  Lumbar back: Spasms and tenderness present  Skin:     General: Skin is dry  Neurological:      Mental Status: She is alert and oriented to person, place, and time  Deep Tendon Reflexes: Reflexes are normal and symmetric         Karol Oropeza MD

## 2023-03-14 ENCOUNTER — HOSPITAL ENCOUNTER (OUTPATIENT)
Dept: INFUSION CENTER | Facility: CLINIC | Age: 43
Discharge: HOME | End: 2023-03-14

## 2023-03-14 VITALS
SYSTOLIC BLOOD PRESSURE: 126 MMHG | TEMPERATURE: 97.7 F | HEART RATE: 69 BPM | RESPIRATION RATE: 18 BRPM | DIASTOLIC BLOOD PRESSURE: 76 MMHG

## 2023-03-14 DIAGNOSIS — G35 MULTIPLE SCLEROSIS (HCC): Primary | ICD-10-CM

## 2023-03-14 LAB
ALBUMIN SERPL BCP-MCNC: 4.3 G/DL (ref 3.5–5)
ALP SERPL-CCNC: 48 U/L (ref 34–104)
ALT SERPL W P-5'-P-CCNC: 11 U/L (ref 7–52)
ANION GAP SERPL CALCULATED.3IONS-SCNC: 5 MMOL/L (ref 4–13)
AST SERPL W P-5'-P-CCNC: 15 U/L (ref 13–39)
BASOPHILS # BLD AUTO: 0.04 THOUSANDS/ÂΜL (ref 0–0.1)
BASOPHILS NFR BLD AUTO: 1 % (ref 0–1)
BILIRUB SERPL-MCNC: 0.3 MG/DL (ref 0.2–1)
BUN SERPL-MCNC: 12 MG/DL (ref 5–25)
CALCIUM SERPL-MCNC: 9.3 MG/DL (ref 8.4–10.2)
CHLORIDE SERPL-SCNC: 105 MMOL/L (ref 96–108)
CO2 SERPL-SCNC: 26 MMOL/L (ref 21–32)
CREAT SERPL-MCNC: 0.57 MG/DL (ref 0.6–1.3)
EOSINOPHIL # BLD AUTO: 0.28 THOUSAND/ÂΜL (ref 0–0.61)
EOSINOPHIL NFR BLD AUTO: 4 % (ref 0–6)
ERYTHROCYTE [DISTWIDTH] IN BLOOD BY AUTOMATED COUNT: 15.2 % (ref 11.6–15.1)
GFR SERPL CREATININE-BSD FRML MDRD: 115 ML/MIN/1.73SQ M
GLUCOSE SERPL-MCNC: 97 MG/DL (ref 65–140)
HCT VFR BLD AUTO: 37.9 % (ref 34.8–46.1)
HGB BLD-MCNC: 12.1 G/DL (ref 11.5–15.4)
IMM GRANULOCYTES # BLD AUTO: 0.02 THOUSAND/UL (ref 0–0.2)
IMM GRANULOCYTES NFR BLD AUTO: 0 % (ref 0–2)
LYMPHOCYTES # BLD AUTO: 2.69 THOUSANDS/ÂΜL (ref 0.6–4.47)
LYMPHOCYTES NFR BLD AUTO: 36 % (ref 14–44)
MCH RBC QN AUTO: 31.8 PG (ref 26.8–34.3)
MCHC RBC AUTO-ENTMCNC: 31.9 G/DL (ref 31.4–37.4)
MCV RBC AUTO: 100 FL (ref 82–98)
MONOCYTES # BLD AUTO: 0.37 THOUSAND/ÂΜL (ref 0.17–1.22)
MONOCYTES NFR BLD AUTO: 5 % (ref 4–12)
NEUTROPHILS # BLD AUTO: 4.01 THOUSANDS/ÂΜL (ref 1.85–7.62)
NEUTS SEG NFR BLD AUTO: 54 % (ref 43–75)
NRBC BLD AUTO-RTO: 0 /100 WBCS
PLATELET # BLD AUTO: 245 THOUSANDS/UL (ref 149–390)
PMV BLD AUTO: 10.7 FL (ref 8.9–12.7)
POTASSIUM SERPL-SCNC: 4.1 MMOL/L (ref 3.5–5.3)
PROT SERPL-MCNC: 7 G/DL (ref 6.4–8.4)
RBC # BLD AUTO: 3.8 MILLION/UL (ref 3.81–5.12)
SODIUM SERPL-SCNC: 136 MMOL/L (ref 135–147)
WBC # BLD AUTO: 7.41 THOUSAND/UL (ref 4.31–10.16)

## 2023-03-14 RX ORDER — ACETAMINOPHEN 325 MG/1
650 TABLET ORAL ONCE
OUTPATIENT
Start: 2023-04-25 | End: 2023-04-25

## 2023-03-14 RX ORDER — SODIUM CHLORIDE 9 MG/ML
20 INJECTION, SOLUTION INTRAVENOUS ONCE
OUTPATIENT
Start: 2023-04-25

## 2023-03-14 RX ORDER — SODIUM CHLORIDE 9 MG/ML
20 INJECTION, SOLUTION INTRAVENOUS ONCE
Status: COMPLETED | OUTPATIENT
Start: 2023-03-14 | End: 2023-03-14

## 2023-03-14 RX ORDER — ACETAMINOPHEN 325 MG/1
650 TABLET ORAL ONCE
Status: COMPLETED | OUTPATIENT
Start: 2023-03-14 | End: 2023-03-14

## 2023-03-14 RX ADMIN — NATALIZUMAB 300 MG: 300 INJECTION INTRAVENOUS at 10:49

## 2023-03-14 RX ADMIN — ACETAMINOPHEN 650 MG: 325 TABLET ORAL at 10:18

## 2023-03-14 RX ADMIN — SODIUM CHLORIDE 20 ML/HR: 0.9 INJECTION, SOLUTION INTRAVENOUS at 10:20

## 2023-03-14 NOTE — PROGRESS NOTES
Pt presents for Tysabri  No new meds or concerns  Labs obtained peripherally per protocol, tolerated well  Touch Program pre-infusion checklist completed  Pt tolerated treatment without adverse reaction  Future appointments discussed  AVS declined

## 2023-03-18 ENCOUNTER — HOSPITAL ENCOUNTER (OUTPATIENT)
Dept: ULTRASOUND IMAGING | Facility: HOSPITAL | Age: 43
Discharge: HOME | End: 2023-03-18

## 2023-03-18 DIAGNOSIS — N92.0 MENORRHAGIA WITH REGULAR CYCLE: ICD-10-CM

## 2023-03-20 ENCOUNTER — CONSULT (OUTPATIENT)
Dept: PAIN MEDICINE | Facility: CLINIC | Age: 43
End: 2023-03-20

## 2023-03-20 VITALS
BODY MASS INDEX: 36.15 KG/M2 | WEIGHT: 217 LBS | OXYGEN SATURATION: 98 % | DIASTOLIC BLOOD PRESSURE: 70 MMHG | SYSTOLIC BLOOD PRESSURE: 114 MMHG | HEIGHT: 65 IN | HEART RATE: 73 BPM

## 2023-03-20 DIAGNOSIS — M54.6 CHRONIC MIDLINE THORACIC BACK PAIN: ICD-10-CM

## 2023-03-20 DIAGNOSIS — M54.16 LUMBAR RADICULOPATHY: ICD-10-CM

## 2023-03-20 DIAGNOSIS — M54.2 NECK PAIN: Primary | ICD-10-CM

## 2023-03-20 DIAGNOSIS — G89.29 CHRONIC MIDLINE THORACIC BACK PAIN: ICD-10-CM

## 2023-03-20 NOTE — PROGRESS NOTES
Assessment  1  Neck pain    2  Chronic midline thoracic back pain    3  Lumbar radiculopathy        Plan  Patient presenting with chronic neck, mid back, lower back back pain for greater than 5 year, worsening over the past 1 year  Pain consistent with cervicalgia, myofascial pain syndrome and lumbar radicular pain in the accompanied by pain >7/10 on the pain scale with inability to participate in IADLs for >6 weeks  Patient has fully participated with PT in the past   Has been taking baclofen with modest benefit  Denies any gait instability, saddle anesthesia  I personally reviewed and interpreted pertinent imaging studies - specifically cervical, thoracic and lumbar MRIs and discussed the results and clinical significance with the patient  Lumbar MRI shows a large disc herniation towards the left at L5-S1 with moderate left lateral recess stenosis and mild central canal stenosis  Patient's thoracic MRI shows no significant disc or central/foraminal pathology  There are areas of signals consistent with demyelinating foci  Cervical MRI shows small disc extrusion at C5-6 and C6-7 without significant compromise of the central canal or foramens  Patient does demonstrate subtle cord lesions consistent with history of demyelinating disease  I reviewed external notes from the relevant aspects of the patient's medical record, specifically primary care physician, neurology office notes in regards to current and prior treatments tried (as mentioned in history of present illness)  I reviewed pertinent laboratory studies, specifically renal function, hemoglobin A1c, CBC, coagulation studies, prior to initiating the recommended medication therapies/interventional treatment options      1   Given patient's history of large paracentral disc herniation at L5-S1 with worsening of radicular pain and prior history of greater than 50% relief with prior epidural steroid injection in 2013/2014, I recommended a repeat L5-S1 lumbar interlaminar epidural steroid injection at this time  Complete risks and benefits including bleeding, infection, tissue reaction, nerve injury and allergic reaction were discussed  The approach was demonstrated using models and literature was provided  2   For her chronic neck and thoracic pain issues, this does seem mainly musculoskeletal versus related to her multiple sclerosis  She did previously benefit significant for chiropractic treatments  We will place referral for this  3   I recommended patient also take naproxen up to 500 mg twice daily for her pain syndrome, in addition to continuing her baclofen as prescribed by her neurologist     4   Patient will follow-up after lumbar epidural steroid injection  [place] Prescription Drug Monitoring Program report was reviewed and was appropriate     Handouts provided questions answered to patient's satisfaction  Lifestyle modifications extensively discussed including diet, exercise and weight loss in addition to core strengthening  My impressions and treatment recommendations were discussed in detail with the patient who verbalized understanding and had no further questions  Discharge instructions were provided  I personally saw and examined the patient and I agree with the above discussed plan of care  Orders Placed This Encounter   Procedures   • FL spine and pain procedure     Standing Status:   Future     Standing Expiration Date:   3/20/2027     Order Specific Question:   Reason for Exam:     Answer:   L5-S1 LESI     Order Specific Question:   Is the patient pregnant? Answer:   No     Order Specific Question:   Anticoagulant hold needed?      Answer:   no   • Ambulatory referral to Chiropractic     Standing Status:   Future     Standing Expiration Date:   3/20/2024     Referral Priority:   Routine     Referral Type:   Chiropractic     Referral Reason:   Specialty Services Required     Requested Specialty: Chiropractic Medicine     Number of Visits Requested:   1     Expiration Date:   3/20/2024     No orders of the defined types were placed in this encounter  History of Present Illness    Fidelia Medel is a 40 y o  female presenting for new patient visit via self referral  She presents with chronic neck, mid back and lower back pains present for greater than 5 years, worsening over the past 1 year  Patient states that symptoms started around 7/12/2012 after an injury at work  Patient states that she was in physical therapy at this time and eventually was found to have a lumbar disc herniation  Patient then underwent a series of epidural steroid injections which provided her significant relief for several years  Patient states that pain has now started to return to a significant degree that over the past year  Patient denies any specific inciting event for current worsening episode  Over the past month the intensity of pain has been moderate to severe, currently 7 out of 10 on the pain scale  Pain does interfere with activities of daily living  Pain is nearly constant, worse in the morning and night  Pain is described as shooting, sharp, dull/aching  Patient endorses weakness in the upper and lower extremities which she attributes to her multiple sclerosis  Patient denies use of assistance device  Pain is increased with lying down, bending, walking, exercise, coughing, sneezing  Pain is not improved with any specific positions/activities  Patient's past medical history is significant for anxiety, depression, multiple sclerosis  Patient's social history is positive for tobacco and alcohol use, negative for marijuana use  Prior pertinent treatments: Nerve injection, physical therapy, heat/ice, TENS unit, chiropractic therapy      Pertinent medications: Tramadol, naproxen, baclofen    I have personally reviewed and/or updated the patient's past medical history, past surgical history, family history, social history, current medications, allergies, and vital signs today  Review of Systems   Constitutional: Negative for chills and fever  HENT: Negative for ear pain and sore throat  Eyes: Negative for pain and visual disturbance  Respiratory: Negative for cough and shortness of breath  Cardiovascular: Negative for chest pain and palpitations  Gastrointestinal: Negative for abdominal pain and vomiting  Genitourinary: Negative for dysuria and hematuria  Musculoskeletal: Positive for arthralgias, back pain, gait problem, myalgias, neck pain and neck stiffness  Skin: Negative for color change and rash  Neurological: Positive for dizziness, weakness, numbness and headaches  Negative for seizures and syncope  All other systems reviewed and are negative        Patient Active Problem List   Diagnosis   • Multiple sclerosis ([facility] )   • Lung nodule seen on imaging study   • Request for sterilization   • S/P tubal ligation   • Yeast infection   • Vaginal discharge   • Incisional infection   • Radiculopathy, lumbar region   • Paresthesia   • Low back pain   • Left hip pain   • Complaints of memory disturbance   • Bilateral radiating leg pain   • Myofascial pain syndrome, cervical   • Tinnitus of both ears   • Vertigo   • Bell's palsy   • Microscopic hematuria   • Deficiency of vitamin B12   • Vitamin D deficiency   • Abnormal computed tomography angiography (CTA)   • Situational depression       Past Medical History:   Diagnosis Date   • Abnormal Pap smear of cervix    • Depression     Last assessed: 8/1/12   • History of Bell's palsy    • History of chlamydia infection    • History of gonorrhea    • MS (multiple sclerosis) ([facility] )    • Ovarian cyst    • Varicella     HX DISEASE       Past Surgical History:   Procedure Laterality Date   • KNEE ARTHROSCOPY     • PA LAPAROSCOPY FULGURATION OVIDUCTS Bilateral 10/5/2018    Procedure: LIGATION TUBAL LAPAROSCOPIC - FALOPE RINGS;  Surgeon: Bam Pugh Arsh Bland MD;  Location: [facility];  Service: Gynecology       Family History   Problem Relation Age of Onset   • Dementia Mother    • Hypertension Mother    • Cataracts Mother    • Hypertension Father    • Macular degeneration Father    • Anuerysm Sister    • Migraines Sister    • Multiple sclerosis Paternal Aunt    • No Known Problems Daughter    • Asthma Son    • ADD / ADHD Son    • Allergies Son    • Glaucoma Family    • Macular degeneration Family    • Hypertension Family        Social History     Occupational History   • Occupation: [employer]   Tobacco Use   • Smoking status: Every Day     Packs/day: 0 50     Types: Cigarettes   • Smokeless tobacco: Never   Vaping Use   • Vaping Use: Never used   Substance and Sexual Activity   • Alcohol use:  Yes   • Drug use: No   • Sexual activity: Yes     Partners: Male     Birth control/protection: Female Sterilization       Current Outpatient Medications on File Prior to Visit   Medication Sig   • cyanocobalamin 1,000 mcg/mL Take B12 1000 mcg IM once a week for 3 weeks, then once a month for 5 months   • Cyanocobalamin 1000 MCG SUBL Place 1 tablet (1,000 mcg total) under the tongue daily   • ketorolac (TORADOL) 10 mg tablet Take 1 tablet (10 mg total) by mouth every 6 (six) hours as needed for moderate pain   • natalizumab (Tysabri) 300 mg/15 mL Inject into a catheter in a vein   • Syringe/Needle, Disp, (SYRINGE 3CC/25GX1") 25G X 1" 3 ML MISC Administer B12 1000mcg IM once per week for three (3) weeks; then once per month for five (5) months   • triamcinolone (KENALOG) 0 1 % ointment Apply topically 2 (two) times a day   • baclofen 10 mg tablet Take 1 tablet (10 mg total) by mouth 2 (two) times a day (Patient not taking: Reported on 1/24/2023)   • fluticasone (FLONASE) 50 mcg/act nasal spray 1 spray into each nostril daily (Patient not taking: Reported on 1/24/2023)     Current Facility-Administered Medications on File Prior to Visit   Medication   • cyanocobalamin injection 1,000 mcg       No Known Allergies    Physical Exam    /70   Pulse 73   Ht 5' 5" (1 651 m)   Wt 98 4 kg (217 lb)   SpO2 98%   BMI 36 11 kg/m²     Constitutional: normal, well developed, well nourished, alert, in no distress and non-toxic and no overt pain behavior  Eyes: anicteric  HEENT: grossly intact  Neck: symmetric, trachea midline and no masses   Pulmonary:even and unlabored  Cardiovascular:No edema or pitting edema present  Skin:Normal without rashes or lesions and well hydrated  Psychiatric:Mood and affect appropriate  Neurologic: Gait is nonantalgic  Motor and sensation are grossly intact  Musculoskeletal: Tenderness throughout the cervical, thoracic, lumbar paraspinal muscles  Pain elicited with lumbar extension and sidebending as well as cervical range of motion  Imaging  MRI Cervical Spine 2/20/2023:  FINDINGS:     ALIGNMENT:  Normal alignment of the cervical spine  No compression fracture  No subluxation  No scoliosis      MARROW SIGNAL:  Mild scattered degenerative endplate changes  No focally suspicious marrow lesions  No bone marrow edema or compression abnormality      CERVICAL AND VISUALIZED UPPER THORACIC CORD:  3 subtle cord lesions redemonstrated, similar to slightly less conspicuous than on the prior study one of the cervical medullary junction acentrically on the right, series 5 image 4  There laterally at the   inferior aspect of C2, series 5 image 9 and the other subtle cord lesion posteriorly at the superior endplate of L3 on series 5 image 12  No enhancing abnormalities  No new or additional cord lesions      PREVERTEBRAL AND PARASPINAL SOFT TISSUES:  Normal      VISUALIZED POSTERIOR FOSSA:  The visualized posterior fossa demonstrates no abnormal signal      CERVICAL DISC SPACES:     C2-C3:  Normal      C3-C4:  Normal      C4-C5:  Normal      C5-C6:  There is a small central disc herniation, protrusion type  Mild central canal narrowing  Neural foramina patent bilaterally  This level is similar to the prior study       C6-C7:  There is a small left paracentral disc herniation, protrusion type  Minimal central canal narrowing  Neural foramina patent bilaterally  This level is similar to the prior study      C7-T1:  Normal      UPPER THORACIC DISC SPACES:  Normal      POSTCONTRAST IMAGING:  Normal      OTHER FINDINGS:  None      IMPRESSION:     1  Three subtle cord lesions at the cervical medullary junction, inferior aspect of C2 and at C3 are stable to slightly less pronounced than on the prior study, correlating the history of demyelinating disease  Heather Almanzar No pathologic enhancement  No new signal   abnormality  No evidence of progressive or active demyelination in the visualized cord  2  Mild spondylotic changes are also stable  No cord compression      MRI thoracic spine 19 2022:  MRI THORACIC SPINE WITH AND WITHOUT CONTRAST     INDICATION: G35: Multiple sclerosis      COMPARISON:  MR thoracic spine 1/4/2019     TECHNIQUE:  Sagittal T1, sagittal T2, sagittal inversion recovery, axial T2,  axial 2D MERGE  Sagittal and axial T1 postcontrast      IV Contrast:  8 mL of Gadobutrol injection (SINGLE-DOSE)      IMAGE QUALITY:  Diagnostic      FINDINGS:     ALIGNMENT:  Normal alignment of the thoracic spine  No compression fracture  No subluxation  No evidence of scoliosis      MARROW SIGNAL:  Normal marrow signal is identified within the visualized bony structures  No discrete marrow lesion      THORACIC CORD:  Stable small focus of T2 signal abnormality in the left dorsal cord at level T8-9 (series 7 image 30)     There is new small T2 signal abnormality in the left dorsolateral cord at level T11 seen on series 7 image 40      PREVERTEBRAL AND PARASPINAL SOFT TISSUES:   Normal      THORACIC DEGENERATIVE CHANGE:       Right eccentric disc bulge at level T3-4 resulting in mild canal narrowing    Minimal multilevel disc bulge or herniation without significant canal stenosis      Right greater than left ligamentum flavum thickening and/or calcification at level T2-3 mildly indenting dorsal thecal sac      POSTCONTRAST:  No abnormal enhancement      IMPRESSION:     1  New small demyelinating focus within left dorsolateral cord at level T11  Stable small demyelinating focus at level T8-9  No active demyelination or cord atrophy      2   Mild degenerative change      MRI lumbar spine 2/7/2020:  MRI LUMBAR SPINE WITHOUT CONTRAST     INDICATION: M54 42: Lumbago with sciatica, left side  G89 29: Other chronic pain  Chronic left-sided low back pain with left-sided sciatica      COMPARISON:  Prior MRI dated January 9, 2016     TECHNIQUE:  Sagittal T1, sagittal T2, sagittal inversion recovery, axial T1 and axial T2, coronal T2     IMAGE QUALITY:  Diagnostic     FINDINGS:     VERTEBRAL BODIES:  There are 5 lumbar type vertebral bodies  Normal alignment of the lumbar spine  No spondylolysis or spondylolisthesis  No scoliosis  No compression fracture  Normal marrow signal is identified within the visualized bony   structures  No discrete marrow lesion      SACRUM:  Normal signal within the sacrum  No evidence of insufficiency or stress fracture      DISTAL CORD AND CONUS:  Normal size and signal within the distal cord and conus      PARASPINAL SOFT TISSUES:  Paraspinal soft tissues are unremarkable      LOWER THORACIC DISC SPACES:  Normal disc height and signal   No disc herniation, canal stenosis or foraminal narrowing      LUMBAR DISC SPACES:     L1-L2:  Normal      L2-L3:  Normal      L3-L4:  No significant central or foraminal narrowing      L4-L5:  Mild facet hypertrophy with mild annular bulging  There is minimal central stenosis  Foramina patent      L5-S1:  Mild facet hypertrophy  There is a broad-based central and left paramedian moderate to large protrusion /extrusion extending superiorly    There is mild central stenosis, moderate left lateral recess stenosis, and mild bilateral foraminal   narrowing  Correlate for left L5 or left S1 radiculopathy      IMPRESSION:     Moderate to large left paramedian protrusion/superior extrusion type disc herniation results in central and lateral recess stenosis potentially impacting exiting left L5 nerve root or descending left S1 nerve root  This finding is new from the prior   study

## 2023-03-30 ENCOUNTER — HOSPITAL ENCOUNTER (OUTPATIENT)
Dept: MAMMOGRAPHY | Facility: MEDICAL CENTER | Age: 43
Discharge: HOME | End: 2023-03-30

## 2023-03-30 VITALS — BODY MASS INDEX: 36.14 KG/M2 | HEIGHT: 65 IN | WEIGHT: 216.93 LBS

## 2023-03-30 DIAGNOSIS — Z12.31 ENCOUNTER FOR SCREENING MAMMOGRAM FOR MALIGNANT NEOPLASM OF BREAST: ICD-10-CM

## 2023-04-07 ENCOUNTER — PATIENT OUTREACH (OUTPATIENT)
Dept: FAMILY MEDICINE CLINIC | Facility: CLINIC | Age: 43
End: 2023-04-07

## 2023-04-07 NOTE — PROGRESS NOTES
DERICK VALADEZ placed call to pt to follow-up and see if pt was able to establish [place] [address] services and if she was able to moved  No answer, left a details vm and ask for a return call

## 2023-04-16 ENCOUNTER — TELEPHONE (OUTPATIENT)
Dept: NEUROLOGY | Facility: CLINIC | Age: 43
End: 2023-04-16

## 2023-04-16 NOTE — TELEPHONE ENCOUNTER
Pt is scheduled for Tysabri on 4/25/23  Last infusion 3/14/23, infusions every 6 weeks  Confirmed scheduling is correct      Last CBC/CMP 3/14/23  Last JCV 1/16/23 0 20 (final result negative)  Last MRI 2/11/23     Okay to proceed with infusion?     Tysabri is approved with [facility] from 3/14/23 to 9/14/23 for 6 visits   Auth # [id number]      Touch auth valid until 9/13/23 at [facility]

## 2023-04-25 ENCOUNTER — HOSPITAL ENCOUNTER (OUTPATIENT)
Dept: INFUSION CENTER | Facility: CLINIC | Age: 43
Discharge: HOME | End: 2023-04-25

## 2023-04-25 VITALS
RESPIRATION RATE: 18 BRPM | DIASTOLIC BLOOD PRESSURE: 78 MMHG | SYSTOLIC BLOOD PRESSURE: 134 MMHG | TEMPERATURE: 96.6 F | HEART RATE: 80 BPM

## 2023-04-25 DIAGNOSIS — G35 MULTIPLE SCLEROSIS (HCC): Primary | ICD-10-CM

## 2023-04-25 RX ORDER — SODIUM CHLORIDE 9 MG/ML
20 INJECTION, SOLUTION INTRAVENOUS ONCE
Status: COMPLETED | OUTPATIENT
Start: 2023-04-25 | End: 2023-04-25

## 2023-04-25 RX ORDER — ACETAMINOPHEN 325 MG/1
650 TABLET ORAL ONCE
OUTPATIENT
Start: 2023-06-06 | End: 2023-06-06

## 2023-04-25 RX ORDER — ACETAMINOPHEN 325 MG/1
650 TABLET ORAL ONCE
Status: COMPLETED | OUTPATIENT
Start: 2023-04-25 | End: 2023-04-25

## 2023-04-25 RX ORDER — SODIUM CHLORIDE 9 MG/ML
20 INJECTION, SOLUTION INTRAVENOUS ONCE
OUTPATIENT
Start: 2023-06-06

## 2023-04-25 RX ADMIN — NATALIZUMAB 300 MG: 300 INJECTION INTRAVENOUS at 10:34

## 2023-04-25 RX ADMIN — ACETAMINOPHEN 650 MG: 325 TABLET ORAL at 10:08

## 2023-04-25 RX ADMIN — SODIUM CHLORIDE 20 ML/HR: 0.9 INJECTION, SOLUTION INTRAVENOUS at 10:08

## 2023-04-25 NOTE — PROGRESS NOTES
Patient tolerated infusion without incidents  Next appointment time scheduled  Denies need for AVS  Left unit in stable condition

## 2023-05-05 ENCOUNTER — PROCEDURE VISIT (OUTPATIENT)
Age: 43
End: 2023-05-05

## 2023-05-05 VITALS
HEART RATE: 80 BPM | WEIGHT: 220 LBS | BODY MASS INDEX: 36.65 KG/M2 | DIASTOLIC BLOOD PRESSURE: 79 MMHG | SYSTOLIC BLOOD PRESSURE: 123 MMHG | HEIGHT: 65 IN

## 2023-05-05 DIAGNOSIS — M54.2 NECK PAIN: ICD-10-CM

## 2023-05-05 DIAGNOSIS — G89.29 CHRONIC LEFT-SIDED LOW BACK PAIN WITH LEFT-SIDED SCIATICA: Primary | ICD-10-CM

## 2023-05-05 DIAGNOSIS — M54.42 CHRONIC LEFT-SIDED LOW BACK PAIN WITH LEFT-SIDED SCIATICA: Primary | ICD-10-CM

## 2023-05-05 DIAGNOSIS — M79.18 MYOFASCIAL PAIN: ICD-10-CM

## 2023-05-05 DIAGNOSIS — M54.6 CHRONIC BILATERAL THORACIC BACK PAIN: ICD-10-CM

## 2023-05-05 DIAGNOSIS — G89.29 CHRONIC BILATERAL THORACIC BACK PAIN: ICD-10-CM

## 2023-05-05 NOTE — PROGRESS NOTES
HPI:  Wilber Briggs returns for treatment today ongoing symptomatology  Has ongoing neck pain upper back pain bilateral shoulder pain 3-5 visual pain analog scale depending upon her movements  Right sided low back pain  The following portions of the patient's history were reviewed and updated as appropriate: allergies, current medications, past family history, past medical history, past social history, past surgical history, and problem list     Review of Systems    Physical Exam:  Exam reveals a pelvic obliquity elevated left versus right innominate leg with inequality biomechanical joint dysfunction present right SI L5-S1 motion unit as parathoracic hypertonicity noted T4-T5 extension fixation C5-C6 C6-C7 segmental dysfunctions paracervical spine hypertonicity  Assessment:   Diagnosis ICD-10-CM Associated Orders   1  Chronic left-sided low back pain with left-sided sciatica  M54 42     G89 29       2  Chronic bilateral thoracic back pain  M54 6     G89 29       3  Neck pain  M54 2       4  Myofascial pain  M79 18                 Treatment: 51234  Manipulation right innominate, sacrum, L5 via Park drop maneuver T4 via double thenar contact C5-C6 via modified rotary break all well-tolerated    Continue daily stretching see her back for follow-up    Discussion:  Continue her daily stretching icing 15 minutes as well

## 2023-05-11 ENCOUNTER — ANNUAL EXAM (OUTPATIENT)
Dept: OBGYN CLINIC | Facility: CLINIC | Age: 43
End: 2023-05-11

## 2023-05-11 VITALS
HEIGHT: 65 IN | BODY MASS INDEX: 37.15 KG/M2 | WEIGHT: 223 LBS | DIASTOLIC BLOOD PRESSURE: 80 MMHG | SYSTOLIC BLOOD PRESSURE: 140 MMHG

## 2023-05-11 DIAGNOSIS — Z11.3 SCREENING FOR STD (SEXUALLY TRANSMITTED DISEASE): ICD-10-CM

## 2023-05-11 DIAGNOSIS — Z23 NEED FOR HPV VACCINATION: ICD-10-CM

## 2023-05-11 DIAGNOSIS — Z01.419 ENCOUNTER FOR GYNECOLOGICAL EXAMINATION (GENERAL) (ROUTINE) WITHOUT ABNORMAL FINDINGS: Primary | ICD-10-CM

## 2023-05-11 DIAGNOSIS — Z71.85 HPV VACCINE COUNSELING: ICD-10-CM

## 2023-05-11 DIAGNOSIS — N76.0 VULVOVAGINITIS: ICD-10-CM

## 2023-05-11 DIAGNOSIS — N92.0 MENORRHAGIA WITH REGULAR CYCLE: ICD-10-CM

## 2023-05-11 RX ORDER — LANOLIN ALCOHOL/MO/W.PET/CERES
1 CREAM (GRAM) TOPICAL 2 TIMES DAILY
COMMUNITY

## 2023-05-11 NOTE — ASSESSMENT & PLAN NOTE
Discussed Mirena IUD as a birth control option in detail  The following risks were reviewed: if infected with a sexually transmitted infection such as chlamydia or gonorrhea the risk for pelvic inflammatory disease may be greater, as well as for resulting chronic pelvic pain or infertility  Emphasized need to continue with safe sex practices with condom use  Also reviewed possible dysfunction bleeding for a few months after insertion, the possibility of expulsion, the risk of ectopic pregnancy if patient were to conceive with the IUD in place  Patient expressed understanding of above and desire to proceed this this method  Precertification form completed  Patient aware will be contacted by office in regards to coverage, ordering, and scheduling

## 2023-05-11 NOTE — PROGRESS NOTES
Pt requested HPV vaccine be given in left deltoid  VIS given at time of administration  Pt asked to wait 15 min after administration to make sure no reactions noted  Also asked pt to make her next follow up appointment for her second HPV immunization    Ana Eisenberg MA
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
## 2023-05-15 ENCOUNTER — TELEPHONE (OUTPATIENT)
Dept: OBGYN CLINIC | Facility: CLINIC | Age: 43
End: 2023-05-15

## 2023-05-15 ENCOUNTER — APPOINTMENT (OUTPATIENT)
Dept: LAB | Age: 43
End: 2023-05-15

## 2023-05-15 DIAGNOSIS — Z11.3 SCREENING FOR STD (SEXUALLY TRANSMITTED DISEASE): ICD-10-CM

## 2023-05-15 DIAGNOSIS — N92.0 MENORRHAGIA WITH REGULAR CYCLE: ICD-10-CM

## 2023-05-15 LAB
C TRACH DNA SPEC QL NAA+PROBE: NEGATIVE
HPV HR 12 DNA CVX QL NAA+PROBE: NEGATIVE
HPV16 DNA CVX QL NAA+PROBE: NEGATIVE
HPV18 DNA CVX QL NAA+PROBE: NEGATIVE
N GONORRHOEA DNA SPEC QL NAA+PROBE: NEGATIVE
TSH SERPL DL<=0.05 MIU/L-ACNC: 0.98 UIU/ML (ref 0.45–4.5)

## 2023-05-15 NOTE — TELEPHONE ENCOUNTER
Pt states she was in the office for annual appt 5/11 with Georgiana Jaeger and had c/o itching, burning and foul odor which have been persistent and pt is still experiencing these symptoms  Pt made aware results pending in system and will review with Georgiana Jaeger tomorrow for recommendations to treat appropriately

## 2023-05-16 ENCOUNTER — TELEPHONE (OUTPATIENT)
Dept: NEUROLOGY | Facility: CLINIC | Age: 43
End: 2023-05-16

## 2023-05-16 ENCOUNTER — HOSPITAL ENCOUNTER (OUTPATIENT)
Dept: MAMMOGRAPHY | Facility: CLINIC | Age: 43
Discharge: HOME | End: 2023-05-16

## 2023-05-16 ENCOUNTER — HOSPITAL ENCOUNTER (OUTPATIENT)
Dept: ULTRASOUND IMAGING | Facility: CLINIC | Age: 43
Discharge: HOME | End: 2023-05-16

## 2023-05-16 DIAGNOSIS — R92.8 ABNORMAL SCREENING MAMMOGRAM: ICD-10-CM

## 2023-05-16 LAB
BACTERIA GENITAL AEROBE CULT: NORMAL
HBV SURFACE AG SER QL: NORMAL
HCV AB SER QL: NORMAL
HIV 1+2 AB+HIV1 P24 AG SERPL QL IA: NORMAL
HIV 2 AB SERPL QL IA: NORMAL
HIV1 AB SERPL QL IA: NORMAL
HIV1 P24 AG SERPL QL IA: NORMAL
T VAGINALIS DNA SPEC QL NAA+PROBE: NEGATIVE
TREPONEMA PALLIDUM IGG+IGM AB [PRESENCE] IN SERUM OR PLASMA BY IMMUNOASSAY: NORMAL

## 2023-05-16 NOTE — TELEPHONE ENCOUNTER
Called and spoke to patient  Patient confirmed upcoming apt with Anette Smith PA-C on 5/24/23 @ 9:45am in the [facility] office

## 2023-05-16 NOTE — TELEPHONE ENCOUNTER
I suspect final results should be in today or tomorrow - I would prefer to wait to treat but if she prefers - can send in the Flagyl

## 2023-05-18 ENCOUNTER — TELEPHONE (OUTPATIENT)
Dept: NEUROLOGY | Facility: CLINIC | Age: 43
End: 2023-05-18

## 2023-05-18 DIAGNOSIS — N76.0 BACTERIAL VAGINOSIS: Primary | ICD-10-CM

## 2023-05-18 DIAGNOSIS — B96.89 BACTERIAL VAGINOSIS: Primary | ICD-10-CM

## 2023-05-18 LAB
LAB AP GYN PRIMARY INTERPRETATION: NORMAL
LAB AP LMP: NORMAL
Lab: NORMAL
PATH INTERP SPEC-IMP: NORMAL

## 2023-05-18 RX ORDER — METRONIDAZOLE 500 MG/1
500 TABLET ORAL EVERY 12 HOURS SCHEDULED
Qty: 14 TABLET | Refills: 0 | Status: SHIPPED | OUTPATIENT
Start: 2023-05-18 | End: 2023-05-25

## 2023-05-18 NOTE — TELEPHONE ENCOUNTER
Pt is scheduled for Tysabri on 5/22/23  Last infusion 4/25/23  Infusions every 6 weeks  Scheduling is incorrect       Please see 5/119/23 Telephone encounter for additional information  Closing this encounter

## 2023-05-19 ENCOUNTER — TELEPHONE (OUTPATIENT)
Dept: NEUROLOGY | Facility: CLINIC | Age: 43
End: 2023-05-19

## 2023-05-19 NOTE — TELEPHONE ENCOUNTER
Patient is scheduled for next Tysabri infusion on 5/23/23  Last infusion 4/25/23  This is only q4wks  Patient is ordered q6wks  Spoke w/patient  Advised her of above  Next Tysabri infusion due June 6  Patient needs to check schedule to see if her children have half days at school that week  She will call back once she gets home and looks at schedule  Called [facility]  Spoke w/Mackenzie Barreto just found error and rescheduled pt to 6/6/23 @  0930  Preet Distance patient may need to change appt  Waiting for call back from patient

## 2023-05-23 ENCOUNTER — HOSPITAL ENCOUNTER (OUTPATIENT)
Dept: INFUSION CENTER | Facility: CLINIC | Age: 43
End: 2023-05-23

## 2023-05-24 ENCOUNTER — OFFICE VISIT (OUTPATIENT)
Dept: NEUROLOGY | Facility: CLINIC | Age: 43
End: 2023-05-24

## 2023-05-24 VITALS
HEIGHT: 65 IN | HEART RATE: 84 BPM | OXYGEN SATURATION: 99 % | DIASTOLIC BLOOD PRESSURE: 70 MMHG | RESPIRATION RATE: 18 BRPM | SYSTOLIC BLOOD PRESSURE: 141 MMHG | BODY MASS INDEX: 36.82 KG/M2 | TEMPERATURE: 97.5 F | WEIGHT: 221 LBS

## 2023-05-24 DIAGNOSIS — E55.9 VITAMIN D DEFICIENCY: ICD-10-CM

## 2023-05-24 DIAGNOSIS — R13.10 DYSPHAGIA: ICD-10-CM

## 2023-05-24 DIAGNOSIS — G35 MULTIPLE SCLEROSIS (HCC): Primary | ICD-10-CM

## 2023-05-24 DIAGNOSIS — E53.8 DEFICIENCY OF VITAMIN B12: ICD-10-CM

## 2023-05-24 NOTE — ASSESSMENT & PLAN NOTE
Patient remains on Tysabri infusions q6 weeks  JCV last checked 1/16/23 and negative with index 0 20     --due July 2023  MRI brain completed 2/11/23 and stable  MRI c-spine 2/11/23 also stable  She reports some twitching of the muscles around her eyes and mouth at times, as well as sometimes her fingers and other spots  She has been under increased stress at home and not sleeping well  No abnormal movements or fasciculations appreciated today  -- Will check additional labs  Advised staying well-hydrated, watching caffeine intake, and trying to reduce stress and get adequate sleep    She reports some intermittent difficulty swallowing, more like feeling food is getting stuck after swallowing  She also says that sometimes while lying flat on her back she feels like she is being choked  -- Will order a swallow study  -- Advise seeing PCP regarding the sensation in her throat, may need GI/ENT    As above, her imaging was recently stable including brain and C-spine  Her neurologic exam is stable today  She will proceed with Tysabri infusions every 6 weeks  She will return in 3 months or sooner if needed

## 2023-05-24 NOTE — PATIENT INSTRUCTIONS
Continue Tysabri every 6 weeks  Will check additional labs  Swallow study ordered  Will be due for JCV testing in July 2023  MRI brain and cervical spine were both stable when completed in Feb 2023  Follow up in 4 months with Dr Scot Brownlee or sooner if needed

## 2023-05-24 NOTE — PROGRESS NOTES
Patient ID: Jeremiah Burdick is a 40 y o  female  Assessment/Plan:    Multiple sclerosis ([facility] 75 )  Patient remains on Tysabri infusions q6 weeks  JCV last checked 1/16/23 and negative with index 0 20     --due July 2023  MRI brain completed 2/11/23 and stable  MRI c-spine 2/11/23 also stable  She reports some twitching of the muscles around her eyes and mouth at times, as well as sometimes her fingers and other spots  She has been under increased stress at home and not sleeping well  No abnormal movements or fasciculations appreciated today  -- Will check additional labs  Advised staying well-hydrated, watching caffeine intake, and trying to reduce stress and get adequate sleep    She reports some intermittent difficulty swallowing, more like feeling food is getting stuck after swallowing  She also says that sometimes while lying flat on her back she feels like she is being choked  -- Will order a swallow study  -- Advise seeing PCP regarding the sensation in her throat, may need GI/ENT    As above, her imaging was recently stable including brain and C-spine  Her neurologic exam is stable today  She will proceed with Tysabri infusions every 6 weeks  She will return in 3 months or sooner if needed  Diagnoses and all orders for this visit:    Multiple sclerosis ([facility] )  -     FL barium swallow video w speech; Future  -     Magnesium; Future  -     Vitamin D 25 hydroxy; Future  -     Vitamin B12; Future  -     Acetylcholine receptor, binding; Future  -     Acetylcholine receptor, blocking; Future    Dysphagia  -     FL barium swallow video w speech; Future  -     Magnesium; Future  -     Vitamin D 25 hydroxy; Future  -     Vitamin B12; Future  -     Acetylcholine receptor, binding; Future  -     Acetylcholine receptor, blocking; Future    Deficiency of vitamin B12  -     Vitamin B12; Future    Vitamin D deficiency  -     Vitamin D 25 hydroxy;  Future           Subjective:    HPI    Patient is a 40year old female who presents today for MS follow up, last seen in October 2022  To review, patient’s symptoms began in January 2015 when she had a work injury to the left shoulder  In March 2015, she had left sided numbness going down her torso, down into her feet and legs  She also experienced urinary leakage without Valsalva  Patient then experienced right eye pain and fogginess of vision  She was seen by Dr Barb Spear and had an OCT done  It revealed normal findings on the right but a sector defect on the left  MRI of the brain from May 13, 2015 revealed a few scattered nonspecific supratentorial WM lesions  No acute infarction, or hemorrhage or enh  MRI of the lumbar spine without contrast revealed small L5/S1 disc herniation, MRI of the T-spine revealed evidence of solitary 8mm lesion to the left of midline at T8/9 in the dorsal Cord  No pathological enh  MRI of the C-spine also done on June 13, 2015 revealed a solitary 5 mm focus of active demyelination in the right lateral cord at C2  Patient was given 3 days of IV steroids  Patient with significant GI upset with the steroids and also some mild change in mood with increased anxiety  She was NMO negative and Lyme negative  She did not have an LP  Patient was started on Copaxone as her initial DMT in 2015  She had a brief interval of being off med due to insurance  However, due to site reactions, patient stopped Copaxone and started Tecfidera in May 2016  Patient was subsequently told to stop her Tecfidera due to unplanned pregnancy in Nov 2017  Patient delivered her daughter on 8/77/18 without complication  She also had a tubal ligation  She restarted Tecfidera in January 2019 after she was done breastfeeding  Patient had c/o low back pain as well as left hip pain  She had an x-ray of the lumbar spine which showed moderate disc narrowing at L5/S1  She saw Orthopedics who agreed with MRI lumbar spine, however this was not approved by her insurance   She then did a trial of PT from 10/28/2019 through 11/20/2019  She also had an EMG of the left lower extremity on 11/27/2019, which was a normal study  MRI lumbar spine was completed on 2/7/2020  This demonstrated moderate to large left paramedian protrusion/superior extrusion type disc herniation which results in central and lateral recess stenosis potentially impacting exiting left L5 nerve root or descending left S1 nerve root  Patient was advised to return to ortho, who she was already established with  She has not seen ortho again  Due to insurance reasons, she changed from brand name Tecfidera to generic dimethyl fumarate in December 2020  Patient called our office on 3/12/21 reporting onset of tongue numbness on 3/8  She then started experiencing numbness of her right lip and weakness of the right face on 3/11  On 3/12 she started to experience a bit of pain in the right face, which is why she called the office and then presented to the ED (prior to hearing back from our office)  Per the ER note, she had weakness of the entire right face, as well as decreased sensation of the entire right face  Due to symptoms x 4 days, no stroke alert called, as well as this appeared like Bell’s palsy  I received a tiger text from the ED physician reporting her presentation was most concerning for Bell’s palsy and asking if any further workup/treatment advised  CTH negative  Lyme negative, UA negative  She had a brain MRI with attn to IACs (due to occasional vertigo and tinnitus) completed on 3/2/21 which showed stable MS, no pathology in the IACs  She was discharged from the ED with a prednisone taper and valacyclovir  She was seen in the office on 3/18/2021 and was doing ok, still with right facial weakness, decreased eye closure, tongue and facial numbness on the right, hyperacusis         At timing of her visit on 6/8/21 she admitted that she was not compliant with her dimethyl fumarate, forgetting to take it, and in fact had not taken it at all since April 2021  She was having trouble requesting a refill from her specialty pharmacy but never informed our office about this  She had also stopped her gabapentin due to she was concerned it caused weight gain  Alternative DMTs were discussed at visits in June 2021  Patient had JCV testing done and negative with index 0 33  Tysabri and Aubagio were discussed  At timing of October 2021 visit with Dr Cathie Sargent, patient chose Aubagio  Start form was signed  Patient started Aubagio in Dec 2021 (7mg x 1 month then increase to 14mg)  She was on Aubagio for about 18 days and tested positive for COVID in Jan 2022 (she also had COVID prior to starting Aubagio in November 2021)  She held the [facility] for a few weeks until COVID symptoms improved and then restarted Aubagio 7mg before increase to 14mg  At timing of patient’s visit in April 2022 she informed me that she had stopped Aubagio, never called our office to report this  She stopped the medication because she noticed her hair was coming out while brushing her hair  She estimated that she took the medication for only a few weeks from late January to late February 2022  She reported stability of her MS symptoms, denied any new symptoms  We discussed either resuming dimethyl fumarate, Aubagio, or considering Tysabri  She had labs done 4/21/22-JCV negative with index 0 22  MRI brain completed 5/14/22 showed stable supratentorial white matter disease without progression  There was a new white matter lesion seen laterally in the upper cervical cord at the cervical medullary junction, eccentrically on the right, indicative of progressive demyelinating disease  At timing of her visit in July 2022, she chose to initiate Tysabri  Updated JCV testing completed 7/25/22 and negative with index 0 28 (first indeterminate)  She had updated cord imaging on 8/19/22    MRI c-spine compared to 1/14/19 with new "demyelinating foci involving right lateral cord at cervicomedullary junction and mid/dorsal cord at level C3  Stable punctuate focus within right lateral cord at level C2  No active demyelination or cord atrophy  MRI t-spine compared to 1/4/19 with new small demyelinating focus within left dorsolateral cord at level T11  Stable small demyelinating focus at level T8-9  No active demyelination or cord atrophy  First Tysabri infusion was given 9/13/22  She was seen by Dr Sweetie Dent for [place] due to Tysabri on 10/25/22  Due to JCV index being in indeterminate range, it was advised she have infusions every 6 weeks  Patient reported headaches with a family history of brain aneurysm in her half sister, therefore CTA was ordered  CTA head 11/4/22 showing 1-2 mm outpouching from the communicating segment of the left ICA may represent an infundibulum or tiny aneurysm  Follow-up CT angiogram recommended in 6 months  Today, patient reports things have been overall stable from a MS standpoint  She reports some twitching of her eye muscles and around her mouth, sometimes fingers  She reports she has been under a lot of stress at home and not sleeping well  She feels she stays well hydrated, has her coffee in the AM and then drinks water throughout the day  No new weakness  She also reports occasionally feeling like food gets stuck after swallowing as well as if she drinks liquids she has to [id number] big\"  She reports sometimes at night while lying flat she gets a sensation of being choked  Has not seen PCP for that  GYN checked her TSH and it was normal    She continues on Tysabri every 6 weeks  JCV last checked 1/16/23 and negative with index 0 20  MRI brain completed 2/11/23 and stable  MRI c-spine 2/11/23 also stable      The following portions of the patient's history were reviewed and updated as appropriate: current medications, past family history, past medical history, past social history, past " "surgical history and problem list        Objective:    Blood pressure 141/70, pulse 84, temperature 97 5 °F (36 4 °C), temperature source Temporal, resp  rate 18, height 5' 5\" (1 651 m), weight 100 kg (221 lb)    Physical Exam  Constitutional:       Appearance: Normal appearance  HENT:      Head: Normocephalic and atraumatic  Eyes:      Extraocular Movements: EOM normal       Pupils: Pupils are equal, round, and reactive to light  Neurological:      Mental Status: She is alert  Motor: Motor strength is normal      Deep Tendon Reflexes: Reflexes are normal and symmetric  Psychiatric:         Mood and Affect: Mood normal          Speech: Speech normal          Behavior: Behavior normal          Neurological Exam  Mental Status  Alert  Oriented to person, place, time and situation  Speech is normal  Language is fluent with no aphasia  Attention and concentration are normal     Cranial Nerves  CN II: Visual fields full to confrontation  CN III, IV, VI: Extraocular movements intact bilaterally  Pupils equal round and reactive to light bilaterally  CN V: Facial sensation is normal   CN VII: Full and symmetric facial movement  CN VIII: Hearing is normal   CN IX, X: Palate elevates symmetrically  CN XI: Shoulder shrug strength is normal   CN XII: Tongue midline without atrophy or fasciculations  Motor   Normal muscle tone  Strength is 5/5 throughout all four extremities  Sensory  Light touch is normal in upper and lower extremities  Reflexes  Deep tendon reflexes are 2+ and symmetric in all four extremities  Coordination  Right: Finger-to-nose normal Left: Finger-to-nose normal     Gait  Casual gait is normal including stance, stride, and arm swing  ROS:    Review of Systems   Constitutional: Positive for fatigue  Negative for chills and fever  HENT: Negative for ear pain and sore throat  Eyes: Negative for pain and visual disturbance     Respiratory: Negative for cough and shortness " of breath  Cardiovascular: Negative for chest pain and palpitations  Gastrointestinal: Negative for abdominal pain and vomiting  Genitourinary: Positive for urgency  Negative for dysuria and hematuria  Musculoskeletal: Positive for back pain (lower back), neck pain and neck stiffness  Negative for arthralgias  Some balance issues   Skin: Negative for color change and rash  Neurological: Positive for dizziness (at times), tremors (hands, face and legs), speech difficulty (at times), numbness (arms, hands, and lags) and headaches  Negative for seizures and syncope  Hematological: Bruises/bleeds easily  Psychiatric/Behavioral: Positive for confusion (at times) and sleep disturbance  The patient is nervous/anxious  Depression, anxiety, and mood swings   All other systems reviewed and are negative      I personally reviewed and updated the ROS as appropriate

## 2023-05-26 ENCOUNTER — PROCEDURE VISIT (OUTPATIENT)
Age: 43
End: 2023-05-26

## 2023-05-26 VITALS
HEART RATE: 80 BPM | BODY MASS INDEX: 36.82 KG/M2 | HEIGHT: 65 IN | WEIGHT: 221 LBS | DIASTOLIC BLOOD PRESSURE: 95 MMHG | SYSTOLIC BLOOD PRESSURE: 125 MMHG

## 2023-05-26 DIAGNOSIS — M79.18 MYOFASCIAL PAIN: ICD-10-CM

## 2023-05-26 DIAGNOSIS — G89.29 CHRONIC LEFT-SIDED LOW BACK PAIN WITH LEFT-SIDED SCIATICA: Primary | ICD-10-CM

## 2023-05-26 DIAGNOSIS — M54.6 CHRONIC BILATERAL THORACIC BACK PAIN: ICD-10-CM

## 2023-05-26 DIAGNOSIS — M54.42 CHRONIC LEFT-SIDED LOW BACK PAIN WITH LEFT-SIDED SCIATICA: Primary | ICD-10-CM

## 2023-05-26 DIAGNOSIS — G89.29 CHRONIC BILATERAL THORACIC BACK PAIN: ICD-10-CM

## 2023-05-26 DIAGNOSIS — M54.2 NECK PAIN: ICD-10-CM

## 2023-05-26 NOTE — PROGRESS NOTES
HPI:  Erika Head returns for treatment today ongoing symptomatology  Has ongoing neck pain upper back pain bilateral shoulder pain 3-6 visual pain analog scale depending upon her movements  Right sided low back pain  The following portions of the patient's history were reviewed and updated as appropriate: allergies, current medications, past family history, past medical history, past social history, past surgical history, and problem list     Review of Systems    Physical Exam:  Exam reveals a pelvic obliquity elevated left versus right innominate leg with inequality biomechanical joint dysfunction present right SI L5-S1 motion unit as parathoracic hypertonicity noted T4-T5 extension fixation C5-C6 C6-C7 segmental dysfunctions paracervical spine hypertonicity  Assessment:   Diagnosis ICD-10-CM Associated Orders   1  Chronic left-sided low back pain with left-sided sciatica  M54 42     G89 29       2  Chronic bilateral thoracic back pain  M54 6     G89 29       3  Neck pain  M54 2       4  Myofascial pain  M79 18                 Treatment: 81385  Manipulation right innominate, sacrum, L5 via Park drop maneuver T4 via double thenar contact C5-C6 via modified rotary break all well-tolerated    Continue daily stretching see her back for follow-up    Discussion:  Continue her daily stretching icing 15 minutes as well

## 2023-06-06 ENCOUNTER — TELEPHONE (OUTPATIENT)
Dept: NEUROLOGY | Facility: CLINIC | Age: 43
End: 2023-06-06

## 2023-06-06 DIAGNOSIS — G35 MULTIPLE SCLEROSIS (HCC): Primary | ICD-10-CM

## 2023-06-06 RX ORDER — ACETAMINOPHEN 325 MG/1
650 TABLET ORAL ONCE
Status: CANCELLED | OUTPATIENT
Start: 2023-06-12 | End: 2023-06-12

## 2023-06-06 RX ORDER — SODIUM CHLORIDE 9 MG/ML
20 INJECTION, SOLUTION INTRAVENOUS ONCE
Status: CANCELLED | OUTPATIENT
Start: 2023-06-12

## 2023-06-06 NOTE — TELEPHONE ENCOUNTER
Pt is scheduled for Tysabri on 6/12/23. Last infusion 4/25/23, infusions every 6 weeks (+6 days). Confirmed scheduling is correct.     Last CBC/CMP 3/14/23  Last JCV 1/16/23 0.20 (final result negative)  Last MRI 2/11/23     Okay to proceed with infusion?     Tysabri is approved with [facility] from 3/14/23 to 9/14/23 for 6 visits.  Auth # [id number].     Touch auth valid until 9/13/23 at [facility] center.

## 2023-06-08 ENCOUNTER — PROCEDURE VISIT (OUTPATIENT)
Age: 43
End: 2023-06-08
Payer: COMMERCIAL

## 2023-06-08 ENCOUNTER — HOSPITAL ENCOUNTER (OUTPATIENT)
Dept: RADIOLOGY | Facility: HOSPITAL | Age: 43
Discharge: HOME | End: 2023-06-08
Payer: COMMERCIAL

## 2023-06-08 VITALS
WEIGHT: 221 LBS | BODY MASS INDEX: 36.82 KG/M2 | DIASTOLIC BLOOD PRESSURE: 65 MMHG | HEART RATE: 75 BPM | SYSTOLIC BLOOD PRESSURE: 116 MMHG | HEIGHT: 65 IN

## 2023-06-08 DIAGNOSIS — M54.2 NECK PAIN: ICD-10-CM

## 2023-06-08 DIAGNOSIS — M54.6 CHRONIC BILATERAL THORACIC BACK PAIN: ICD-10-CM

## 2023-06-08 DIAGNOSIS — G35 MULTIPLE SCLEROSIS (HCC): ICD-10-CM

## 2023-06-08 DIAGNOSIS — M54.42 CHRONIC LEFT-SIDED LOW BACK PAIN WITH LEFT-SIDED SCIATICA: Primary | ICD-10-CM

## 2023-06-08 DIAGNOSIS — G89.29 CHRONIC LEFT-SIDED LOW BACK PAIN WITH LEFT-SIDED SCIATICA: Primary | ICD-10-CM

## 2023-06-08 DIAGNOSIS — R13.10 DYSPHAGIA: ICD-10-CM

## 2023-06-08 DIAGNOSIS — G89.29 CHRONIC BILATERAL THORACIC BACK PAIN: ICD-10-CM

## 2023-06-08 DIAGNOSIS — M79.18 MYOFASCIAL PAIN: ICD-10-CM

## 2023-06-08 PROCEDURE — 92611 MOTION FLUOROSCOPY/SWALLOW: CPT

## 2023-06-08 PROCEDURE — 74230 X-RAY XM SWLNG FUNCJ C+: CPT

## 2023-06-08 PROCEDURE — 98942 CHIROPRACTIC MANJ 5 REGIONS: CPT | Performed by: CHIROPRACTOR

## 2023-06-08 NOTE — PROCEDURES
Video Swallow Study      Patient Name: Funmi Vasquez  LFUVP'Z Date: 6/8/2023        Past Medical History  Past Medical History:   Diagnosis Date   • Abnormal Pap smear of cervix    • Depression     Last assessed: 8/1/12   • History of Bell's palsy    • History of chlamydia infection    • History of gonorrhea    • MS (multiple sclerosis) ([facility] )    • Ovarian cyst    • Varicella     HX DISEASE        Past Surgical History  Past Surgical History:   Procedure Laterality Date   • KNEE ARTHROSCOPY     • [place] LAPAROSCOPY FULGURATION OVIDUCTS Bilateral 10/5/2018    Procedure: LIGATION TUBAL LAPAROSCOPIC - FALOPE RINGS;  Surgeon: Emelia Tran MD;  Location: [facility];  Service: Gynecology     Video Barium Swallow Study    Summary:  Oral Stage: Pt presented with oral stage WNL  Mastication, bolus control, formation, and transfer were WNL  Pharyngeal Stage: Pt presented with pharyngeal stage of swallow WNL  Swallows were prompt  Hyoid excursion , laryngeal elevation, and pharyngeal constriction were WNL  Epiglottic inversion was complete  No retention, aspiration or penetration  Small Osteophyte noted C6 however did not appear to impact trials  Per esophageal screen   Material passed adequately, no residue at end of study  Images are on PACS for review  Recommendations:  Diet: Regular  Liquids: Thin   Meds: as Tolerated   Strategies: Eat only when fully upright and alert  Alternate liquids with solids, utilize slow rate, and swallow prior to additional po  Upright position  Reflux Precautions  Consider consult with: GI, continue f/u with Neuro  Results reviewed with: pt  If a dedicated assessment of the esophagus is desired, consider esophagram/barium swallow or EGD  Pt is a 44year old female who was referred by Eleni Brito, neurology  She reported intermittent difficulty swallowing stating that she feels as if food is getting stuck and choking sensation   Pt reported that difficulty swallowing/choking sensation last approx a minute and occurs more at end of day  H&P/Admit info, pertinent provider notes/procedures/studies/PMH:(copied and placed in this report)  HPI copied from Liseth Fraga PA-C encounter 5/24/2023  Multiple sclerosis ([facility] )  Patient remains on Tysabri infusions q6 weeks  JCV last checked 1/16/23 and negative with index 0 20     --due July 2023  MRI brain completed 2/11/23 and stable  MRI c-spine 2/11/23 also stable      She reports some twitching of the muscles around her eyes and mouth at times, as well as sometimes her fingers and other spots  She has been under increased stress at home and not sleeping well  No abnormal movements or fasciculations appreciated today  -- Will check additional labs  Advised staying well-hydrated, watching caffeine intake, and trying to reduce stress and get adequate sleep     She reports some intermittent difficulty swallowing, more like feeling food is getting stuck after swallowing  She also says that sometimes while lying flat on her back she feels like she is being choked  -- Will order a swallow study  -- Advise seeing PCP regarding the sensation in her throat, may need GI/ENT     As above, her imaging was recently stable including brain and C-spine      Her neurologic exam is stable today  Previous VBS: none    Current Diet: Regular and thin liquids     Premorbid diet: Regular and thin liquids     Dentition: Natural     O2 requirement: room air     Oral mech:  Strength and ROM: WNL    Vocal Quality/Speech: WNL     Cognitive status: alert and follows directions      Consistencies administered: Barium laden applesauce,soft solid, hard solid, thin liquids, 13mm barium pill  Liquids were administered by cup and straw       Pt was stood laterally and viewed in AP position

## 2023-06-08 NOTE — PROGRESS NOTES
HPI:  La Hernandez returns for treatment today ongoing symptomatology  Has ongoing neck pain upper back pain bilateral shoulder pain 2-3 visual pain analog scale depending upon her movements  Right sided low back pain  The following portions of the patient's history were reviewed and updated as appropriate: allergies, current medications, past family history, past medical history, past social history, past surgical history, and problem list     Review of Systems    Physical Exam:  Exam reveals a pelvic obliquity elevated left versus right innominate leg with inequality biomechanical joint dysfunction present right SI L5-S1 motion unit as parathoracic hypertonicity noted T4-T5 extension fixation C5-C6 C6-C7 segmental dysfunctions paracervical spine hypertonicity  Assessment:   Diagnosis ICD-10-CM Associated Orders   1  Chronic left-sided low back pain with left-sided sciatica  M54 42     G89 29       2  Chronic bilateral thoracic back pain  M54 6     G89 29       3  Neck pain  M54 2       4  Myofascial pain  M79 18                 Treatment: 05766  Manipulation right innominate, sacrum, L5 via Park drop maneuver T4 via double thenar contact C5-C6 via modified rotary break all well-tolerated    Continue daily stretching see her back for follow-up    Discussion:  Continue her daily stretching icing 15 minutes as well

## 2023-06-12 ENCOUNTER — HOSPITAL ENCOUNTER (OUTPATIENT)
Dept: INFUSION CENTER | Facility: CLINIC | Age: 43
Discharge: HOME | End: 2023-06-12
Payer: COMMERCIAL

## 2023-06-12 VITALS
HEART RATE: 88 BPM | SYSTOLIC BLOOD PRESSURE: 136 MMHG | DIASTOLIC BLOOD PRESSURE: 77 MMHG | TEMPERATURE: 97.9 F | RESPIRATION RATE: 18 BRPM

## 2023-06-12 DIAGNOSIS — G35 MULTIPLE SCLEROSIS (HCC): Primary | ICD-10-CM

## 2023-06-12 LAB
ALBUMIN SERPL BCP-MCNC: 3.9 G/DL (ref 3.5–5)
ALP SERPL-CCNC: 51 U/L (ref 34–104)
ALT SERPL W P-5'-P-CCNC: 18 U/L (ref 7–52)
ANION GAP SERPL CALCULATED.3IONS-SCNC: 8 MMOL/L (ref 4–13)
AST SERPL W P-5'-P-CCNC: 16 U/L (ref 13–39)
BASOPHILS # BLD AUTO: 0.03 THOUSANDS/ÂΜL (ref 0–0.1)
BASOPHILS NFR BLD AUTO: 0 % (ref 0–1)
BILIRUB SERPL-MCNC: 0.32 MG/DL (ref 0.2–1)
BUN SERPL-MCNC: 11 MG/DL (ref 5–25)
CALCIUM SERPL-MCNC: 9.1 MG/DL (ref 8.4–10.2)
CHLORIDE SERPL-SCNC: 105 MMOL/L (ref 96–108)
CO2 SERPL-SCNC: 26 MMOL/L (ref 21–32)
CREAT SERPL-MCNC: 0.6 MG/DL (ref 0.6–1.3)
EOSINOPHIL # BLD AUTO: 0.4 THOUSAND/ÂΜL (ref 0–0.61)
EOSINOPHIL NFR BLD AUTO: 6 % (ref 0–6)
ERYTHROCYTE [DISTWIDTH] IN BLOOD BY AUTOMATED COUNT: 15.6 % (ref 11.6–15.1)
GFR SERPL CREATININE-BSD FRML MDRD: 112 ML/MIN/1.73SQ M
GLUCOSE SERPL-MCNC: 91 MG/DL (ref 65–140)
HCT VFR BLD AUTO: 35.1 % (ref 34.8–46.1)
HGB BLD-MCNC: 11.5 G/DL (ref 11.5–15.4)
IMM GRANULOCYTES # BLD AUTO: 0.03 THOUSAND/UL (ref 0–0.2)
IMM GRANULOCYTES NFR BLD AUTO: 0 % (ref 0–2)
LYMPHOCYTES # BLD AUTO: 2.45 THOUSANDS/ÂΜL (ref 0.6–4.47)
LYMPHOCYTES NFR BLD AUTO: 34 % (ref 14–44)
MCH RBC QN AUTO: 32 PG (ref 26.8–34.3)
MCHC RBC AUTO-ENTMCNC: 32.8 G/DL (ref 31.4–37.4)
MCV RBC AUTO: 98 FL (ref 82–98)
MONOCYTES # BLD AUTO: 0.58 THOUSAND/ÂΜL (ref 0.17–1.22)
MONOCYTES NFR BLD AUTO: 8 % (ref 4–12)
NEUTROPHILS # BLD AUTO: 3.65 THOUSANDS/ÂΜL (ref 1.85–7.62)
NEUTS SEG NFR BLD AUTO: 52 % (ref 43–75)
NRBC BLD AUTO-RTO: 1 /100 WBCS
PLATELET # BLD AUTO: 236 THOUSANDS/UL (ref 149–390)
PMV BLD AUTO: 10.3 FL (ref 8.9–12.7)
POTASSIUM SERPL-SCNC: 4 MMOL/L (ref 3.5–5.3)
PROT SERPL-MCNC: 6.4 G/DL (ref 6.4–8.4)
RBC # BLD AUTO: 3.59 MILLION/UL (ref 3.81–5.12)
SODIUM SERPL-SCNC: 139 MMOL/L (ref 135–147)
WBC # BLD AUTO: 7.14 THOUSAND/UL (ref 4.31–10.16)

## 2023-06-12 PROCEDURE — 85025 COMPLETE CBC W/AUTO DIFF WBC: CPT | Performed by: PSYCHIATRY & NEUROLOGY

## 2023-06-12 PROCEDURE — 80053 COMPREHEN METABOLIC PANEL: CPT | Performed by: PSYCHIATRY & NEUROLOGY

## 2023-06-12 RX ORDER — ACETAMINOPHEN 325 MG/1
650 TABLET ORAL ONCE
OUTPATIENT
Start: 2023-07-24 | End: 2023-07-24

## 2023-06-12 RX ORDER — SODIUM CHLORIDE 9 MG/ML
20 INJECTION, SOLUTION INTRAVENOUS ONCE
Status: COMPLETED | OUTPATIENT
Start: 2023-06-12 | End: 2023-06-12

## 2023-06-12 RX ORDER — ACETAMINOPHEN 325 MG/1
650 TABLET ORAL ONCE
Status: COMPLETED | OUTPATIENT
Start: 2023-06-12 | End: 2023-06-12

## 2023-06-12 RX ORDER — SODIUM CHLORIDE 9 MG/ML
20 INJECTION, SOLUTION INTRAVENOUS ONCE
OUTPATIENT
Start: 2023-07-24

## 2023-06-12 RX ADMIN — SODIUM CHLORIDE 20 ML/HR: 0.9 INJECTION, SOLUTION INTRAVENOUS at 10:34

## 2023-06-12 RX ADMIN — NATALIZUMAB 300 MG: 300 INJECTION INTRAVENOUS at 11:00

## 2023-06-12 RX ADMIN — ACETAMINOPHEN 650 MG: 325 TABLET ORAL at 10:28

## 2023-06-12 NOTE — PROGRESS NOTES
Pt presents for [facility]  No new meds or concerns  TouchProgram checklist completed  Pt tolerated infusion without adverse reaction with 1 hr post-observation  Future appointments discussed  AVS declined

## 2023-07-05 ENCOUNTER — PATIENT OUTREACH (OUTPATIENT)
Dept: FAMILY MEDICINE CLINIC | Facility: CLINIC | Age: 43
End: 2023-07-05

## 2023-07-05 NOTE — PROGRESS NOTES
Completed chart review, no return call. Will closed this referral at this time. Will remains available for future consult as needed. No further outreach schedule at this time.

## 2023-07-14 ENCOUNTER — TELEPHONE (OUTPATIENT)
Dept: NEUROLOGY | Facility: CLINIC | Age: 43
End: 2023-07-14

## 2023-07-14 DIAGNOSIS — G35 MULTIPLE SCLEROSIS (HCC): Primary | ICD-10-CM

## 2023-07-14 NOTE — TELEPHONE ENCOUNTER
Pt is scheduled for Tysabri on 7/24/23. Last infusion 6/12/23, infusions every 6 weeks. Confirmed scheduling is correct.     Last CBC/CMP 6/12/23  Last JCV 1/16/23 0.20 (final result negative)  Last MRI 2/11/23    Pt is due for updated JCV testing now. Order entered. [employer] message sent.      Okay to proceed with infusion?     Tysabri is approved with [facility] from 3/14/23 to 9/14/23 for 6 visits.  Auth # [id number].     Touch auth valid until 9/13/23 at [facility] infusion center.

## 2023-07-16 ENCOUNTER — APPOINTMENT (EMERGENCY)
Dept: CT IMAGING | Facility: HOSPITAL | Age: 43
End: 2023-07-16
Payer: COMMERCIAL

## 2023-07-16 ENCOUNTER — HOSPITAL ENCOUNTER (EMERGENCY)
Facility: HOSPITAL | Age: 43
Discharge: HOME | End: 2023-07-16
Attending: EMERGENCY MEDICINE
Payer: COMMERCIAL

## 2023-07-16 VITALS
DIASTOLIC BLOOD PRESSURE: 75 MMHG | OXYGEN SATURATION: 98 % | BODY MASS INDEX: 37.35 KG/M2 | WEIGHT: 224.43 LBS | TEMPERATURE: 97.6 F | SYSTOLIC BLOOD PRESSURE: 142 MMHG | RESPIRATION RATE: 18 BRPM | HEART RATE: 85 BPM

## 2023-07-16 DIAGNOSIS — M54.2 NECK PAIN: ICD-10-CM
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
## 2023-07-16 PROCEDURE — 99284 EMERGENCY DEPT VISIT MOD MDM: CPT

## 2023-07-16 PROCEDURE — G1004 CDSM NDSC: HCPCS

## 2023-07-16 PROCEDURE — 72125 CT NECK SPINE W/O DYE: CPT

## 2023-07-16 PROCEDURE — 90471 IMMUNIZATION ADMIN: CPT

## 2023-07-16 PROCEDURE — 70450 CT HEAD/BRAIN W/O DYE: CPT

## 2023-07-16 PROCEDURE — 90715 TDAP VACCINE 7 YRS/> IM: CPT

## 2023-07-16 RX ORDER — IBUPROFEN 600 MG/1
600 TABLET ORAL EVERY 6 HOURS PRN
Qty: 30 TABLET | Refills: 0 | Status: SHIPPED | OUTPATIENT
Start: 2023-07-16

## 2023-07-16 RX ORDER — ACETAMINOPHEN 500 MG
500 TABLET ORAL EVERY 6 HOURS PRN
Qty: 30 TABLET | Refills: 0 | Status: SHIPPED | OUTPATIENT
Start: 2023-07-16

## 2023-07-16 RX ORDER — METHOCARBAMOL 500 MG/1
500 TABLET, FILM COATED ORAL 2 TIMES DAILY
Qty: 20 TABLET | Refills: 0 | Status: SHIPPED | OUTPATIENT
Start: 2023-07-16

## 2023-07-16 RX ADMIN — TETANUS TOXOID, REDUCED DIPHTHERIA TOXOID AND ACELLULAR PERTUSSIS VACCINE, ADSORBED 0.5 ML: 5; 2.5; 8; 8; 2.5 SUSPENSION INTRAMUSCULAR at 18:43
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
## 2023-07-18 ENCOUNTER — APPOINTMENT (OUTPATIENT)
Dept: LAB | Facility: HOSPITAL | Age: 43
End: 2023-07-18
Payer: COMMERCIAL

## 2023-07-18 DIAGNOSIS — R13.10 DYSPHAGIA: ICD-10-CM

## 2023-07-18 DIAGNOSIS — E55.9 VITAMIN D DEFICIENCY: ICD-10-CM

## 2023-07-18 DIAGNOSIS — E53.8 DEFICIENCY OF VITAMIN B12: ICD-10-CM

## 2023-07-18 DIAGNOSIS — G35 MULTIPLE SCLEROSIS (HCC): Primary | ICD-10-CM

## 2023-07-18 LAB
25(OH)D3 SERPL-MCNC: 19 NG/ML (ref 30–100)
MAGNESIUM SERPL-MCNC: 2.1 MG/DL (ref 1.9–2.7)
VIT B12 SERPL-MCNC: 211 PG/ML (ref 180–914)

## 2023-07-18 PROCEDURE — 36415 COLL VENOUS BLD VENIPUNCTURE: CPT

## 2023-07-18 PROCEDURE — 83735 ASSAY OF MAGNESIUM: CPT

## 2023-07-18 PROCEDURE — 82607 VITAMIN B-12: CPT

## 2023-07-18 PROCEDURE — 86711 JOHN CUNNINGHAM ANTIBODY: CPT

## 2023-07-18 PROCEDURE — 83519 RIA NONANTIBODY: CPT

## 2023-07-18 PROCEDURE — 82306 VITAMIN D 25 HYDROXY: CPT

## 2023-07-20 LAB — ACHR BIND AB SER-SCNC: <0.03 NMOL/L (ref 0–0.24)

## 2023-07-21 LAB — ACHR BLOCK AB/ACHR TOTAL SFR SER: 19 % (ref 0–25)

## 2023-07-24 ENCOUNTER — HOSPITAL ENCOUNTER (OUTPATIENT)
Dept: INFUSION CENTER | Facility: CLINIC | Age: 43
Discharge: HOME | End: 2023-07-24
Payer: COMMERCIAL

## 2023-07-24 VITALS
HEART RATE: 72 BPM | SYSTOLIC BLOOD PRESSURE: 111 MMHG | DIASTOLIC BLOOD PRESSURE: 77 MMHG | RESPIRATION RATE: 16 BRPM | TEMPERATURE: 97.1 F

## 2023-07-24 DIAGNOSIS — G35 MULTIPLE SCLEROSIS (HCC): Primary | ICD-10-CM

## 2023-07-24 PROCEDURE — 96365 THER/PROPH/DIAG IV INF INIT: CPT

## 2023-07-24 RX ORDER — ACETAMINOPHEN 325 MG/1
650 TABLET ORAL ONCE
Status: COMPLETED | OUTPATIENT
Start: 2023-07-24 | End: 2023-07-24

## 2023-07-24 RX ORDER — ACETAMINOPHEN 325 MG/1
650 TABLET ORAL ONCE
OUTPATIENT
Start: 2023-09-04 | End: 2023-09-04

## 2023-07-24 RX ORDER — SODIUM CHLORIDE 9 MG/ML
20 INJECTION, SOLUTION INTRAVENOUS ONCE
Status: COMPLETED | OUTPATIENT
Start: 2023-07-24 | End: 2023-07-24

## 2023-07-24 RX ORDER — SODIUM CHLORIDE 9 MG/ML
20 INJECTION, SOLUTION INTRAVENOUS ONCE
OUTPATIENT
Start: 2023-09-04

## 2023-07-24 RX ADMIN — SODIUM CHLORIDE 20 ML/HR: 0.9 INJECTION, SOLUTION INTRAVENOUS at 10:35

## 2023-07-24 RX ADMIN — ACETAMINOPHEN 650 MG: 325 TABLET ORAL at 10:45

## 2023-07-24 RX ADMIN — NATALIZUMAB 300 MG: 300 INJECTION INTRAVENOUS at 10:58

## 2023-07-24 NOTE — PLAN OF CARE
Problem: Knowledge Deficit  Goal: Patient/family/caregiver demonstrates understanding of disease process, treatment plan, medications, and discharge instructions  Description: Complete learning assessment and assess knowledge base.   Interventions:  - Provide teaching at level of understanding  - Provide teaching via preferred learning methods  7/24/2023 1048 by Ethel Monique RN  Outcome: Progressing  7/24/2023 1048 by Ethel Monique RN  Outcome: Progressing

## 2023-07-24 NOTE — PROGRESS NOTES
Pt. Denied any changes at this time. Tysabri check list completed Touch Program site. Authorization noted from 3/14/23 through 9/14/23. Pt. Tolerated Tysabri without adverse event. Future appointments reviewed. AVS declined.

## 2023-07-27 LAB
GALACTOMANNAN AG SERPL IA-ACNC: 0.23
JCPYV AB SERPL QL IA: ABNORMAL
JCPYV AB SERPL QL IA: ABNORMAL
SL AMB JCV AB BY INHIBITION: NEGATIVE
SPECIMEN STATUS: ABNORMAL

## 2023-07-28 ENCOUNTER — TELEPHONE (OUTPATIENT)
Dept: NEUROLOGY | Facility: CLINIC | Age: 43
End: 2023-07-28

## 2023-07-28 DIAGNOSIS — E55.9 VITAMIN D DEFICIENCY: Primary | ICD-10-CM

## 2023-07-28 RX ORDER — ERGOCALCIFEROL 1.25 MG/1
50000 CAPSULE ORAL WEEKLY
Qty: 8 CAPSULE | Refills: 0 | Status: SHIPPED | OUTPATIENT
Start: 2023-07-28

## 2023-07-28 NOTE — TELEPHONE ENCOUNTER
----- Message from Adan Smith PA-C sent at 7/28/2023  8:33 AM EDT -----  Please let patient know vit D is low and I am sending in Rx for her. Additionally, her B12 is low at 211. I would suggest B12 shots to bring this up. She can either do this through our office or her PCP.   I recommend B12 injection once a week x 4 weeks, then monthly x 4-6 months

## 2023-07-28 NOTE — TELEPHONE ENCOUNTER
Spoke w/pt. Advised her of results and recommendations below. Pt verbalized understanding. She would like to do B12 injections w/PCP. She will contact them to arrange. Labs sent to PCP.

## 2023-08-17 ENCOUNTER — DOCUMENTATION (OUTPATIENT)
Dept: NEUROLOGY | Facility: CLINIC | Age: 43
End: 2023-08-17

## 2023-08-17 NOTE — PROGRESS NOTES
Tysabri touch reauthorization submitted, valid until 3/14/24 at [facility].

## 2023-08-24 ENCOUNTER — TELEPHONE (OUTPATIENT)
Dept: NEUROLOGY | Facility: CLINIC | Age: 43
End: 2023-08-24
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
## 2023-08-25 ENCOUNTER — OFFICE VISIT (OUTPATIENT)
Dept: FAMILY MEDICINE CLINIC | Facility: CLINIC | Age: 43
End: 2023-08-25

## 2023-08-25 ENCOUNTER — APPOINTMENT (OUTPATIENT)
Dept: LAB | Facility: CLINIC | Age: 43
End: 2023-08-25
Payer: COMMERCIAL

## 2023-08-25 VITALS
RESPIRATION RATE: 16 BRPM | DIASTOLIC BLOOD PRESSURE: 68 MMHG | SYSTOLIC BLOOD PRESSURE: 124 MMHG | WEIGHT: 221 LBS | HEART RATE: 79 BPM | HEIGHT: 65 IN | OXYGEN SATURATION: 99 % | TEMPERATURE: 96.7 F | BODY MASS INDEX: 36.82 KG/M2

## 2023-08-25 DIAGNOSIS — Z02.89 PHYSICAL EXAMINATION OF EMPLOYEE: ICD-10-CM

## 2023-08-25 DIAGNOSIS — Z02.89 PHYSICAL EXAMINATION OF EMPLOYEE: Primary | ICD-10-CM

## 2023-08-25 DIAGNOSIS — E53.8 B12 DEFICIENCY: ICD-10-CM

## 2023-08-25 LAB
MEV IGG SER QL IA: NORMAL
MUV IGG SER QL IA: NORMAL
RUBV IGG SERPL IA-ACNC: 46.3 IU/ML
VZV IGG SER QL IA: NORMAL

## 2023-08-25 PROCEDURE — 86765 RUBEOLA ANTIBODY: CPT

## 2023-08-25 PROCEDURE — 86787 VARICELLA-ZOSTER ANTIBODY: CPT

## 2023-08-25 PROCEDURE — 86762 RUBELLA ANTIBODY: CPT

## 2023-08-25 PROCEDURE — 36415 COLL VENOUS BLD VENIPUNCTURE: CPT

## 2023-08-25 PROCEDURE — 99214 OFFICE O/P EST MOD 30 MIN: CPT | Performed by: FAMILY MEDICINE

## 2023-08-25 PROCEDURE — 86735 MUMPS ANTIBODY: CPT

## 2023-08-25 PROCEDURE — 86580 TB INTRADERMAL TEST: CPT | Performed by: FAMILY MEDICINE

## 2023-08-25 PROCEDURE — 96372 THER/PROPH/DIAG INJ SC/IM: CPT | Performed by: FAMILY MEDICINE

## 2023-08-25 RX ORDER — CYANOCOBALAMIN 1000 UG/ML
1000 INJECTION, SOLUTION INTRAMUSCULAR; SUBCUTANEOUS
Status: SHIPPED | OUTPATIENT
Start: 2023-08-25

## 2023-08-25 RX ADMIN — CYANOCOBALAMIN 1000 MCG: 1000 INJECTION, SOLUTION INTRAMUSCULAR; SUBCUTANEOUS at 13:39

## 2023-08-25 NOTE — PROGRESS NOTES
Name: Chidi Bowser      : 1980      MRN: [id number]  Encounter Provider: Jennifer Wilson MD  Encounter Date: 2023   Encounter department: [address] [facility]     1. Physical examination of employee  -     Measles/Mumps/Rubella Immunity; Future  -     Varicella Zoster Virus Ab (Immunity Scr),ACIF (S); Future  -     TB Skin Test    2. 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 All other systems reviewed and are negative.       Current Outpatient Medications on File Prior to Visit   Medication Sig   • acetaminophen (TYLENOL) 500 mg tablet Take 1 tablet (500 mg total) by mouth every 6 (six) hours as needed for mild pain   • calcium citrate-vitamin D 315 mg-5 mcg tablet Take 1 tablet by mouth 2 (two) times a day   • Cyanocobalamin 1000 MCG SUBL Place 1 tablet (1,000 mcg total) under the tongue daily   • ergocalciferol (VITAMIN D2) 50,000 units Take 1 capsule (50,000 Units total) by mouth once a week   • fluticasone (FLONASE) 50 mcg/act nasal spray 1 spray into each nostril daily (Patient not taking: Reported on 2023)   • ibuprofen (MOTRIN) 600 mg tablet Take 1 tablet (600 mg total) by mouth every 6 (six) hours as needed for mild pain   • ketorolac (TORADOL) 10 mg tablet Take 1 tablet (10 mg total) by mouth every 6 (six) hours as needed for moderate pain (Patient not taking: Reported on 2023)   • methocarbamol (ROBAXIN) 500 mg tablet Take 1 tablet (500 mg total) by mouth 2 (two) times a day   • natalizumab (Tysabri) 300 mg/15 mL Inject into a catheter in a vein   • Syringe/Needle, Disp, (SYRINGE 3CC/25GX1") 25G X 1" 3 ML MISC Administer B12 1000mcg IM once per week for three (3) weeks; then once per month for five (5) months   • triamcinolone (KENALOG) 0.1 % ointment Apply topically 2 (two) times a day (Patient not taking: Reported on 5/11/2023)       Objective     /68 (BP Location: Left arm, Patient Position: Sitting, Cuff Size: Large)   Pulse 79   Temp (!) 96.7 °F (35.9 °C) (Temporal)   Resp 16   Ht 5' 5" (1.651 m)   Wt 100 kg (221 lb)   LMP 08/19/2023 (Exact Date)   SpO2 99%   BMI 36.78 kg/m²     Physical Exam  Vitals and nursing note reviewed. Constitutional:       Appearance: She is well-developed. HENT:      Head: Normocephalic. Right Ear: External ear normal.      Left Ear: External ear normal.      Nose: Nose normal.   Eyes:      Conjunctiva/sclera: Conjunctivae normal.      Pupils: Pupils are equal, round, and reactive to light. Neck:      Thyroid: No thyromegaly. Cardiovascular:      Rate and Rhythm: Normal rate and regular rhythm. Heart sounds: Normal heart sounds. Pulmonary:      Effort: Pulmonary effort is normal.      Breath sounds: Normal breath sounds. Abdominal:      Palpations: Abdomen is soft. Tenderness: There is no abdominal tenderness. There is no guarding or rebound. Musculoskeletal:         General: Normal range of motion. Cervical back: Normal range of motion and neck supple. Skin:     General: Skin is dry. Neurological:      Mental Status: She is alert and oriented to person, place, and time. Deep Tendon Reflexes: Reflexes are normal and symmetric.        Loly Nichole MD

## 2023-08-28 ENCOUNTER — CLINICAL SUPPORT (OUTPATIENT)
Dept: FAMILY MEDICINE CLINIC | Facility: CLINIC | Age: 43
End: 2023-08-28

## 2023-08-28 DIAGNOSIS — Z11.1 ENCOUNTER FOR PPD SKIN TEST READING: Primary | ICD-10-CM

## 2023-08-28 LAB
INDURATION: 0 MM
TB SKIN TEST: NEGATIVE

## 2023-09-01 ENCOUNTER — CLINICAL SUPPORT (OUTPATIENT)
Dept: FAMILY MEDICINE CLINIC | Facility: CLINIC | Age: 43
End: 2023-09-01

## 2023-09-01 DIAGNOSIS — E53.8 B12 DEFICIENCY: Primary | ICD-10-CM

## 2023-09-01 PROCEDURE — 96372 THER/PROPH/DIAG INJ SC/IM: CPT

## 2023-09-01 RX ORDER — CYANOCOBALAMIN 1000 UG/ML
1000 INJECTION, SOLUTION INTRAMUSCULAR; SUBCUTANEOUS
Status: SHIPPED | OUTPATIENT
Start: 2023-09-01

## 2023-09-01 RX ADMIN — CYANOCOBALAMIN 1000 MCG: 1000 INJECTION, SOLUTION INTRAMUSCULAR; SUBCUTANEOUS at 11:35

## 2023-09-06 ENCOUNTER — HOSPITAL ENCOUNTER (OUTPATIENT)
Dept: INFUSION CENTER | Facility: CLINIC | Age: 43
Discharge: HOME | End: 2023-09-06
Payer: COMMERCIAL

## 2023-09-06 DIAGNOSIS — G35 MULTIPLE SCLEROSIS (HCC): Primary | ICD-10-CM

## 2023-09-06 LAB
ALBUMIN SERPL BCP-MCNC: 3.9 G/DL (ref 3.5–5)
ALP SERPL-CCNC: 55 U/L (ref 34–104)
ALT SERPL W P-5'-P-CCNC: 10 U/L (ref 7–52)
ANION GAP SERPL CALCULATED.3IONS-SCNC: 5 MMOL/L
AST SERPL W P-5'-P-CCNC: 14 U/L (ref 13–39)
BASOPHILS # BLD AUTO: 0.05 THOUSANDS/ÂΜL (ref 0–0.1)
BASOPHILS NFR BLD AUTO: 1 % (ref 0–1)
BILIRUB SERPL-MCNC: 0.26 MG/DL (ref 0.2–1)
BUN SERPL-MCNC: 11 MG/DL (ref 5–25)
CALCIUM SERPL-MCNC: 9 MG/DL (ref 8.4–10.2)
CHLORIDE SERPL-SCNC: 105 MMOL/L (ref 96–108)
CO2 SERPL-SCNC: 26 MMOL/L (ref 21–32)
CREAT SERPL-MCNC: 0.61 MG/DL (ref 0.6–1.3)
EOSINOPHIL # BLD AUTO: 0.27 THOUSAND/ÂΜL (ref 0–0.61)
EOSINOPHIL NFR BLD AUTO: 3 % (ref 0–6)
ERYTHROCYTE [DISTWIDTH] IN BLOOD BY AUTOMATED COUNT: 16.1 % (ref 11.6–15.1)
GFR SERPL CREATININE-BSD FRML MDRD: 112 ML/MIN/1.73SQ M
GLUCOSE SERPL-MCNC: 92 MG/DL (ref 65–140)
HCT VFR BLD AUTO: 36.3 % (ref 34.8–46.1)
HGB BLD-MCNC: 11.6 G/DL (ref 11.5–15.4)
IMM GRANULOCYTES # BLD AUTO: 0.03 THOUSAND/UL (ref 0–0.2)
IMM GRANULOCYTES NFR BLD AUTO: 0 % (ref 0–2)
LYMPHOCYTES # BLD AUTO: 2.95 THOUSANDS/ÂΜL (ref 0.6–4.47)
LYMPHOCYTES NFR BLD AUTO: 37 % (ref 14–44)
MCH RBC QN AUTO: 31.6 PG (ref 26.8–34.3)
MCHC RBC AUTO-ENTMCNC: 32 G/DL (ref 31.4–37.4)
MCV RBC AUTO: 99 FL (ref 82–98)
MONOCYTES # BLD AUTO: 0.51 THOUSAND/ÂΜL (ref 0.17–1.22)
MONOCYTES NFR BLD AUTO: 6 % (ref 4–12)
NEUTROPHILS # BLD AUTO: 4.27 THOUSANDS/ÂΜL (ref 1.85–7.62)
NEUTS SEG NFR BLD AUTO: 53 % (ref 43–75)
NRBC BLD AUTO-RTO: 1 /100 WBCS
PLATELET # BLD AUTO: 235 THOUSANDS/UL (ref 149–390)
PMV BLD AUTO: 10.3 FL (ref 8.9–12.7)
POTASSIUM SERPL-SCNC: 4.5 MMOL/L (ref 3.5–5.3)
PROT SERPL-MCNC: 6.4 G/DL (ref 6.4–8.4)
RBC # BLD AUTO: 3.67 MILLION/UL (ref 3.81–5.12)
SODIUM SERPL-SCNC: 136 MMOL/L (ref 135–147)
WBC # BLD AUTO: 8.08 THOUSAND/UL (ref 4.31–10.16)

## 2023-09-06 PROCEDURE — 85025 COMPLETE CBC W/AUTO DIFF WBC: CPT | Performed by: PSYCHIATRY & NEUROLOGY

## 2023-09-06 PROCEDURE — 96365 THER/PROPH/DIAG IV INF INIT: CPT

## 2023-09-06 PROCEDURE — 80053 COMPREHEN METABOLIC PANEL: CPT | Performed by: PSYCHIATRY & NEUROLOGY

## 2023-09-06 RX ORDER — SODIUM CHLORIDE 9 MG/ML
20 INJECTION, SOLUTION INTRAVENOUS ONCE
OUTPATIENT
Start: 2023-10-16

## 2023-09-06 RX ORDER — SODIUM CHLORIDE 9 MG/ML
20 INJECTION, SOLUTION INTRAVENOUS ONCE
Status: COMPLETED | OUTPATIENT
Start: 2023-09-06 | End: 2023-09-06

## 2023-09-06 RX ORDER — ACETAMINOPHEN 325 MG/1
650 TABLET ORAL ONCE
OUTPATIENT
Start: 2023-10-16 | End: 2023-10-16

## 2023-09-06 RX ORDER — ACETAMINOPHEN 325 MG/1
650 TABLET ORAL ONCE
Status: COMPLETED | OUTPATIENT
Start: 2023-09-06 | End: 2023-09-06

## 2023-09-06 RX ADMIN — ACETAMINOPHEN 650 MG: 325 TABLET ORAL at 09:45

## 2023-09-06 RX ADMIN — SODIUM CHLORIDE 20 ML/HR: 0.9 INJECTION, SOLUTION INTRAVENOUS at 09:47

## 2023-09-06 RX ADMIN — NATALIZUMAB 300 MG: 300 INJECTION INTRAVENOUS at 10:06

## 2023-09-06 NOTE — PLAN OF CARE
Problem: INFECTION - ADULT  Goal: Absence or prevention of progression during hospitalization  Description: INTERVENTIONS:  - Assess and monitor for signs and symptoms of infection  - Monitor lab/diagnostic results  - Monitor all insertion sites, i.e. indwelling lines, tubes, and drains  - Monitor endotracheal if appropriate and nasal secretions for changes in amount and color  - [place] appropriate cooling/warming therapies per order  - Administer medications as ordered  - Instruct and encourage patient and family to use good hand hygiene technique  - Identify and instruct in appropriate isolation precautions for identified infection/condition  Outcome: Progressing

## 2023-09-06 NOTE — PROGRESS NOTES
Patient arrived to the unit and denied complications. Touch program completed. Labs were drawn with IV insertion. Patient tolerated her tysabri and her one hour of observation. She is aware of her future appointments.

## 2023-09-07 ENCOUNTER — TELEPHONE (OUTPATIENT)
Dept: NEUROLOGY | Facility: CLINIC | Age: 43
End: 2023-09-07

## 2023-09-08 ENCOUNTER — CLINICAL SUPPORT (OUTPATIENT)
Dept: FAMILY MEDICINE CLINIC | Facility: CLINIC | Age: 43
End: 2023-09-08

## 2023-09-08 DIAGNOSIS — E53.8 DEFICIENCY OF VITAMIN B12: Primary | ICD-10-CM

## 2023-09-12 NOTE — TELEPHONE ENCOUNTER
Tysabri is approved with [facility] from 10/18/23 to 10/18/24 for 13 visits. Auth # [id number].

## 2023-09-20 ENCOUNTER — CLINICAL SUPPORT (OUTPATIENT)
Dept: FAMILY MEDICINE CLINIC | Facility: CLINIC | Age: 43
End: 2023-09-20

## 2023-09-20 ENCOUNTER — TELEPHONE (OUTPATIENT)
Dept: FAMILY MEDICINE CLINIC | Facility: CLINIC | Age: 43
End: 2023-09-20

## 2023-09-20 DIAGNOSIS — E53.8 DEFICIENCY OF VITAMIN B12: Primary | ICD-10-CM

## 2023-09-20 PROCEDURE — 96372 THER/PROPH/DIAG INJ SC/IM: CPT

## 2023-09-20 RX ADMIN — CYANOCOBALAMIN 1000 MCG: 1000 INJECTION, SOLUTION INTRAMUSCULAR; SUBCUTANEOUS at 15:46

## 2023-09-20 NOTE — TELEPHONE ENCOUNTER
Pt was in the office today for a B 12 injection. Pt drop [employer]  form to be fill out for her job. Form has been placed in your bin to be completed.      Please advise, thank you

## 2023-09-21 NOTE — TELEPHONE ENCOUNTER
Form completed at the best of my ability given I am not sure of the details.   Returned to [employer] to please call patient and let her know

## 2023-09-26 ENCOUNTER — OFFICE VISIT (OUTPATIENT)
Dept: NEUROLOGY | Facility: CLINIC | Age: 43
End: 2023-09-26
Payer: COMMERCIAL

## 2023-09-26 VITALS
BODY MASS INDEX: 36.99 KG/M2 | HEIGHT: 65 IN | DIASTOLIC BLOOD PRESSURE: 78 MMHG | TEMPERATURE: 98.2 F | SYSTOLIC BLOOD PRESSURE: 134 MMHG | WEIGHT: 222 LBS | OXYGEN SATURATION: 98 % | HEART RATE: 74 BPM

## 2023-09-26 DIAGNOSIS — G51.0 BELL'S PALSY: ICD-10-CM
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
## 2023-09-26 DIAGNOSIS — G35 MULTIPLE SCLEROSIS (HCC): Primary | ICD-10-CM

## 2023-09-26 DIAGNOSIS — M79.18 MYOFASCIAL PAIN SYNDROME, CERVICAL: ICD-10-CM

## 2023-09-26 DIAGNOSIS — M54.16 RADICULOPATHY, LUMBAR REGION: ICD-10-CM

## 2023-09-26 DIAGNOSIS — M62.838 MUSCLE SPASM OF BOTH LOWER LEGS: ICD-10-CM

## 2023-09-26 PROCEDURE — 99215 OFFICE O/P EST HI 40 MIN: CPT | Performed by: PSYCHIATRY & NEUROLOGY

## 2023-09-26 RX ORDER — LANOLIN ALCOHOL/MO/W.PET/CERES
400 CREAM (GRAM) TOPICAL DAILY
Qty: 90 TABLET | Refills: 0 | Status: SHIPPED | OUTPATIENT
Start: 2023-09-26

## 2023-09-26 RX ORDER — CYCLOBENZAPRINE HCL 10 MG
10 TABLET ORAL
Qty: 30 TABLET | Refills: 1 | Status: SHIPPED | OUTPATIENT
Start: 2023-09-26
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
## 2023-09-26 NOTE — PROGRESS NOTES
Patient ID: Yamila Simental is a 44 y.o. female. Assessment/Plan:    No problem-specific Assessment & Plan notes found for this encounter. {Assess/PlanSmartLinks:51075}       Subjective:    HPI    {[facility] Neurology HPI texts:09212}    {Common ambulatory SmartLinks:26907}         Objective:    not currently breastfeeding. Physical Exam    Neurological Exam      ROS:    Review of Systems   Constitutional: Negative for appetite change, fatigue and fever. HENT: Negative. Negative for hearing loss, tinnitus, trouble swallowing and voice change. Eyes: Negative. Negative for photophobia, pain and visual disturbance. Respiratory: Negative. Negative for shortness of breath. Cardiovascular: Negative. Negative for palpitations. Cramping in both legs   Gastrointestinal: Negative. Negative for nausea and vomiting. Endocrine: Negative. Negative for cold intolerance. Genitourinary: Negative. Negative for dysuria, frequency and urgency. Musculoskeletal: Positive for gait problem (T25FW 10.21 seconds). Negative for back pain, myalgias and neck pain. Skin: Negative. Negative for rash. Allergic/Immunologic: Negative. Neurological: Positive for numbness (both arms starting at elbow). Negative for dizziness, tremors, seizures, syncope, facial asymmetry, speech difficulty, weakness, light-headedness and headaches. Hematological: Negative. Does not bruise/bleed easily. Psychiatric/Behavioral: Negative. Negative for confusion, hallucinations and sleep disturbance.

## 2023-09-26 NOTE — LETTER
September 28, 2023     Patient: Viji Valera  YOB: 1980  Date of Visit: 9/26/2023      To Whom it May Concern:    Krystal Oreilly is under my professional care. Alicia Del Toro was seen in my office on 9/26/2023. Alicia Del Toro may return to work on 09/27/23. If you have any questions or concerns, please don't hesitate to call.          Sincerely,          Cuco Baker MD

## 2023-09-26 NOTE — LETTER
September 28, 2023     Patient: Kailee Babin  YOB: 1980  Date of Visit: 9/26/2023      To Whom it May Concern:    Raza Lozano is under my professional care. Mabel Hinds was seen in my office on 9/26/2023. Mabel Hinds may return to work on 9/27/2023 . If you have any questions or concerns, please don't hesitate to call.          Sincerely,          Raya Small MD        CC: No Recipients

## 2023-10-06 ENCOUNTER — TELEPHONE (OUTPATIENT)
Dept: NEUROLOGY | Facility: CLINIC | Age: 43
End: 2023-10-06

## 2023-10-06 NOTE — TELEPHONE ENCOUNTER
Pt is scheduled for Tysabri on 10/18/23. Last infusion 9/6/23, infusions every 6 weeks. Confirmed scheduling is correct.     Last CBC/CMP 9/6/23  Last JCV 7/18/23 0.23 (final result negative)  Last MRI 2/11/23     Okay to proceed with infusion?     Tysabri is approved with [facility] from 10/18/23 to 10/18/24 for 13 visits. Auth # [id number].     Touch auth valid until 3/14/24 at [facility].

## 2023-10-18 ENCOUNTER — HOSPITAL ENCOUNTER (OUTPATIENT)
Dept: INFUSION CENTER | Facility: CLINIC | Age: 43
Discharge: HOME | End: 2023-10-18
Payer: COMMERCIAL

## 2023-10-18 VITALS
TEMPERATURE: 98.9 F | SYSTOLIC BLOOD PRESSURE: 108 MMHG | OXYGEN SATURATION: 98 % | HEART RATE: 62 BPM | DIASTOLIC BLOOD PRESSURE: 54 MMHG | RESPIRATION RATE: 14 BRPM

## 2023-10-18 DIAGNOSIS — G35 MULTIPLE SCLEROSIS (HCC): Primary | ICD-10-CM

## 2023-10-18 LAB
ALBUMIN SERPL BCP-MCNC: 4.3 G/DL (ref 3.5–5)
ALP SERPL-CCNC: 66 U/L (ref 34–104)
ALT SERPL W P-5'-P-CCNC: 9 U/L (ref 7–52)
ANION GAP SERPL CALCULATED.3IONS-SCNC: 4 MMOL/L
AST SERPL W P-5'-P-CCNC: 16 U/L (ref 13–39)
BASOPHILS # BLD AUTO: 0.04 THOUSANDS/ÂΜL (ref 0–0.1)
BASOPHILS NFR BLD AUTO: 1 % (ref 0–1)
BILIRUB SERPL-MCNC: 0.29 MG/DL (ref 0.2–1)
BUN SERPL-MCNC: 13 MG/DL (ref 5–25)
CALCIUM SERPL-MCNC: 9.9 MG/DL (ref 8.4–10.2)
CHLORIDE SERPL-SCNC: 105 MMOL/L (ref 96–108)
CO2 SERPL-SCNC: 28 MMOL/L (ref 21–32)
CREAT SERPL-MCNC: 0.57 MG/DL (ref 0.6–1.3)
EOSINOPHIL # BLD AUTO: 0.28 THOUSAND/ÂΜL (ref 0–0.61)
EOSINOPHIL NFR BLD AUTO: 4 % (ref 0–6)
ERYTHROCYTE [DISTWIDTH] IN BLOOD BY AUTOMATED COUNT: 15.4 % (ref 11.6–15.1)
GFR SERPL CREATININE-BSD FRML MDRD: 114 ML/MIN/1.73SQ M
GLUCOSE SERPL-MCNC: 83 MG/DL (ref 65–140)
HCT VFR BLD AUTO: 38.3 % (ref 34.8–46.1)
HGB BLD-MCNC: 12.2 G/DL (ref 11.5–15.4)
IMM GRANULOCYTES # BLD AUTO: 0.02 THOUSAND/UL (ref 0–0.2)
IMM GRANULOCYTES NFR BLD AUTO: 0 % (ref 0–2)
LYMPHOCYTES # BLD AUTO: 2.71 THOUSANDS/ÂΜL (ref 0.6–4.47)
LYMPHOCYTES NFR BLD AUTO: 37 % (ref 14–44)
MCH RBC QN AUTO: 31.7 PG (ref 26.8–34.3)
MCHC RBC AUTO-ENTMCNC: 31.9 G/DL (ref 31.4–37.4)
MCV RBC AUTO: 100 FL (ref 82–98)
MONOCYTES # BLD AUTO: 0.46 THOUSAND/ÂΜL (ref 0.17–1.22)
MONOCYTES NFR BLD AUTO: 6 % (ref 4–12)
NEUTROPHILS # BLD AUTO: 3.85 THOUSANDS/ÂΜL (ref 1.85–7.62)
NEUTS SEG NFR BLD AUTO: 52 % (ref 43–75)
NRBC BLD AUTO-RTO: 0 /100 WBCS
PLATELET # BLD AUTO: 283 THOUSANDS/UL (ref 149–390)
PMV BLD AUTO: 9.9 FL (ref 8.9–12.7)
POTASSIUM SERPL-SCNC: 4 MMOL/L (ref 3.5–5.3)
PROT SERPL-MCNC: 7.2 G/DL (ref 6.4–8.4)
RBC # BLD AUTO: 3.85 MILLION/UL (ref 3.81–5.12)
SODIUM SERPL-SCNC: 137 MMOL/L (ref 135–147)
WBC # BLD AUTO: 7.36 THOUSAND/UL (ref 4.31–10.16)

## 2023-10-18 PROCEDURE — 85025 COMPLETE CBC W/AUTO DIFF WBC: CPT | Performed by: PSYCHIATRY & NEUROLOGY

## 2023-10-18 PROCEDURE — 96365 THER/PROPH/DIAG IV INF INIT: CPT

## 2023-10-18 PROCEDURE — 80053 COMPREHEN METABOLIC PANEL: CPT | Performed by: PSYCHIATRY & NEUROLOGY

## 2023-10-18 RX ORDER — ACETAMINOPHEN 325 MG/1
650 TABLET ORAL ONCE
Status: COMPLETED | OUTPATIENT
Start: 2023-10-18 | End: 2023-10-18

## 2023-10-18 RX ORDER — SODIUM CHLORIDE 9 MG/ML
20 INJECTION, SOLUTION INTRAVENOUS ONCE
OUTPATIENT
Start: 2023-11-29

## 2023-10-18 RX ORDER — SODIUM CHLORIDE 9 MG/ML
20 INJECTION, SOLUTION INTRAVENOUS ONCE
Status: COMPLETED | OUTPATIENT
Start: 2023-10-18 | End: 2023-10-18

## 2023-10-18 RX ORDER — ACETAMINOPHEN 325 MG/1
650 TABLET ORAL ONCE
OUTPATIENT
Start: 2023-11-29 | End: 2023-11-29

## 2023-10-18 RX ADMIN — ACETAMINOPHEN 650 MG: 325 TABLET, FILM COATED ORAL at 09:18

## 2023-10-18 RX ADMIN — SODIUM CHLORIDE 20 ML/HR: 9 INJECTION, SOLUTION INTRAVENOUS at 09:18

## 2023-10-18 RX ADMIN — NATALIZUMAB 300 MG: 300 INJECTION INTRAVENOUS at 09:43

## 2023-10-18 NOTE — PROGRESS NOTES
Patient Tysabri infusion completed without complication. During observation period, patient stated she needed to leave to  her kids from school due to it being an early release day. Patient vitals stable, 40 minutes of observation completed. Patient advised to stay the hour if she could, but patient declined. Ending vitals taken and stable. Patient educated to call 911 if she starts feeling funny and s/s of reaction. Patient declined AVS at this time and confirmed next appointment. Patient ambulated off unit at this time.

## 2023-10-20 ENCOUNTER — CLINICAL SUPPORT (OUTPATIENT)
Dept: FAMILY MEDICINE CLINIC | Facility: CLINIC | Age: 43
End: 2023-10-20

## 2023-10-20 ENCOUNTER — TELEPHONE (OUTPATIENT)
Dept: FAMILY MEDICINE CLINIC | Facility: CLINIC | Age: 43
End: 2023-10-20

## 2023-10-20 DIAGNOSIS — E53.8 B12 DEFICIENCY: Primary | ICD-10-CM

## 2023-10-20 RX ADMIN — CYANOCOBALAMIN 1000 MCG: 1000 INJECTION, SOLUTION INTRAMUSCULAR; SUBCUTANEOUS at 15:57

## 2023-10-20 NOTE — TELEPHONE ENCOUNTER
Pt was in today for a nurse visit B12 injection and requested if you can send a medication to the pharmacy for BV.  Thank you

## 2023-10-30 ENCOUNTER — OFFICE VISIT (OUTPATIENT)
Dept: URGENT CARE | Age: 43
End: 2023-10-30
Payer: COMMERCIAL

## 2023-10-30 VITALS
TEMPERATURE: 96.6 F | OXYGEN SATURATION: 99 % | SYSTOLIC BLOOD PRESSURE: 143 MMHG | WEIGHT: 218.4 LBS | HEART RATE: 80 BPM | DIASTOLIC BLOOD PRESSURE: 76 MMHG | RESPIRATION RATE: 20 BRPM | BODY MASS INDEX: 36.34 KG/M2

## 2023-10-30 DIAGNOSIS — R05.1 ACUTE COUGH: Primary | ICD-10-CM

## 2023-10-30 DIAGNOSIS — J20.9 ACUTE BRONCHITIS, UNSPECIFIED ORGANISM: ICD-10-CM

## 2023-10-30 LAB
SARS-COV-2 AG UPPER RESP QL IA: NEGATIVE
VALID CONTROL: NORMAL

## 2023-10-30 PROCEDURE — 87811 SARS-COV-2 COVID19 W/OPTIC: CPT

## 2023-10-30 PROCEDURE — 94640 AIRWAY INHALATION TREATMENT: CPT

## 2023-10-30 PROCEDURE — 99213 OFFICE O/P EST LOW 20 MIN: CPT

## 2023-10-30 RX ORDER — METHYLPREDNISOLONE 4 MG/1
TABLET ORAL
Qty: 21 TABLET | Refills: 0 | Status: SHIPPED | OUTPATIENT
Start: 2023-10-30

## 2023-10-30 RX ORDER — ALBUTEROL SULFATE 90 UG/1
2 AEROSOL, METERED RESPIRATORY (INHALATION) EVERY 6 HOURS PRN
Qty: 6.7 G | Refills: 0 | Status: SHIPPED | OUTPATIENT
Start: 2023-10-30

## 2023-10-30 RX ORDER — AZITHROMYCIN 250 MG/1
TABLET, FILM COATED ORAL
Qty: 6 TABLET | Refills: 0 | Status: SHIPPED | OUTPATIENT
Start: 2023-10-30 | End: 2023-11-03

## 2023-10-30 RX ORDER — ALBUTEROL SULFATE 2.5 MG/3ML
2.5 SOLUTION RESPIRATORY (INHALATION) ONCE
Status: COMPLETED | OUTPATIENT
Start: 2023-10-30 | End: 2023-10-30

## 2023-10-30 RX ADMIN — Medication 0.5 MG: at 10:02

## 2023-10-30 RX ADMIN — ALBUTEROL SULFATE 2.5 MG: 2.5 SOLUTION RESPIRATORY (INHALATION) at 10:02

## 2023-10-30 NOTE — LETTER
October 30, 2023     Patient: Jeanette Olmstead   YOB: 1980   Date of Visit: 10/30/2023       To Whom it May Concern:    Ed Metcalf was seen in my clinic on 10/30/2023. She may return to work on 10/31/2023 . If you have any questions or concerns, please don't hesitate to call.          Sincerely,          MICHAEL Shaffer        CC: No Recipients

## 2023-10-30 NOTE — PROGRESS NOTES
[facility]        NAME: Gee Caldwell is a 44 y.o. female  : 1980    MRN: [id number]  DATE: 2023  TIME: 10:16 AM    Assessment and Plan   Acute cough [R05.1]  1. Acute cough  Poct Covid 19 Rapid Antigen Test    albuterol (Proventil HFA) 90 mcg/act inhaler    albuterol inhalation solution 2.5 mg    ipratropium (ATROVENT) 0.02 % inhalation solution 0.5 mg    azithromycin (ZITHROMAX) 250 mg tablet    methylPREDNISolone 4 MG tablet therapy pack      2. Acute bronchitis, unspecified organism  albuterol (Proventil HFA) 90 mcg/act inhaler    albuterol inhalation solution 2.5 mg    ipratropium (ATROVENT) 0.02 % inhalation solution 0.5 mg    azithromycin (ZITHROMAX) 250 mg tablet    methylPREDNISolone 4 MG tablet therapy pack            Patient Instructions     The patient instructed to pick -up ordered prescriptions and administer, as prescribed. The patient instructed to continue supportive measures. The patient instructed to monitor symptoms, any increase in SOB to proceed to ED. The patient instructed to follow-up with PCP, as needed. Chief Complaint     Chief Complaint   Patient presents with    Cold Like Symptoms     Pt started last Wed with scratchy throat, cough, SOB, wheezing, nasal congestion, PND and chills. Taking Mucinex Severe Cold. History of Present Illness       HPI    Lolly Marrufo is a 44year-old female who presents to the Urgent Care setting with complaints of cold-like symptoms that started last week. The patient explains that she began experiencing cough, shortness of breath, wheezing, and sore throat since last week. The patient explains that she has had no improvement in her symptoms despite attempts of symptom management. The patient endorses taking Mucinex Severe: Cough and Cold, but has had only minor relief. The patient expresses coughing up green sputum with the use of the Mucinex.  The patient explains that she is eating and drinking to maintain adequate fluid hydration status. The patient does not complain of any nausea, vomiting, or diarrhea. The patient presents for further evaluation and management of symptoms. The patient tested for rapid COVID, where the results were negative. Review of Systems   Review of Systems   Constitutional:  Positive for activity change and fatigue. Negative for appetite change, chills, diaphoresis and fever. HENT:  Positive for congestion, rhinorrhea, sinus pressure, sneezing and sore throat. Negative for sinus pain, tinnitus, trouble swallowing and voice change. Eyes:  Negative for discharge and itching. Respiratory:  Positive for cough, chest tightness and shortness of breath. Cardiovascular:  Negative for chest pain. Gastrointestinal:  Negative for diarrhea, nausea and vomiting. Genitourinary:  Negative for difficulty urinating. Musculoskeletal:  Negative for arthralgias and myalgias. Skin:  Negative for color change, pallor and rash. Allergic/Immunologic: Positive for environmental allergies. Neurological:  Negative for dizziness and light-headedness. Hematological:  Negative for adenopathy.          Current Medications       Current Outpatient Medications:     acetaminophen (TYLENOL) 500 mg tablet, Take 1 tablet (500 mg total) by mouth every 6 (six) hours as needed for mild pain, Disp: 30 tablet, Rfl: 0    albuterol (Proventil HFA) 90 mcg/act inhaler, Inhale 2 puffs every 6 (six) hours as needed for wheezing, Disp: 6.7 g, Rfl: 0    azithromycin (ZITHROMAX) 250 mg tablet, Take 2 tablets today then 1 tablet daily x 4 days, Disp: 6 tablet, Rfl: 0    cyclobenzaprine (FLEXERIL) 10 mg tablet, Take 1 tablet (10 mg total) by mouth daily at bedtime, Disp: 30 tablet, Rfl: 1    magnesium Oxide (MAG-OX) 400 mg TABS, Take 1 tablet (400 mg total) by mouth daily, Disp: 90 tablet, Rfl: 0    methylPREDNISolone 4 MG tablet therapy pack, Use as directed on package, Disp: 21 tablet, Rfl: 0 natalizumab (Tysabri) 300 mg/15 mL, Inject into a catheter in a vein, Disp: , Rfl:     Syringe/Needle, Disp, (SYRINGE 3CC/25GX1") 25G X 1" 3 ML MISC, Administer B12 1000mcg IM once per week for three (3) weeks; then once per month for five (5) months, Disp: 8 each, Rfl: 0    calcium citrate-vitamin D 315 mg-5 mcg tablet, Take 1 tablet by mouth 2 (two) times a day (Patient not taking: Reported on 9/26/2023), Disp: , Rfl:     Cyanocobalamin 1000 MCG SUBL, Place 1 tablet (1,000 mcg total) under the tongue daily (Patient not taking: Reported on 10/30/2023), Disp: 90 tablet, Rfl: 0    ergocalciferol (VITAMIN D2) 50,000 units, Take 1 capsule (50,000 Units total) by mouth once a week (Patient not taking: Reported on 9/26/2023), Disp: 8 capsule, Rfl: 0    fluticasone (FLONASE) 50 mcg/act nasal spray, 1 spray into each nostril daily (Patient not taking: Reported on 1/24/2023), Disp: 16 g, Rfl: 1    ibuprofen (MOTRIN) 600 mg tablet, Take 1 tablet (600 mg total) by mouth every 6 (six) hours as needed for mild pain (Patient not taking: Reported on 10/30/2023), Disp: 30 tablet, Rfl: 0    ketorolac (TORADOL) 10 mg tablet, Take 1 tablet (10 mg total) by mouth every 6 (six) hours as needed for moderate pain (Patient not taking: Reported on 5/11/2023), Disp: 10 tablet, Rfl: 0    triamcinolone (KENALOG) 0.1 % ointment, Apply topically 2 (two) times a day (Patient not taking: Reported on 5/11/2023), Disp: 30 g, Rfl: 0    Current Facility-Administered Medications:     cyanocobalamin injection 1,000 mcg, 1,000 mcg, Intramuscular, Q30 Days, Pamela Arrieta MD, 1,000 mcg at 08/25/23 1339    cyanocobalamin injection 1,000 mcg, 1,000 mcg, Intramuscular, Q30 Days, Pamela Arrieta MD, 1,000 mcg at 10/20/23 1557    Current Allergies     Allergies as of 10/30/2023    (No Known Allergies)            The following portions of the patient's history were reviewed and updated as appropriate: allergies, current medications, past family history, past medical history, past social history, past surgical history and problem list.     Past Medical History:   Diagnosis Date    Abnormal Pap smear of cervix     Depression     Last assessed: 8/1/12    History of Bell's palsy     History of chlamydia infection     History of gonorrhea     MS (multiple sclerosis) ([address])     Multiple sclerosis ([address])     Ovarian cyst     Varicella     HX DISEASE       Past Surgical History:   Procedure Laterality Date    KNEE ARTHROSCOPY      MD LAPAROSCOPY FULGURATION OVIDUCTS Bilateral 10/5/2018    Procedure: LIGATION TUBAL LAPAROSCOPIC - FALOPE RINGS;  Surgeon: Sixto Cuevas MD;  Location: [facility];  Service: Gynecology       Family History   Problem Relation Age of Onset    Dementia Mother     Hypertension Mother     Cataracts Mother     Hypertension Father     Macular degeneration Father     Anuerysm Sister     Migraines Sister     Heart Valve Disease Brother         mechanical valve placed    No Known Problems Daughter     Asthma Son     ADD / ADHD Son     Allergies Son     Multiple sclerosis Paternal Aunt     Glaucoma Family     Macular degeneration Family     Hypertension Family          Medications have been verified. Objective   /76 (BP Location: Right arm, Patient Position: Sitting, Cuff Size: Standard)   Pulse 80   Temp (!) 96.6 °F (35.9 °C) (Tympanic)   Resp 20   Wt 99.1 kg (218 lb 6.4 oz)   LMP 10/16/2023 (Approximate)   SpO2 99%   BMI 36.34 kg/m²   Patient's last menstrual period was 10/16/2023 (approximate). Physical Exam     Physical Exam  Vitals reviewed. Constitutional:       General: She is not in acute distress. Appearance: She is not ill-appearing. HENT:      Nose: Congestion and rhinorrhea present. Mouth/Throat:      Mouth: Mucous membranes are moist.      Pharynx: Posterior oropharyngeal erythema present. Eyes:      General:         Right eye: No discharge. Left eye: No discharge.    Cardiovascular:      Rate and Rhythm: Normal rate and regular rhythm. Heart sounds: No murmur heard. Pulmonary:      Effort: No tachypnea or respiratory distress. Breath sounds: Examination of the right-lower field reveals wheezing. Examination of the left-lower field reveals wheezing. Wheezing present. No rhonchi. Chest:      Chest wall: No tenderness. Abdominal:      Palpations: Abdomen is soft. Neurological:      General: No focal deficit present. Mental Status: She is alert.    Psychiatric:         Mood and Affect: Mood normal.         Behavior: Behavior normal.

## 2023-11-17 ENCOUNTER — EVALUATION (OUTPATIENT)
Dept: PHYSICAL THERAPY | Facility: CLINIC | Age: 43
End: 2023-11-17
Payer: COMMERCIAL

## 2023-11-17 ENCOUNTER — TELEPHONE (OUTPATIENT)
Dept: NEUROLOGY | Facility: CLINIC | Age: 43
End: 2023-11-17

## 2023-11-17 DIAGNOSIS — M79.18 MYOFASCIAL PAIN SYNDROME, CERVICAL: ICD-10-CM
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
## 2023-11-17 PROCEDURE — 97140 MANUAL THERAPY 1/> REGIONS: CPT | Performed by: PHYSICAL THERAPIST

## 2023-11-17 PROCEDURE — 97110 THERAPEUTIC EXERCISES: CPT | Performed by: PHYSICAL THERAPIST

## 2023-11-17 PROCEDURE — 97161 PT EVAL LOW COMPLEX 20 MIN: CPT | Performed by: PHYSICAL THERAPIST
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
## 2023-11-17 NOTE — TELEPHONE ENCOUNTER
Pt is scheduled for Tysabri on 11/29/23. Last infusion 10/18/23, infusions every 6 weeks. Confirmed scheduling is correct. Last CBC/CMP 10/18/23  Last JCV 7/18/23 0.23 (final result negative)  Last MRI 2/11/23    Pt went to  on 10/30/23 for bronchitis. Given azithromycin x5 days, methylprednisolone pack, and albuterol inhaler. Okay to proceed with infusion? Tysabri is approved with [facility] [employer] from 10/18/23 to 10/18/24 for 13 visits. Auth # [id number]. Touch auth valid until 3/14/24 at [facility] infusion center.

## 2023-11-20 ENCOUNTER — CLINICAL SUPPORT (OUTPATIENT)
Dept: FAMILY MEDICINE CLINIC | Facility: CLINIC | Age: 43
End: 2023-11-20

## 2023-11-20 DIAGNOSIS — E53.8 B12 DEFICIENCY: Primary | ICD-10-CM

## 2023-11-20 PROCEDURE — 96372 THER/PROPH/DIAG INJ SC/IM: CPT

## 2023-11-20 RX ADMIN — CYANOCOBALAMIN 1000 MCG: 1000 INJECTION, SOLUTION INTRAMUSCULAR; SUBCUTANEOUS at 15:55

## 2023-11-22 ENCOUNTER — APPOINTMENT (OUTPATIENT)
Dept: PHYSICAL THERAPY | Facility: CLINIC | Age: 43
End: 2023-11-22
Payer: COMMERCIAL

## 2023-11-24 ENCOUNTER — OFFICE VISIT (OUTPATIENT)
Dept: PHYSICAL THERAPY | Facility: CLINIC | Age: 43
End: 2023-11-24
Payer: COMMERCIAL

## 2023-11-24 DIAGNOSIS — M79.18 MYOFASCIAL PAIN SYNDROME, CERVICAL: Primary | ICD-10-CM
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
## 2023-11-24 PROCEDURE — 97140 MANUAL THERAPY 1/> REGIONS: CPT

## 2023-11-24 PROCEDURE — 97110 THERAPEUTIC EXERCISES: CPT

## 2023-11-24 PROCEDURE — 97112 NEUROMUSCULAR REEDUCATION: CPT
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
## 2023-11-29 ENCOUNTER — HOSPITAL ENCOUNTER (OUTPATIENT)
Dept: INFUSION CENTER | Facility: CLINIC | Age: 43
Discharge: HOME | End: 2023-11-29
Payer: COMMERCIAL

## 2023-11-29 VITALS
TEMPERATURE: 97.2 F | DIASTOLIC BLOOD PRESSURE: 86 MMHG | HEART RATE: 87 BPM | RESPIRATION RATE: 18 BRPM | SYSTOLIC BLOOD PRESSURE: 134 MMHG

## 2023-11-29 DIAGNOSIS — G35 MULTIPLE SCLEROSIS (HCC): Primary | ICD-10-CM

## 2023-11-29 PROCEDURE — 96365 THER/PROPH/DIAG IV INF INIT: CPT

## 2023-11-29 RX ORDER — SODIUM CHLORIDE 9 MG/ML
20 INJECTION, SOLUTION INTRAVENOUS ONCE
Status: COMPLETED | OUTPATIENT
Start: 2023-11-29 | End: 2023-11-29

## 2023-11-29 RX ORDER — ACETAMINOPHEN 325 MG/1
650 TABLET ORAL ONCE
OUTPATIENT
Start: 2024-01-10 | End: 2024-01-10

## 2023-11-29 RX ORDER — ACETAMINOPHEN 325 MG/1
650 TABLET ORAL ONCE
Status: COMPLETED | OUTPATIENT
Start: 2023-11-29 | End: 2023-11-29

## 2023-11-29 RX ORDER — SODIUM CHLORIDE 9 MG/ML
20 INJECTION, SOLUTION INTRAVENOUS ONCE
OUTPATIENT
Start: 2024-01-10

## 2023-11-29 RX ADMIN — NATALIZUMAB 300 MG: 300 INJECTION INTRAVENOUS at 09:28

## 2023-11-29 RX ADMIN — SODIUM CHLORIDE 20 ML/HR: 0.9 INJECTION, SOLUTION INTRAVENOUS at 08:54

## 2023-11-29 RX ADMIN — ACETAMINOPHEN 650 MG: 325 TABLET, FILM COATED ORAL at 08:54

## 2023-11-29 NOTE — PROGRESS NOTES
Pt presents for tysabri. No new meds or concerns. Touch Program checklist completed. Pt tolerated treatment without adverse reaction with 1 hour post infusion observation. Future appointments discussed. AVS declined.

## 2023-11-30 ENCOUNTER — APPOINTMENT (OUTPATIENT)
Dept: PHYSICAL THERAPY | Facility: CLINIC | Age: 43
End: 2023-11-30
Payer: COMMERCIAL

## 2023-12-29 ENCOUNTER — TELEPHONE (OUTPATIENT)
Dept: NEUROLOGY | Facility: CLINIC | Age: 43
End: 2023-12-29

## 2023-12-29 DIAGNOSIS — G35 MULTIPLE SCLEROSIS (HCC): Primary | ICD-10-CM

## 2023-12-29 NOTE — TELEPHONE ENCOUNTER
Pt is scheduled for Tysabri on 1/10/24. Last infusion 11/29/23, infusions every 6 weeks.  Confirmed scheduling is correct.     Last CBC/CMP 10/18/23  Last JCV 7/18/23 0.23 (final result negative)  Last MRI 2/11/23     Updated JCV testing due by 1/18/24. Order entered. [employer] message sent.      Okay to proceed with infusion?     Tysabri is approved with [facility] from 10/18/23 to 10/18/24 for 13 visits. Auth # [id number].     Touch auth valid until 3/14/24 at [facility].

## 2024-01-10 ENCOUNTER — HOSPITAL ENCOUNTER (OUTPATIENT)
Dept: INFUSION CENTER | Facility: CLINIC | Age: 44
Discharge: HOME | End: 2024-01-10
Payer: COMMERCIAL

## 2024-01-10 VITALS
RESPIRATION RATE: 18 BRPM | TEMPERATURE: 97.5 F | HEART RATE: 85 BPM | SYSTOLIC BLOOD PRESSURE: 138 MMHG | DIASTOLIC BLOOD PRESSURE: 84 MMHG

## 2024-01-10 DIAGNOSIS — G35 MULTIPLE SCLEROSIS (HCC): Primary | ICD-10-CM

## 2024-01-10 LAB
ALBUMIN SERPL BCP-MCNC: 4.3 G/DL (ref 3.5–5)
ALP SERPL-CCNC: 70 U/L (ref 34–104)
ALT SERPL W P-5'-P-CCNC: 12 U/L (ref 7–52)
ANION GAP SERPL CALCULATED.3IONS-SCNC: 4 MMOL/L
AST SERPL W P-5'-P-CCNC: 18 U/L (ref 13–39)
BASOPHILS # BLD AUTO: 0.04 THOUSANDS/ÂΜL (ref 0–0.1)
BASOPHILS NFR BLD AUTO: 1 % (ref 0–1)
BILIRUB SERPL-MCNC: 0.23 MG/DL (ref 0.2–1)
BUN SERPL-MCNC: 12 MG/DL (ref 5–25)
CALCIUM SERPL-MCNC: 9.2 MG/DL (ref 8.4–10.2)
CHLORIDE SERPL-SCNC: 107 MMOL/L (ref 96–108)
CO2 SERPL-SCNC: 26 MMOL/L (ref 21–32)
CREAT SERPL-MCNC: 0.59 MG/DL (ref 0.6–1.3)
EOSINOPHIL # BLD AUTO: 0.28 THOUSAND/ÂΜL (ref 0–0.61)
EOSINOPHIL NFR BLD AUTO: 4 % (ref 0–6)
ERYTHROCYTE [DISTWIDTH] IN BLOOD BY AUTOMATED COUNT: 15.4 % (ref 11.6–15.1)
GFR SERPL CREATININE-BSD FRML MDRD: 113 ML/MIN/1.73SQ M
GLUCOSE SERPL-MCNC: 74 MG/DL (ref 65–140)
HCT VFR BLD AUTO: 36.2 % (ref 34.8–46.1)
HGB BLD-MCNC: 11.5 G/DL (ref 11.5–15.4)
IMM GRANULOCYTES # BLD AUTO: 0.01 THOUSAND/UL (ref 0–0.2)
IMM GRANULOCYTES NFR BLD AUTO: 0 % (ref 0–2)
LYMPHOCYTES # BLD AUTO: 3 THOUSANDS/ÂΜL (ref 0.6–4.47)
LYMPHOCYTES NFR BLD AUTO: 38 % (ref 14–44)
MCH RBC QN AUTO: 31.3 PG (ref 26.8–34.3)
MCHC RBC AUTO-ENTMCNC: 31.8 G/DL (ref 31.4–37.4)
MCV RBC AUTO: 99 FL (ref 82–98)
MONOCYTES # BLD AUTO: 0.53 THOUSAND/ÂΜL (ref 0.17–1.22)
MONOCYTES NFR BLD AUTO: 7 % (ref 4–12)
NEUTROPHILS # BLD AUTO: 4.12 THOUSANDS/ÂΜL (ref 1.85–7.62)
NEUTS SEG NFR BLD AUTO: 50 % (ref 43–75)
NRBC BLD AUTO-RTO: 0 /100 WBCS
PLATELET # BLD AUTO: 289 THOUSANDS/UL (ref 149–390)
PMV BLD AUTO: 9.9 FL (ref 8.9–12.7)
POTASSIUM SERPL-SCNC: 4.2 MMOL/L (ref 3.5–5.3)
PROT SERPL-MCNC: 6.8 G/DL (ref 6.4–8.4)
RBC # BLD AUTO: 3.67 MILLION/UL (ref 3.81–5.12)
SODIUM SERPL-SCNC: 137 MMOL/L (ref 135–147)
WBC # BLD AUTO: 7.98 THOUSAND/UL (ref 4.31–10.16)

## 2024-01-10 PROCEDURE — 96365 THER/PROPH/DIAG IV INF INIT: CPT

## 2024-01-10 PROCEDURE — 85025 COMPLETE CBC W/AUTO DIFF WBC: CPT | Performed by: PSYCHIATRY & NEUROLOGY

## 2024-01-10 PROCEDURE — 80053 COMPREHEN METABOLIC PANEL: CPT | Performed by: PSYCHIATRY & NEUROLOGY

## 2024-01-10 RX ORDER — ACETAMINOPHEN 325 MG/1
650 TABLET ORAL ONCE
OUTPATIENT
Start: 2024-02-21 | End: 2024-02-21

## 2024-01-10 RX ORDER — ACETAMINOPHEN 325 MG/1
650 TABLET ORAL ONCE
Status: COMPLETED | OUTPATIENT
Start: 2024-01-10 | End: 2024-01-10

## 2024-01-10 RX ORDER — SODIUM CHLORIDE 9 MG/ML
20 INJECTION, SOLUTION INTRAVENOUS ONCE
Status: COMPLETED | OUTPATIENT
Start: 2024-01-10 | End: 2024-01-10

## 2024-01-10 RX ORDER — SODIUM CHLORIDE 9 MG/ML
20 INJECTION, SOLUTION INTRAVENOUS ONCE
OUTPATIENT
Start: 2024-02-21

## 2024-01-10 RX ADMIN — SODIUM CHLORIDE 20 ML/HR: 0.9 INJECTION, SOLUTION INTRAVENOUS at 09:08

## 2024-01-10 RX ADMIN — NATALIZUMAB 300 MG: 300 INJECTION INTRAVENOUS at 09:21

## 2024-01-10 RX ADMIN — ACETAMINOPHEN 650 MG: 325 TABLET, FILM COATED ORAL at 09:08

## 2024-01-10 NOTE — PLAN OF CARE
Problem: Potential for Falls  Goal: Patient will remain free of falls  Description: INTERVENTIONS:  - Educate patient/family on patient safety including physical limitations  - Instruct patient to call for assistance with activity   - Consult OT/PT to assist with strengthening/mobility   - Keep Call bell within reach  - Keep bed low and locked with side rails adjusted as appropriate  - Keep care items and personal belongings within reach  - Initiate and maintain comfort rounds  - Make Fall Risk Sign visible to staff  - - Apply yellow socks and bracelet for high fall risk patients  - Consider moving patient to room near nurses station  1/10/2024 1226 by Latia Swift, RN  Outcome: Progressing  1/10/2024 1226 by Latia Swift, RN  Outcome: Progressing

## 2024-01-10 NOTE — PROGRESS NOTES
Patient here for tysabri. Infusion checklist complete. Patient tolerated her tysabri well without adverse reaction. She is aware of her next infusion on 2/21/24.

## 2024-01-24 ENCOUNTER — APPOINTMENT (OUTPATIENT)
Dept: LAB | Facility: HOSPITAL | Age: 44
End: 2024-01-24
Payer: COMMERCIAL

## 2024-01-24 DIAGNOSIS — G35 MULTIPLE SCLEROSIS (HCC): ICD-10-CM

## 2024-01-24 LAB
ALBUMIN SERPL BCP-MCNC: 4.3 G/DL (ref 3.5–5)
ALP SERPL-CCNC: 74 U/L (ref 34–104)
ALT SERPL W P-5'-P-CCNC: 12 U/L (ref 7–52)
ANION GAP SERPL CALCULATED.3IONS-SCNC: 9 MMOL/L
AST SERPL W P-5'-P-CCNC: 15 U/L (ref 13–39)
BASOPHILS # BLD AUTO: 0.03 THOUSANDS/ÂΜL (ref 0–0.1)
BASOPHILS NFR BLD AUTO: 0 % (ref 0–1)
BILIRUB SERPL-MCNC: 0.33 MG/DL (ref 0.2–1)
BUN SERPL-MCNC: 12 MG/DL (ref 5–25)
CALCIUM SERPL-MCNC: 9 MG/DL (ref 8.4–10.2)
CHLORIDE SERPL-SCNC: 102 MMOL/L (ref 96–108)
CO2 SERPL-SCNC: 26 MMOL/L (ref 21–32)
CREAT SERPL-MCNC: 0.68 MG/DL (ref 0.6–1.3)
EOSINOPHIL # BLD AUTO: 0.25 THOUSAND/ÂΜL (ref 0–0.61)
EOSINOPHIL NFR BLD AUTO: 3 % (ref 0–6)
ERYTHROCYTE [DISTWIDTH] IN BLOOD BY AUTOMATED COUNT: 15.7 % (ref 11.6–15.1)
GFR SERPL CREATININE-BSD FRML MDRD: 108 ML/MIN/1.73SQ M
GLUCOSE SERPL-MCNC: 107 MG/DL (ref 65–140)
HCT VFR BLD AUTO: 35.4 % (ref 34.8–46.1)
HGB BLD-MCNC: 11.7 G/DL (ref 11.5–15.4)
IMM GRANULOCYTES # BLD AUTO: 0.02 THOUSAND/UL (ref 0–0.2)
IMM GRANULOCYTES NFR BLD AUTO: 0 % (ref 0–2)
LYMPHOCYTES # BLD AUTO: 3.27 THOUSANDS/ÂΜL (ref 0.6–4.47)
LYMPHOCYTES NFR BLD AUTO: 37 % (ref 14–44)
MCH RBC QN AUTO: 31.9 PG (ref 26.8–34.3)
MCHC RBC AUTO-ENTMCNC: 33.1 G/DL (ref 31.4–37.4)
MCV RBC AUTO: 97 FL (ref 82–98)
MONOCYTES # BLD AUTO: 0.35 THOUSAND/ÂΜL (ref 0.17–1.22)
MONOCYTES NFR BLD AUTO: 4 % (ref 4–12)
NEUTROPHILS # BLD AUTO: 5.04 THOUSANDS/ÂΜL (ref 1.85–7.62)
NEUTS SEG NFR BLD AUTO: 56 % (ref 43–75)
NRBC BLD AUTO-RTO: 0 /100 WBCS
PLATELET # BLD AUTO: 289 THOUSANDS/UL (ref 149–390)
PMV BLD AUTO: 10 FL (ref 8.9–12.7)
POTASSIUM SERPL-SCNC: 3.7 MMOL/L (ref 3.5–5.3)
PROT SERPL-MCNC: 6.8 G/DL (ref 6.4–8.4)
RBC # BLD AUTO: 3.67 MILLION/UL (ref 3.81–5.12)
SODIUM SERPL-SCNC: 137 MMOL/L (ref 135–147)
WBC # BLD AUTO: 8.96 THOUSAND/UL (ref 4.31–10.16)

## 2024-01-24 PROCEDURE — 85025 COMPLETE CBC W/AUTO DIFF WBC: CPT

## 2024-01-24 PROCEDURE — 36415 COLL VENOUS BLD VENIPUNCTURE: CPT

## 2024-01-24 PROCEDURE — 80053 COMPREHEN METABOLIC PANEL: CPT

## 2024-01-26 ENCOUNTER — TELEPHONE (OUTPATIENT)
Dept: FAMILY MEDICINE CLINIC | Facility: CLINIC | Age: 44
End: 2024-01-26

## 2024-01-26 NOTE — TELEPHONE ENCOUNTER
PCP SIGNATURE NEEDED FOR  [facility] FOR[facility] RECEIVED VIA FAX AND PLACED IN PCP FOLDER TO BE DELIVERED AT ASSIGNED TIMES.      PHYSICIANS ORDER

## 2024-01-29 DIAGNOSIS — G35 MULTIPLE SCLEROSIS (HCC): Primary | ICD-10-CM

## 2024-01-30 ENCOUNTER — TELEPHONE (OUTPATIENT)
Dept: NEUROLOGY | Facility: CLINIC | Age: 44
End: 2024-01-30

## 2024-01-30 NOTE — TELEPHONE ENCOUNTER
Left message for patient to call back and reschedule appt from 2/2/24 with Nuria due to her being out.

## 2024-02-02 LAB — MISCELLANEOUS LAB TEST RESULT: NORMAL

## 2024-02-09 ENCOUNTER — TELEPHONE (OUTPATIENT)
Dept: NEUROLOGY | Facility: CLINIC | Age: 44
End: 2024-02-09

## 2024-02-09 NOTE — TELEPHONE ENCOUNTER
Tysabri TOUCH reauthorization submitted, valid until 9/13/24 @ [facility].   
Health Care Proxy (HCP)

## 2024-02-09 NOTE — TELEPHONE ENCOUNTER
Pt is scheduled for Tysabri on 2/21/24.   Last infusion 1/10/24, infusions every 6 weeks.  Confirmed scheduling is correct.     Last CBC/CMP 1/24/24  Last JCV 1/24/24 0.35 (final result negative)  Last MRI 2/11/23     Okay to proceed with infusion?     Tysabri is approved with [facility] from 10/18/23 to 10/18/24 for 13 visits. Auth # [id number].     Touch auth valid until 3/14/24 at [facility] center.

## 2024-02-14 ENCOUNTER — TELEPHONE (OUTPATIENT)
Dept: NEUROLOGY | Facility: CLINIC | Age: 44
End: 2024-02-14

## 2024-02-14 NOTE — TELEPHONE ENCOUNTER
Patient is scheduled for f/u on 2/20. Called patient and reminded her of appt details. She confirms appt.

## 2024-02-20 ENCOUNTER — OFFICE VISIT (OUTPATIENT)
Dept: NEUROLOGY | Facility: CLINIC | Age: 44
End: 2024-02-20
Payer: COMMERCIAL

## 2024-02-20 VITALS
WEIGHT: 224 LBS | OXYGEN SATURATION: 98 % | TEMPERATURE: 97.2 F | DIASTOLIC BLOOD PRESSURE: 70 MMHG | RESPIRATION RATE: 18 BRPM | BODY MASS INDEX: 37.32 KG/M2 | SYSTOLIC BLOOD PRESSURE: 124 MMHG | HEIGHT: 65 IN | HEART RATE: 77 BPM

## 2024-02-20 DIAGNOSIS — M79.18 MYOFASCIAL PAIN SYNDROME, CERVICAL: ICD-10-CM

## 2024-02-20 DIAGNOSIS — E53.8 DEFICIENCY OF VITAMIN B12: ICD-10-CM

## 2024-02-20 DIAGNOSIS — M62.838 MUSCLE SPASM OF BOTH LOWER LEGS: ICD-10-CM

## 2024-02-20 DIAGNOSIS — G35 MULTIPLE SCLEROSIS (HCC): Primary | ICD-10-CM

## 2024-02-20 DIAGNOSIS — E55.9 VITAMIN D DEFICIENCY: ICD-10-CM

## 2024-02-20 PROCEDURE — 99214 OFFICE O/P EST MOD 30 MIN: CPT | Performed by: PHYSICIAN ASSISTANT

## 2024-02-20 RX ORDER — LANOLIN ALCOHOL/MO/W.PET/CERES
400 CREAM (GRAM) TOPICAL DAILY
Qty: 90 TABLET | Refills: 1 | Status: SHIPPED | OUTPATIENT
Start: 2024-02-20

## 2024-02-20 NOTE — PATIENT INSTRUCTIONS
Do blood work for B12 and vit D.  Start over the counter B12 1000mcg daily.  You may need another Rx for vit D.  If so, then once finished with the Rx, take OTC vit D3 4,000IU daily  JCV due in May  MRI brain without contrast now

## 2024-02-20 NOTE — ASSESSMENT & PLAN NOTE
Level was low in July 2023, now status post monthly injections through her PCP.  However she has not had an injection in 2-3 months and is not taking any oral replacement.  Will recheck a level at this time, but would suggest OTC B12 1000 mcg daily for maintenance.

## 2024-02-20 NOTE — ASSESSMENT & PLAN NOTE
Level noted to be 19 in July 2023, status post Rx vitamin D weekly.  She is not currently taking any supplement.  Will recheck a level, may need another round of high-dose Rx vitamin D weekly.  Would then suggest maintaining with OTC vitamin D3 4000 IU daily.

## 2024-02-20 NOTE — PROGRESS NOTES
Patient ID: Maribel Kathleen is a 43 y.o. female.    Assessment/Plan:    Multiple sclerosis (HCC)  Patient remains clinically stable at this time, no new or progressive MS symptoms.  She continues on Tysabri every 6 weeks, tolerating well.    JCV checked 1/24/2024 and negative with index of 0.35 (first indeterminate)  --Will recheck JCV in May 2024    She is due for MRI brain, last completed 1 year ago 2/2023  --Will check MRI brain without contrast for surveillance    She can continue with magnesium oxide for muscle spasms and cramping.  Encouraged hydration, regular stretching and exercise routine.    Her neurologic exam is stable today.    Vitamin D deficiency  Level noted to be 19 in July 2023, status post Rx vitamin D weekly.  She is not currently taking any supplement.  Will recheck a level, may need another round of high-dose Rx vitamin D weekly.  Would then suggest maintaining with OTC vitamin D3 4000 IU daily.    Deficiency of vitamin B12  Level was low in July 2023, now status post monthly injections through her PCP.  However she has not had an injection in 2-3 months and is not taking any oral replacement.  Will recheck a level at this time, but would suggest OTC B12 1000 mcg daily for maintenance.    Patient will follow-up in 4 months or sooner if needed.  She was advised to call the office for any new or worsening symptoms in the meantime.     Diagnoses and all orders for this visit:    Multiple sclerosis (HCC)  -     Ambulatory Referral to Neurology  -     Vitamin D 25 hydroxy; Future  -     Vitamin B12; Future  -     STRATIFY JCV(TM) AB W/RFX TO INHIBITION ASSAY; Future  -     MRI brain MS wo contrast; Future    Vitamin D deficiency  -     Vitamin D 25 hydroxy; Future    Deficiency of vitamin B12  -     Vitamin B12; Future    Myofascial pain syndrome, cervical  -     magnesium Oxide (MAG-OX) 400 mg TABS; Take 1 tablet (400 mg total) by mouth daily    Muscle spasm of both lower legs  -     magnesium  Oxide (MAG-OX) 400 mg TABS; Take 1 tablet (400 mg total) by mouth daily           Subjective:    HPI    Patient is a 43 year old female who presents today for MS follow up, last seen in September 2023.     To review, patient's symptoms began in January 2015 when she had a work injury to the left shoulder. In March 2015, she had left sided numbness going down her torso, down into her feet and legs. She also experienced urinary leakage without Valsalva. Patient then experienced right eye pain and fogginess of vision. She was seen by Dr. De Santiago and had an OCT done. It revealed normal findings on the right but a sector defect on the left. MRI of the brain from May 13, 2015 revealed a few scattered nonspecific supratentorial WM lesions. No acute infarction, or hemorrhage or enh. MRI of the lumbar spine without contrast revealed small L5/S1 disc herniation, MRI of the T-spine revealed evidence of solitary 8mm lesion to the left of midline at T8/9 in the dorsal Cord. No pathological enh. MRI of the C-spine also done on June 13, 2015 revealed a solitary 5 mm focus of active demyelination in the right lateral cord at C2. Patient was given 3 days of IV steroids. Patient with significant GI upset with the steroids and also some mild change in mood with increased anxiety. She was NMO negative and Lyme negative. She did not have an LP. Patient was started on Copaxone as her initial DMT in 2015. She had a brief interval of being off med due to insurance. However, due to site reactions, patient stopped Copaxone and started Tecfidera in May 2016. Patient was subsequently told to stop her Tecfidera due to unplanned pregnancy in Nov 2017. Patient delivered her daughter on 7/18/18 without complication. She also had a tubal ligation. She restarted Tecfidera in January 2019 after she was done breastfeeding.     Patient had c/o low back pain as well as left hip pain. She had an x-ray of the lumbar spine which showed moderate disc  narrowing at L5/S1. She saw Orthopedics who agreed with MRI lumbar spine, however this was not approved by her insurance. She then did a trial of PT from 10/28/2019 through 11/20/2019. She also had an EMG of the left lower extremity on 11/27/2019, which was a normal study.  MRI lumbar spine was completed on 2/7/2020.  This demonstrated moderate to large left paramedian protrusion/superior extrusion type disc herniation which results in central and lateral recess stenosis potentially impacting exiting left L5 nerve root or descending left S1 nerve root.  Patient was advised to return to ortho, who she was already established with.  She has not seen ortho again.     Due to insurance reasons, she changed from brand name Tecfidera to generic dimethyl fumarate in December 2020.  Patient called our office on 3/12/21 reporting onset of tongue numbness on 3/8.  She then started experiencing numbness of her right lip and weakness of the right face on 3/11. On 3/12 she started to experience a bit of pain in the right face, which is why she called the office and then presented to the ED (prior to hearing back from our office).  Per the ER note, she had weakness of the entire right face, as well as decreased sensation of the entire right face.  Due to symptoms x 4 days, no stroke alert called, as well as this appeared like Bell's palsy.  I received a tiger text from the ED physician reporting her presentation was most concerning for Bell's palsy and asking if any further workup/treatment advised.  CTH negative.  Lyme negative, UA negative.  She had a brain MRI with attn to IACs (due to occasional vertigo and tinnitus) completed on 3/2/21 which showed stable MS, no pathology in the IACs.  She was discharged from the ED with a prednisone taper and valacyclovir.    She was seen in the office on 3/18/2021 and was doing ok, still with right facial weakness, decreased eye closure, tongue and facial numbness on the right, hyperacusis.        At timing of her visit on 6/8/21 she admitted that she was not compliant with her dimethyl fumarate, forgetting to take it, and in fact had not taken it at all since April 2021.  She was having trouble requesting a refill from her specialty pharmacy but never informed our office about this.  She had also stopped her gabapentin due to she was concerned it caused weight gain.       Alternative DMTs were discussed at visits in June 2021. Patient had JCV testing done and negative with index 0.33. Tysabri and Aubagio were discussed.     At timing of October 2021 visit with Dr. Muniz, patient chose Aubagio. Start form was signed. Patient started Aubagio in Dec 2021 (7mg x 1 month then increase to 14mg).  She was on Aubagio for about 18 days and tested positive for COVID in Jan 2022 (she also had COVID prior to starting Aubagio in November 2021). She held the Aubagio for a few weeks until COVID symptoms improved and then restarted Aubagio 7mg before increase to 14mg.     At timing of patient's visit in April 2022 she informed me that she had stopped Aubagio, never called our office to report this. She stopped the medication because she noticed her hair was coming out while brushing her hair. She estimated that she took the medication for only a few weeks from late January to late February 2022. She reported stability of her MS symptoms, denied any new symptoms. We discussed either resuming dimethyl fumarate, Aubagio, or considering Tysabri.     She had labs done 4/21/22-JCV negative with index 0.22.  MRI brain completed 5/14/22 showed stable supratentorial white matter disease without progression. There was a new white matter lesion seen laterally in the upper cervical cord at the cervical medullary junction, eccentrically on the right, indicative of progressive demyelinating disease.     At timing of her visit in July 2022, she chose to initiate Tysabri. Updated JCV testing completed 7/25/22 and negative with  index 0.28 (first indeterminate).      She had updated cord imaging on 8/19/22.  MRI c-spine compared to 1/14/19 with new demyelinating foci involving right lateral cord at cervicomedullary junction and mid/dorsal cord at level C3.  Stable punctuate focus within right lateral cord at level C2.  No active demyelination or cord atrophy.  MRI t-spine compared to 1/4/19 with new small demyelinating focus within left dorsolateral cord at level T11.  Stable small demyelinating focus at level T8-9.  No active demyelination or cord atrophy.     First Tysabri infusion was given 9/13/22.     She was seen by Dr. Burns for KIRSTEN due to Tysabri on 10/25/22. Due to JCV index being in indeterminate range, it was advised she have infusions every 6 weeks. Patient reported headaches with a family history of brain aneurysm in her half sister, therefore CTA was ordered. CTA head 11/4/22 showing 1-2 mm outpouching from the communicating segment of the left ICA may represent an infundibulum or tiny aneurysm.  Follow-up CT angiogram recommended in 6 months.     Today, patient reports things have been overall stable from a MS standpoint.  She denies any new symptoms. She continues on Tysabri every 6 weeks. JCV last checked 1/24/24 and negative with index 0.35. MRI brain completed 2/11/23 and stable. MRI c-spine 2/11/23 also stable.  She had a low B12 and vit D level in July 2023 and was getting B12 shots through her PCP.  She last had an injection 2-3 months ago and is not on an oral supplement.  She was on Rx vit D after the low level result in July, but once she finished that, she did not take any OTC vit D.      The following portions of the patient's history were reviewed and updated as appropriate: current medications, past family history, past medical history, past social history, past surgical history, and problem list.       Objective:    Blood pressure 124/70, pulse 77, temperature (!) 97.2 °F (36.2 °C), temperature source  "Temporal, resp. rate 18, height 5' 5\" (1.651 m), weight 102 kg (224 lb), SpO2 98%    Physical Exam  Constitutional:       Appearance: Normal appearance.   Eyes:      Extraocular Movements: EOM normal.      Pupils: Pupils are equal, round, and reactive to light.   Neurological:      Mental Status: She is alert.      Motor: Motor strength is normal.     Deep Tendon Reflexes: Reflexes are normal and symmetric.   Psychiatric:         Mood and Affect: Mood normal.         Speech: Speech normal.         Behavior: Behavior normal.         Neurological Exam  Mental Status  Alert. Oriented to person, place, time and situation. Speech is normal. Language is fluent with no aphasia. Attention and concentration are normal.    Cranial Nerves  CN II: Visual fields full to confrontation.  CN III, IV, VI: Extraocular movements intact bilaterally. Pupils equal round and reactive to light bilaterally.  CN V: Facial sensation is normal.  CN VII: Full and symmetric facial movement.  CN VIII: Hearing is normal.  CN IX, X: Palate elevates symmetrically  CN XI: Shoulder shrug strength is normal.  CN XII: Tongue midline without atrophy or fasciculations.    Motor   Normal muscle tone. Strength is 5/5 throughout all four extremities.    Sensory  Light touch is normal in upper and lower extremities.     Reflexes  Deep tendon reflexes are 2+ and symmetric in all four extremities.    Coordination  Right: Finger-to-nose normal.Left: Finger-to-nose normal.    Gait  Casual gait is normal including stance, stride, and arm swing.        ROS:    Review of Systems   Constitutional: Negative.    HENT: Negative.     Eyes: Negative.    Respiratory: Negative.     Cardiovascular: Negative.    Gastrointestinal: Negative.    Endocrine: Negative.    Genitourinary: Negative.    Musculoskeletal: Negative.    Skin: Negative.    Allergic/Immunologic: Negative.    Neurological:  Positive for numbness (left arm).   Hematological: Negative.  "   Psychiatric/Behavioral: Negative.       I personally reviewed and updated the ROS as appropriate

## 2024-02-21 ENCOUNTER — HOSPITAL ENCOUNTER (OUTPATIENT)
Dept: INFUSION CENTER | Facility: CLINIC | Age: 44
Discharge: HOME | End: 2024-02-21
Payer: COMMERCIAL

## 2024-02-21 VITALS
HEART RATE: 80 BPM | RESPIRATION RATE: 18 BRPM | DIASTOLIC BLOOD PRESSURE: 80 MMHG | TEMPERATURE: 97.9 F | SYSTOLIC BLOOD PRESSURE: 122 MMHG

## 2024-02-21 DIAGNOSIS — E55.9 VITAMIN D DEFICIENCY: ICD-10-CM

## 2024-02-21 DIAGNOSIS — E53.8 DEFICIENCY OF VITAMIN B12: ICD-10-CM

## 2024-02-21 DIAGNOSIS — G35 MULTIPLE SCLEROSIS (HCC): Primary | ICD-10-CM

## 2024-02-21 PROCEDURE — 82306 VITAMIN D 25 HYDROXY: CPT

## 2024-02-21 PROCEDURE — 82607 VITAMIN B-12: CPT

## 2024-02-21 PROCEDURE — 96365 THER/PROPH/DIAG IV INF INIT: CPT

## 2024-02-21 RX ORDER — ACETAMINOPHEN 325 MG/1
650 TABLET ORAL ONCE
Status: COMPLETED | OUTPATIENT
Start: 2024-02-21 | End: 2024-02-21

## 2024-02-21 RX ORDER — SODIUM CHLORIDE 9 MG/ML
20 INJECTION, SOLUTION INTRAVENOUS ONCE
OUTPATIENT
Start: 2024-03-06

## 2024-02-21 RX ORDER — SODIUM CHLORIDE 9 MG/ML
20 INJECTION, SOLUTION INTRAVENOUS ONCE
Status: COMPLETED | OUTPATIENT
Start: 2024-02-21 | End: 2024-02-21

## 2024-02-21 RX ORDER — ACETAMINOPHEN 325 MG/1
650 TABLET ORAL ONCE
OUTPATIENT
Start: 2024-03-06 | End: 2024-03-06

## 2024-02-21 RX ADMIN — ACETAMINOPHEN 650 MG: 325 TABLET, FILM COATED ORAL at 08:48

## 2024-02-21 RX ADMIN — NATALIZUMAB 300 MG: 300 INJECTION INTRAVENOUS at 09:00

## 2024-02-21 RX ADMIN — SODIUM CHLORIDE 20 ML/HR: 9 INJECTION, SOLUTION INTRAVENOUS at 08:56

## 2024-02-21 NOTE — PROGRESS NOTES
Pt presents for tysabri. No new meds or concerns. Pt tolerated treatment without adverse reaction. Future appointments discussed, confirmed with patient for 4/3/2024. AVS declined.

## 2024-02-21 NOTE — PLAN OF CARE
Problem: Knowledge Deficit  Goal: Patient/family/caregiver demonstrates understanding of disease process, treatment plan, medications, and discharge instructions  Description: Complete learning assessment and assess knowledge base.  Interventions:  - Provide teaching at level of understanding  - Provide teaching via preferred learning methods  2/21/2024 1137 by Genna Maher, RN  Outcome: Progressing  2/21/2024 0916 by Genna Maher, RN  Outcome: Progressing

## 2024-02-22 DIAGNOSIS — E55.9 VITAMIN D DEFICIENCY: ICD-10-CM

## 2024-02-22 DIAGNOSIS — E53.8 DEFICIENCY OF VITAMIN B12: Primary | ICD-10-CM

## 2024-02-22 LAB
25(OH)D3 SERPL-MCNC: 9 NG/ML (ref 30–100)
VIT B12 SERPL-MCNC: 350 PG/ML (ref 180–914)

## 2024-02-22 RX ORDER — ERGOCALCIFEROL 1.25 MG/1
50000 CAPSULE ORAL WEEKLY
Qty: 12 CAPSULE | Refills: 0 | Status: SHIPPED | OUTPATIENT
Start: 2024-02-22

## 2024-03-08 ENCOUNTER — HOSPITAL ENCOUNTER (OUTPATIENT)
Dept: MRI IMAGING | Facility: HOSPITAL | Age: 44
Discharge: HOME | End: 2024-03-08
Payer: COMMERCIAL

## 2024-03-08 DIAGNOSIS — G35 MULTIPLE SCLEROSIS (HCC): ICD-10-CM

## 2024-03-08 PROCEDURE — 70551 MRI BRAIN STEM W/O DYE: CPT

## 2024-03-08 PROCEDURE — G1004 CDSM NDSC: HCPCS

## 2024-03-22 ENCOUNTER — TELEPHONE (OUTPATIENT)
Dept: NEUROLOGY | Facility: CLINIC | Age: 44
End: 2024-03-22

## 2024-03-22 NOTE — TELEPHONE ENCOUNTER
Pt is scheduled for Tysabri on 4/3/24.   Last infusion 2/21/24, infusions every 6 weeks.  Confirmed scheduling is correct.     Last CBC/CMP 1/24/24  Last JCV 1/24/24 0.35 (final result negative)  Last MRI brain 3/8/24     Okay to proceed with infusion?     Tysabri is approved with [facility] from 10/18/23 to 10/18/24 for 13 visits. Auth # [id number].     Touch auth valid until 9/13/24 at [facility] center.

## 2024-04-03 ENCOUNTER — HOSPITAL ENCOUNTER (OUTPATIENT)
Dept: INFUSION CENTER | Facility: CLINIC | Age: 44
Discharge: HOME | End: 2024-04-03
Payer: COMMERCIAL

## 2024-04-03 VITALS
RESPIRATION RATE: 18 BRPM | TEMPERATURE: 98 F | HEART RATE: 76 BPM | DIASTOLIC BLOOD PRESSURE: 84 MMHG | SYSTOLIC BLOOD PRESSURE: 124 MMHG

## 2024-04-03 DIAGNOSIS — G35 MULTIPLE SCLEROSIS (HCC): Primary | ICD-10-CM

## 2024-04-03 LAB
BASOPHILS # BLD AUTO: 0.05 THOUSANDS/ÂΜL (ref 0–0.1)
BASOPHILS NFR BLD AUTO: 1 % (ref 0–1)
EOSINOPHIL # BLD AUTO: 0.39 THOUSAND/ÂΜL (ref 0–0.61)
EOSINOPHIL NFR BLD AUTO: 5 % (ref 0–6)
ERYTHROCYTE [DISTWIDTH] IN BLOOD BY AUTOMATED COUNT: 15.8 % (ref 11.6–15.1)
HCT VFR BLD AUTO: 35.5 % (ref 34.8–46.1)
HGB BLD-MCNC: 11.4 G/DL (ref 11.5–15.4)
IMM GRANULOCYTES # BLD AUTO: 0.04 THOUSAND/UL (ref 0–0.2)
IMM GRANULOCYTES NFR BLD AUTO: 1 % (ref 0–2)
LYMPHOCYTES # BLD AUTO: 2.89 THOUSANDS/ÂΜL (ref 0.6–4.47)
LYMPHOCYTES NFR BLD AUTO: 34 % (ref 14–44)
MCH RBC QN AUTO: 30.9 PG (ref 26.8–34.3)
MCHC RBC AUTO-ENTMCNC: 32.1 G/DL (ref 31.4–37.4)
MCV RBC AUTO: 96 FL (ref 82–98)
MONOCYTES # BLD AUTO: 0.49 THOUSAND/ÂΜL (ref 0.17–1.22)
MONOCYTES NFR BLD AUTO: 6 % (ref 4–12)
NEUTROPHILS # BLD AUTO: 4.77 THOUSANDS/ÂΜL (ref 1.85–7.62)
NEUTS SEG NFR BLD AUTO: 53 % (ref 43–75)
NRBC BLD AUTO-RTO: 0 /100 WBCS
PLATELET # BLD AUTO: 277 THOUSANDS/UL (ref 149–390)
PMV BLD AUTO: 11.1 FL (ref 8.9–12.7)
RBC # BLD AUTO: 3.69 MILLION/UL (ref 3.81–5.12)
WBC # BLD AUTO: 8.63 THOUSAND/UL (ref 4.31–10.16)

## 2024-04-03 PROCEDURE — 85025 COMPLETE CBC W/AUTO DIFF WBC: CPT | Performed by: PSYCHIATRY & NEUROLOGY

## 2024-04-03 PROCEDURE — 96365 THER/PROPH/DIAG IV INF INIT: CPT

## 2024-04-03 RX ORDER — SODIUM CHLORIDE 9 MG/ML
20 INJECTION, SOLUTION INTRAVENOUS ONCE
Status: COMPLETED | OUTPATIENT
Start: 2024-04-03 | End: 2024-04-03

## 2024-04-03 RX ORDER — SODIUM CHLORIDE 9 MG/ML
20 INJECTION, SOLUTION INTRAVENOUS ONCE
OUTPATIENT
Start: 2024-05-15

## 2024-04-03 RX ORDER — ACETAMINOPHEN 325 MG/1
650 TABLET ORAL ONCE
OUTPATIENT
Start: 2024-05-15 | End: 2024-05-15

## 2024-04-03 RX ORDER — ACETAMINOPHEN 325 MG/1
650 TABLET ORAL ONCE
Status: COMPLETED | OUTPATIENT
Start: 2024-04-03 | End: 2024-04-03

## 2024-04-03 RX ADMIN — NATALIZUMAB 300 MG: 300 INJECTION INTRAVENOUS at 09:17

## 2024-04-03 RX ADMIN — SODIUM CHLORIDE 20 ML/HR: 0.9 INJECTION, SOLUTION INTRAVENOUS at 09:07

## 2024-04-03 RX ADMIN — ACETAMINOPHEN 650 MG: 325 TABLET, FILM COATED ORAL at 09:06

## 2024-04-03 NOTE — LETTER
[facility]  [address]  [facility] [address]  Dept: [phone]    April 3, 2024     Patient: Maribel Kathleen   YOB: 1980   Date of Visit: 4/3/2024       To Whom it May Concern:    Maribel Kathleen is under my professional care. She was seen in the hospital from 4/3/2024 to 04/03/24.   If you have any questions or concerns, please don't hesitate to call.         Sincerely,          Latia Swift RN

## 2024-04-03 NOTE — PLAN OF CARE
Problem: INFECTION - ADULT  Goal: Absence or prevention of progression during hospitalization  Description: INTERVENTIONS:  - Assess and monitor for signs and symptoms of infection  - Monitor lab/diagnostic results  - Monitor all insertion sites, i.e. indwelling lines, tubes, and drains  - Monitor endotracheal if appropriate and nasal secretions for changes in amount and color  - [place] appropriate cooling/warming therapies per order  - Administer medications as ordered  - Instruct and encourage patient and family to use good hand hygiene technique  - Identify and instruct in appropriate isolation precautions for identified infection/condition  Outcome: Progressing

## 2024-04-03 NOTE — PROGRESS NOTES
Infusion checklist completed. Patient tolerated her tysabri infusion well without adverse affects. Patient is aware to return on 5/15/24.

## 2024-05-07 ENCOUNTER — TELEPHONE (OUTPATIENT)
Dept: NEUROLOGY | Facility: CLINIC | Age: 44
End: 2024-05-07

## 2024-05-07 NOTE — TELEPHONE ENCOUNTER
Pt is scheduled for Tysabri on 5/15/24.   Last infusion 4/3/24, infusions every 6 weeks.  Confirmed scheduling is correct.     Last CBC 4/3/24 CMP 1/24/24  Last JCV 1/24/24 0.35 (final result negative)  Last MRI brain 3/8/24     Okay to proceed with infusion?     Tysabri is approved with [facility] from 10/18/23 to 10/18/24 for 13 visits. Auth # [id number].     Touch auth valid until 9/13/24 at [facility].

## 2024-05-15 ENCOUNTER — HOSPITAL ENCOUNTER (OUTPATIENT)
Dept: INFUSION CENTER | Facility: CLINIC | Age: 44
Discharge: HOME | End: 2024-05-15
Payer: COMMERCIAL

## 2024-05-15 VITALS
RESPIRATION RATE: 18 BRPM | HEART RATE: 76 BPM | DIASTOLIC BLOOD PRESSURE: 75 MMHG | TEMPERATURE: 97.5 F | SYSTOLIC BLOOD PRESSURE: 116 MMHG

## 2024-05-15 DIAGNOSIS — G35 MULTIPLE SCLEROSIS (HCC): Primary | ICD-10-CM

## 2024-05-15 LAB
ALBUMIN SERPL BCP-MCNC: 3.8 G/DL (ref 3.5–5)
ALP SERPL-CCNC: 59 U/L (ref 34–104)
ALT SERPL W P-5'-P-CCNC: 13 U/L (ref 7–52)
ANION GAP SERPL CALCULATED.3IONS-SCNC: 4 MMOL/L (ref 4–13)
AST SERPL W P-5'-P-CCNC: 16 U/L (ref 13–39)
BASOPHILS # BLD AUTO: 0.02 THOUSANDS/ÂΜL (ref 0–0.1)
BASOPHILS NFR BLD AUTO: 0 % (ref 0–1)
BILIRUB SERPL-MCNC: 0.24 MG/DL (ref 0.2–1)
BUN SERPL-MCNC: 12 MG/DL (ref 5–25)
CALCIUM SERPL-MCNC: 9.2 MG/DL (ref 8.4–10.2)
CHLORIDE SERPL-SCNC: 105 MMOL/L (ref 96–108)
CO2 SERPL-SCNC: 27 MMOL/L (ref 21–32)
CREAT SERPL-MCNC: 0.64 MG/DL (ref 0.6–1.3)
EOSINOPHIL # BLD AUTO: 0.39 THOUSAND/ÂΜL (ref 0–0.61)
EOSINOPHIL NFR BLD AUTO: 6 % (ref 0–6)
ERYTHROCYTE [DISTWIDTH] IN BLOOD BY AUTOMATED COUNT: 16 % (ref 11.6–15.1)
GFR SERPL CREATININE-BSD FRML MDRD: 110 ML/MIN/1.73SQ M
GLUCOSE SERPL-MCNC: 90 MG/DL (ref 65–140)
HCT VFR BLD AUTO: 34.6 % (ref 34.8–46.1)
HGB BLD-MCNC: 11.3 G/DL (ref 11.5–15.4)
IMM GRANULOCYTES # BLD AUTO: 0.02 THOUSAND/UL (ref 0–0.2)
IMM GRANULOCYTES NFR BLD AUTO: 0 % (ref 0–2)
LYMPHOCYTES # BLD AUTO: 2.22 THOUSANDS/ÂΜL (ref 0.6–4.47)
LYMPHOCYTES NFR BLD AUTO: 37 % (ref 14–44)
MCH RBC QN AUTO: 31.2 PG (ref 26.8–34.3)
MCHC RBC AUTO-ENTMCNC: 32.7 G/DL (ref 31.4–37.4)
MCV RBC AUTO: 96 FL (ref 82–98)
MONOCYTES # BLD AUTO: 0.48 THOUSAND/ÂΜL (ref 0.17–1.22)
MONOCYTES NFR BLD AUTO: 8 % (ref 4–12)
NEUTROPHILS # BLD AUTO: 2.95 THOUSANDS/ÂΜL (ref 1.85–7.62)
NEUTS SEG NFR BLD AUTO: 49 % (ref 43–75)
NRBC BLD AUTO-RTO: 0 /100 WBCS
PLATELET # BLD AUTO: 247 THOUSANDS/UL (ref 149–390)
PMV BLD AUTO: 10.1 FL (ref 8.9–12.7)
POTASSIUM SERPL-SCNC: 4 MMOL/L (ref 3.5–5.3)
PROT SERPL-MCNC: 6.2 G/DL (ref 6.4–8.4)
RBC # BLD AUTO: 3.62 MILLION/UL (ref 3.81–5.12)
SODIUM SERPL-SCNC: 136 MMOL/L (ref 135–147)
WBC # BLD AUTO: 6.08 THOUSAND/UL (ref 4.31–10.16)

## 2024-05-15 PROCEDURE — 85025 COMPLETE CBC W/AUTO DIFF WBC: CPT | Performed by: PSYCHIATRY & NEUROLOGY

## 2024-05-15 PROCEDURE — 80053 COMPREHEN METABOLIC PANEL: CPT | Performed by: PSYCHIATRY & NEUROLOGY

## 2024-05-15 PROCEDURE — 96365 THER/PROPH/DIAG IV INF INIT: CPT

## 2024-05-15 RX ORDER — SODIUM CHLORIDE 9 MG/ML
20 INJECTION, SOLUTION INTRAVENOUS ONCE
OUTPATIENT
Start: 2024-06-26

## 2024-05-15 RX ORDER — ACETAMINOPHEN 325 MG/1
650 TABLET ORAL ONCE
OUTPATIENT
Start: 2024-06-26 | End: 2024-06-26

## 2024-05-15 RX ORDER — SODIUM CHLORIDE 9 MG/ML
20 INJECTION, SOLUTION INTRAVENOUS ONCE
Status: COMPLETED | OUTPATIENT
Start: 2024-05-15 | End: 2024-05-15

## 2024-05-15 RX ORDER — ACETAMINOPHEN 325 MG/1
650 TABLET ORAL ONCE
Status: COMPLETED | OUTPATIENT
Start: 2024-05-15 | End: 2024-05-15

## 2024-05-15 RX ADMIN — SODIUM CHLORIDE 20 ML/HR: 0.9 INJECTION, SOLUTION INTRAVENOUS at 09:10

## 2024-05-15 RX ADMIN — ACETAMINOPHEN 650 MG: 325 TABLET ORAL at 08:54

## 2024-05-15 RX ADMIN — NATALIZUMAB 300 MG: 300 INJECTION INTRAVENOUS at 09:20

## 2024-05-15 NOTE — LETTER
[facility]  [address], [place] [address]  [facility] [address]  Dept: [phone]    May 15, 2024     Patient: Maribel Kathleen   YOB: 1980   Date of Visit: 5/15/2024       To Whom it May Concern:    Maribel Kathleen is under my professional care. She was seen in the hospital from 5/15/2024.    If you have any questions or concerns, please don't hesitate to call.         Sincerely,          Genna Maher RN

## 2024-05-15 NOTE — PROGRESS NOTES
Pt presents for tysabri. Touch Online checklist completed. No new meds or concerns. Pt tolerated treatment without adverse reaction. Future appointments discussed, confirmed with patient for 6/26/2024 0900. AVS declined.

## 2024-06-14 ENCOUNTER — TELEPHONE (OUTPATIENT)
Dept: NEUROLOGY | Facility: CLINIC | Age: 44
End: 2024-06-14

## 2024-06-14 DIAGNOSIS — G35 MULTIPLE SCLEROSIS (HCC): Primary | ICD-10-CM

## 2024-06-14 NOTE — TELEPHONE ENCOUNTER
Pt is scheduled for Tysabri on 6/26/24.   Last infusion 5/15/24, infusions every 6 weeks.  Confirmed scheduling is correct.     Last CBC/CMP 5/15/24  Last JCV 1/24/24 0.35 (final result negative)  Last MRI brain 3/8/24    Updated JCV due by 7/24/24. New order entered. [employer] message sent.      Okay to proceed with infusion?     Tysabri is approved with [facility] from 10/18/23 to 10/18/24 for 13 visits. Auth # [id number].     Touch auth valid until 9/13/24 at [facility] infusion center.

## 2024-06-20 ENCOUNTER — OFFICE VISIT (OUTPATIENT)
Dept: NEUROLOGY | Facility: CLINIC | Age: 44
End: 2024-06-20
Payer: COMMERCIAL

## 2024-06-20 VITALS
TEMPERATURE: 97.6 F | SYSTOLIC BLOOD PRESSURE: 120 MMHG | BODY MASS INDEX: 36.65 KG/M2 | OXYGEN SATURATION: 97 % | HEIGHT: 65 IN | HEART RATE: 85 BPM | WEIGHT: 220 LBS | DIASTOLIC BLOOD PRESSURE: 59 MMHG

## 2024-06-20 DIAGNOSIS — E53.8 DEFICIENCY OF VITAMIN B12: ICD-10-CM

## 2024-06-20 DIAGNOSIS — E55.9 VITAMIN D DEFICIENCY: ICD-10-CM

## 2024-06-20 DIAGNOSIS — G35 MULTIPLE SCLEROSIS (HCC): Primary | ICD-10-CM

## 2024-06-20 PROCEDURE — 99214 OFFICE O/P EST MOD 30 MIN: CPT | Performed by: PHYSICIAN ASSISTANT

## 2024-06-20 RX ORDER — ERGOCALCIFEROL 1.25 MG/1
50000 CAPSULE ORAL WEEKLY
Qty: 12 CAPSULE | Refills: 0 | Status: SHIPPED | OUTPATIENT
Start: 2024-06-20

## 2024-06-20 NOTE — PATIENT INSTRUCTIONS
Continue Tysabri every 6 weeks  Please get JCV testing done by 7/14/24    I sent in Rx for vitamin D to take once a week x 12 weeks.  Once you are finished with the Rx, take over the counter vit D3 4,000IU to 5,000IU daily for maintenance    Take over the counter B12 1000mcg daily     Will recheck vitamin levels prior to your next visit    Return in 4 months or sooner if needed

## 2024-06-20 NOTE — ASSESSMENT & PLAN NOTE
She completed monthly B12 injections through her PCP last year, and at timing of last visit was not on any supplements.  Her level was rechecked in February 2024 and slightly low at 350 (discussed B12 should be maintained above 400).    I suggested starting OTC B12 1000 mcg daily, but as above with vitamin D, she was unaware of these results and recommendations and not currently taking a supplement.    Advise she obtain OTC B12 1000 mcg and take daily.  Will recheck a level in a few months.

## 2024-06-20 NOTE — ASSESSMENT & PLAN NOTE
Patient remains clinically stable at this time, no new or progressive MS symptoms. She continues on Tysabri every 6 weeks, tolerating well.     JCV checked 1/24/2024 and negative with index of 0.35 (first indeterminate)  --She has orders to repeat JCV at this time     MRI brain updated 3/8/2024 and stable.     She can continue with magnesium oxide for muscle spasms and cramping. Encouraged hydration, regular stretching and exercise routine.     Her neurologic exam is stable today.

## 2024-06-20 NOTE — ASSESSMENT & PLAN NOTE
Level was rechecked in Feb 2024 and very low at 9.  I sent her a results note indicating this and sent in a prescription for ergocalciferol 50,000 IU weekly x 12 weeks.  She says she was unaware of these results and recommendations and is still not taking a vitamin D supplement.    Discussed I will resend the Rx vitamin D which she will take weekly x 12 weeks.  Once she is finished with the Rx, she should take OTC vitamin D3 3425-0271 IU daily for maintenance.  Will recheck a level prior to her next visit in 4 months.

## 2024-06-20 NOTE — PROGRESS NOTES
Patient ID: Maribel Kathleen is a 44 y.o. female.    Assessment/Plan:    Multiple sclerosis (HCC)  Patient remains clinically stable at this time, no new or progressive MS symptoms. She continues on Tysabri every 6 weeks, tolerating well.     JCV checked 1/24/2024 and negative with index of 0.35 (first indeterminate)  --She has orders to repeat JCV at this time     MRI brain updated 3/8/2024 and stable.     She can continue with magnesium oxide for muscle spasms and cramping. Encouraged hydration, regular stretching and exercise routine.     Her neurologic exam is stable today.    Vitamin D deficiency  Level was rechecked in Feb 2024 and very low at 9.  I sent her a results note indicating this and sent in a prescription for ergocalciferol 50,000 IU weekly x 12 weeks.  She says she was unaware of these results and recommendations and is still not taking a vitamin D supplement.    Discussed I will resend the Rx vitamin D which she will take weekly x 12 weeks.  Once she is finished with the Rx, she should take OTC vitamin D3 0582-6167 IU daily for maintenance.  Will recheck a level prior to her next visit in 4 months.    Deficiency of vitamin B12  She completed monthly B12 injections through her PCP last year, and at timing of last visit was not on any supplements.  Her level was rechecked in February 2024 and slightly low at 350 (discussed B12 should be maintained above 400).    I suggested starting OTC B12 1000 mcg daily, but as above with vitamin D, she was unaware of these results and recommendations and not currently taking a supplement.    Advise she obtain OTC B12 1000 mcg and take daily.  Will recheck a level in a few months.    Return in 4 months or sooner if needed     Diagnoses and all orders for this visit:    Multiple sclerosis (HCC)    Deficiency of vitamin B12  -     Vitamin B12; Future    Vitamin D deficiency  -     ergocalciferol (VITAMIN D2) 50,000 units; Take 1 capsule (50,000 Units total) by mouth  once a week  -     Vitamin D 25 hydroxy; Future           Subjective:    HPI    Patient is a 44 year old female who presents today for MS follow up, last seen in February 2024.     To review, patient's symptoms began in January 2015 when she had a work injury to the left shoulder. In March 2015, she had left sided numbness going down her torso, down into her feet and legs. She also experienced urinary leakage without Valsalva. Patient then experienced right eye pain and fogginess of vision. She was seen by Dr. De Santiago and had an OCT done. It revealed normal findings on the right but a sector defect on the left. MRI of the brain from May 13, 2015 revealed a few scattered nonspecific supratentorial WM lesions. No acute infarction, or hemorrhage or enh. MRI of the lumbar spine without contrast revealed small L5/S1 disc herniation, MRI of the T-spine revealed evidence of solitary 8mm lesion to the left of midline at T8/9 in the dorsal Cord. No pathological enh. MRI of the C-spine also done on June 13, 2015 revealed a solitary 5 mm focus of active demyelination in the right lateral cord at C2. Patient was given 3 days of IV steroids. Patient with significant GI upset with the steroids and also some mild change in mood with increased anxiety. She was NMO negative and Lyme negative. She did not have an LP. Patient was started on Copaxone as her initial DMT in 2015. She had a brief interval of being off med due to insurance. However, due to site reactions, patient stopped Copaxone and started Tecfidera in May 2016. Patient was subsequently told to stop her Tecfidera due to unplanned pregnancy in Nov 2017. Patient delivered her daughter on 7/18/18 without complication. She also had a tubal ligation. She restarted Tecfidera in January 2019 after she was done breastfeeding.     Patient had c/o low back pain as well as left hip pain. She had an x-ray of the lumbar spine which showed moderate disc narrowing at L5/S1. She saw  Orthopedics who agreed with MRI lumbar spine, however this was not approved by her insurance. She then did a trial of PT from 10/28/2019 through 11/20/2019. She also had an EMG of the left lower extremity on 11/27/2019, which was a normal study.  MRI lumbar spine was completed on 2/7/2020.  This demonstrated moderate to large left paramedian protrusion/superior extrusion type disc herniation which results in central and lateral recess stenosis potentially impacting exiting left L5 nerve root or descending left S1 nerve root.  Patient was advised to return to ortho, who she was already established with.  She has not seen ortho again.     Due to insurance reasons, she changed from brand name Tecfidera to generic dimethyl fumarate in December 2020.  Patient called our office on 3/12/21 reporting onset of tongue numbness on 3/8.  She then started experiencing numbness of her right lip and weakness of the right face on 3/11. On 3/12 she started to experience a bit of pain in the right face, which is why she called the office and then presented to the ED (prior to hearing back from our office).  Per the ER note, she had weakness of the entire right face, as well as decreased sensation of the entire right face.  Due to symptoms x 4 days, no stroke alert called, as well as this appeared like Bell's palsy.  I received a tiger text from the ED physician reporting her presentation was most concerning for Bell's palsy and asking if any further workup/treatment advised.  CTH negative.  Lyme negative, UA negative.  She had a brain MRI with attn to IACs (due to occasional vertigo and tinnitus) completed on 3/2/21 which showed stable MS, no pathology in the IACs.  She was discharged from the ED with a prednisone taper and valacyclovir.    She was seen in the office on 3/18/2021 and was doing ok, still with right facial weakness, decreased eye closure, tongue and facial numbness on the right, hyperacusis.       At timing of her  visit on 6/8/21 she admitted that she was not compliant with her dimethyl fumarate, forgetting to take it, and in fact had not taken it at all since April 2021.  She was having trouble requesting a refill from her specialty pharmacy but never informed our office about this.  She had also stopped her gabapentin due to she was concerned it caused weight gain.       Alternative DMTs were discussed at visits in June 2021. Patient had JCV testing done and negative with index 0.33. Tysabri and Aubagio were discussed.     At timing of October 2021 visit with Dr. Muniz, patient chose Aubagio. Start form was signed. Patient started Aubagio in Dec 2021 (7mg x 1 month then increase to 14mg).  She was on Aubagio for about 18 days and tested positive for COVID in Jan 2022 (she also had COVID prior to starting Aubagio in November 2021). She held the Aubagio for a few weeks until COVID symptoms improved and then restarted Aubagio 7mg before increase to 14mg.     At timing of patient's visit in April 2022 she informed me that she had stopped Aubagio, never called our office to report this. She stopped the medication because she noticed her hair was coming out while brushing her hair. She estimated that she took the medication for only a few weeks from late January to late February 2022. She reported stability of her MS symptoms, denied any new symptoms. We discussed either resuming dimethyl fumarate, Aubagio, or considering Tysabri.     She had labs done 4/21/22-JCV negative with index 0.22.  MRI brain completed 5/14/22 showed stable supratentorial white matter disease without progression. There was a new white matter lesion seen laterally in the upper cervical cord at the cervical medullary junction, eccentrically on the right, indicative of progressive demyelinating disease.     At timing of her visit in July 2022, she chose to initiate Tysabri. Updated JCV testing completed 7/25/22 and negative with index 0.28 (first  indeterminate).      She had updated cord imaging on 8/19/22.  MRI c-spine compared to 1/14/19 with new demyelinating foci involving right lateral cord at cervicomedullary junction and mid/dorsal cord at level C3.  Stable punctuate focus within right lateral cord at level C2.  No active demyelination or cord atrophy.  MRI t-spine compared to 1/4/19 with new small demyelinating focus within left dorsolateral cord at level T11.  Stable small demyelinating focus at level T8-9.  No active demyelination or cord atrophy.     First Tysabri infusion was given 9/13/22.     She was seen by Dr. Burns for KIRSTEN due to Tysabri on 10/25/22. Due to JCV index being in indeterminate range, it was advised she have infusions every 6 weeks. Patient reported headaches with a family history of brain aneurysm in her half sister, therefore CTA was ordered. CTA head 11/4/22 showing 1-2 mm outpouching from the communicating segment of the left ICA may represent an infundibulum or tiny aneurysm.  Follow-up CT angiogram recommended in 6 months.     Today, patient reports things have been overall stable from a MS standpoint. She denies any new symptoms. She continues on Tysabri every 6 weeks. JCV last checked 1/24/24 and negative with index 0.35. MRI brain completed 3/8/2024 and stable. I rechecked her vit D and B12 levels following last visit.  Labs 2/21/24 B12 = 350, vit D = 9.  She was advised OTC B12 1000mcg daily and Rx for ergocalciferol 50,000IU weekly x 12 weeks was sent in for D deficiency.  However, she says she was not aware of these results and recommendations and not currently taking any B12 or vit D.  She reports she is tired a lot.      The following portions of the patient's history were reviewed and updated as appropriate: current medications, past family history, past medical history, past social history, past surgical history, and problem list.       Objective:    Blood pressure 120/59, pulse 85, temperature 97.6 °F (36.4  "°C), temperature source Temporal, height 5' 5\" (1.651 m), weight 99.8 kg (220 lb), SpO2 97%, not currently breastfeeding.    Physical Exam  Constitutional:       Appearance: Normal appearance.   Eyes:      Extraocular Movements: EOM normal.      Pupils: Pupils are equal, round, and reactive to light.   Neurological:      Mental Status: She is alert.      Motor: Motor strength is normal.     Deep Tendon Reflexes: Reflexes are normal and symmetric.   Psychiatric:         Mood and Affect: Mood normal.         Speech: Speech normal.         Behavior: Behavior normal.         Neurological Exam  Mental Status  Alert. Oriented to person, place, time and situation. Speech is normal. Language is fluent with no aphasia. Attention and concentration are normal.    Cranial Nerves  CN II: Visual fields full to confrontation.  CN III, IV, VI: Extraocular movements intact bilaterally. Pupils equal round and reactive to light bilaterally.  CN V: Facial sensation is normal.  CN VII: Full and symmetric facial movement.  CN VIII: Hearing is normal.  CN IX, X: Palate elevates symmetrically  CN XI: Shoulder shrug strength is normal.  CN XII: Tongue midline without atrophy or fasciculations.    Motor   Normal muscle tone. Strength is 5/5 throughout all four extremities.    Sensory  Light touch is normal in upper and lower extremities.     Reflexes  Deep tendon reflexes are 2+ and symmetric in all four extremities.    Coordination  Right: Finger-to-nose normal.Left: Finger-to-nose normal.    Gait  Casual gait is normal including stance, stride, and arm swing.        ROS:    Review of Systems   Constitutional:  Positive for fatigue.   HENT: Negative.     Eyes: Negative.    Respiratory: Negative.     Cardiovascular: Negative.    Gastrointestinal: Negative.    Endocrine: Negative.    Genitourinary: Negative.    Musculoskeletal: Negative.    Skin: Negative.    Allergic/Immunologic: Negative.    Neurological:  Positive for speech difficulty, " numbness (arms & legs) and headaches.   Hematological: Negative.    Psychiatric/Behavioral: Negative.     All other systems reviewed and are negative.    I personally reviewed and updated the ROS as appropriate

## 2024-06-26 ENCOUNTER — HOSPITAL ENCOUNTER (OUTPATIENT)
Dept: INFUSION CENTER | Facility: CLINIC | Age: 44
Discharge: HOME | End: 2024-06-26
Payer: COMMERCIAL

## 2024-06-26 DIAGNOSIS — G35 MULTIPLE SCLEROSIS (HCC): Primary | ICD-10-CM

## 2024-06-26 LAB
ALBUMIN SERPL BCG-MCNC: 4 G/DL (ref 3.5–5)
ALP SERPL-CCNC: 68 U/L (ref 34–104)
ALT SERPL W P-5'-P-CCNC: 11 U/L (ref 7–52)
ANION GAP SERPL CALCULATED.3IONS-SCNC: 5 MMOL/L (ref 4–13)
AST SERPL W P-5'-P-CCNC: 14 U/L (ref 13–39)
BASOPHILS # BLD AUTO: 0.04 THOUSANDS/ÂΜL (ref 0–0.1)
BASOPHILS NFR BLD AUTO: 1 % (ref 0–1)
BILIRUB SERPL-MCNC: 0.21 MG/DL (ref 0.2–1)
BUN SERPL-MCNC: 15 MG/DL (ref 5–25)
CALCIUM SERPL-MCNC: 9.4 MG/DL (ref 8.4–10.2)
CHLORIDE SERPL-SCNC: 106 MMOL/L (ref 96–108)
CO2 SERPL-SCNC: 26 MMOL/L (ref 21–32)
CREAT SERPL-MCNC: 0.61 MG/DL (ref 0.6–1.3)
EOSINOPHIL # BLD AUTO: 0.65 THOUSAND/ÂΜL (ref 0–0.61)
EOSINOPHIL NFR BLD AUTO: 8 % (ref 0–6)
ERYTHROCYTE [DISTWIDTH] IN BLOOD BY AUTOMATED COUNT: 16.3 % (ref 11.6–15.1)
GFR SERPL CREATININE-BSD FRML MDRD: 111 ML/MIN/1.73SQ M
GLUCOSE SERPL-MCNC: 97 MG/DL (ref 65–140)
HCT VFR BLD AUTO: 35.9 % (ref 34.8–46.1)
HGB BLD-MCNC: 11.6 G/DL (ref 11.5–15.4)
IMM GRANULOCYTES # BLD AUTO: 0.04 THOUSAND/UL (ref 0–0.2)
IMM GRANULOCYTES NFR BLD AUTO: 1 % (ref 0–2)
LYMPHOCYTES # BLD AUTO: 2.83 THOUSANDS/ÂΜL (ref 0.6–4.47)
LYMPHOCYTES NFR BLD AUTO: 33 % (ref 14–44)
MCH RBC QN AUTO: 31.4 PG (ref 26.8–34.3)
MCHC RBC AUTO-ENTMCNC: 32.3 G/DL (ref 31.4–37.4)
MCV RBC AUTO: 97 FL (ref 82–98)
MONOCYTES # BLD AUTO: 0.57 THOUSAND/ÂΜL (ref 0.17–1.22)
MONOCYTES NFR BLD AUTO: 7 % (ref 4–12)
NEUTROPHILS # BLD AUTO: 4.41 THOUSANDS/ÂΜL (ref 1.85–7.62)
NEUTS SEG NFR BLD AUTO: 50 % (ref 43–75)
NRBC BLD AUTO-RTO: 0 /100 WBCS
PLATELET # BLD AUTO: 262 THOUSANDS/UL (ref 149–390)
PMV BLD AUTO: 9.9 FL (ref 8.9–12.7)
POTASSIUM SERPL-SCNC: 4.2 MMOL/L (ref 3.5–5.3)
PROT SERPL-MCNC: 6.4 G/DL (ref 6.4–8.4)
RBC # BLD AUTO: 3.69 MILLION/UL (ref 3.81–5.12)
SODIUM SERPL-SCNC: 137 MMOL/L (ref 135–147)
WBC # BLD AUTO: 8.54 THOUSAND/UL (ref 4.31–10.16)

## 2024-06-26 PROCEDURE — 80053 COMPREHEN METABOLIC PANEL: CPT | Performed by: PSYCHIATRY & NEUROLOGY

## 2024-06-26 PROCEDURE — 96365 THER/PROPH/DIAG IV INF INIT: CPT

## 2024-06-26 PROCEDURE — 85025 COMPLETE CBC W/AUTO DIFF WBC: CPT | Performed by: PSYCHIATRY & NEUROLOGY

## 2024-06-26 RX ORDER — SODIUM CHLORIDE 9 MG/ML
20 INJECTION, SOLUTION INTRAVENOUS ONCE
Status: COMPLETED | OUTPATIENT
Start: 2024-06-26 | End: 2024-06-26

## 2024-06-26 RX ORDER — SODIUM CHLORIDE 9 MG/ML
20 INJECTION, SOLUTION INTRAVENOUS ONCE
OUTPATIENT
Start: 2024-08-07

## 2024-06-26 RX ORDER — ACETAMINOPHEN 325 MG/1
650 TABLET ORAL ONCE
Status: COMPLETED | OUTPATIENT
Start: 2024-06-26 | End: 2024-06-26

## 2024-06-26 RX ORDER — ACETAMINOPHEN 325 MG/1
650 TABLET ORAL ONCE
OUTPATIENT
Start: 2024-08-07 | End: 2024-08-07

## 2024-06-26 RX ADMIN — SODIUM CHLORIDE 20 ML/HR: 0.9 INJECTION, SOLUTION INTRAVENOUS at 09:25

## 2024-06-26 RX ADMIN — NATALIZUMAB 300 MG: 300 INJECTION INTRAVENOUS at 09:49

## 2024-06-26 RX ADMIN — ACETAMINOPHEN 650 MG: 325 TABLET ORAL at 09:28

## 2024-06-26 NOTE — PROGRESS NOTES
Patient arrived on unit for Tysabri. Tysabri preinfusion checklist reviewed with patient. Patient cleared for Tysabri today. Patient tolerated infusion and remained additional hour for observation. No discomforts/issues. Patient made aware of next appointment. AVS declined, next appt scheduled for August 7 0830

## 2024-07-17 ENCOUNTER — APPOINTMENT (OUTPATIENT)
Dept: LAB | Facility: HOSPITAL | Age: 44
End: 2024-07-17
Payer: COMMERCIAL

## 2024-07-17 DIAGNOSIS — G35 MULTIPLE SCLEROSIS (HCC): ICD-10-CM

## 2024-07-17 DIAGNOSIS — E55.9 VITAMIN D DEFICIENCY: ICD-10-CM

## 2024-07-17 DIAGNOSIS — E53.8 DEFICIENCY OF VITAMIN B12: ICD-10-CM

## 2024-07-17 LAB
25(OH)D3 SERPL-MCNC: 21.3 NG/ML (ref 30–100)
VIT B12 SERPL-MCNC: 412 PG/ML (ref 180–914)

## 2024-07-17 PROCEDURE — 86711 JOHN CUNNINGHAM ANTIBODY: CPT

## 2024-07-17 PROCEDURE — 36415 COLL VENOUS BLD VENIPUNCTURE: CPT

## 2024-07-17 PROCEDURE — 82306 VITAMIN D 25 HYDROXY: CPT

## 2024-07-17 PROCEDURE — 82607 VITAMIN B-12: CPT

## 2024-07-18 DIAGNOSIS — E55.9 VITAMIN D DEFICIENCY: ICD-10-CM

## 2024-07-18 RX ORDER — ERGOCALCIFEROL 1.25 MG/1
50000 CAPSULE ORAL WEEKLY
Qty: 8 CAPSULE | Refills: 0 | Status: SHIPPED | OUTPATIENT
Start: 2024-07-18

## 2024-07-26 ENCOUNTER — TELEPHONE (OUTPATIENT)
Dept: NEUROLOGY | Facility: CLINIC | Age: 44
End: 2024-07-26

## 2024-07-26 NOTE — TELEPHONE ENCOUNTER
Pt is scheduled for Tysabri on 8/7/24.   Last infusion 6/26/24, infusions every 6 weeks.  Confirmed scheduling is correct.     Last CBC/CMP 6/26/24  Last JCV 1/24/24 0.35 (final result negative)  Last MRI brain 3/8/24     Updated JCV results in process.     Okay to proceed with infusion?     Tysabri is approved with [facility] from 10/18/23 to 10/18/24 for 13 visits. Auth # [id number].     Touch auth valid until 9/13/24 at [facility].

## 2024-07-30 LAB
GALACTOMANNAN AG SERPL IA-ACNC: 0.27
JCPYV AB SERPL QL IA: ABNORMAL
JCPYV AB SERPL QL IA: ABNORMAL
SL AMB JCV AB BY INHIBITION: NEGATIVE
SPECIMEN STATUS: ABNORMAL

## 2024-08-06 ENCOUNTER — TELEPHONE (OUTPATIENT)
Age: 44
End: 2024-08-06

## 2024-08-06 DIAGNOSIS — Z12.31 ENCOUNTER FOR SCREENING MAMMOGRAM FOR MALIGNANT NEOPLASM OF BREAST: Primary | ICD-10-CM

## 2024-08-07 ENCOUNTER — HOSPITAL ENCOUNTER (OUTPATIENT)
Dept: INFUSION CENTER | Facility: CLINIC | Age: 44
Discharge: HOME | End: 2024-08-07
Payer: COMMERCIAL

## 2024-08-07 VITALS
SYSTOLIC BLOOD PRESSURE: 116 MMHG | TEMPERATURE: 96.7 F | HEART RATE: 77 BPM | RESPIRATION RATE: 18 BRPM | DIASTOLIC BLOOD PRESSURE: 77 MMHG

## 2024-08-07 DIAGNOSIS — G35 MULTIPLE SCLEROSIS (HCC): Primary | ICD-10-CM

## 2024-08-07 LAB
ALBUMIN SERPL BCG-MCNC: 3.5 G/DL (ref 3.5–5)
ALP SERPL-CCNC: 52 U/L (ref 34–104)
ALT SERPL W P-5'-P-CCNC: 10 U/L (ref 7–52)
ANION GAP SERPL CALCULATED.3IONS-SCNC: 4 MMOL/L (ref 4–13)
AST SERPL W P-5'-P-CCNC: 11 U/L (ref 13–39)
BASOPHILS # BLD AUTO: 0.04 THOUSANDS/ÂΜL (ref 0–0.1)
BASOPHILS NFR BLD AUTO: 1 % (ref 0–1)
BILIRUB SERPL-MCNC: 0.22 MG/DL (ref 0.2–1)
BUN SERPL-MCNC: 11 MG/DL (ref 5–25)
CALCIUM SERPL-MCNC: 8.9 MG/DL (ref 8.4–10.2)
CHLORIDE SERPL-SCNC: 108 MMOL/L (ref 96–108)
CO2 SERPL-SCNC: 25 MMOL/L (ref 21–32)
CREAT SERPL-MCNC: 0.58 MG/DL (ref 0.6–1.3)
EOSINOPHIL # BLD AUTO: 0.4 THOUSAND/ÂΜL (ref 0–0.61)
EOSINOPHIL NFR BLD AUTO: 5 % (ref 0–6)
ERYTHROCYTE [DISTWIDTH] IN BLOOD BY AUTOMATED COUNT: 16.5 % (ref 11.6–15.1)
GFR SERPL CREATININE-BSD FRML MDRD: 113 ML/MIN/1.73SQ M
GLUCOSE SERPL-MCNC: 101 MG/DL (ref 65–140)
HCT VFR BLD AUTO: 34.1 % (ref 34.8–46.1)
HGB BLD-MCNC: 10.8 G/DL (ref 11.5–15.4)
IMM GRANULOCYTES # BLD AUTO: 0.03 THOUSAND/UL (ref 0–0.2)
IMM GRANULOCYTES NFR BLD AUTO: 0 % (ref 0–2)
LYMPHOCYTES # BLD AUTO: 2.09 THOUSANDS/ÂΜL (ref 0.6–4.47)
LYMPHOCYTES NFR BLD AUTO: 28 % (ref 14–44)
MCH RBC QN AUTO: 30.9 PG (ref 26.8–34.3)
MCHC RBC AUTO-ENTMCNC: 31.7 G/DL (ref 31.4–37.4)
MCV RBC AUTO: 97 FL (ref 82–98)
MONOCYTES # BLD AUTO: 0.37 THOUSAND/ÂΜL (ref 0.17–1.22)
MONOCYTES NFR BLD AUTO: 5 % (ref 4–12)
NEUTROPHILS # BLD AUTO: 4.55 THOUSANDS/ÂΜL (ref 1.85–7.62)
NEUTS SEG NFR BLD AUTO: 61 % (ref 43–75)
NRBC BLD AUTO-RTO: 0 /100 WBCS
PLATELET # BLD AUTO: 231 THOUSANDS/UL (ref 149–390)
PMV BLD AUTO: 10 FL (ref 8.9–12.7)
POTASSIUM SERPL-SCNC: 4.2 MMOL/L (ref 3.5–5.3)
PROT SERPL-MCNC: 6.1 G/DL (ref 6.4–8.4)
RBC # BLD AUTO: 3.5 MILLION/UL (ref 3.81–5.12)
SODIUM SERPL-SCNC: 137 MMOL/L (ref 135–147)
WBC # BLD AUTO: 7.48 THOUSAND/UL (ref 4.31–10.16)

## 2024-08-07 PROCEDURE — 80053 COMPREHEN METABOLIC PANEL: CPT | Performed by: PSYCHIATRY & NEUROLOGY

## 2024-08-07 PROCEDURE — 85025 COMPLETE CBC W/AUTO DIFF WBC: CPT | Performed by: PSYCHIATRY & NEUROLOGY

## 2024-08-07 PROCEDURE — 96365 THER/PROPH/DIAG IV INF INIT: CPT

## 2024-08-07 RX ORDER — ACETAMINOPHEN 325 MG/1
650 TABLET ORAL ONCE
OUTPATIENT
Start: 2024-09-18 | End: 2024-09-18

## 2024-08-07 RX ORDER — SODIUM CHLORIDE 9 MG/ML
20 INJECTION, SOLUTION INTRAVENOUS ONCE
Status: COMPLETED | OUTPATIENT
Start: 2024-08-07 | End: 2024-08-07

## 2024-08-07 RX ORDER — SODIUM CHLORIDE 9 MG/ML
20 INJECTION, SOLUTION INTRAVENOUS ONCE
OUTPATIENT
Start: 2024-09-18

## 2024-08-07 RX ORDER — ACETAMINOPHEN 325 MG/1
650 TABLET ORAL ONCE
Status: COMPLETED | OUTPATIENT
Start: 2024-08-07 | End: 2024-08-07

## 2024-08-07 RX ADMIN — ACETAMINOPHEN 650 MG: 325 TABLET ORAL at 08:45

## 2024-08-07 RX ADMIN — SODIUM CHLORIDE 20 ML/HR: 0.9 INJECTION, SOLUTION INTRAVENOUS at 08:43

## 2024-08-07 RX ADMIN — NATALIZUMAB 300 MG: 300 INJECTION INTRAVENOUS at 09:09

## 2024-08-07 NOTE — PLAN OF CARE
Problem: Potential for Falls  Goal: Patient will remain free of falls  Description: INTERVENTIONS:  - Educate patient/family on patient safety including physical limitations  - Instruct patient to call for assistance with activity   - Consult OT/PT to assist with strengthening/mobility   - Keep Call bell within reach  - Keep bed low and locked with side rails adjusted as appropriate  - Keep care items and personal belongings within reach  - Initiate and maintain comfort rounds  - Consider moving patient to room near nurses station  8/7/2024 1139 by Fercho Ward RN  Outcome: Completed  8/7/2024 0942 by Fercho Ward, RN  Outcome: Progressing

## 2024-08-07 NOTE — PROGRESS NOTES
Maribel Kathleen  tolerated Tysabri well with no complications. Touch Online Questionnaire completed.    Maribel Kathleen is aware of future appt on 9/18/24 @ 0800.     AVS declined by Maribel Kathleen.

## 2024-08-13 ENCOUNTER — TELEPHONE (OUTPATIENT)
Dept: NEUROLOGY | Facility: CLINIC | Age: 44
End: 2024-08-13

## 2024-08-13 NOTE — TELEPHONE ENCOUNTER
Fax received is questionnaire for TOUCH program.     Tysabri TOUCH reauthorization submitted via portal, valid until 3/15/25 at [facility].

## 2024-09-05 ENCOUNTER — TELEPHONE (OUTPATIENT)
Dept: NEUROLOGY | Facility: CLINIC | Age: 44
End: 2024-09-05

## 2024-09-05 DIAGNOSIS — G35 MULTIPLE SCLEROSIS (HCC): Primary | ICD-10-CM

## 2024-09-05 NOTE — TELEPHONE ENCOUNTER
September 4, 2024  Nuria George PA-C   to Me       9/4/24  2:38 PM  Ok with me since patient has been on Tysabri for so long           9/4/24  9:24 AM  Bibiana Gould RN routed this conversation to Nuria George PA-C  Me  Maribel Kathleen   to  [facility] (supporting Nuria George PA-C)         9/4/24  8:32 AM  Gonzalo Quiñones, I was wondering if the 1hr observation time for infusion can be discontinued so I don't have to take the days off of work.

## 2024-09-06 ENCOUNTER — TELEPHONE (OUTPATIENT)
Dept: NEUROLOGY | Facility: CLINIC | Age: 44
End: 2024-09-06

## 2024-09-06 NOTE — TELEPHONE ENCOUNTER
Pt is scheduled for Tysabri on 9/18/24.   Last infusion 8/7/24, infusions every 6 weeks.  Confirmed scheduling is correct.     Last CBC/CMP 8/7/24  Last JCV 7/17/24 0.27 (final result negative)  Last MRI brain 3/8/24     Okay to proceed with infusion?     Tysabri is approved with [facility] from 10/18/23 to 10/18/24 for 13 visits. Auth # [id number].     Touch auth valid until 3/15/25 at [facility].    *Will need new PA after this infusion.

## 2024-09-18 ENCOUNTER — HOSPITAL ENCOUNTER (OUTPATIENT)
Dept: INFUSION CENTER | Facility: CLINIC | Age: 44
Discharge: HOME | End: 2024-09-18
Payer: COMMERCIAL

## 2024-09-18 VITALS
RESPIRATION RATE: 16 BRPM | SYSTOLIC BLOOD PRESSURE: 137 MMHG | TEMPERATURE: 97.6 F | HEART RATE: 76 BPM | DIASTOLIC BLOOD PRESSURE: 83 MMHG

## 2024-09-18 DIAGNOSIS — G35 MULTIPLE SCLEROSIS (HCC): Primary | ICD-10-CM

## 2024-09-18 LAB
ALBUMIN SERPL BCG-MCNC: 3.9 G/DL (ref 3.5–5)
ALP SERPL-CCNC: 66 U/L (ref 34–104)
ALT SERPL W P-5'-P-CCNC: 16 U/L (ref 7–52)
ANION GAP SERPL CALCULATED.3IONS-SCNC: 5 MMOL/L (ref 4–13)
AST SERPL W P-5'-P-CCNC: 23 U/L (ref 13–39)
BASOPHILS # BLD AUTO: 0.03 THOUSANDS/ΜL (ref 0–0.1)
BASOPHILS NFR BLD AUTO: 0 % (ref 0–1)
BILIRUB SERPL-MCNC: 0.29 MG/DL (ref 0.2–1)
BUN SERPL-MCNC: 11 MG/DL (ref 5–25)
CALCIUM SERPL-MCNC: 9.6 MG/DL (ref 8.4–10.2)
CHLORIDE SERPL-SCNC: 104 MMOL/L (ref 96–108)
CO2 SERPL-SCNC: 27 MMOL/L (ref 21–32)
CREAT SERPL-MCNC: 0.59 MG/DL (ref 0.6–1.3)
EOSINOPHIL # BLD AUTO: 0.5 THOUSAND/ΜL (ref 0–0.61)
EOSINOPHIL NFR BLD AUTO: 6 % (ref 0–6)
ERYTHROCYTE [DISTWIDTH] IN BLOOD BY AUTOMATED COUNT: 15.8 % (ref 11.6–15.1)
GFR SERPL CREATININE-BSD FRML MDRD: 112 ML/MIN/1.73SQ M
GLUCOSE SERPL-MCNC: 81 MG/DL (ref 65–140)
HCT VFR BLD AUTO: 35.8 % (ref 34.8–46.1)
HGB BLD-MCNC: 11.7 G/DL (ref 11.5–15.4)
IMM GRANULOCYTES # BLD AUTO: 0.03 THOUSAND/UL (ref 0–0.2)
IMM GRANULOCYTES NFR BLD AUTO: 0 % (ref 0–2)
LYMPHOCYTES # BLD AUTO: 2.89 THOUSANDS/ΜL (ref 0.6–4.47)
LYMPHOCYTES NFR BLD AUTO: 35 % (ref 14–44)
MCH RBC QN AUTO: 32.6 PG (ref 26.8–34.3)
MCHC RBC AUTO-ENTMCNC: 32.7 G/DL (ref 31.4–37.4)
MCV RBC AUTO: 100 FL (ref 82–98)
MONOCYTES # BLD AUTO: 0.47 THOUSAND/ΜL (ref 0.17–1.22)
MONOCYTES NFR BLD AUTO: 6 % (ref 4–12)
NEUTROPHILS # BLD AUTO: 4.26 THOUSANDS/ΜL (ref 1.85–7.62)
NEUTS SEG NFR BLD AUTO: 53 % (ref 43–75)
NRBC BLD AUTO-RTO: 0 /100 WBCS
PLATELET # BLD AUTO: 258 THOUSANDS/UL (ref 149–390)
PMV BLD AUTO: 10.5 FL (ref 8.9–12.7)
POTASSIUM SERPL-SCNC: 4.7 MMOL/L (ref 3.5–5.3)
PROT SERPL-MCNC: 6.5 G/DL (ref 6.4–8.4)
RBC # BLD AUTO: 3.59 MILLION/UL (ref 3.81–5.12)
SODIUM SERPL-SCNC: 136 MMOL/L (ref 135–147)
WBC # BLD AUTO: 8.18 THOUSAND/UL (ref 4.31–10.16)

## 2024-09-18 PROCEDURE — 96365 THER/PROPH/DIAG IV INF INIT: CPT

## 2024-09-18 PROCEDURE — 85025 COMPLETE CBC W/AUTO DIFF WBC: CPT | Performed by: PSYCHIATRY & NEUROLOGY

## 2024-09-18 PROCEDURE — 80053 COMPREHEN METABOLIC PANEL: CPT | Performed by: PSYCHIATRY & NEUROLOGY

## 2024-09-18 RX ORDER — ACETAMINOPHEN 325 MG/1
650 TABLET ORAL ONCE
Status: COMPLETED | OUTPATIENT
Start: 2024-09-18 | End: 2024-09-18

## 2024-09-18 RX ORDER — SODIUM CHLORIDE 9 MG/ML
20 INJECTION, SOLUTION INTRAVENOUS ONCE
OUTPATIENT
Start: 2024-10-30

## 2024-09-18 RX ORDER — SODIUM CHLORIDE 9 MG/ML
20 INJECTION, SOLUTION INTRAVENOUS ONCE
Status: COMPLETED | OUTPATIENT
Start: 2024-09-18 | End: 2024-09-18

## 2024-09-18 RX ORDER — ACETAMINOPHEN 325 MG/1
650 TABLET ORAL ONCE
OUTPATIENT
Start: 2024-10-30 | End: 2024-10-30

## 2024-09-18 RX ADMIN — NATALIZUMAB 300 MG: 300 INJECTION INTRAVENOUS at 08:48

## 2024-09-18 RX ADMIN — SODIUM CHLORIDE 20 ML/HR: 0.9 INJECTION, SOLUTION INTRAVENOUS at 08:30

## 2024-09-18 RX ADMIN — ACETAMINOPHEN 650 MG: 325 TABLET ORAL at 08:30

## 2024-09-18 NOTE — PROGRESS NOTES
Patient presents for Tysabri treatment today. Touch online checklist completed. Patient tolerated treatment without incident. Declines AVS aware of next appointment on 10/31 @ 8am

## 2024-10-09 ENCOUNTER — TELEPHONE (OUTPATIENT)
Dept: NEUROLOGY | Facility: CLINIC | Age: 44
End: 2024-10-09

## 2024-10-09 NOTE — TELEPHONE ENCOUNTER
Pt is scheduled for next Tysabri infusion on 10/31/24. New PA is needed.     Faxed with clinicals. Awaiting determination.

## 2024-10-11 NOTE — TELEPHONE ENCOUNTER
Tysabri is approved with [facility] from 10/31/24 to 10/31/25 for 9 doses. Auth # [id number].

## 2024-10-12 ENCOUNTER — HOSPITAL ENCOUNTER (OUTPATIENT)
Dept: MAMMOGRAPHY | Facility: MEDICAL CENTER | Age: 44
Discharge: HOME | End: 2024-10-12
Payer: COMMERCIAL

## 2024-10-12 VITALS — HEIGHT: 65 IN | WEIGHT: 220 LBS | BODY MASS INDEX: 36.65 KG/M2

## 2024-10-12 DIAGNOSIS — Z12.31 ENCOUNTER FOR SCREENING MAMMOGRAM FOR MALIGNANT NEOPLASM OF BREAST: ICD-10-CM

## 2024-10-12 PROCEDURE — 77063 BREAST TOMOSYNTHESIS BI: CPT

## 2024-10-12 PROCEDURE — 77067 SCR MAMMO BI INCL CAD: CPT

## 2024-10-18 ENCOUNTER — TELEPHONE (OUTPATIENT)
Dept: NEUROLOGY | Facility: CLINIC | Age: 44
End: 2024-10-18

## 2024-10-18 NOTE — TELEPHONE ENCOUNTER
Pt is scheduled for Tysabri on 10/31/24.   Last infusion 9/18/24, infusions every 6 weeks.  Confirmed scheduling is correct (+1 day).     Last CBC/CMP 9/18/24  Last JCV 7/17/24 0.27 (final result negative)  Last MRI brain 3/8/24     Okay to proceed with infusion?     Tysabri is approved with [facility] from 10/31/24 to 10/31/25 for 9 doses. Auth # [id number].     Touch auth valid until 3/15/25 at [facility] center.

## 2024-10-21 ENCOUNTER — OFFICE VISIT (OUTPATIENT)
Dept: NEUROLOGY | Facility: CLINIC | Age: 44
End: 2024-10-21
Payer: COMMERCIAL

## 2024-10-21 VITALS
SYSTOLIC BLOOD PRESSURE: 122 MMHG | BODY MASS INDEX: 37.02 KG/M2 | RESPIRATION RATE: 18 BRPM | OXYGEN SATURATION: 99 % | TEMPERATURE: 97.5 F | WEIGHT: 222.2 LBS | HEART RATE: 89 BPM | DIASTOLIC BLOOD PRESSURE: 60 MMHG | HEIGHT: 65 IN

## 2024-10-21 DIAGNOSIS — G35 MULTIPLE SCLEROSIS (HCC): Primary | ICD-10-CM

## 2024-10-21 DIAGNOSIS — R93.89 ABNORMAL COMPUTED TOMOGRAPHY ANGIOGRAPHY (CTA): ICD-10-CM

## 2024-10-21 DIAGNOSIS — E55.9 VITAMIN D DEFICIENCY: ICD-10-CM

## 2024-10-21 DIAGNOSIS — E53.8 DEFICIENCY OF VITAMIN B12: ICD-10-CM

## 2024-10-21 PROCEDURE — 99214 OFFICE O/P EST MOD 30 MIN: CPT | Performed by: PHYSICIAN ASSISTANT

## 2024-10-21 NOTE — ASSESSMENT & PLAN NOTE
Finished Rx vit D.  Currently taking OTC vit D3 4,000IU daily.  Will recheck a level prior to next visit

## 2024-10-21 NOTE — PATIENT INSTRUCTIONS
Continue Tysabri every 6 weeks  Check B12, vit D and JCV in January  CTA head at this time  Return in 4 months

## 2024-10-21 NOTE — ASSESSMENT & PLAN NOTE
Patient had previously reported headaches with a family history of brain aneurysm in her half sister, therefore CTA was ordered. CTA head 11/4/22 showing 1-2 mm outpouching from the communicating segment of the left ICA may represent an infundibulum or tiny aneurysm.  Follow-up CT angiogram recommended in 6 months.    She has not had any follow up imaging in 2 years.  Repeat CTA head ordered today.

## 2024-10-21 NOTE — ASSESSMENT & PLAN NOTE
Advised patient to continue OTC B12 1000mcg daily.  Will recheck level prior to next visit.  B12 level should be maintained >400

## 2024-10-21 NOTE — ASSESSMENT & PLAN NOTE
Patient remains clinically stable at this time, no new or progressive MS symptoms. She continues on Tysabri every 6 weeks, tolerating well.     JCV checked 7/17/2024 and negative with index of 0.27 (first indeterminate)  --She will update JCV testing in Jan 2025     MRI brain updated 3/8/2024 and stable    Neurologic exam is stable today.  Continue Tysabri every 6 weeks.

## 2024-10-21 NOTE — PROGRESS NOTES
Ambulatory Visit  Name: Maribel Kathleen      : 1980      MRN: [id number]  Encounter Provider: Nuria George PA-C  Encounter Date: 10/21/2024   Encounter department: [facility]    Assessment & Plan  Multiple sclerosis (HCC)  Patient remains clinically stable at this time, no new or progressive MS symptoms. She continues on Tysabri every 6 weeks, tolerating well.     JCV checked 2024 and negative with index of 0.27 (first indeterminate)  --She will update JCV testing in 2025     MRI brain updated 3/8/2024 and stable    Neurologic exam is stable today.  Continue Tysabri every 6 weeks.          Deficiency of vitamin B12  Advised patient to continue OTC B12 1000mcg daily.  Will recheck level prior to next visit.  B12 level should be maintained >400       Vitamin D deficiency  Finished Rx vit D.  Currently taking OTC vit D3 4,000IU daily.  Will recheck a level prior to next visit        Abnormal computed tomography angiography (CTA)  Patient had previously reported headaches with a family history of brain aneurysm in her half sister, therefore CTA was ordered. CTA head 22 showing 1-2 mm outpouching from the communicating segment of the left ICA may represent an infundibulum or tiny aneurysm.  Follow-up CT angiogram recommended in 6 months.    She has not had any follow up imaging in 2 years.  Repeat CTA head ordered today.         Return in 4 months or sooner if needed      History of Present Illness   HPI  Patient is a 44 year old female who presents today for MS follow up, last seen in 2024.     To review, patient's symptoms began in 2015 when she had a work injury to the left shoulder. In 2015, she had left sided numbness going down her torso, down into her feet and legs. She also experienced urinary leakage without Valsalva. Patient then experienced right eye pain and fogginess of vision. She was seen by Dr. De Santiago and had an OCT done. It  revealed normal findings on the right but a sector defect on the left. MRI of the brain from May 13, 2015 revealed a few scattered nonspecific supratentorial WM lesions. No acute infarction, or hemorrhage or enh. MRI of the lumbar spine without contrast revealed small L5/S1 disc herniation, MRI of the T-spine revealed evidence of solitary 8mm lesion to the left of midline at T8/9 in the dorsal Cord. No pathological enh. MRI of the C-spine also done on June 13, 2015 revealed a solitary 5 mm focus of active demyelination in the right lateral cord at C2. Patient was given 3 days of IV steroids. Patient with significant GI upset with the steroids and also some mild change in mood with increased anxiety. She was NMO negative and Lyme negative. She did not have an LP. Patient was started on Copaxone as her initial DMT in 2015. She had a brief interval of being off med due to insurance. However, due to site reactions, patient stopped Copaxone and started Tecfidera in May 2016. Patient was subsequently told to stop her Tecfidera due to unplanned pregnancy in Nov 2017. Patient delivered her daughter on 7/18/18 without complication. She also had a tubal ligation. She restarted Tecfidera in January 2019 after she was done breastfeeding.     Patient had c/o low back pain as well as left hip pain. She had an x-ray of the lumbar spine which showed moderate disc narrowing at L5/S1. She saw Orthopedics who agreed with MRI lumbar spine, however this was not approved by her insurance. She then did a trial of PT from 10/28/2019 through 11/20/2019. She also had an EMG of the left lower extremity on 11/27/2019, which was a normal study.  MRI lumbar spine was completed on 2/7/2020.  This demonstrated moderate to large left paramedian protrusion/superior extrusion type disc herniation which results in central and lateral recess stenosis potentially impacting exiting left L5 nerve root or descending left S1 nerve root.  Patient was advised  to return to ortho, who she was already established with.  She has not seen ortho again.     Due to insurance reasons, she changed from brand name Tecfidera to generic dimethyl fumarate in December 2020.  Patient called our office on 3/12/21 reporting onset of tongue numbness on 3/8.  She then started experiencing numbness of her right lip and weakness of the right face on 3/11. On 3/12 she started to experience a bit of pain in the right face, which is why she called the office and then presented to the ED (prior to hearing back from our office).  Per the ER note, she had weakness of the entire right face, as well as decreased sensation of the entire right face.  Due to symptoms x 4 days, no stroke alert called, as well as this appeared like Bell's palsy.  I received a tiger text from the ED physician reporting her presentation was most concerning for Bell's palsy and asking if any further workup/treatment advised.  CTH negative.  Lyme negative, UA negative.  She had a brain MRI with attn to IACs (due to occasional vertigo and tinnitus) completed on 3/2/21 which showed stable MS, no pathology in the IACs.  She was discharged from the ED with a prednisone taper and valacyclovir.    She was seen in the office on 3/18/2021 and was doing ok, still with right facial weakness, decreased eye closure, tongue and facial numbness on the right, hyperacusis.       At timing of her visit on 6/8/21 she admitted that she was not compliant with her dimethyl fumarate, forgetting to take it, and in fact had not taken it at all since April 2021.  She was having trouble requesting a refill from her specialty pharmacy but never informed our office about this.  She had also stopped her gabapentin due to she was concerned it caused weight gain.       Alternative DMTs were discussed at visits in June 2021. Patient had JCV testing done and negative with index 0.33. Tysabri and Aubagio were discussed.     At timing of October 2021 visit  with Dr. Muniz, patient chose Aubagio. Start form was signed. Patient started Aubagio in Dec 2021 (7mg x 1 month then increase to 14mg).  She was on Aubagio for about 18 days and tested positive for COVID in Jan 2022 (she also had COVID prior to starting Aubagio in November 2021). She held the Aubagio for a few weeks until COVID symptoms improved and then restarted Aubagio 7mg before increase to 14mg.     At timing of patient's visit in April 2022 she informed me that she had stopped Aubagio, never called our office to report this. She stopped the medication because she noticed her hair was coming out while brushing her hair. She estimated that she took the medication for only a few weeks from late January to late February 2022. She reported stability of her MS symptoms, denied any new symptoms. We discussed either resuming dimethyl fumarate, Aubagio, or considering Tysabri.     She had labs done 4/21/22-JCV negative with index 0.22.  MRI brain completed 5/14/22 showed stable supratentorial white matter disease without progression. There was a new white matter lesion seen laterally in the upper cervical cord at the cervical medullary junction, eccentrically on the right, indicative of progressive demyelinating disease.     At timing of her visit in July 2022, she chose to initiate Tysabri. Updated JCV testing completed 7/25/22 and negative with index 0.28 (first indeterminate).      She had updated cord imaging on 8/19/22.  MRI c-spine compared to 1/14/19 with new demyelinating foci involving right lateral cord at cervicomedullary junction and mid/dorsal cord at level C3.  Stable punctuate focus within right lateral cord at level C2.  No active demyelination or cord atrophy.  MRI t-spine compared to 1/4/19 with new small demyelinating focus within left dorsolateral cord at level T11.  Stable small demyelinating focus at level T8-9.  No active demyelination or cord atrophy.     First Tysabri infusion was given  9/13/22.     She was seen by Dr. Burns for KIRSTEN due to Tysabri on 10/25/22. Due to JCV index being in indeterminate range, it was advised she have infusions every 6 weeks. Patient reported headaches with a family history of brain aneurysm in her half sister, therefore CTA was ordered. CTA head 11/4/22 showing 1-2 mm outpouching from the communicating segment of the left ICA may represent an infundibulum or tiny aneurysm.  Follow-up CT angiogram recommended in 6 months.     Today, patient reports things have been overall stable from a MS standpoint. She denies any new symptoms. She continues on Tysabri every 6 weeks. JCV last checked 7/17/24 and negative with index 0.27. MRI brain completed 3/8/2024 and stable.     Review of Systems   Constitutional: Negative.  Negative for fatigue and fever.   HENT: Negative.  Negative for hearing loss, tinnitus and trouble swallowing.    Eyes: Negative.  Negative for photophobia, pain and visual disturbance.   Respiratory: Negative.  Negative for cough and shortness of breath.    Cardiovascular: Negative.  Negative for chest pain and palpitations.   Gastrointestinal: Negative.  Negative for constipation, diarrhea, nausea and vomiting.   Endocrine: Negative.    Genitourinary: Negative.  Negative for difficulty urinating and urgency.   Musculoskeletal: Negative.  Negative for back pain, gait problem and neck pain.   Skin: Negative.  Negative for rash.   Allergic/Immunologic: Negative.    Neurological: Negative.  Negative for dizziness, tremors, seizures, syncope, speech difficulty, weakness, numbness and headaches.   Hematological: Negative.    Psychiatric/Behavioral: Negative.  Negative for decreased concentration and sleep disturbance. The patient is not nervous/anxious.      I have personally reviewed the MA's review of systems and made changes as necessary.    Current Outpatient Medications on File Prior to Visit   Medication Sig Dispense Refill    acetaminophen (TYLENOL)  "500 mg tablet Take 1 tablet (500 mg total) by mouth every 6 (six) hours as needed for mild pain 30 tablet 0    ergocalciferol (VITAMIN D2) 50,000 units Take 1 capsule (50,000 Units total) by mouth once a week 8 capsule 0    ibuprofen (MOTRIN) 600 mg tablet Take 1 tablet (600 mg total) by mouth every 6 (six) hours as needed for mild pain 30 tablet 0    natalizumab (Tysabri) 300 mg/15 mL Inject into a catheter in a vein      triamcinolone (KENALOG) 0.1 % ointment Apply topically 2 (two) times a day 30 g 0    albuterol (Proventil HFA) 90 mcg/act inhaler Inhale 2 puffs every 6 (six) hours as needed for wheezing (Patient not taking: Reported on 2/20/2024) 6.7 g 0    calcium citrate-vitamin D 315 mg-5 mcg tablet Take 1 tablet by mouth 2 (two) times a day (Patient not taking: Reported on 2/20/2024)      Cyanocobalamin 1000 MCG SUBL Place 1 tablet (1,000 mcg total) under the tongue daily (Patient not taking: Reported on 10/30/2023) 90 tablet 0    cyclobenzaprine (FLEXERIL) 10 mg tablet Take 1 tablet (10 mg total) by mouth daily at bedtime (Patient not taking: Reported on 6/20/2024) 30 tablet 1    fluticasone (FLONASE) 50 mcg/act nasal spray 1 spray into each nostril daily (Patient not taking: Reported on 1/24/2023) 16 g 1    ketorolac (TORADOL) 10 mg tablet Take 1 tablet (10 mg total) by mouth every 6 (six) hours as needed for moderate pain (Patient not taking: Reported on 5/11/2023) 10 tablet 0    magnesium Oxide (MAG-OX) 400 mg TABS Take 1 tablet (400 mg total) by mouth daily (Patient not taking: Reported on 6/20/2024) 90 tablet 1    Syringe/Needle, Disp, (SYRINGE 3CC/25GX1\") 25G X 1\" 3 ML MISC Administer B12 1000mcg IM once per week for three (3) weeks; then once per month for five (5) months (Patient not taking: Reported on 6/20/2024) 8 each 0     Current Facility-Administered Medications on File Prior to Visit   Medication Dose Route Frequency Provider Last Rate Last Admin    cyanocobalamin injection 1,000 mcg  1,000 " "mcg Intramuscular Q30 Days Luna Caruso MD   1,000 mcg at 11/20/23 1555    cyanocobalamin injection 1,000 mcg  1,000 mcg Intramuscular Q30 Days Luna Caruso MD   1,000 mcg at 10/20/23 1557      Objective     /60 (BP Location: Right arm, Patient Position: Sitting, Cuff Size: Large)   Pulse 89   Temp 97.5 °F (36.4 °C) (Temporal)   Resp 18   Ht 5' 5\" (1.651 m)   Wt 101 kg (222 lb 3.2 oz)   LMP 10/08/2024   SpO2 99%   BMI 36.98 kg/m²     Physical Exam  Constitutional:       Appearance: Normal appearance.   Eyes:      Extraocular Movements: EOM normal.      Pupils: Pupils are equal, round, and reactive to light.   Neurological:      Mental Status: She is alert and oriented to person, place, and time.      Motor: Motor strength is normal.     Coordination: Finger-Nose-Finger Test and Heel to Shin Test normal.      Gait: Gait is intact.   Psychiatric:         Mood and Affect: Mood normal.         Speech: Speech normal.         Behavior: Behavior normal.       Neurologic Exam     Mental Status   Oriented to person, place, and time.   Attention: normal. Concentration: normal.   Speech: speech is normal   Level of consciousness: alert  Normal comprehension.     Cranial Nerves     CN II   Visual fields full to confrontation.     CN III, IV, VI   Pupils are equal, round, and reactive to light.  Extraocular motions are normal.     CN V   Facial sensation intact.     CN VII   Facial expression full, symmetric.     CN VIII   CN VIII normal.     CN IX, X   CN IX normal.   CN X normal.     CN XI   CN XI normal.     CN XII   CN XII normal.     Motor Exam     Strength   Strength 5/5 throughout.     Sensory Exam   Light touch normal.     Gait, Coordination, and Reflexes     Gait  Gait: normal    Coordination   Finger to nose coordination: normal  Heel to shin coordination: normal    "

## 2024-10-26 ENCOUNTER — HOSPITAL ENCOUNTER (OUTPATIENT)
Dept: CT IMAGING | Facility: HOSPITAL | Age: 44
Discharge: HOME | End: 2024-10-26
Payer: COMMERCIAL

## 2024-10-26 DIAGNOSIS — R93.89 ABNORMAL COMPUTED TOMOGRAPHY ANGIOGRAPHY (CTA): ICD-10-CM

## 2024-10-26 PROCEDURE — 70496 CT ANGIOGRAPHY HEAD: CPT

## 2024-10-26 RX ADMIN — IOHEXOL 100 ML: 350 INJECTION, SOLUTION INTRAVENOUS at 09:37

## 2024-10-31 ENCOUNTER — HOSPITAL ENCOUNTER (OUTPATIENT)
Dept: INFUSION CENTER | Facility: CLINIC | Age: 44
Discharge: HOME | End: 2024-10-31
Payer: COMMERCIAL

## 2024-10-31 VITALS
SYSTOLIC BLOOD PRESSURE: 131 MMHG | DIASTOLIC BLOOD PRESSURE: 81 MMHG | TEMPERATURE: 97.1 F | HEART RATE: 79 BPM | RESPIRATION RATE: 16 BRPM

## 2024-10-31 DIAGNOSIS — G35 MULTIPLE SCLEROSIS (HCC): Primary | ICD-10-CM

## 2024-10-31 LAB
ALBUMIN SERPL BCG-MCNC: 3.8 G/DL (ref 3.5–5)
ALP SERPL-CCNC: 60 U/L (ref 34–104)
ALT SERPL W P-5'-P-CCNC: 10 U/L (ref 7–52)
ANION GAP SERPL CALCULATED.3IONS-SCNC: 5 MMOL/L (ref 4–13)
AST SERPL W P-5'-P-CCNC: 12 U/L (ref 13–39)
BASOPHILS # BLD AUTO: 0.04 THOUSANDS/ΜL (ref 0–0.1)
BASOPHILS NFR BLD AUTO: 1 % (ref 0–1)
BILIRUB SERPL-MCNC: 0.27 MG/DL (ref 0.2–1)
BUN SERPL-MCNC: 10 MG/DL (ref 5–25)
CALCIUM SERPL-MCNC: 9.1 MG/DL (ref 8.4–10.2)
CHLORIDE SERPL-SCNC: 105 MMOL/L (ref 96–108)
CO2 SERPL-SCNC: 25 MMOL/L (ref 21–32)
CREAT SERPL-MCNC: 0.67 MG/DL (ref 0.6–1.3)
EOSINOPHIL # BLD AUTO: 0.62 THOUSAND/ΜL (ref 0–0.61)
EOSINOPHIL NFR BLD AUTO: 8 % (ref 0–6)
ERYTHROCYTE [DISTWIDTH] IN BLOOD BY AUTOMATED COUNT: 15.6 % (ref 11.6–15.1)
GFR SERPL CREATININE-BSD FRML MDRD: 108 ML/MIN/1.73SQ M
GLUCOSE SERPL-MCNC: 97 MG/DL (ref 65–140)
HCT VFR BLD AUTO: 34.3 % (ref 34.8–46.1)
HGB BLD-MCNC: 11 G/DL (ref 11.5–15.4)
IMM GRANULOCYTES # BLD AUTO: 0.02 THOUSAND/UL (ref 0–0.2)
IMM GRANULOCYTES NFR BLD AUTO: 0 % (ref 0–2)
LYMPHOCYTES # BLD AUTO: 2.6 THOUSANDS/ΜL (ref 0.6–4.47)
LYMPHOCYTES NFR BLD AUTO: 33 % (ref 14–44)
MCH RBC QN AUTO: 31.4 PG (ref 26.8–34.3)
MCHC RBC AUTO-ENTMCNC: 32.1 G/DL (ref 31.4–37.4)
MCV RBC AUTO: 98 FL (ref 82–98)
MONOCYTES # BLD AUTO: 0.55 THOUSAND/ΜL (ref 0.17–1.22)
MONOCYTES NFR BLD AUTO: 7 % (ref 4–12)
NEUTROPHILS # BLD AUTO: 4.03 THOUSANDS/ΜL (ref 1.85–7.62)
NEUTS SEG NFR BLD AUTO: 51 % (ref 43–75)
NRBC BLD AUTO-RTO: 0 /100 WBCS
PLATELET # BLD AUTO: 247 THOUSANDS/UL (ref 149–390)
PMV BLD AUTO: 10.1 FL (ref 8.9–12.7)
POTASSIUM SERPL-SCNC: 4 MMOL/L (ref 3.5–5.3)
PROT SERPL-MCNC: 6.4 G/DL (ref 6.4–8.4)
RBC # BLD AUTO: 3.5 MILLION/UL (ref 3.81–5.12)
SODIUM SERPL-SCNC: 135 MMOL/L (ref 135–147)
WBC # BLD AUTO: 7.86 THOUSAND/UL (ref 4.31–10.16)

## 2024-10-31 PROCEDURE — 80053 COMPREHEN METABOLIC PANEL: CPT | Performed by: PSYCHIATRY & NEUROLOGY

## 2024-10-31 PROCEDURE — 85025 COMPLETE CBC W/AUTO DIFF WBC: CPT | Performed by: PSYCHIATRY & NEUROLOGY

## 2024-10-31 PROCEDURE — 96365 THER/PROPH/DIAG IV INF INIT: CPT

## 2024-10-31 RX ORDER — SODIUM CHLORIDE 9 MG/ML
20 INJECTION, SOLUTION INTRAVENOUS ONCE
OUTPATIENT
Start: 2024-12-11

## 2024-10-31 RX ORDER — ACETAMINOPHEN 325 MG/1
650 TABLET ORAL ONCE
OUTPATIENT
Start: 2024-12-11 | End: 2024-12-11

## 2024-10-31 RX ORDER — SODIUM CHLORIDE 9 MG/ML
20 INJECTION, SOLUTION INTRAVENOUS ONCE
Status: COMPLETED | OUTPATIENT
Start: 2024-10-31 | End: 2024-10-31

## 2024-10-31 RX ORDER — ACETAMINOPHEN 325 MG/1
650 TABLET ORAL ONCE
Status: COMPLETED | OUTPATIENT
Start: 2024-10-31 | End: 2024-10-31

## 2024-10-31 RX ADMIN — ACETAMINOPHEN 650 MG: 325 TABLET ORAL at 08:27

## 2024-10-31 RX ADMIN — SODIUM CHLORIDE 20 ML/HR: 0.9 INJECTION, SOLUTION INTRAVENOUS at 08:28

## 2024-10-31 RX ADMIN — NATALIZUMAB 300 MG: 300 INJECTION INTRAVENOUS at 08:53

## 2024-10-31 NOTE — PROGRESS NOTES
Maribel Kathleen  tolerated Tysabri without incident. Tysabri pre-infusion checklist completed.     Maribel Kathleen is aware of future appt on 12/12 at 8am.     AVS printed and given to Maribel Kathleen:    No (Declined by Maribel Kathleen)

## 2024-11-15 ENCOUNTER — APPOINTMENT (OUTPATIENT)
Dept: RADIOLOGY | Age: 44
End: 2024-11-15
Payer: COMMERCIAL

## 2024-11-15 ENCOUNTER — TELEPHONE (OUTPATIENT)
Dept: OBGYN CLINIC | Facility: CLINIC | Age: 44
End: 2024-11-15

## 2024-11-15 ENCOUNTER — OFFICE VISIT (OUTPATIENT)
Dept: URGENT CARE | Age: 44
End: 2024-11-15
Payer: COMMERCIAL

## 2024-11-15 VITALS
SYSTOLIC BLOOD PRESSURE: 132 MMHG | TEMPERATURE: 98.9 F | OXYGEN SATURATION: 97 % | HEART RATE: 72 BPM | RESPIRATION RATE: 18 BRPM | DIASTOLIC BLOOD PRESSURE: 72 MMHG

## 2024-11-15 DIAGNOSIS — M25.572 ACUTE LEFT ANKLE PAIN: Primary | ICD-10-CM

## 2024-11-15 DIAGNOSIS — M25.561 ACUTE PAIN OF RIGHT KNEE: ICD-10-CM

## 2024-11-15 PROCEDURE — 73564 X-RAY EXAM KNEE 4 OR MORE: CPT

## 2024-11-15 PROCEDURE — 73610 X-RAY EXAM OF ANKLE: CPT

## 2024-11-15 PROCEDURE — 99213 OFFICE O/P EST LOW 20 MIN: CPT | Performed by: EMERGENCY MEDICINE

## 2024-11-15 RX ORDER — ACETAMINOPHEN 500 MG
1000 TABLET ORAL EVERY 6 HOURS PRN
Qty: 120 TABLET | Refills: 0 | Status: SHIPPED | OUTPATIENT
Start: 2024-11-15 | End: 2024-12-15

## 2024-11-15 RX ORDER — NAPROXEN 500 MG/1
500 TABLET ORAL 2 TIMES DAILY WITH MEALS
Qty: 60 TABLET | Refills: 0 | Status: SHIPPED | OUTPATIENT
Start: 2024-11-15 | End: 2024-12-15

## 2024-11-21 ENCOUNTER — RESULTS FOLLOW-UP (OUTPATIENT)
Dept: NEUROLOGY | Facility: CLINIC | Age: 44
End: 2024-11-21

## 2024-11-22 NOTE — PROGRESS NOTES
[facility]        NAME: Maribel Kathleen is a 44 y.o. female  : 1980    MRN: [id number]  DATE: 2024  TIME: 7:37 PM    Assessment and Plan   Acute left ankle pain [M25.572]  1. Acute left ankle pain  XR ankle 3+ vw left    Ambulatory Referral to Orthopedic Surgery    acetaminophen (TYLENOL) 500 mg tablet    naproxen (Naprosyn) 500 mg tablet      2. Acute pain of right knee  XR knee 4+ vw right injury    Ambulatory Referral to Orthopedic Surgery    acetaminophen (TYLENOL) 500 mg tablet    naproxen (Naprosyn) 500 mg tablet            Patient Instructions       Follow up with PCP in 3-5 days.  Proceed to  ER if symptoms worsen.    If tests have been performed at [facility], our office will contact you with results if changes need to be made to the care plan discussed with you at the visit.  You can review your full results on [facility] MyChart.    Chief Complaint     Chief Complaint   Patient presents with    Ankle Pain     Pt has left ankle pain that started on  after she fell and heard cracks in her left ankle. Still has pain, pain also in right knee that she landed on          History of Present Illness       44-year-old female with history of MS presents with chief complaint of persistent left ankle and right knee pain after sustaining a mechanical fall from standing approximately 1 month ago.  She notes persistent swelling and pain in the affected areas and difficulty bearing weight and ambulating.  She denies any focal numbness tingling or weakness.  States that when she fell she did not strike her head and denies neck or back pain, headaches, visual changes or additional injury.  Denies any focal numbness tingling or weakness in extremities which is new and different baseline given her history of multiple sclerosis.    Ankle Pain   Pertinent negatives include no numbness.       Review of Systems   Review of Systems   Constitutional:  Negative for activity change, appetite  change, chills, diaphoresis, fatigue, fever and unexpected weight change.   HENT:  Negative for congestion, dental problem, ear pain and sore throat.    Eyes:  Negative for pain and visual disturbance.   Respiratory:  Negative for cough and shortness of breath.    Cardiovascular:  Negative for chest pain and palpitations.   Gastrointestinal:  Negative for abdominal pain and vomiting.   Genitourinary:  Negative for dysuria and hematuria.   Musculoskeletal:  Positive for arthralgias, gait problem, joint swelling and myalgias. Negative for back pain, neck pain and neck stiffness.   Skin:  Negative for color change and rash.   Neurological:  Negative for dizziness, tremors, seizures, syncope, facial asymmetry, speech difficulty, weakness, light-headedness, numbness and headaches.   All other systems reviewed and are negative.        Current Medications       Current Outpatient Medications:     acetaminophen (TYLENOL) 500 mg tablet, Take 2 tablets (1,000 mg total) by mouth every 6 (six) hours as needed for mild pain, Disp: 120 tablet, Rfl: 0    naproxen (Naprosyn) 500 mg tablet, Take 1 tablet (500 mg total) by mouth 2 (two) times a day with meals, Disp: 60 tablet, Rfl: 0    acetaminophen (TYLENOL) 500 mg tablet, Take 1 tablet (500 mg total) by mouth every 6 (six) hours as needed for mild pain, Disp: 30 tablet, Rfl: 0    albuterol (Proventil HFA) 90 mcg/act inhaler, Inhale 2 puffs every 6 (six) hours as needed for wheezing (Patient not taking: Reported on 2/20/2024), Disp: 6.7 g, Rfl: 0    calcium citrate-vitamin D 315 mg-5 mcg tablet, Take 1 tablet by mouth 2 (two) times a day (Patient not taking: Reported on 2/20/2024), Disp: , Rfl:     Cyanocobalamin 1000 MCG SUBL, Place 1 tablet (1,000 mcg total) under the tongue daily (Patient not taking: Reported on 10/30/2023), Disp: 90 tablet, Rfl: 0    cyclobenzaprine (FLEXERIL) 10 mg tablet, Take 1 tablet (10 mg total) by mouth daily at bedtime (Patient not taking: Reported on  "6/20/2024), Disp: 30 tablet, Rfl: 1    ergocalciferol (VITAMIN D2) 50,000 units, Take 1 capsule (50,000 Units total) by mouth once a week, Disp: 8 capsule, Rfl: 0    fluticasone (FLONASE) 50 mcg/act nasal spray, 1 spray into each nostril daily (Patient not taking: Reported on 1/24/2023), Disp: 16 g, Rfl: 1    ibuprofen (MOTRIN) 600 mg tablet, Take 1 tablet (600 mg total) by mouth every 6 (six) hours as needed for mild pain, Disp: 30 tablet, Rfl: 0    ketorolac (TORADOL) 10 mg tablet, Take 1 tablet (10 mg total) by mouth every 6 (six) hours as needed for moderate pain (Patient not taking: Reported on 5/11/2023), Disp: 10 tablet, Rfl: 0    magnesium Oxide (MAG-OX) 400 mg TABS, Take 1 tablet (400 mg total) by mouth daily (Patient not taking: Reported on 6/20/2024), Disp: 90 tablet, Rfl: 1    natalizumab (Tysabri) 300 mg/15 mL, Inject into a catheter in a vein, Disp: , Rfl:     Syringe/Needle, Disp, (SYRINGE 3CC/25GX1\") 25G X 1\" 3 ML MISC, Administer B12 1000mcg IM once per week for three (3) weeks; then once per month for five (5) months (Patient not taking: Reported on 6/20/2024), Disp: 8 each, Rfl: 0    triamcinolone (KENALOG) 0.1 % ointment, Apply topically 2 (two) times a day, Disp: 30 g, Rfl: 0    Current Facility-Administered Medications:     cyanocobalamin injection 1,000 mcg, 1,000 mcg, Intramuscular, Q30 Days, Luna Caruso MD, 1,000 mcg at 11/20/23 1555    cyanocobalamin injection 1,000 mcg, 1,000 mcg, Intramuscular, Q30 Days, Luna Caruso MD, 1,000 mcg at 10/20/23 1557    Current Allergies     Allergies as of 11/15/2024    (No Known Allergies)            The following portions of the patient's history were reviewed and updated as appropriate: allergies, current medications, past family history, past medical history, past social history, past surgical history and problem list.     Past Medical History:   Diagnosis Date    Abnormal Pap smear of cervix     Depression     Last assessed: 8/1/12    History of " Bell's palsy     History of chlamydia infection     History of gonorrhea     MS (multiple sclerosis) (HCC)     Multiple sclerosis (HCC)     Ovarian cyst     Varicella     HX DISEASE       Past Surgical History:   Procedure Laterality Date    KNEE ARTHROSCOPY      IN LAPAROSCOPY FULGURATION OVIDUCTS Bilateral 10/5/2018    Procedure: LIGATION TUBAL LAPAROSCOPIC - FALOPE RINGS;  Surgeon: Shilpa Rai MD;  Location: [facility];  Service: Gynecology       Family History   Problem Relation Age of Onset    Dementia Mother     Hypertension Mother     Cataracts Mother     Parkinsonism Mother     Hypertension Father     Macular degeneration Father     Anuerysm Sister     Migraines Sister     Heart Valve Disease Brother         mechanical valve placed    Multiple sclerosis Paternal Aunt     No Known Problems Maternal Grandmother     No Known Problems Maternal Grandfather     No Known Problems Paternal Grandmother     No Known Problems Paternal Grandfather     No Known Problems Daughter     No Known Problems Daughter     Asthma Son     ADD / ADHD Son     Allergies Son     Glaucoma Family     Macular degeneration Family     Hypertension Family          Medications have been verified.        Objective   /72   Pulse 72   Temp 98.9 °F (37.2 °C)   Resp 18   SpO2 97%   No LMP recorded.       Physical Exam     Physical Exam  Vitals and nursing note reviewed.   Constitutional:       General: She is not in acute distress.     Appearance: Normal appearance. She is normal weight. She is not ill-appearing, toxic-appearing or diaphoretic.   HENT:      Head: Normocephalic and atraumatic.      Right Ear: External ear normal.      Left Ear: External ear normal.      Nose: Nose normal.      Mouth/Throat:      Mouth: Mucous membranes are moist.      Pharynx: No oropharyngeal exudate or posterior oropharyngeal erythema.   Eyes:      General: No visual field deficit or scleral icterus.        Right eye: No discharge.         Left eye: No  discharge.      Extraocular Movements: Extraocular movements intact.      Pupils: Pupils are equal, round, and reactive to light.   Cardiovascular:      Rate and Rhythm: Normal rate and regular rhythm.      Pulses: Normal pulses.           Carotid pulses are 2+ on the right side and 2+ on the left side.       Radial pulses are 2+ on the right side and 2+ on the left side.      Heart sounds: No murmur heard.     No friction rub. No gallop.   Pulmonary:      Effort: Pulmonary effort is normal. No respiratory distress.      Breath sounds: Normal breath sounds. No stridor. No wheezing, rhonchi or rales.   Chest:      Chest wall: No tenderness.   Abdominal:      General: Abdomen is flat. There is no distension.      Palpations: Abdomen is soft. There is no mass.      Tenderness: There is no abdominal tenderness. There is no right CVA tenderness, left CVA tenderness, guarding or rebound.      Hernia: No hernia is present.   Musculoskeletal:         General: Swelling, tenderness and signs of injury present. No deformity.      Cervical back: Normal range of motion and neck supple.      Right hip: Normal.      Left hip: Normal.      Right upper leg: Normal.      Left upper leg: Normal.      Right knee: Bony tenderness present. No deformity, effusion, erythema, ecchymosis, lacerations or crepitus. Decreased range of motion. Tenderness present over the medial joint line, lateral joint line and patellar tendon. No LCL laxity, MCL laxity, ACL laxity or PCL laxity. Normal alignment, normal meniscus and normal patellar mobility. Normal pulse.      Instability Tests: Anterior drawer test negative. Posterior drawer test negative. Anterior Lachman test negative. Medial Sanya test negative and lateral Sanya test negative.      Left knee: Normal.      Right lower leg: Normal. No edema.      Left lower leg: Normal. No edema.      Right ankle: Normal.      Right Achilles Tendon: Normal.      Left ankle: Swelling present. No  deformity, ecchymosis or lacerations. Tenderness present over the lateral malleolus. No posterior TF ligament, base of 5th metatarsal or proximal fibula tenderness. Decreased range of motion. Anterior drawer test negative. Normal pulse.      Left Achilles Tendon: Normal. No tenderness or defects. Park's test negative.      Right foot: Normal.      Left foot: Normal.   Skin:     General: Skin is warm and dry.   Neurological:      General: No focal deficit present.      Mental Status: She is alert and oriented to person, place, and time.      Cranial Nerves: Cranial nerves 2-12 are intact. No cranial nerve deficit, dysarthria or facial asymmetry.      Sensory: Sensation is intact. No sensory deficit.      Motor: Motor function is intact. No weakness, tremor, atrophy, abnormal muscle tone, seizure activity or pronator drift.      Coordination: Coordination is intact. Romberg sign negative. Coordination normal. Finger-Nose-Finger Test and Heel to Shin Test normal. Rapid alternating movements normal.      Gait: Gait is intact. Gait normal.      Deep Tendon Reflexes: Reflexes normal.   Psychiatric:         Mood and Affect: Mood normal.         Behavior: Behavior normal.

## 2024-11-25 ENCOUNTER — OFFICE VISIT (OUTPATIENT)
Dept: FAMILY MEDICINE CLINIC | Facility: CLINIC | Age: 44
End: 2024-11-25

## 2024-11-25 VITALS
RESPIRATION RATE: 18 BRPM | OXYGEN SATURATION: 98 % | TEMPERATURE: 98 F | SYSTOLIC BLOOD PRESSURE: 138 MMHG | BODY MASS INDEX: 38.31 KG/M2 | HEIGHT: 64 IN | WEIGHT: 224.4 LBS | DIASTOLIC BLOOD PRESSURE: 74 MMHG | HEART RATE: 74 BPM

## 2024-11-25 DIAGNOSIS — Z13.220 SCREENING FOR LIPID DISORDERS: ICD-10-CM

## 2024-11-25 DIAGNOSIS — Z02.89 ENCOUNTER FOR COMPLETION OF FORM WITH PATIENT: ICD-10-CM

## 2024-11-25 DIAGNOSIS — Z00.00 ANNUAL PHYSICAL EXAM: Primary | ICD-10-CM

## 2024-11-25 DIAGNOSIS — Z13.1 SCREENING FOR DIABETES MELLITUS: ICD-10-CM

## 2024-11-25 DIAGNOSIS — Z11.1 SCREENING FOR TUBERCULOSIS: ICD-10-CM

## 2024-11-25 PROCEDURE — 99396 PREV VISIT EST AGE 40-64: CPT

## 2024-11-25 PROCEDURE — 86580 TB INTRADERMAL TEST: CPT

## 2024-11-25 NOTE — PROGRESS NOTES
Adult Annual Physical  Name: Maribel Kathleen      : 1980      MRN: [id number]  Encounter Provider: MICHAEL Mosquera  Encounter Date: 2024   Encounter department: [facility]    Assessment & Plan  Annual physical exam         Screening for tuberculosis    Orders:    TB Skin Test    Screening for lipid disorders    Orders:    Lipid panel; Future    Screening for diabetes mellitus    Orders:    Hemoglobin A1C; Future    Encounter for completion of form with patient  -No concerns of note.   -Will order  tb testing for employment.  to  forms PPD results are good        Immunizations and preventive care screenings were discussed with patient today. Appropriate education was printed on patient's after visit summary.    Counseling:  Alcohol/drug use: discussed moderation in alcohol intake, the recommendations for healthy alcohol use, and avoidance of illicit drug use.  Dental Health: discussed importance of regular tooth brushing, flossing, and dental visits.  Injury prevention: discussed safety/seat belts, safety helmets, smoke detectors, carbon monoxide detectors, and smoking near bedding or upholstery.  Sexual health: discussed sexually transmitted diseases, partner selection, use of condoms, avoidance of unintended pregnancy, and contraceptive alternatives.  Exercise: the importance of regular exercise/physical activity was discussed. Recommend exercise 3-5 times per week for at least 30 minutes.     BMI Counseling: Body mass index is 38.31 kg/m². The BMI is above normal. Nutrition recommendations include decreasing portion sizes, encouraging healthy choices of fruits and vegetables, decreasing fast food intake, consuming healthier snacks, limiting drinks that contain sugar, moderation in carbohydrate intake, increasing intake of lean protein, reducing intake of saturated and trans fat and reducing intake of cholesterol. Exercise recommendations include  exercising 3-5 times per week. No pharmacotherapy was ordered. Patient referred to PCP. Rationale for BMI follow-up plan is due to patient being overweight or obese.     Depression Screening and Follow-up Plan: Patient was screened for depression during today's encounter. They screened negative with a PHQ-9 score of 2.    Tobacco Cessation Counseling: The patient is sincerely urged to quit consumption of tobacco. She is not ready to quit tobacco.         History of Present Illness     Adult Annual Physical:  Patient presents for annual physical. Maribel Kathleen is a 44 y.o. with  has a past medical history of Abnormal Pap smear of cervix, Depression, History of Bell's palsy, History of chlamydia infection, History of gonorrhea, MS (multiple sclerosis) (HCC), Multiple sclerosis (HCC), Ovarian cyst, and Varicella.     Patient presents to the clinic for management of her chronic medical conditions.  Patient has no further complaints other than what is mentioned in the ROS.   .     Diet and Physical Activity:  - Diet/Nutrition: poor diet. eats once daily  - Exercise: walking and no formal exercise.    Depression Screening:    - PHQ-9 Score: 2    General Health:  - Sleep: sleeps poorly, 4-6 hours of sleep on average and unrefreshing sleep.  - Hearing: normal hearing right ear and normal hearing left ear. patient reports R ear sensitive to high pitch d/t bells palsy, checked by ENT  - Vision: wears glasses, wears contacts, goes for regular eye exams and most recent eye exam < 1 year ago.  - Dental: regular dental visits and brushes teeth twice daily.    /GYN Health:  - Follows with GYN: yes.   - Last menstrual cycle: 11/24/2024.   - History of STDs: yes  - Contraception:. sterilized-tubal      Advanced Care Planning:  - Has an advanced directive?: no    - Has a durable medical POA?: no      Review of Systems   Constitutional: Negative.  Negative for chills, fatigue and fever.   HENT: Negative.  Negative for ear pain  "and sore throat.    Eyes: Negative.  Negative for pain and visual disturbance.   Respiratory: Negative.  Negative for cough and shortness of breath.    Cardiovascular: Negative.  Negative for chest pain and palpitations.   Gastrointestinal: Negative.  Negative for abdominal pain and vomiting.   Endocrine: Negative.    Genitourinary: Negative.  Negative for dysuria and hematuria.   Musculoskeletal: Negative.  Negative for arthralgias and back pain.   Skin: Negative.  Negative for color change and rash.   Allergic/Immunologic: Negative.    Neurological: Negative.  Negative for seizures and syncope.   Hematological: Negative.    Psychiatric/Behavioral: Negative.     All other systems reviewed and are negative.        Objective   /74 (BP Location: Left arm, Patient Position: Sitting, Cuff Size: Large)   Pulse 74   Temp 98 °F (36.7 °C) (Temporal)   Resp 18   Ht 5' 4.17\" (1.63 m)   Wt 102 kg (224 lb 6.4 oz)   LMP 11/24/2024 (Exact Date)   SpO2 98%   BMI 38.31 kg/m²     Physical Exam  Vitals and nursing note reviewed.   Constitutional:       General: She is not in acute distress.     Appearance: Normal appearance. She is well-developed and normal weight.   HENT:      Head: Normocephalic and atraumatic.      Right Ear: Tympanic membrane normal.      Left Ear: Tympanic membrane normal.      Nose: Nose normal.      Mouth/Throat:      Mouth: Mucous membranes are moist.   Eyes:      Conjunctiva/sclera: Conjunctivae normal.   Cardiovascular:      Rate and Rhythm: Normal rate and regular rhythm.      Pulses: Normal pulses.      Heart sounds: Normal heart sounds. No murmur heard.  Pulmonary:      Effort: Pulmonary effort is normal. No respiratory distress.      Breath sounds: Normal breath sounds.   Abdominal:      General: Abdomen is flat. Bowel sounds are normal.      Palpations: Abdomen is soft.      Tenderness: There is no abdominal tenderness.   Musculoskeletal:         General: No swelling. Normal range of " motion.      Cervical back: Normal range of motion and neck supple.   Skin:     General: Skin is warm and dry.      Capillary Refill: Capillary refill takes less than 2 seconds.   Neurological:      General: No focal deficit present.      Mental Status: She is alert and oriented to person, place, and time. Mental status is at baseline.   Psychiatric:         Mood and Affect: Mood normal.         Behavior: Behavior normal.         Thought Content: Thought content normal.         Judgment: Judgment normal.

## 2024-11-25 NOTE — PATIENT INSTRUCTIONS
"Patient Education     Routine physical for adults   The Basics   Written by the doctors and editors at [facility]   What is a physical? -- A physical is a routine visit, or \"check-up,\" with your doctor. You might also hear it called a \"wellness visit\" or \"preventive visit.\"  During each visit, the doctor will:   Ask about your physical and mental health   Ask about your habits, behaviors, and lifestyle   Do an exam   Give you vaccines if needed   Talk to you about any medicines you take   Give advice about your health   Answer your questions  Getting regular check-ups is an important part of taking care of your health. It can help your doctor find and treat any problems you have. But it's also important for preventing health problems.  A routine physical is different from a \"sick visit.\" A sick visit is when you see a doctor because of a health concern or problem. Since physicals are scheduled ahead of time, you can think about what you want to ask the doctor.  How often should I get a physical? -- It depends on your age and health. In general, for people age 21 years and older:   If you are younger than 50 years, you might be able to get a physical every 3 years.   If you are 50 years or older, your doctor might recommend a physical every year.  If you have an ongoing health condition, like diabetes or high blood pressure, your doctor will probably want to see you more often.  What happens during a physical? -- In general, each visit will include:   Physical exam - The doctor or nurse will check your height, weight, heart rate, and blood pressure. They will also look at your eyes and ears. They will ask about how you are feeling and whether you have any symptoms that bother you.   Medicines - It's a good idea to bring a list of all the medicines you take to each doctor visit. Your doctor will talk to you about your medicines and answer any questions. Tell them if you are having any side effects that bother you. You " "should also tell them if you are having trouble paying for any of your medicines.   Habits and behaviors - This includes:   Your diet   Your exercise habits   Whether you smoke, drink alcohol, or use drugs   Whether you are sexually active   Whether you feel safe at home  Your doctor will talk to you about things you can do to improve your health and lower your risk of health problems. They will also offer help and support. For example, if you want to quit smoking, they can give you advice and might prescribe medicines. If you want to improve your diet or get more physical activity, they can help you with this, too.   Lab tests, if needed - The tests you get will depend on your age and situation. For example, your doctor might want to check your:   Cholesterol   Blood sugar   Iron level   Vaccines - The recommended vaccines will depend on your age, health, and what vaccines you already had. Vaccines are very important because they can prevent certain serious or deadly infections.   Discussion of screening - \"Screening\" means checking for diseases or other health problems before they cause symptoms. Your doctor can recommend screening based on your age, risk, and preferences. This might include tests to check for:   Cancer, such as breast, prostate, cervical, ovarian, colorectal, prostate, lung, or skin cancer   Sexually transmitted infections, such as chlamydia and gonorrhea   Mental health conditions like depression and anxiety  Your doctor will talk to you about the different types of screening tests. They can help you decide which screenings to have. They can also explain what the results might mean.   Answering questions - The physical is a good time to ask the doctor or nurse questions about your health. If needed, they can refer you to other doctors or specialists, too.  Adults older than 65 years often need other care, too. As you get older, your doctor will talk to you about:   How to prevent falling at " home   Hearing or vision tests   Memory testing   How to take your medicines safely   Making sure that you have the help and support you need at home  All topics are updated as new evidence becomes available and our peer review process is complete.  This topic retrieved from [employer] on: May 02, 2024.  Topic 986686 Version 1.0  Release: 32.4.3 - C32.122  © 2024 UpToDate, Inc. and/or its affiliates. All rights reserved.  Consumer Information Use and Disclaimer   Disclaimer: This generalized information is a limited summary of diagnosis, treatment, and/or medication information. It is not meant to be comprehensive and should be used as a tool to help the user understand and/or assess potential diagnostic and treatment options. It does NOT include all information about conditions, treatments, medications, side effects, or risks that may apply to a specific patient. It is not intended to be medical advice or a substitute for the medical advice, diagnosis, or treatment of a health care provider based on the health care provider's examination and assessment of a patient's specific and unique circumstances. Patients must speak with a health care provider for complete information about their health, medical questions, and treatment options, including any risks or benefits regarding use of medications. This information does not endorse any treatments or medications as safe, effective, or approved for treating a specific patient. UpToDate, Inc. and its affiliates disclaim any warranty or liability relating to this information or the use thereof.The use of this information is governed by the Terms of Use, available at https://www.[employer] Projectuwer.com/en/know/clinical-effectiveness-terms. 2024© UpToDate, Inc. and its affiliates and/or licensors. All rights reserved.  Copyright   © 2024 UpToDate, Inc. and/or its affiliates. All rights reserved.

## 2024-11-27 ENCOUNTER — CLINICAL SUPPORT (OUTPATIENT)
Dept: FAMILY MEDICINE CLINIC | Facility: CLINIC | Age: 44
End: 2024-11-27

## 2024-11-27 DIAGNOSIS — Z11.1 ENCOUNTER FOR PPD SKIN TEST READING: Primary | ICD-10-CM

## 2024-11-27 LAB
INDURATION: 0 MM
TB SKIN TEST: NEGATIVE

## 2024-11-29 ENCOUNTER — TELEPHONE (OUTPATIENT)
Dept: NEUROLOGY | Facility: CLINIC | Age: 44
End: 2024-11-29

## 2024-11-29 NOTE — TELEPHONE ENCOUNTER
Pt is scheduled for Tysabri on 12/12/24.   Last infusion 10/31/24, infusions every 6 weeks.  Confirmed scheduling is correct.     Last CBC/CMP 10/31/24  Last JCV 7/17/24 0.27 (final result negative)  Last MRI brain 3/8/24     Okay to proceed with infusion?     Tysabri is approved with [facility] from 10/31/24 to 10/31/25 for 9 doses. Auth # [id number].     Touch auth valid until 3/15/25 at [facility] center.

## 2024-12-12 ENCOUNTER — HOSPITAL ENCOUNTER (OUTPATIENT)
Dept: INFUSION CENTER | Facility: CLINIC | Age: 44
Discharge: HOME | End: 2024-12-12
Payer: COMMERCIAL

## 2024-12-12 VITALS
DIASTOLIC BLOOD PRESSURE: 81 MMHG | TEMPERATURE: 97.3 F | HEART RATE: 80 BPM | SYSTOLIC BLOOD PRESSURE: 128 MMHG | RESPIRATION RATE: 18 BRPM

## 2024-12-12 DIAGNOSIS — G35 MULTIPLE SCLEROSIS (HCC): Primary | ICD-10-CM

## 2024-12-12 LAB
ALBUMIN SERPL BCG-MCNC: 4.1 G/DL (ref 3.5–5)
ALP SERPL-CCNC: 63 U/L (ref 34–104)
ALT SERPL W P-5'-P-CCNC: 12 U/L (ref 7–52)
ANION GAP SERPL CALCULATED.3IONS-SCNC: 6 MMOL/L (ref 4–13)
AST SERPL W P-5'-P-CCNC: 17 U/L (ref 13–39)
BASOPHILS # BLD AUTO: 0.06 THOUSANDS/ÂΜL (ref 0–0.1)
BASOPHILS NFR BLD AUTO: 1 % (ref 0–1)
BILIRUB SERPL-MCNC: 0.27 MG/DL (ref 0.2–1)
BUN SERPL-MCNC: 16 MG/DL (ref 5–25)
CALCIUM SERPL-MCNC: 9.1 MG/DL (ref 8.4–10.2)
CHLORIDE SERPL-SCNC: 105 MMOL/L (ref 96–108)
CO2 SERPL-SCNC: 26 MMOL/L (ref 21–32)
CREAT SERPL-MCNC: 0.65 MG/DL (ref 0.6–1.3)
EOSINOPHIL # BLD AUTO: 1.02 THOUSAND/ÂΜL (ref 0–0.61)
EOSINOPHIL NFR BLD AUTO: 12 % (ref 0–6)
ERYTHROCYTE [DISTWIDTH] IN BLOOD BY AUTOMATED COUNT: 15.4 % (ref 11.6–15.1)
GFR SERPL CREATININE-BSD FRML MDRD: 109 ML/MIN/1.73SQ M
GLUCOSE SERPL-MCNC: 98 MG/DL (ref 65–140)
HCT VFR BLD AUTO: 36.5 % (ref 34.8–46.1)
HGB BLD-MCNC: 11.7 G/DL (ref 11.5–15.4)
IMM GRANULOCYTES # BLD AUTO: 0.03 THOUSAND/UL (ref 0–0.2)
IMM GRANULOCYTES NFR BLD AUTO: 0 % (ref 0–2)
LYMPHOCYTES # BLD AUTO: 2.97 THOUSANDS/ÂΜL (ref 0.6–4.47)
LYMPHOCYTES NFR BLD AUTO: 34 % (ref 14–44)
MCH RBC QN AUTO: 31 PG (ref 26.8–34.3)
MCHC RBC AUTO-ENTMCNC: 32.1 G/DL (ref 31.4–37.4)
MCV RBC AUTO: 97 FL (ref 82–98)
MONOCYTES # BLD AUTO: 0.47 THOUSAND/ÂΜL (ref 0.17–1.22)
MONOCYTES NFR BLD AUTO: 5 % (ref 4–12)
NEUTROPHILS # BLD AUTO: 4.26 THOUSANDS/ÂΜL (ref 1.85–7.62)
NEUTS SEG NFR BLD AUTO: 48 % (ref 43–75)
NRBC BLD AUTO-RTO: 0 /100 WBCS
PLATELET # BLD AUTO: 280 THOUSANDS/UL (ref 149–390)
PMV BLD AUTO: 10.1 FL (ref 8.9–12.7)
POTASSIUM SERPL-SCNC: 4.3 MMOL/L (ref 3.5–5.3)
PROT SERPL-MCNC: 7 G/DL (ref 6.4–8.4)
RBC # BLD AUTO: 3.77 MILLION/UL (ref 3.81–5.12)
SODIUM SERPL-SCNC: 137 MMOL/L (ref 135–147)
WBC # BLD AUTO: 8.81 THOUSAND/UL (ref 4.31–10.16)

## 2024-12-12 PROCEDURE — 96365 THER/PROPH/DIAG IV INF INIT: CPT

## 2024-12-12 PROCEDURE — 80053 COMPREHEN METABOLIC PANEL: CPT | Performed by: PSYCHIATRY & NEUROLOGY

## 2024-12-12 PROCEDURE — 85025 COMPLETE CBC W/AUTO DIFF WBC: CPT | Performed by: PSYCHIATRY & NEUROLOGY

## 2024-12-12 RX ORDER — ACETAMINOPHEN 325 MG/1
650 TABLET ORAL ONCE
Status: COMPLETED | OUTPATIENT
Start: 2024-12-12 | End: 2024-12-12

## 2024-12-12 RX ORDER — SODIUM CHLORIDE 9 MG/ML
20 INJECTION, SOLUTION INTRAVENOUS ONCE
OUTPATIENT
Start: 2025-01-23

## 2024-12-12 RX ORDER — ACETAMINOPHEN 325 MG/1
650 TABLET ORAL ONCE
OUTPATIENT
Start: 2025-01-23 | End: 2025-01-23

## 2024-12-12 RX ORDER — SODIUM CHLORIDE 9 MG/ML
20 INJECTION, SOLUTION INTRAVENOUS ONCE
Status: COMPLETED | OUTPATIENT
Start: 2024-12-12 | End: 2024-12-12

## 2024-12-12 RX ADMIN — ACETAMINOPHEN 650 MG: 325 TABLET ORAL at 08:27

## 2024-12-12 RX ADMIN — SODIUM CHLORIDE 20 ML/HR: 0.9 INJECTION, SOLUTION INTRAVENOUS at 08:30

## 2024-12-12 RX ADMIN — NATALIZUMAB 300 MG: 300 INJECTION INTRAVENOUS at 08:45

## 2024-12-12 NOTE — PROGRESS NOTES
Pt presents for tysabri. Touch Online checklist completed, labs collected with IV insertion. Pt tolerated treatment without incident. No observation required per order. Pt declined AVS, aware of next appt 1/24 at 9am.

## 2024-12-12 NOTE — LETTER
[facility]  [address]  [facility] [address]  Dept: [phone]    December 12, 2024     Patient: Maribel Kathleen   YOB: 1980   Date of Visit: 12/12/2024       To Whom it May Concern:    Maribel Kathleen is under my professional care. She was seen in the infusion center on 12/12/24. She may return to work on 12/13 without limitations.    If you have any questions or concerns, please don't hesitate to call.         Sincerely,          Sangeeta Dick RN

## 2024-12-23 NOTE — ASSESSMENT & PLAN NOTE
Pt here for neuro follow up  Overall ms wise doing well  Pt remains on tecfidera  Pt just had updated mri head on aug 1 and stable comp to 12/ 2018  Pt also just had updated labs and lfts ok and cbc diff good with abs lymph of 1 51  Pt to cont with tecifera  Repeat labs every other month no

## 2025-01-10 ENCOUNTER — TELEPHONE (OUTPATIENT)
Dept: NEUROLOGY | Facility: CLINIC | Age: 45
End: 2025-01-10

## 2025-01-10 DIAGNOSIS — G35 MULTIPLE SCLEROSIS (HCC): Primary | ICD-10-CM

## 2025-01-10 NOTE — TELEPHONE ENCOUNTER
Pt is scheduled for Tysabri on 1/24/25.   Last infusion 12/12/24, infusions every 6 weeks.  Confirmed scheduling is correct (+1 day).     Last CBC/CMP 12/12/24  Last JCV 7/17/24 0.27 (final result negative)  Last MRI brain 3/8/24    Pt is due for updated JCV testing by 1/17/25. Order entered. ShoutEm message sent.      Okay to proceed with infusion?     Tysabri is approved with [facility] from 10/31/24 to 10/31/25 for 9 doses. Auth # [id number].     Touch auth valid until 3/15/25 at [facility] infusion center.

## 2025-01-16 ENCOUNTER — TELEPHONE (OUTPATIENT)
Dept: NEUROLOGY | Facility: CLINIC | Age: 45
End: 2025-01-16

## 2025-01-16 NOTE — TELEPHONE ENCOUNTER
LMOM asking pt to CB to R/S 2/25/25 appt at 9am with Dr PACHECO due to provider being out of the office.  Told pt to CB at her convenience for us to further assist to R/S.

## 2025-01-21 NOTE — TELEPHONE ENCOUNTER
LMOM for pt to CB to R/S 2/25/25 appt at 9am with Dr PACHECO due to provider being out of the office. Told pt to CB at her convenience.

## 2025-01-24 ENCOUNTER — HOSPITAL ENCOUNTER (OUTPATIENT)
Dept: INFUSION CENTER | Facility: CLINIC | Age: 45
End: 2025-01-24
Payer: COMMERCIAL

## 2025-01-24 VITALS
SYSTOLIC BLOOD PRESSURE: 126 MMHG | HEART RATE: 68 BPM | RESPIRATION RATE: 18 BRPM | TEMPERATURE: 96.9 F | DIASTOLIC BLOOD PRESSURE: 76 MMHG

## 2025-01-24 DIAGNOSIS — G35 MULTIPLE SCLEROSIS (HCC): Primary | ICD-10-CM

## 2025-01-24 PROCEDURE — 96365 THER/PROPH/DIAG IV INF INIT: CPT

## 2025-01-24 RX ORDER — ACETAMINOPHEN 325 MG/1
650 TABLET ORAL ONCE
OUTPATIENT
Start: 2025-03-06 | End: 2025-03-06

## 2025-01-24 RX ORDER — SODIUM CHLORIDE 9 MG/ML
20 INJECTION, SOLUTION INTRAVENOUS ONCE
Status: COMPLETED | OUTPATIENT
Start: 2025-01-24 | End: 2025-01-24

## 2025-01-24 RX ORDER — SODIUM CHLORIDE 9 MG/ML
20 INJECTION, SOLUTION INTRAVENOUS ONCE
OUTPATIENT
Start: 2025-03-06

## 2025-01-24 RX ORDER — ACETAMINOPHEN 325 MG/1
650 TABLET ORAL ONCE
Status: COMPLETED | OUTPATIENT
Start: 2025-01-24 | End: 2025-01-24

## 2025-01-24 RX ADMIN — NATALIZUMAB 300 MG: 300 INJECTION INTRAVENOUS at 09:44

## 2025-01-24 RX ADMIN — SODIUM CHLORIDE 20 ML/HR: 0.9 INJECTION, SOLUTION INTRAVENOUS at 09:35

## 2025-01-24 RX ADMIN — ACETAMINOPHEN 650 MG: 325 TABLET ORAL at 09:33

## 2025-01-24 NOTE — LETTER
[facility]  [address]  [facility] [address]  Dept: [phone]    January 24, 2025     Patient: Maribel Kathleen   YOB: 1980   Date of Visit: 1/24/2025       To Whom it May Concern:    Maribel Kathleen is under my professional care. She was seen in the hospital from 1/24/2025 to 01/24/25. She may return to work on 1/24/25 without limitations.    If you have any questions or concerns, please don't hesitate to call.         Sincerely,          Laura Llamas RN

## 2025-01-24 NOTE — PROGRESS NOTES
Maribel Kathleen  tolerated treatment well with no complications.      Maribel Kathleen is aware of future appt on 3/7/25 at 0900.    No (Declined by Maribel Kathleen)

## 2025-02-11 ENCOUNTER — APPOINTMENT (OUTPATIENT)
Dept: LAB | Facility: HOSPITAL | Age: 45
End: 2025-02-11
Payer: COMMERCIAL

## 2025-02-11 DIAGNOSIS — E55.9 VITAMIN D DEFICIENCY: ICD-10-CM

## 2025-02-11 DIAGNOSIS — G35 MULTIPLE SCLEROSIS (HCC): ICD-10-CM

## 2025-02-11 DIAGNOSIS — E53.8 DEFICIENCY OF VITAMIN B12: ICD-10-CM

## 2025-02-11 DIAGNOSIS — Z13.1 SCREENING FOR DIABETES MELLITUS: ICD-10-CM

## 2025-02-11 DIAGNOSIS — Z13.220 SCREENING FOR LIPID DISORDERS: ICD-10-CM

## 2025-02-11 LAB
25(OH)D3 SERPL-MCNC: 14 NG/ML (ref 30–100)
ALBUMIN SERPL BCG-MCNC: 4.1 G/DL (ref 3.5–5)
ALP SERPL-CCNC: 59 U/L (ref 34–104)
ALT SERPL W P-5'-P-CCNC: 9 U/L (ref 7–52)
ANION GAP SERPL CALCULATED.3IONS-SCNC: 8 MMOL/L (ref 4–13)
AST SERPL W P-5'-P-CCNC: 13 U/L (ref 13–39)
BASOPHILS # BLD AUTO: 0.06 THOUSANDS/ÂΜL (ref 0–0.1)
BASOPHILS NFR BLD AUTO: 1 % (ref 0–1)
BILIRUB SERPL-MCNC: 0.27 MG/DL (ref 0.2–1)
BUN SERPL-MCNC: 14 MG/DL (ref 5–25)
CALCIUM SERPL-MCNC: 9.2 MG/DL (ref 8.4–10.2)
CHLORIDE SERPL-SCNC: 104 MMOL/L (ref 96–108)
CHOLEST SERPL-MCNC: 178 MG/DL (ref ?–200)
CO2 SERPL-SCNC: 26 MMOL/L (ref 21–32)
CREAT SERPL-MCNC: 0.67 MG/DL (ref 0.6–1.3)
EOSINOPHIL # BLD AUTO: 1.2 THOUSAND/ÂΜL (ref 0–0.61)
EOSINOPHIL NFR BLD AUTO: 12 % (ref 0–6)
ERYTHROCYTE [DISTWIDTH] IN BLOOD BY AUTOMATED COUNT: 15.2 % (ref 11.6–15.1)
EST. AVERAGE GLUCOSE BLD GHB EST-MCNC: 114 MG/DL
GFR SERPL CREATININE-BSD FRML MDRD: 108 ML/MIN/1.73SQ M
GLUCOSE SERPL-MCNC: 89 MG/DL (ref 65–140)
HBA1C MFR BLD: 5.6 %
HCT VFR BLD AUTO: 34.7 % (ref 34.8–46.1)
HDLC SERPL-MCNC: 34 MG/DL
HGB BLD-MCNC: 11.2 G/DL (ref 11.5–15.4)
IMM GRANULOCYTES # BLD AUTO: 0.04 THOUSAND/UL (ref 0–0.2)
IMM GRANULOCYTES NFR BLD AUTO: 0 % (ref 0–2)
LDLC SERPL CALC-MCNC: 100 MG/DL (ref 0–100)
LYMPHOCYTES # BLD AUTO: 3.6 THOUSANDS/ÂΜL (ref 0.6–4.47)
LYMPHOCYTES NFR BLD AUTO: 37 % (ref 14–44)
MCH RBC QN AUTO: 31.9 PG (ref 26.8–34.3)
MCHC RBC AUTO-ENTMCNC: 32.3 G/DL (ref 31.4–37.4)
MCV RBC AUTO: 99 FL (ref 82–98)
MONOCYTES # BLD AUTO: 0.53 THOUSAND/ÂΜL (ref 0.17–1.22)
MONOCYTES NFR BLD AUTO: 6 % (ref 4–12)
NEUTROPHILS # BLD AUTO: 4.21 THOUSANDS/ÂΜL (ref 1.85–7.62)
NEUTS SEG NFR BLD AUTO: 44 % (ref 43–75)
NONHDLC SERPL-MCNC: 144 MG/DL
NRBC BLD AUTO-RTO: 0 /100 WBCS
PLATELET # BLD AUTO: 247 THOUSANDS/UL (ref 149–390)
PMV BLD AUTO: 10.2 FL (ref 8.9–12.7)
POTASSIUM SERPL-SCNC: 4 MMOL/L (ref 3.5–5.3)
PROT SERPL-MCNC: 6.8 G/DL (ref 6.4–8.4)
RBC # BLD AUTO: 3.51 MILLION/UL (ref 3.81–5.12)
SODIUM SERPL-SCNC: 138 MMOL/L (ref 135–147)
TRIGL SERPL-MCNC: 221 MG/DL (ref ?–150)
VIT B12 SERPL-MCNC: 324 PG/ML (ref 180–914)
WBC # BLD AUTO: 9.64 THOUSAND/UL (ref 4.31–10.16)

## 2025-02-11 PROCEDURE — 86711 JOHN CUNNINGHAM ANTIBODY: CPT

## 2025-02-11 PROCEDURE — 80061 LIPID PANEL: CPT

## 2025-02-11 PROCEDURE — 82306 VITAMIN D 25 HYDROXY: CPT

## 2025-02-11 PROCEDURE — 80053 COMPREHEN METABOLIC PANEL: CPT

## 2025-02-11 PROCEDURE — 82607 VITAMIN B-12: CPT

## 2025-02-11 PROCEDURE — 83036 HEMOGLOBIN GLYCOSYLATED A1C: CPT

## 2025-02-11 PROCEDURE — 85025 COMPLETE CBC W/AUTO DIFF WBC: CPT

## 2025-02-11 PROCEDURE — 36415 COLL VENOUS BLD VENIPUNCTURE: CPT

## 2025-02-12 ENCOUNTER — RESULTS FOLLOW-UP (OUTPATIENT)
Dept: FAMILY MEDICINE CLINIC | Facility: CLINIC | Age: 45
End: 2025-02-12

## 2025-02-17 LAB
GALACTOMANNAN AG SERPL IA-ACNC: 0.57
JCPYV AB SERPL QL IA: ABNORMAL
JCPYV AB SERPL QL IA: POSITIVE
SPECIMEN STATUS: ABNORMAL

## 2025-02-18 ENCOUNTER — TELEPHONE (OUTPATIENT)
Dept: NEUROLOGY | Facility: CLINIC | Age: 45
End: 2025-02-18

## 2025-02-18 ENCOUNTER — RESULTS FOLLOW-UP (OUTPATIENT)
Dept: NEUROLOGY | Facility: CLINIC | Age: 45
End: 2025-02-18

## 2025-02-18 NOTE — TELEPHONE ENCOUNTER
Tysabri TOUCH reauthorization submitted, valid until 9/14/25 at [facility].     Will address JCV result in other encounter.

## 2025-02-20 NOTE — TELEPHONE ENCOUNTER
----- Message from Nisreen Burns MD sent at 2/20/2025  4:37 PM EST -----  Yes to hold of Tysabri and in 8 weeks from last dose patient should receive Solumedrol 1000 mg.    Please check for my NP 60 min slot in March - hope there are some openings with me, otherwise Nuria help will be appreciated  ----- Message -----  From: Bibiana Flores RN  Sent: 2/20/2025  12:37 PM EST  To: Nisreen Burns MD

## 2025-02-20 NOTE — TELEPHONE ENCOUNTER
Confirmed on Tysabri TOUCH website that pt initiated Tysabri infusions on 9/13/22 (on therapy for about 2 years 5 months).     JCV index is 0.57 (<0.9).    Our office does not have a pharmacist to schedule with at this time.     3/13/25 OV is for 30 minutes.     Next 60 minute slot with Dr. PACHECO not until end of July. Nuria George may have sooner availability.     To confirm, should Tysabri infusion on 3/7/25 be cancelled? Can pt f/u with Nuria instead?

## 2025-02-20 NOTE — TELEPHONE ENCOUNTER
Next available 60 min slots with Dr. PACHECO are starting 4/1/25 (new patient slots). Next 60 minute urgent slot is not until 4/29/25.     Will schedule with Nuria. Nuria- okay to use one of your upcoming 60 minute NP slots for this patient?     Last Tysabri infusion was 1/24/25. Would need solumedrol infusion 3/21/25.

## 2025-02-20 NOTE — RESULT ENCOUNTER NOTE
Nisreen Burns MD to Me  Nuria George PA-C       2/20/25  4:39 PM   Yes to hold of Tysabri and in 8 weeks from last dose patient should receive Solumedrol 1000 mg.    Please check for my NP 60 min slot in March - hope there are some openings with me, otherwise Nuria help will be appreciated

## 2025-02-20 NOTE — TELEPHONE ENCOUNTER
----- Message from Nisreen Burns MD sent at 2/18/2025  3:45 PM EST -----  How many years patient has been taking Tysabri?   If more than 3 years and JCV <0.9, may continue extended dose regimen, but we can hold off Tysabri on 3/7 and I will see her on 3/13.    I discussed with New that patient requires 60 min OVL to review all DMs in [place], discuss MOA, discuss potential SE of all of them, demonstrate how to do injections if ofatumumab...     OR    ...schedule visit with Pharmacist after my OVS  ----- Message -----  From: Bibiana Flores RN  Sent: 2/18/2025  12:50 PM EST  To: MD Cynthia Morris, to confirm- are you agreeable to below plan?  ----- Message -----  From: Nuria George PA-C  Sent: 2/18/2025  12:34 PM EST  To: MIGUELITO Mcmahon--I was cc'd on JCV result, looks like it was ordered by Dr. PACHECO.  Patient is on Tysabri and JCV is positive now.  She has an appt with Dr. PACHECO on 3/13/25.  I assume Dr. PACHECO will also review this, since it should be in her inbox. Looks like she is scheduled for Tysabri on 3/7/25.  Would assume Dr. PACHECO is ok with giving that infusion and then discussing alternatives at their appt on 3/13, but would also double check with T what she wants to do for the upcoming infusion.  Can let patient know JCV is positive and options will be discussed at upcoming visit

## 2025-02-21 NOTE — TELEPHONE ENCOUNTER
Reviewed below recommendations with pt, she is agreeable. She accepted appt with Nuria on 3/5/25.     Discussed JCV positive status and PML. No further questions at this time.     Will f/u on Solumedrol scheduling. Tysabri plan placed on hold (appt is on 3/7/25, after visit with Nuria). Will cancel once plan is confirmed at upcoming visit.

## 2025-03-05 ENCOUNTER — OFFICE VISIT (OUTPATIENT)
Dept: NEUROLOGY | Facility: CLINIC | Age: 45
End: 2025-03-05
Payer: COMMERCIAL

## 2025-03-05 VITALS
BODY MASS INDEX: 37.73 KG/M2 | HEART RATE: 92 BPM | DIASTOLIC BLOOD PRESSURE: 78 MMHG | SYSTOLIC BLOOD PRESSURE: 116 MMHG | WEIGHT: 221 LBS | HEIGHT: 64 IN | OXYGEN SATURATION: 99 % | RESPIRATION RATE: 18 BRPM | TEMPERATURE: 97.5 F

## 2025-03-05 DIAGNOSIS — E53.8 DEFICIENCY OF VITAMIN B12: ICD-10-CM

## 2025-03-05 DIAGNOSIS — E55.9 VITAMIN D DEFICIENCY: ICD-10-CM

## 2025-03-05 DIAGNOSIS — G35 MULTIPLE SCLEROSIS (HCC): Primary | ICD-10-CM

## 2025-03-05 PROCEDURE — 99214 OFFICE O/P EST MOD 30 MIN: CPT | Performed by: PHYSICIAN ASSISTANT

## 2025-03-05 RX ORDER — ERGOCALCIFEROL 1.25 MG/1
50000 CAPSULE, LIQUID FILLED ORAL WEEKLY
Qty: 12 CAPSULE | Refills: 0 | Status: SHIPPED | OUTPATIENT
Start: 2025-03-05

## 2025-03-05 NOTE — TELEPHONE ENCOUNTER
Saw patient today.  She is agreeable to cancel Tysabri on 3/7/25 and get a dose of IV steroids around 3/21/25.    We decided on Kesimpta as next DMT.  She needs to get her blood work done, which I ordered, and I had her sign a start form which I am holding on to.    Once I get her blood work back, I will submit Kesimpta start form and let you know so you can assist with Kesimpta start.    Thanks!

## 2025-03-05 NOTE — PATIENT INSTRUCTIONS
Will cancel next Tysabri infusion and set you up for a dose of IV steroids in about 2 weeks  Will obtain additional blood work and then start Kesimpta  I will send nursing a message once your blood work comes back and they will assist with getting the Kesimpta started  Update MRI brain at this time   Return in 3 months or sooner if needed

## 2025-03-05 NOTE — ASSESSMENT & PLAN NOTE
Patient currently on Tysabri (started 9/2022, has been on every 6 weeks from the start due to indeterminate JCV, reflexed negative).  She recently had updated JCV testing on 2/11/2025 which has now come back positive with index 0.57.      Case previously reviewed with Dr. Burns who provided recommendations.  Discussed JCV positive status with patient.  Decision has been made to transition off Tysabri.  She is due for infusion on 3/7/25 and will cancel this infusion and give her a single dose of IV solumedrol about 2 weeks later (around 3/21).      We discussed alternative DMTs.  Prior DMT history for this patient includes Copaxone, Tecfidera, Aubagio, and most recently Tysabri.  We discussed B-cell depleting therapy in detail today and she is interested in Kesimpta.  Side effects and safety concerns were explained.  She has no plans for pregnancy, has had tubal ligation.    Discussed she will need to get some additional blood work done before initiating Kesimpta.  Once that blood work is back and cleared, we can proceed with Kesimpta start.  She is agreeable.  Sending message to MS nurse navigator with plan regarding Tysabri d/c, 1 dose of IV steroids in 2 weeks, and eventual Kesimpta start.    Last brain MRI was 1 year ago 3/8/24.  This needs to be repeated for her Tysabri (PML) surveillance yearly, as well as to serve as a new baseline since switching DMTs.  Ordered today.     MS has been stable with no new symptoms.  Neurologic exam is stable today.

## 2025-03-05 NOTE — PROGRESS NOTES
Name: Maribel Kathleen      : 1980      MRN: [id number]  Encounter Provider: Nuria George PA-C  Encounter Date: 3/5/2025   [facility]  :  Assessment & Plan  Multiple sclerosis (HCC)  Patient currently on Tysabri (started 2022, has been on every 6 weeks from the start due to indeterminate JCV, reflexed negative).  She recently had updated JCV testing on 2025 which has now come back positive with index 0.57.      Case previously reviewed with Dr. Burns who provided recommendations.  Discussed JCV positive status with patient.  Decision has been made to transition off Tysabri.  She is due for infusion on 3/7/25 and will cancel this infusion and give her a single dose of IV solumedrol about 2 weeks later (around 3/21).      We discussed alternative DMTs.  Prior DMT history for this patient includes Copaxone, Tecfidera, Aubagio, and most recently Tysabri.  We discussed B-cell depleting therapy in detail today and she is interested in Kesimpta.  Side effects and safety concerns were explained.  She has no plans for pregnancy, has had tubal ligation.    Discussed she will need to get some additional blood work done before initiating Kesimpta.  Once that blood work is back and cleared, we can proceed with Kesimpta start.  She is agreeable.  Sending message to MS nurse navigator with plan regarding Tysabri d/c, 1 dose of IV steroids in 2 weeks, and eventual Kesimpta start.    Last brain MRI was 1 year ago 3/8/24.  This needs to be repeated for her Tysabri (PML) surveillance yearly, as well as to serve as a new baseline since switching DMTs.  Ordered today.     MS has been stable with no new symptoms.  Neurologic exam is stable today.       Vitamin D deficiency  Persistently low vit D.  New script for Rx sent today.  Orders:    ergocalciferol (VITAMIN D2) 50,000 units; Take 1 capsule (50,000 Units total) by mouth once a week    Deficiency of vitamin  B12  Should be taking OTC B12 1000mcg daily       Return in 3 months or sooner if needed        History of Present Illness   HPI   Patient is a 44 year old female who presents today for MS follow up, last seen in October 2024.     To review, patient's symptoms began in January 2015 when she had a work injury to the left shoulder. In March 2015, she had left sided numbness going down her torso, down into her feet and legs. She also experienced urinary leakage without Valsalva. Patient then experienced right eye pain and fogginess of vision. She was seen by Dr. De Santiago and had an OCT done. It revealed normal findings on the right but a sector defect on the left. MRI of the brain from May 13, 2015 revealed a few scattered nonspecific supratentorial WM lesions. No acute infarction, or hemorrhage or enh. MRI of the lumbar spine without contrast revealed small L5/S1 disc herniation, MRI of the T-spine revealed evidence of solitary 8mm lesion to the left of midline at T8/9 in the dorsal Cord. No pathological enh. MRI of the C-spine also done on June 13, 2015 revealed a solitary 5 mm focus of active demyelination in the right lateral cord at C2. Patient was given 3 days of IV steroids. Patient with significant GI upset with the steroids and also some mild change in mood with increased anxiety. She was NMO negative and Lyme negative. She did not have an LP. Patient was started on Copaxone as her initial DMT in 2015. She had a brief interval of being off med due to insurance. However, due to site reactions, patient stopped Copaxone and started Tecfidera in May 2016. Patient was subsequently told to stop her Tecfidera due to unplanned pregnancy in Nov 2017. Patient delivered her daughter on 7/18/18 without complication. She also had a tubal ligation. She restarted Tecfidera in January 2019 after she was done breastfeeding.     Patient had c/o low back pain as well as left hip pain. She had an x-ray of the lumbar spine which  showed moderate disc narrowing at L5/S1. She saw Orthopedics who agreed with MRI lumbar spine, however this was not approved by her insurance. She then did a trial of PT from 10/28/2019 through 11/20/2019. She also had an EMG of the left lower extremity on 11/27/2019, which was a normal study.  MRI lumbar spine was completed on 2/7/2020.  This demonstrated moderate to large left paramedian protrusion/superior extrusion type disc herniation which results in central and lateral recess stenosis potentially impacting exiting left L5 nerve root or descending left S1 nerve root.  Patient was advised to return to ortho, who she was already established with.  She has not seen ortho again.     Due to insurance reasons, she changed from brand name Tecfidera to generic dimethyl fumarate in December 2020.  Patient called our office on 3/12/21 reporting onset of tongue numbness on 3/8.  She then started experiencing numbness of her right lip and weakness of the right face on 3/11. On 3/12 she started to experience a bit of pain in the right face, which is why she called the office and then presented to the ED (prior to hearing back from our office).  Per the ER note, she had weakness of the entire right face, as well as decreased sensation of the entire right face.  Due to symptoms x 4 days, no stroke alert called, as well as this appeared like Bell's palsy.  I received a tiger text from the ED physician reporting her presentation was most concerning for Bell's palsy and asking if any further workup/treatment advised.  CTH negative.  Lyme negative, UA negative.  She had a brain MRI with attn to IACs (due to occasional vertigo and tinnitus) completed on 3/2/21 which showed stable MS, no pathology in the IACs.  She was discharged from the ED with a prednisone taper and valacyclovir.    She was seen in the office on 3/18/2021 and was doing ok, still with right facial weakness, decreased eye closure, tongue and facial numbness on  the right, hyperacusis.       At timing of her visit on 6/8/21 she admitted that she was not compliant with her dimethyl fumarate, forgetting to take it, and in fact had not taken it at all since April 2021.  She was having trouble requesting a refill from her specialty pharmacy but never informed our office about this.  She had also stopped her gabapentin due to she was concerned it caused weight gain.       Alternative DMTs were discussed at visits in June 2021. Patient had JCV testing done and negative with index 0.33. Tysabri and Aubagio were discussed.     At timing of October 2021 visit with Dr. Muniz, patient chose Aubagio. Start form was signed. Patient started Aubagio in Dec 2021 (7mg x 1 month then increase to 14mg).  She was on Aubagio for about 18 days and tested positive for COVID in Jan 2022 (she also had COVID prior to starting Aubagio in November 2021). She held the Aubagio for a few weeks until COVID symptoms improved and then restarted Aubagio 7mg before increase to 14mg.     At timing of patient's visit in April 2022 she informed me that she had stopped Aubagio, never called our office to report this. She stopped the medication because she noticed her hair was coming out while brushing her hair. She estimated that she took the medication for only a few weeks from late January to late February 2022. She reported stability of her MS symptoms, denied any new symptoms. We discussed either resuming dimethyl fumarate, Aubagio, or considering Tysabri.     She had labs done 4/21/22-JCV negative with index 0.22.  MRI brain completed 5/14/22 showed stable supratentorial white matter disease without progression. There was a new white matter lesion seen laterally in the upper cervical cord at the cervical medullary junction, eccentrically on the right, indicative of progressive demyelinating disease.     At timing of her visit in July 2022, she chose to initiate Tysabri. Updated JCV testing completed  7/25/22 and negative with index 0.28 (first indeterminate).      She had updated cord imaging on 8/19/22.  MRI c-spine compared to 1/14/19 with new demyelinating foci involving right lateral cord at cervicomedullary junction and mid/dorsal cord at level C3.  Stable punctuate focus within right lateral cord at level C2.  No active demyelination or cord atrophy.  MRI t-spine compared to 1/4/19 with new small demyelinating focus within left dorsolateral cord at level T11.  Stable small demyelinating focus at level T8-9.  No active demyelination or cord atrophy.     First Tysabri infusion was given 9/13/22.     She was seen by Dr. Burns for KIRSTEN due to Tysabri on 10/25/22. Due to JCV index being in indeterminate range, it was advised she have infusions every 6 weeks. Patient reported headaches with a family history of brain aneurysm in her half sister, therefore CTA was ordered. CTA head 11/4/22 showing 1-2 mm outpouching from the communicating segment of the left ICA may represent an infundibulum or tiny aneurysm.  Follow-up CT angiogram recommended in 6 months.     Today, patient reports things have been overall stable from a MS standpoint. She recently had JCV testing on 2/11/25 and she is now JCV positive with index 0.57.  Dr. Burns has advised transitioning to another DMT.  She is due to have Tysabri infusion on 3/7/25.  Dr. PACHECO advised cancelling this infusion and giving 1 dose of IV steroids around 2 weeks later. MRI brain completed 3/8/2024 and stable.       Review of Systems   Constitutional: Negative.  Negative for fatigue and fever.   HENT: Negative.  Negative for hearing loss, tinnitus and trouble swallowing.    Eyes:  Negative for photophobia, pain and visual disturbance.   Respiratory: Negative.  Negative for cough and shortness of breath.    Cardiovascular: Negative.  Negative for chest pain and palpitations.   Gastrointestinal:  Negative for constipation, diarrhea, nausea and vomiting.    Endocrine: Negative.    Genitourinary: Negative.  Negative for difficulty urinating and urgency.   Musculoskeletal: Negative.  Negative for back pain, gait problem and neck pain.   Skin: Negative.  Negative for rash.   Neurological:  Positive for numbness. Negative for dizziness, tremors, seizures, syncope, speech difficulty, weakness and headaches.   Hematological: Negative.    Psychiatric/Behavioral:  Negative for decreased concentration and sleep disturbance. The patient is not nervous/anxious.     I have personally reviewed the MA's review of systems and made changes as necessary.    Current Outpatient Medications on File Prior to Visit   Medication Sig Dispense Refill    acetaminophen (TYLENOL) 500 mg tablet Take 1 tablet (500 mg total) by mouth every 6 (six) hours as needed for mild pain 30 tablet 0    Cyanocobalamin 1000 MCG SUBL Place 1 tablet (1,000 mcg total) under the tongue daily 90 tablet 0    magnesium Oxide (MAG-OX) 400 mg TABS Take 1 tablet (400 mg total) by mouth daily 90 tablet 1    natalizumab (Tysabri) 300 mg/15 mL Inject into a catheter in a vein      triamcinolone (KENALOG) 0.1 % ointment Apply topically 2 (two) times a day 30 g 0    albuterol (Proventil HFA) 90 mcg/act inhaler Inhale 2 puffs every 6 (six) hours as needed for wheezing (Patient not taking: Reported on 2/20/2024) 6.7 g 0    calcium citrate-vitamin D 315 mg-5 mcg tablet Take 1 tablet by mouth 2 (two) times a day (Patient not taking: Reported on 2/20/2024)      cyclobenzaprine (FLEXERIL) 10 mg tablet Take 1 tablet (10 mg total) by mouth daily at bedtime (Patient not taking: Reported on 6/20/2024) 30 tablet 1    fluticasone (FLONASE) 50 mcg/act nasal spray 1 spray into each nostril daily (Patient not taking: Reported on 1/24/2023) 16 g 1    ibuprofen (MOTRIN) 600 mg tablet Take 1 tablet (600 mg total) by mouth every 6 (six) hours as needed for mild pain (Patient not taking: Reported on 11/25/2024) 30 tablet 0    ketorolac  "(TORADOL) 10 mg tablet Take 1 tablet (10 mg total) by mouth every 6 (six) hours as needed for moderate pain (Patient not taking: Reported on 5/11/2023) 10 tablet 0    naproxen (Naprosyn) 500 mg tablet Take 1 tablet (500 mg total) by mouth 2 (two) times a day with meals 60 tablet 0    Syringe/Needle, Disp, (SYRINGE 3CC/25GX1\") 25G X 1\" 3 ML MISC Administer B12 1000mcg IM once per week for three (3) weeks; then once per month for five (5) months (Patient not taking: Reported on 6/20/2024) 8 each 0    [DISCONTINUED] ergocalciferol (VITAMIN D2) 50,000 units Take 1 capsule (50,000 Units total) by mouth once a week (Patient not taking: Reported on 3/5/2025) 8 capsule 0     Current Facility-Administered Medications on File Prior to Visit   Medication Dose Route Frequency Provider Last Rate Last Admin    cyanocobalamin injection 1,000 mcg  1,000 mcg Intramuscular Q30 Days Luna Caruso MD   1,000 mcg at 11/20/23 1555    cyanocobalamin injection 1,000 mcg  1,000 mcg Intramuscular Q30 Days Luna Caruso MD   1,000 mcg at 10/20/23 1557         Objective   /78 (BP Location: Right arm, Patient Position: Sitting, Cuff Size: Standard)   Pulse 92   Temp 97.5 °F (36.4 °C) (Temporal)   Resp 18   Ht 5' 4.17\" (1.63 m)   Wt 100 kg (221 lb)   SpO2 99%   BMI 37.73 kg/m²     Physical Exam  Constitutional:       Appearance: Normal appearance.   Eyes:      Extraocular Movements: EOM normal.      Pupils: Pupils are equal, round, and reactive to light.   Neurological:      Mental Status: She is alert.      Motor: Motor strength is normal.     Deep Tendon Reflexes: Reflexes are normal and symmetric.   Psychiatric:         Mood and Affect: Mood normal.         Speech: Speech normal.         Behavior: Behavior normal.       Neurological Exam  Mental Status  Alert. Oriented to person, place, time and situation. Speech is normal. Language is fluent with no aphasia. Attention and concentration are normal.    Cranial Nerves  CN II: " Visual fields full to confrontation.  CN III, IV, VI: Extraocular movements intact bilaterally. Pupils equal round and reactive to light bilaterally.  CN V: Facial sensation is normal.  CN VII: Full and symmetric facial movement.  CN VIII: Hearing is normal.  CN IX, X: Palate elevates symmetrically  CN XI: Shoulder shrug strength is normal.  CN XII: Tongue midline without atrophy or fasciculations.    Motor   Normal muscle tone. Strength is 5/5 throughout all four extremities.    Sensory  Light touch is normal in upper and lower extremities.     Reflexes  Deep tendon reflexes are 2+ and symmetric in all four extremities.    Coordination  Right: Finger-to-nose normal.Left: Finger-to-nose normal.    Gait  Casual gait is normal including stance, stride, and arm swing.    I have spent a total time of 45 minutes in caring for this patient on the day of the visit/encounter including Diagnostic results, Risks and benefits of tx options, Instructions for management, Patient and family education, Risk factor reductions, Impressions, Counseling / Coordination of care, Documenting in the medical record, Reviewing/placing orders in the medical record (including tests, medications, and/or procedures), Obtaining or reviewing history  , and Communicating with other healthcare professionals .

## 2025-03-05 NOTE — ASSESSMENT & PLAN NOTE
Persistently low vit D.  New script for Rx sent today.  Orders:    ergocalciferol (VITAMIN D2) 50,000 units; Take 1 capsule (50,000 Units total) by mouth once a week

## 2025-03-06 DIAGNOSIS — G35 MULTIPLE SCLEROSIS (HCC): Primary | ICD-10-CM

## 2025-03-06 RX ORDER — SODIUM CHLORIDE 9 MG/ML
20 INJECTION, SOLUTION INTRAVENOUS ONCE
OUTPATIENT
Start: 2025-03-21

## 2025-03-06 NOTE — TELEPHONE ENCOUNTER
Pt has coverage with [employer]. PA is not required for , [id number], or [id number].     Solumedrol plan entered and sent for signature.    Called [facility] infusion center and spoke with Michelle. Cancelled Tysabri infusion appt scheduled for tomorrow.      Tysabri TOUCH discontinuation questionnaire submitted.

## 2025-03-07 ENCOUNTER — HOSPITAL ENCOUNTER (OUTPATIENT)
Dept: INFUSION CENTER | Facility: CLINIC | Age: 45
Discharge: HOME | End: 2025-03-07

## 2025-03-07 NOTE — TELEPHONE ENCOUNTER
Plan is signed.     Called [facility] and spoke with Mackenzie. Scheduled Solumedrol infusion for 3/21/25 @ 11:30am.     Pt made aware, she is agreeable. Reminded pt once lab results are in will begin Kesimpta start process as per below note. No further questions.

## 2025-03-21 ENCOUNTER — HOSPITAL ENCOUNTER (OUTPATIENT)
Dept: INFUSION CENTER | Facility: CLINIC | Age: 45
Discharge: HOME | End: 2025-03-21
Payer: COMMERCIAL

## 2025-03-21 ENCOUNTER — TELEPHONE (OUTPATIENT)
Dept: NEUROLOGY | Facility: CLINIC | Age: 45
End: 2025-03-21

## 2025-03-21 VITALS
HEART RATE: 80 BPM | DIASTOLIC BLOOD PRESSURE: 72 MMHG | SYSTOLIC BLOOD PRESSURE: 118 MMHG | RESPIRATION RATE: 16 BRPM | TEMPERATURE: 97.4 F

## 2025-03-21 DIAGNOSIS — G35 MULTIPLE SCLEROSIS (HCC): Primary | ICD-10-CM

## 2025-03-21 PROCEDURE — 96365 THER/PROPH/DIAG IV INF INIT: CPT

## 2025-03-21 RX ORDER — SODIUM CHLORIDE 9 MG/ML
20 INJECTION, SOLUTION INTRAVENOUS ONCE
Status: CANCELLED | OUTPATIENT
Start: 2025-03-21

## 2025-03-21 RX ORDER — SODIUM CHLORIDE 9 MG/ML
20 INJECTION, SOLUTION INTRAVENOUS ONCE
Status: COMPLETED | OUTPATIENT
Start: 2025-03-21 | End: 2025-03-21

## 2025-03-21 RX ADMIN — SODIUM CHLORIDE 20 ML/HR: 9 INJECTION, SOLUTION INTRAVENOUS at 11:23

## 2025-03-21 RX ADMIN — SODIUM CHLORIDE 1000 MG: 0.9 INJECTION, SOLUTION INTRAVENOUS at 11:47

## 2025-03-21 NOTE — TELEPHONE ENCOUNTER
"See \"results follow up\" note 3/7/25.  I saw patient on 3/5 and decision was made to change to Kesimpta (from Tysabri due to JCV positive).  She is getting a dose of IV steroids today it looks like.  She still needs to get her labs done in order to proceed with Kesimpta start (I am holding onto her signed start form).  Please remind her to get the blood work done so we can proceed with starting Kesimpta.  Thanks   "

## 2025-03-21 NOTE — PROGRESS NOTES
Pt tolerated infusion today without incident.  Pt declined AVS.  Pt has no future appts with infusion center at this time.  Pt given work excuse

## 2025-03-21 NOTE — PLAN OF CARE
Problem: Potential for Falls  Goal: Patient will remain free of falls  Description: INTERVENTIONS:- Educate patient/family on patient safety including physical limitations- Instruct patient to call for assistance with activity - Consult OT/PT to assist with strengthening/mobility - Keep Call bell within reach- Keep bed low and locked with side rails adjusted as appropriate- Keep care items and personal belongings within reach- Initiate and maintain comfort rounds- Make Fall Risk Sign visible to staff- Offer Toileting every  Hours, in advance of need- Initiate/Maintain alarm- Obtain necessary fall risk management equipment: - Apply yellow socks and bracelet for high fall risk patients- Consider moving patient to room near nurses station  Outcome: Progressing

## 2025-03-22 ENCOUNTER — HOSPITAL ENCOUNTER (OUTPATIENT)
Dept: MRI IMAGING | Facility: HOSPITAL | Age: 45
Discharge: HOME | End: 2025-03-22
Payer: COMMERCIAL

## 2025-03-22 ENCOUNTER — LAB (OUTPATIENT)
Dept: LAB | Facility: HOSPITAL | Age: 45
End: 2025-03-22
Payer: COMMERCIAL

## 2025-03-22 DIAGNOSIS — G35 MULTIPLE SCLEROSIS (HCC): ICD-10-CM

## 2025-03-22 LAB
HAV IGM SER QL: NORMAL
HBV CORE IGM SER QL: NORMAL
HBV SURFACE AG SER QL: NORMAL
HCV AB SER QL: NORMAL
HIV 1+2 AB+HIV1 P24 AG SERPL QL IA: NORMAL
IGA SERPL-MCNC: 201 MG/DL (ref 66–433)
IGG SERPL-MCNC: 765 MG/DL (ref 635–1741)
IGM SERPL-MCNC: 78 MG/DL (ref 45–281)

## 2025-03-22 PROCEDURE — 80074 ACUTE HEPATITIS PANEL: CPT

## 2025-03-22 PROCEDURE — 82784 ASSAY IGA/IGD/IGG/IGM EACH: CPT

## 2025-03-22 PROCEDURE — 36415 COLL VENOUS BLD VENIPUNCTURE: CPT

## 2025-03-22 PROCEDURE — 86360 T CELL ABSOLUTE COUNT/RATIO: CPT

## 2025-03-22 PROCEDURE — 86355 B CELLS TOTAL COUNT: CPT

## 2025-03-22 PROCEDURE — 70551 MRI BRAIN STEM W/O DYE: CPT

## 2025-03-22 PROCEDURE — 86359 T CELLS TOTAL COUNT: CPT

## 2025-03-22 PROCEDURE — 87389 HIV-1 AG W/HIV-1&-2 AB AG IA: CPT

## 2025-03-23 LAB
BASOPHILS # BLD AUTO: 0 X10E3/UL (ref 0–0.2)
BASOPHILS NFR BLD AUTO: 0 %
CD19 CELLS # BLD: 264 /UL (ref 12–645)
CD19 CELLS NFR BLD: 24 % (ref 3.3–25.4)
CD3 CELLS # BLD: 724 /UL (ref 622–2402)
CD3 CELLS NFR BLD: 65.8 % (ref 57.5–86.2)
CD3+CD4+ CELLS # BLD: 344 /UL (ref 359–1519)
CD3+CD4+ CELLS NFR BLD: 31.3 % (ref 30.8–58.5)
CD3+CD4+ CELLS/CD3+CD8+ CLL BLD: 0.95 % (ref 0.92–3.72)
CD3+CD8+ CELLS # BLD: 362 /UL (ref 109–897)
CD3+CD8+ CELLS NFR BLD: 32.9 % (ref 12–35.5)
EOSINOPHIL # BLD AUTO: 0 X10E3/UL (ref 0–0.4)
EOSINOPHIL NFR BLD AUTO: 0 %
ERYTHROCYTE [DISTWIDTH] IN BLOOD BY AUTOMATED COUNT: 15.3 % (ref 11.7–15.4)
HCT VFR BLD AUTO: 27.2 % (ref 34–46.6)
HGB BLD-MCNC: 8.7 G/DL (ref 11.1–15.9)
IMM GRANULOCYTES # BLD: 0.1 X10E3/UL (ref 0–0.1)
IMM GRANULOCYTES NFR BLD: 1 %
LYMPHOCYTES # BLD AUTO: 1.1 X10E3/UL (ref 0.7–3.1)
LYMPHOCYTES NFR BLD AUTO: 6 %
MCH RBC QN AUTO: 31.8 PG (ref 26.6–33)
MCHC RBC AUTO-ENTMCNC: 32 G/DL (ref 31.5–35.7)
MCV RBC AUTO: 99 FL (ref 79–97)
MONOCYTES # BLD AUTO: 0.7 X10E3/UL (ref 0.1–0.9)
MONOCYTES NFR BLD AUTO: 3 %
NEUTROPHILS # BLD AUTO: 18.6 X10E3/UL (ref 1.4–7)
NEUTROPHILS NFR BLD AUTO: 90 %
PLATELET # BLD AUTO: 362 X10E3/UL (ref 150–450)
RBC # BLD AUTO: 2.74 X10E6/UL (ref 3.77–5.28)
WBC # BLD AUTO: 20.5 X10E3/UL (ref 3.4–10.8)

## 2025-03-27 NOTE — TELEPHONE ENCOUNTER
Labs completed. Quantiferon TB not drawn (unknown why). Pt last had TB skin test on 11/27/24, negative. Please enter new Quantiferon TB order if needed.     Please submit start form if appropriate. Thanks!

## 2025-04-01 NOTE — PROGRESS NOTES
Subjective      Maribel Kathleen is a 44 y.o. female who presents for annual GYN exam.     GYN:  Menses are regular, q month,  lasting 6-8 days. +heavy bleeding with clots. CD2 is very heavy, changing a pad every 1.5 hour. Reports significant cramping.  She is a smoker with MS.   She declines hormonal BC.   She desires hysterectomy.   Last US:  Normal.   She reports discharge (denies odor/irritation); primarily, close to time of menses.  Contraception: TL.  Patient is not currently sexually active.      OB:  OB History    Para Term  AB Living   4 4 4   4   SAB IAB Ectopic Multiple Live Births      0 4      # Outcome Date GA Lbr Remington/2nd Weight Sex Type Anes PTL Lv   4 Term 18 39w0d / 00:12 3090 g (6 lb 13 oz) F Vag-Spont EPI N JACQUELINE   3 Term 10/02/13 40w0d  3175 g (7 lb) F Vag-Spont EPI  JACQUELINE   2 Term 05/10/08 40w0d  3657 g (8 lb 1 oz) M Vag-Spont EPI  JACQUELINE   1 Term 02 39w0d  2778 g (6 lb 2 oz) M Vag-Spont EPI  JACQUELINE      Obstetric Comments   :   M;   M;   F;   F         :  Denies dysuria, urinary frequency.  +urgency.   Denies hematuria, flank pain, incontinence.    Breast:  Denies breast mass, skin changes, dimpling, reddening, nipple retraction.  Denies breast discharge.  Patient does not have a family history of breast, endometrial, colon, or ovarian ca.     Cancer-related family history is not on file.    Past Medical History:   Diagnosis Date    Abnormal Pap smear of cervix     Bell palsy 2021    Right side    Depression     Last assessed: 12    Dizziness     Fatigue     History of Bell's palsy     History of chlamydia infection     History of gonorrhea     HL (hearing loss) 2021    MS (multiple sclerosis) (HCC)     Multiple sclerosis (HCC)     Nasal congestion     Obesity     Ovarian cyst     Sinusitis     Urinary tract infection     Varicella     HX DISEASE    Visual impairment        Past Surgical History:   Procedure Laterality Date  "   KNEE ARTHROSCOPY      MA LAPAROSCOPY FULGURATION OVIDUCTS Bilateral 10/05/2018    Procedure: LIGATION TUBAL LAPAROSCOPIC - FALOPE RINGS;  Surgeon: Shilpa Rai MD;  Location: [facility];  Service: Gynecology    TUBAL LIGATION           General:  BMI: 38  Work: [employer]  Safety: feels safe at home    Social History     Tobacco Use    Smoking status: Every Day     Current packs/day: 0.50     Average packs/day: 0.5 packs/day for 25.0 years (12.5 ttl pk-yrs)     Types: Cigarettes    Smokeless tobacco: Never   Vaping Use    Vaping status: Never Used   Substance Use Topics    Alcohol use: Yes     Alcohol/week: 2.0 standard drinks of alcohol     Types: 2 Glasses of wine per week     Comment: socially, special occasions    Drug use: No       Screening:  Cervical cancer: last pap smear in 05/11/2023. Results were NILM/HPV-  Breast cancer: last mammogram in 10/12/2024. Results were B1.  Colon cancer: last colonoscopy in Not on file   Review of Systems   Constitutional:  Negative for fatigue.   Eyes:  Negative for photophobia and visual disturbance.   Respiratory:  Negative for cough and shortness of breath.    Cardiovascular:  Negative for chest pain and palpitations.   Gastrointestinal:  Negative for abdominal pain, blood in stool, constipation, diarrhea, nausea and rectal pain.   Genitourinary:  Negative for dyspareunia, dysuria, flank pain, frequency, genital sores, menstrual problem, pelvic pain, urgency, vaginal bleeding, vaginal discharge and vaginal pain.   Musculoskeletal:  Negative for arthralgias and back pain.   Skin:  Negative for rash.   Neurological:  Negative for weakness and headaches.          Objective      /76 (BP Location: Left arm, Patient Position: Sitting, Cuff Size: Standard)   Ht 5' 4.17\" (1.63 m)   Wt 101 kg (223 lb)   LMP 03/14/2025 (Exact Date)   BMI 38.08 kg/m²   Physical Exam  Vitals and nursing note reviewed.   Constitutional:       Appearance: Normal appearance.   HENT:      Head: " Normocephalic.   Eyes:      Conjunctiva/sclera: Conjunctivae normal.   Cardiovascular:      Rate and Rhythm: Normal rate and regular rhythm.      Heart sounds: Normal heart sounds.   Pulmonary:      Effort: Pulmonary effort is normal.      Breath sounds: Normal breath sounds.   Chest:   Breasts:     Right: Normal. No inverted nipple, mass, nipple discharge, skin change or tenderness.      Left: Normal. No inverted nipple, mass, nipple discharge, skin change or tenderness.   Abdominal:      General: Abdomen is flat.      Palpations: Abdomen is soft. There is no mass.      Tenderness: There is no abdominal tenderness. There is no right CVA tenderness or left CVA tenderness.   Genitourinary:     General: Normal vulva.      Exam position: Lithotomy position.      Pubic Area: No rash or pubic lice.       Labia:         Right: No rash or tenderness.         Left: No rash or tenderness.       Urethra: No prolapse or urethral pain.      Vagina: Normal. No vaginal discharge.      Cervix: Normal.      Uterus: Normal.       Adnexa: Right adnexa normal and left adnexa normal.        Right: No mass or tenderness.          Left: No mass or tenderness.        Comments: Bulbous cervix   Musculoskeletal:         General: Normal range of motion.      Cervical back: Normal range of motion. No tenderness.      Right lower leg: No edema.      Left lower leg: No edema.   Lymphadenopathy:      Cervical: No cervical adenopathy.      Upper Body:      Right upper body: No supraclavicular or axillary adenopathy.      Left upper body: No supraclavicular or axillary adenopathy.      Lower Body: No right inguinal adenopathy. No left inguinal adenopathy.   Skin:     General: Skin is warm and dry.      Findings: No rash.   Neurological:      Mental Status: She is alert and oriented to person, place, and time.   Psychiatric:         Mood and Affect: Mood normal.         Behavior: Behavior normal.         Thought Content: Thought content normal.          Judgment: Judgment normal.                 Assessment/Plan  Problem List Items Addressed This Visit       Vaginal discharge    Relevant Orders    Molecular Vaginal Panel    Chlamydia/GC amplified DNA by PCR    Menorrhagia with regular cycle    Relevant Orders    US pelvis complete w transvaginal     Other Visit Diagnoses         Well woman exam    -  Primary            Reviewed GYN concerns today. TVUS ordered for menorrhagia. Encouraged to make appt with physician to discuss surgical options and candidacy.   Birth control: TL  Cervical cancer screening: deferred  Breast Cancer screening: UTD  Colon cancer Screening: age 46  STD screening: desires  Reviewed healthy lifestyle and safe sex practices  RTO for annual exam or PRN      MICHAEL Saenz  OB/GYN  4/3/2025  2:57 PM

## 2025-04-03 ENCOUNTER — ANNUAL EXAM (OUTPATIENT)
Dept: OBGYN CLINIC | Facility: CLINIC | Age: 45
End: 2025-04-03
Payer: COMMERCIAL

## 2025-04-03 VITALS
DIASTOLIC BLOOD PRESSURE: 76 MMHG | HEIGHT: 64 IN | WEIGHT: 223 LBS | BODY MASS INDEX: 38.07 KG/M2 | SYSTOLIC BLOOD PRESSURE: 124 MMHG

## 2025-04-03 DIAGNOSIS — Z01.419 WELL WOMAN EXAM: Primary | ICD-10-CM

## 2025-04-03 DIAGNOSIS — N89.8 VAGINAL DISCHARGE: ICD-10-CM

## 2025-04-03 DIAGNOSIS — N92.0 MENORRHAGIA WITH REGULAR CYCLE: ICD-10-CM

## 2025-04-03 PROCEDURE — 99396 PREV VISIT EST AGE 40-64: CPT

## 2025-04-03 PROCEDURE — 81514 NFCT DS BV&VAGINITIS DNA ALG: CPT

## 2025-04-03 PROCEDURE — 87591 N.GONORRHOEAE DNA AMP PROB: CPT

## 2025-04-03 PROCEDURE — 87491 CHLMYD TRACH DNA AMP PROBE: CPT

## 2025-04-04 ENCOUNTER — RESULTS FOLLOW-UP (OUTPATIENT)
Dept: OBGYN CLINIC | Facility: CLINIC | Age: 45
End: 2025-04-04

## 2025-04-04 DIAGNOSIS — N76.0 BV (BACTERIAL VAGINOSIS): Primary | ICD-10-CM

## 2025-04-04 DIAGNOSIS — B96.89 BV (BACTERIAL VAGINOSIS): Primary | ICD-10-CM

## 2025-04-04 LAB
C GLABRATA DNA VAG QL NAA+PROBE: NEGATIVE
C KRUSEI DNA VAG QL NAA+PROBE: NEGATIVE
C TRACH DNA SPEC QL NAA+PROBE: NEGATIVE
CANDIDA SP 6 PNL VAG NAA+PROBE: NEGATIVE
N GONORRHOEA DNA SPEC QL NAA+PROBE: NEGATIVE
T VAGINALIS DNA VAG QL NAA+PROBE: NEGATIVE
VAGINOSIS/ITIS DNA PNL VAG PROBE+SIG AMP: POSITIVE

## 2025-04-04 RX ORDER — METRONIDAZOLE 500 MG/1
500 TABLET ORAL EVERY 12 HOURS SCHEDULED
Qty: 14 TABLET | Refills: 0 | Status: SHIPPED | OUTPATIENT
Start: 2025-04-04 | End: 2025-04-11

## 2025-04-14 DIAGNOSIS — M62.838 MUSCLE SPASM OF BOTH LOWER LEGS: ICD-10-CM

## 2025-04-14 DIAGNOSIS — M79.18 MYOFASCIAL PAIN SYNDROME, CERVICAL: ICD-10-CM

## 2025-04-15 ENCOUNTER — PATIENT MESSAGE (OUTPATIENT)
Dept: NEUROLOGY | Facility: CLINIC | Age: 45
End: 2025-04-15

## 2025-04-15 RX ORDER — LANOLIN ALCOHOL/MO/W.PET/CERES
400 CREAM (GRAM) TOPICAL DAILY
Qty: 90 TABLET | Refills: 1 | Status: SHIPPED | OUTPATIENT
Start: 2025-04-15

## 2025-04-22 ENCOUNTER — TELEPHONE (OUTPATIENT)
Dept: NEUROLOGY | Facility: CLINIC | Age: 45
End: 2025-04-22

## 2025-04-22 DIAGNOSIS — G35 MULTIPLE SCLEROSIS (HCC): Primary | ICD-10-CM

## 2025-04-30 ENCOUNTER — APPOINTMENT (OUTPATIENT)
Dept: LAB | Facility: CLINIC | Age: 45
End: 2025-04-30
Payer: COMMERCIAL

## 2025-04-30 DIAGNOSIS — G35 MULTIPLE SCLEROSIS (HCC): ICD-10-CM

## 2025-04-30 PROCEDURE — 86480 TB TEST CELL IMMUN MEASURE: CPT

## 2025-05-01 LAB
GAMMA INTERFERON BACKGROUND BLD IA-ACNC: 0.01 IU/ML
M TB IFN-G BLD-IMP: NEGATIVE
M TB IFN-G CD4+ BCKGRND COR BLD-ACNC: 0 IU/ML
M TB IFN-G CD4+ BCKGRND COR BLD-ACNC: 0 IU/ML
MITOGEN IGNF BCKGRD COR BLD-ACNC: 9.99 IU/ML

## 2025-05-02 NOTE — TELEPHONE ENCOUNTER
Scripts for both titration and maintenance sent.    Please remind patient that before her injections, specifically the titration doses and at least the first monthly injection, she should pre-medicate with ASA 81mg x 1 , APAP 500mg x 1 and diphenhydramine 25mg x 1 about 20 min prior to injection

## 2025-05-02 NOTE — TELEPHONE ENCOUNTER
[facility] in [place] is  asking for Kesimpta script to be sent to them.     Nuria MILLER please send script if agreeable, thank you!

## 2025-06-05 ENCOUNTER — OFFICE VISIT (OUTPATIENT)
Dept: NEUROLOGY | Facility: CLINIC | Age: 45
End: 2025-06-05
Payer: COMMERCIAL

## 2025-06-05 VITALS
SYSTOLIC BLOOD PRESSURE: 122 MMHG | BODY MASS INDEX: 37.56 KG/M2 | HEART RATE: 92 BPM | TEMPERATURE: 98 F | DIASTOLIC BLOOD PRESSURE: 68 MMHG | HEIGHT: 64 IN | WEIGHT: 220 LBS

## 2025-06-05 DIAGNOSIS — G35 MULTIPLE SCLEROSIS (HCC): Primary | ICD-10-CM

## 2025-06-05 DIAGNOSIS — E55.9 VITAMIN D DEFICIENCY: ICD-10-CM

## 2025-06-05 DIAGNOSIS — E53.8 B12 DEFICIENCY: ICD-10-CM

## 2025-06-05 PROCEDURE — 99214 OFFICE O/P EST MOD 30 MIN: CPT | Performed by: PHYSICIAN ASSISTANT

## 2025-06-05 RX ORDER — ERGOCALCIFEROL 1.25 MG/1
50000 CAPSULE, LIQUID FILLED ORAL WEEKLY
Qty: 12 CAPSULE | Refills: 0 | Status: SHIPPED | OUTPATIENT
Start: 2025-06-05

## 2025-06-05 NOTE — PROGRESS NOTES
Name: Maribel Kathleen      : 1980      MRN: [id number]  Encounter Provider: Nuria George PA-C  Encounter Date: 2025   Encounter department: [facility]  :  Assessment & Plan  Multiple sclerosis (HCC)  Patient recently transitioned from Tysabri to Kesimpta.  She had been on Tysabri since 2022, every 6 weeks from the start due to indeterminate JCV, reflexed negative.  However, on 2025, JCV was positive with index 0.57.    She is tolerating Kesimpta well, has just completed the titration dosing.      She reports over the last few weeks noticing an increase in symptoms in her legs (paresthesias).  These are longstanding symptoms for her, however due to recent transition of DMT, we will update her cord imaging.  She had recent brain MRI 3/22/2025 which was stable.    Will check her blood work approximately 1-2 weeks prior to her next visit which is already scheduled in 2025.    We again reviewed B-cell depleting therapy and increased risk for illness/infection and she should let us know if she is having any recurrent illness or infections.    Neurologic exam is stable today.  Orders:    MRI cervical spine with and without contrast; Future    MRI thoracic spine with and without contrast; Future    CBC and differential; Future    IgG, IgA, IgM; Future    Comprehensive metabolic panel; Future    Vitamin B12; Future    Vitamin D 25 hydroxy; Future    B12 deficiency  Continue supplement, will recheck a level with next set of routine blood work  Orders:    Vitamin B12; Future    Vitamin D deficiency  Continue supplement, will recheck a level with next set of routine blood work  Orders:    ergocalciferol (VITAMIN D2) 50,000 units; Take 1 capsule (50,000 Units total) by mouth once a week    Vitamin D 25 hydroxy; Future    Return in 4 months or sooner if needed      History of Present Illness   HPI   Patient is a 45 year old female who presents today for MS follow up,  last seen in March 2025.     To review, patient's symptoms began in January 2015 when she had a work injury to the left shoulder. In March 2015, she had left sided numbness going down her torso, down into her feet and legs. She also experienced urinary leakage without Valsalva. Patient then experienced right eye pain and fogginess of vision. She was seen by Dr. De Santiago and had an OCT done. It revealed normal findings on the right but a sector defect on the left. MRI of the brain from May 13, 2015 revealed a few scattered nonspecific supratentorial WM lesions. No acute infarction, or hemorrhage or enh. MRI of the lumbar spine without contrast revealed small L5/S1 disc herniation, MRI of the T-spine revealed evidence of solitary 8mm lesion to the left of midline at T8/9 in the dorsal Cord. No pathological enh. MRI of the C-spine also done on June 13, 2015 revealed a solitary 5 mm focus of active demyelination in the right lateral cord at C2. Patient was given 3 days of IV steroids. Patient with significant GI upset with the steroids and also some mild change in mood with increased anxiety. She was NMO negative and Lyme negative. She did not have an LP. Patient was started on Copaxone as her initial DMT in 2015. She had a brief interval of being off med due to insurance. However, due to site reactions, patient stopped Copaxone and started Tecfidera in May 2016. Patient was subsequently told to stop her Tecfidera due to unplanned pregnancy in Nov 2017. Patient delivered her daughter on 7/18/18 without complication. She also had a tubal ligation. She restarted Tecfidera in January 2019 after she was done breastfeeding.     Patient had c/o low back pain as well as left hip pain. She had an x-ray of the lumbar spine which showed moderate disc narrowing at L5/S1. She saw Orthopedics who agreed with MRI lumbar spine, however this was not approved by her insurance. She then did a trial of PT from 10/28/2019 through  11/20/2019. She also had an EMG of the left lower extremity on 11/27/2019, which was a normal study.  MRI lumbar spine was completed on 2/7/2020.  This demonstrated moderate to large left paramedian protrusion/superior extrusion type disc herniation which results in central and lateral recess stenosis potentially impacting exiting left L5 nerve root or descending left S1 nerve root.  Patient was advised to return to ortho, who she was already established with.  She has not seen ortho again.     Due to insurance reasons, she changed from brand name Tecfidera to generic dimethyl fumarate in December 2020.  Patient called our office on 3/12/21 reporting onset of tongue numbness on 3/8.  She then started experiencing numbness of her right lip and weakness of the right face on 3/11. On 3/12 she started to experience a bit of pain in the right face, which is why she called the office and then presented to the ED (prior to hearing back from our office).  Per the ER note, she had weakness of the entire right face, as well as decreased sensation of the entire right face.  Due to symptoms x 4 days, no stroke alert called, as well as this appeared like Bell's palsy.  I received a tiger text from the ED physician reporting her presentation was most concerning for Bell's palsy and asking if any further workup/treatment advised.  CTH negative.  Lyme negative, UA negative.  She had a brain MRI with attn to IACs (due to occasional vertigo and tinnitus) completed on 3/2/21 which showed stable MS, no pathology in the IACs.  She was discharged from the ED with a prednisone taper and valacyclovir.    She was seen in the office on 3/18/2021 and was doing ok, still with right facial weakness, decreased eye closure, tongue and facial numbness on the right, hyperacusis.       At timing of her visit on 6/8/21 she admitted that she was not compliant with her dimethyl fumarate, forgetting to take it, and in fact had not taken it at all since  April 2021.  She was having trouble requesting a refill from her specialty pharmacy but never informed our office about this.  She had also stopped her gabapentin due to she was concerned it caused weight gain.       Alternative DMTs were discussed at visits in June 2021. Patient had JCV testing done and negative with index 0.33. Tysabri and Aubagio were discussed.     At timing of October 2021 visit with Dr. Muniz, patient chose Aubagio. Start form was signed. Patient started Aubagio in Dec 2021 (7mg x 1 month then increase to 14mg).  She was on Aubagio for about 18 days and tested positive for COVID in Jan 2022 (she also had COVID prior to starting Aubagio in November 2021). She held the Aubagio for a few weeks until COVID symptoms improved and then restarted Aubagio 7mg before increase to 14mg.     At timing of patient's visit in April 2022 she informed me that she had stopped Aubagio, never called our office to report this. She stopped the medication because she noticed her hair was coming out while brushing her hair. She estimated that she took the medication for only a few weeks from late January to late February 2022. She reported stability of her MS symptoms, denied any new symptoms. We discussed either resuming dimethyl fumarate, Aubagio, or considering Tysabri.     She had labs done 4/21/22-JCV negative with index 0.22.  MRI brain completed 5/14/22 showed stable supratentorial white matter disease without progression. There was a new white matter lesion seen laterally in the upper cervical cord at the cervical medullary junction, eccentrically on the right, indicative of progressive demyelinating disease.     At timing of her visit in July 2022, she chose to initiate Tysabri. Updated JCV testing completed 7/25/22 and negative with index 0.28 (first indeterminate).      She had updated cord imaging on 8/19/22.  MRI c-spine compared to 1/14/19 with new demyelinating foci involving right lateral cord at  cervicomedullary junction and mid/dorsal cord at level C3.  Stable punctuate focus within right lateral cord at level C2.  No active demyelination or cord atrophy.  MRI t-spine compared to 1/4/19 with new small demyelinating focus within left dorsolateral cord at level T11.  Stable small demyelinating focus at level T8-9.  No active demyelination or cord atrophy.     First Tysabri infusion was given 9/13/22.     She was seen by Dr. Burns for KIRSTEN due to Tysabri on 10/25/22. Due to JCV index being in indeterminate range, it was advised she have infusions every 6 weeks. Patient reported headaches with a family history of brain aneurysm in her half sister, therefore CTA was ordered. CTA head 11/4/22 showing 1-2 mm outpouching from the communicating segment of the left ICA may represent an infundibulum or tiny aneurysm.  Follow-up CT angiogram recommended in 6 months.    She had JCV testing on 2/11/25 and she was noted to be JCV positive with index 0.57. Dr. Burns has advised transitioning to another DMT.     When seen in March 2025, we discussed alternative DMT's due to now being JCV positive.  She was most interested in Kesimpta.  MRI brain updated 3/22/2025 and stable.  She had blood work done for consideration of B-cell depleting therapy and has more recently started the Kesimpta a couple weeks ago.  She just finished the 3 week titration and is on her skip week before starting monthly injections.  She notes she forgot to take the premedication prior to the first injection and did experience a bit of a hypersensitivity reaction, but has been taking the premedication for the last 2 injections and did great with them.  She denies any recent illness or infections.  She reports she is having some increased paresthesias in her legs, which is a longstanding symptom for her.  She has not recognized any trigger for this.  No difficulty walking, no bowel or bladder changes.  She denies any other new  "symptoms.    Review of Systems   Constitutional: Negative.  Negative for fatigue and fever.   HENT: Negative.  Negative for hearing loss, tinnitus and trouble swallowing.    Eyes:  Negative for photophobia, pain and visual disturbance.   Respiratory: Negative.  Negative for cough and shortness of breath.    Cardiovascular: Negative.  Negative for chest pain and palpitations.   Gastrointestinal:  Negative for constipation, diarrhea, nausea and vomiting.   Endocrine: Negative.    Genitourinary: Negative.  Negative for difficulty urinating and urgency.   Musculoskeletal: Negative.  Negative for back pain, gait problem and neck pain.   Skin: Negative.  Negative for rash.   Neurological: Negative.  Negative for dizziness, tremors, seizures, syncope, speech difficulty, weakness, numbness and headaches.   Hematological: Negative.    Psychiatric/Behavioral:  Negative for decreased concentration and sleep disturbance. The patient is not nervous/anxious.     I have personally reviewed the MA's review of systems and made changes as necessary.    Medications Ordered Prior to Encounter[1]      Objective   /68   Pulse 92   Temp 98 °F (36.7 °C)   Ht 5' 4\" (1.626 m)   Wt 99.8 kg (220 lb)   BMI 37.76 kg/m²     Physical Exam  Constitutional:       Appearance: Normal appearance.     Eyes:      Extraocular Movements: EOM normal.      Pupils: Pupils are equal, round, and reactive to light.       Neurological:      Mental Status: She is alert.      Motor: Motor strength is normal.     Deep Tendon Reflexes: Reflexes are normal and symmetric.     Psychiatric:         Mood and Affect: Mood normal.         Speech: Speech normal.         Behavior: Behavior normal.       Neurological Exam  Mental Status  Alert. Oriented to person, place, time and situation. Speech is normal. Language is fluent with no aphasia. Attention and concentration are normal.    Cranial Nerves  CN II: Visual fields full to confrontation.  CN III, IV, VI: " Extraocular movements intact bilaterally. Pupils equal round and reactive to light bilaterally.  CN V: Facial sensation is normal.  CN VII: Full and symmetric facial movement.  CN VIII: Hearing is normal.  CN IX, X: Palate elevates symmetrically  CN XI: Shoulder shrug strength is normal.  CN XII: Tongue midline without atrophy or fasciculations.    Motor   Normal muscle tone. Strength is 5/5 throughout all four extremities.    Sensory  Light touch is normal in upper and lower extremities.     Reflexes  Deep tendon reflexes are 2+ and symmetric in all four extremities.    Coordination  Right: Finger-to-nose normal.Left: Finger-to-nose normal.    Gait  Casual gait is normal including stance, stride, and arm swing.           [1]   Current Outpatient Medications on File Prior to Visit   Medication Sig Dispense Refill    acetaminophen (TYLENOL) 500 mg tablet Take 1 tablet (500 mg total) by mouth every 6 (six) hours as needed for mild pain 30 tablet 0    calcium citrate-vitamin D 315 mg-5 mcg tablet Take 1 tablet by mouth in the morning and 1 tablet in the evening.      ibuprofen (MOTRIN) 600 mg tablet Take 1 tablet (600 mg total) by mouth every 6 (six) hours as needed for mild pain 30 tablet 0    magnesium Oxide (MAG-OX) 400 mg TABS Take 1 tablet (400 mg total) by mouth daily 90 tablet 1    Ofatumumab 20 MG/0.4ML SOAJ Inject 0.4mL SQ once a week at weeks 0, 1, 2 before switching to maintenance script 1.2 mL 0    triamcinolone (KENALOG) 0.1 % ointment Apply topically 2 (two) times a day (Patient taking differently: Apply topically as needed) 30 g 0    [DISCONTINUED] ergocalciferol (VITAMIN D2) 50,000 units Take 1 capsule (50,000 Units total) by mouth once a week 12 capsule 0    albuterol (Proventil HFA) 90 mcg/act inhaler Inhale 2 puffs every 6 (six) hours as needed for wheezing (Patient not taking: Reported on 4/3/2025) 6.7 g 0    Cyanocobalamin 1000 MCG SUBL Place 1 tablet (1,000 mcg total) under the tongue daily 90  "tablet 0    cyclobenzaprine (FLEXERIL) 10 mg tablet Take 1 tablet (10 mg total) by mouth daily at bedtime (Patient not taking: Reported on 4/3/2025) 30 tablet 1    fluticasone (FLONASE) 50 mcg/act nasal spray 1 spray into each nostril daily (Patient not taking: Reported on 1/24/2023) 16 g 1    ketorolac (TORADOL) 10 mg tablet Take 1 tablet (10 mg total) by mouth every 6 (six) hours as needed for moderate pain (Patient not taking: Reported on 5/11/2023) 10 tablet 0    naproxen (Naprosyn) 500 mg tablet Take 1 tablet (500 mg total) by mouth 2 (two) times a day with meals 60 tablet 0    Ofatumumab 20 MG/0.4ML SOAJ Inject 0.4mL SQ once a monthly after titration script is finished 0.4 mL 11    Syringe/Needle, Disp, (SYRINGE 3CC/25GX1\") 25G X 1\" 3 ML MISC Administer B12 1000mcg IM once per week for three (3) weeks; then once per month for five (5) months (Patient not taking: Reported on 6/20/2024) 8 each 0    [DISCONTINUED] natalizumab (Tysabri) 300 mg/15 mL Inject into a catheter in a vein (Patient not taking: Reported on 4/3/2025)       Current Facility-Administered Medications on File Prior to Visit   Medication Dose Route Frequency Provider Last Rate Last Admin    cyanocobalamin injection 1,000 mcg  1,000 mcg Intramuscular Q30 Days Luna Caruso MD   1,000 mcg at 11/20/23 1555    cyanocobalamin injection 1,000 mcg  1,000 mcg Intramuscular Q30 Days Luna Caruso MD   1,000 mcg at 10/20/23 1557     "

## 2025-06-05 NOTE — ASSESSMENT & PLAN NOTE
Continue supplement, will recheck a level with next set of routine blood work  Orders:    Vitamin B12; Future

## 2025-06-05 NOTE — ASSESSMENT & PLAN NOTE
Patient recently transitioned from Tysabri to Kesimpta.  She had been on Tysabri since 9/2022, every 6 weeks from the start due to indeterminate JCV, reflexed negative.  However, on 2/11/2025, JCV was positive with index 0.57.    She is tolerating Kesimpta well, has just completed the titration dosing.      She reports over the last few weeks noticing an increase in symptoms in her legs (paresthesias).  These are longstanding symptoms for her, however due to recent transition of DMT, we will update her cord imaging.  She had recent brain MRI 3/22/2025 which was stable.    Will check her blood work approximately 1-2 weeks prior to her next visit which is already scheduled in October 2025.    We again reviewed B-cell depleting therapy and increased risk for illness/infection and she should let us know if she is having any recurrent illness or infections.    Neurologic exam is stable today.  Orders:    MRI cervical spine with and without contrast; Future    MRI thoracic spine with and without contrast; Future    CBC and differential; Future    IgG, IgA, IgM; Future    Comprehensive metabolic panel; Future    Vitamin B12; Future    Vitamin D 25 hydroxy; Future

## 2025-06-05 NOTE — PATIENT INSTRUCTIONS
Continue with Kesimpta  Will update cord imaging due to increased symptoms, and for a new baseline  Check blood work prior to next visit (you are scheduled with Dr. PACHECO in October--can get the blood work done about 1-2 weeks before that visit)  Continue with vit D and B12 supplements   Return in 4 months as scheduled or sooner if needed

## 2025-06-05 NOTE — ASSESSMENT & PLAN NOTE
Continue supplement, will recheck a level with next set of routine blood work  Orders:    ergocalciferol (VITAMIN D2) 50,000 units; Take 1 capsule (50,000 Units total) by mouth once a week    Vitamin D 25 hydroxy; Future

## 2025-06-12 ENCOUNTER — TELEPHONE (OUTPATIENT)
Dept: NEUROLOGY | Facility: CLINIC | Age: 45
End: 2025-06-12

## 2025-06-24 NOTE — TELEPHONE ENCOUNTER
Cooling letter uploaded to media this date.  Awaiting patient application and then [facility]   
DERICK called patient at [phone].  Patient said she meets the income criteria for cooling products.  Patient plans to fill out the application and bring into one of the offices.  Asked patient to let staff know to alert  when she brings in the application.  [facility]  Expressed understanding.        [facility]   [id number] [place] [address]  PHONE# [phone]  FAX # [phone]                2025  Re: Maribel JOSE E Kathleen,  1980    To Whom it May Concern,       This letter is regarding our patient here at [facility], Maribel Kathleen.  Maribel was diagnosed with multiple sclerosis and is under my care for treatment of such.     Our patient, Maribel, needs cooling products from your cooling program to assist with her Multiple Sclerosis.  Through the distribution of cooling products, Maribel will have fewer negative effects of heat and humidity.  Beyond the medical reasons for these products, it would also help to offer a better quality of life with the least amount of stress.     If any other information is necessary, please feel free to contact my office at [phone].        Sincerely,     Nuria George PA-C  [facility]  
Patient had questions about cooling vest for MS.  Can you please assist? Thanks   
SW called patient at [phone].  Patient hasn't completed cooling product application yet.  No needs at this time.  SW remains available.   
No

## 2025-07-03 ENCOUNTER — HOSPITAL ENCOUNTER (OUTPATIENT)
Facility: MEDICAL CENTER | Age: 45
Discharge: HOME | End: 2025-07-03
Attending: PHYSICIAN ASSISTANT
Payer: COMMERCIAL

## 2025-07-03 DIAGNOSIS — G35 MULTIPLE SCLEROSIS (HCC): ICD-10-CM

## 2025-07-03 PROCEDURE — A9585 GADOBUTROL INJECTION: HCPCS | Performed by: PHYSICIAN ASSISTANT

## 2025-07-03 PROCEDURE — 72157 MRI CHEST SPINE W/O & W/DYE: CPT

## 2025-07-03 PROCEDURE — 72156 MRI NECK SPINE W/O & W/DYE: CPT

## 2025-07-03 RX ORDER — GADOBUTROL 604.72 MG/ML
10 INJECTION INTRAVENOUS
Status: COMPLETED | OUTPATIENT
Start: 2025-07-03 | End: 2025-07-03

## 2025-07-03 RX ADMIN — GADOBUTROL 10 ML: 604.72 INJECTION INTRAVENOUS at 09:33

## (undated) DEVICE — PVC URETHRAL CATHETER: Brand: DOVER

## (undated) DEVICE — ADHESIVE SKN CLSR HISTOACRYL FLEX 0.5ML LF

## (undated) DEVICE — ENDOPATH XCEL BLADELESS TROCARS WITH STABILITY SLEEVES: Brand: ENDOPATH XCEL

## (undated) DEVICE — SUT VICRYL 4-0 PS-2 27 IN J426H

## (undated) DEVICE — NEEDLE 25GA X 1 IN SAFETY GLIDE

## (undated) DEVICE — CHLORAPREP HI-LITE 26ML ORANGE

## (undated) DEVICE — GLOVE INDICATOR PI UNDERGLOVE SZ 6.5 BLUE

## (undated) DEVICE — 3000CC GUARDIAN II: Brand: GUARDIAN

## (undated) DEVICE — GLOVE PI ULTRA TOUCH SZ.6.5

## (undated) DEVICE — STERILE MINOR LAPAROSCOPY PACK: Brand: CARDINAL HEALTH

## (undated) DEVICE — INTENDED FOR TISSUE SEPARATION, AND OTHER PROCEDURES THAT REQUIRE A SHARP SURGICAL BLADE TO PUNCTURE OR CUT.: Brand: BARD-PARKER SAFETY BLADES SIZE 11, STERILE